# Patient Record
Sex: MALE | Race: WHITE | Employment: OTHER | URBAN - METROPOLITAN AREA
[De-identification: names, ages, dates, MRNs, and addresses within clinical notes are randomized per-mention and may not be internally consistent; named-entity substitution may affect disease eponyms.]

---

## 2017-01-01 ENCOUNTER — CARE COORDINATION (OUTPATIENT)
Dept: CARE COORDINATION | Facility: CLINIC | Age: 82
End: 2017-01-01

## 2017-01-01 ENCOUNTER — OFFICE VISIT (OUTPATIENT)
Dept: CARDIOLOGY | Facility: CLINIC | Age: 82
End: 2017-01-01
Attending: NURSE PRACTITIONER
Payer: MEDICARE

## 2017-01-01 VITALS
SYSTOLIC BLOOD PRESSURE: 120 MMHG | BODY MASS INDEX: 24.93 KG/M2 | HEART RATE: 48 BPM | DIASTOLIC BLOOD PRESSURE: 56 MMHG | WEIGHT: 155.1 LBS | HEIGHT: 66 IN

## 2017-01-01 DIAGNOSIS — E78.00 HYPERCHOLESTEROLEMIA: ICD-10-CM

## 2017-01-01 DIAGNOSIS — I25.10 CORONARY ARTERY DISEASE INVOLVING NATIVE CORONARY ARTERY OF NATIVE HEART WITHOUT ANGINA PECTORIS: Primary | ICD-10-CM

## 2017-01-01 DIAGNOSIS — I48.20 CHRONIC ATRIAL FIBRILLATION (H): ICD-10-CM

## 2017-01-01 DIAGNOSIS — G25.0 ESSENTIAL TREMOR: Primary | ICD-10-CM

## 2017-01-01 DIAGNOSIS — I35.0 MILD AORTIC STENOSIS: ICD-10-CM

## 2017-01-01 PROCEDURE — 99214 OFFICE O/P EST MOD 30 MIN: CPT | Performed by: INTERNAL MEDICINE

## 2017-01-01 RX ORDER — PROPRANOLOL HYDROCHLORIDE 40 MG/1
TABLET ORAL
Qty: 60 TABLET | Refills: 3 | Status: SHIPPED | OUTPATIENT
Start: 2017-01-01 | End: 2017-01-01

## 2017-01-30 DIAGNOSIS — I10 ESSENTIAL HYPERTENSION, BENIGN: Primary | ICD-10-CM

## 2017-01-30 RX ORDER — AMLODIPINE BESYLATE 5 MG/1
5 TABLET ORAL DAILY
Qty: 90 TABLET | Refills: 3 | Status: SHIPPED | OUTPATIENT
Start: 2017-01-30 | End: 2018-01-01

## 2017-03-13 ENCOUNTER — OFFICE VISIT (OUTPATIENT)
Dept: FAMILY MEDICINE | Facility: CLINIC | Age: 82
End: 2017-03-13

## 2017-03-13 VITALS
BODY MASS INDEX: 23.57 KG/M2 | HEART RATE: 59 BPM | DIASTOLIC BLOOD PRESSURE: 60 MMHG | WEIGHT: 155 LBS | SYSTOLIC BLOOD PRESSURE: 130 MMHG | OXYGEN SATURATION: 99 %

## 2017-03-13 DIAGNOSIS — R63.4 LOSS OF WEIGHT: ICD-10-CM

## 2017-03-13 DIAGNOSIS — R29.898 COGWHEEL RIGIDITY: Primary | ICD-10-CM

## 2017-03-13 DIAGNOSIS — R26.89 BALANCE DISORDER: ICD-10-CM

## 2017-03-13 LAB
% GRANULOCYTES: 80.2 % (ref 42.2–75.2)
BACTERIA URINE: NORMAL
BILIRUB UR QL STRIP: 0
BLOOD URINE DIP: 0
CASTS/LPF: NORMAL
COLOR UR: YELLOW
CRYSTAL URINE: NORMAL
EPITHELIAL CELLS - QUEST: NORMAL
GLUCOSE UR STRIP-MCNC: 0 MG/DL
HCT VFR BLD AUTO: 46.5 % (ref 39–51)
HEMOGLOBIN: 15.6 G/DL (ref 13.4–17.5)
KETONES UR QL STRIP: 0
LEUKOCYTE ESTERASE URINE DIP: 0
LYMPHOCYTES NFR BLD AUTO: 15 % (ref 20.5–51.1)
MCH RBC QN AUTO: 32.2 PG (ref 27–31)
MCHC RBC AUTO-ENTMCNC: 33.5 G/DL (ref 33–37)
MCV RBC AUTO: 96.1 FL (ref 80–100)
MONOCYTES NFR BLD AUTO: 4.8 % (ref 1.7–9.3)
MUCOUS URINE: NORMAL
NITRITE UR QL STRIP: NORMAL
PH UR STRIP: 7 PH (ref 5–9)
PLATELET # BLD AUTO: 169 K/UL (ref 140–450)
PROT UR QL: 0 MG/DL (ref ?–0.01)
RBC # BLD AUTO: 4.84 X10/CMM (ref 4.2–5.9)
RBC URINE: NORMAL (ref 0–3)
SP GR UR STRIP: 1.02 (ref 1–1.02)
UROBILINOGEN UR QL STRIP: 0.2 EU/DL (ref 0.2–1)
WBC # BLD AUTO: 7.4 X10/CMM (ref 3.8–11)
WBC URINE: NORMAL (ref 0–3)

## 2017-03-13 PROCEDURE — 81003 URINALYSIS AUTO W/O SCOPE: CPT | Performed by: FAMILY MEDICINE

## 2017-03-13 PROCEDURE — 85025 COMPLETE CBC W/AUTO DIFF WBC: CPT | Performed by: FAMILY MEDICINE

## 2017-03-13 PROCEDURE — 36415 COLL VENOUS BLD VENIPUNCTURE: CPT | Performed by: FAMILY MEDICINE

## 2017-03-13 PROCEDURE — 99214 OFFICE O/P EST MOD 30 MIN: CPT | Performed by: FAMILY MEDICINE

## 2017-03-13 NOTE — PROGRESS NOTES
Problem(s) Oriented visit        SUBJECTIVE:                                                    Deepak Arndt is a 81 year old male who presents to clinic today for the following health issues :    . Cogwheel rigidity  Found today    - Referral to [facility].    2. Loss of weight  See letter from wife  - Comp. Metabolic Panel (14) ([facility])  - CBC with Diff/Plt ([facility])  - PSA Serum ([facility])  - Referral to [facility].  - TSH ([facility])    3. Balance disorder  2 falls  - Referral to [facility].         Problem list, Medication list, Allergies, and Medical/Social/Surgical histories reviewed in [facility] and updated as appropriate.   Additional history: as documented    ROS:  General and Resp. completed and negative except as noted above    Histories:   Patient Active Problem List   Diagnosis     HTN (hypertension)     Osteoporosis     Hypercholesterolemia     Past myocardial infarction     Advance Care Planning     Chronic atrial fibrillation (H)     Health Care Home     Myocardial infarction (H)     Mild aortic stenosis     Coronary artery disease involving native coronary artery of native heart without angina pectoris     Aortic valve disorder     Past Surgical History   Procedure Laterality Date     Hc or ocular device intraop detached retina       Cataract iol, rt/lt         Social History   Substance Use Topics     Smoking status: Former Smoker     Packs/day: 1.00     Years: 20.00     Types: Cigarettes     Quit date: 1/1/1974     Smokeless tobacco: Never Used     Alcohol use 7.0 oz/week     14 Glasses of wine per week      Comment: 2/day     No family history on file.        OBJECTIVE:                                                    /60  Pulse 59  Wt 70.3 kg (155 lb)  SpO2 99%  BMI 23.57 kg/m2  Body mass index is 23.57 kg/(m^2).   APPEARANCE: = Relaxed and in no distress  Conj/Eyelids = noninjected and lids and lashes are without  inflammation  PERRLA/Irises = Pupils Round Reactive to Light and Irisis without inflammation  Neck = No anterior or posterior adenopathy appreciated.  Thyroid = Not enlarged and no masses felt  Resp effort = Calm regular breathing  Breath Sounds = Good air movement with no rales or rhonchi in any lung fields  Heart Rate, Rythym, & sounds (no Murm)  = Regular rate and rythym with no S3, S4, or murmer appreciated.  Carotid Art's = Pulses full and equal and no bruits appreciated  Abdomen = Soft, nontender, no masses, & bowel sounds in all quadrants  Liver/Spleen = Normal span and no splenomegaly noted  Digits and Nails = FROM in all finger joints, no nail dystrophy  Ext (edema) = No pretibial edema noted or elsewhere  Musculsktl: unsteady gait, and cogwheel rigidity  SKIN = absent significant rashes or lesions   Recent/Remote Memory = Alert and Oriented x 3  Mood/Affect = Cooperative and interested     ASSESSMENT/PLAN:                                                        Deepak was seen today for weight loss and fall.    Diagnoses and all orders for this visit:    Cogwheel rigidity  -     Referral to [facility]    Loss of weight  -     Comp. Metabolic Panel (14) ([facility])  -     CBC with Diff/Plt ([facility])  -     PSA Serum ([facility])  -     Referral to [facility]  -     TSH ([facility])  UA  Colonoscopy done 3 years ago and no PSA for 6 years,  Balance disorder  -     Referral to [facility]        See Patient Instructions    The following health maintenance items are reviewed in Epic and correct as of today:  Health Maintenance   Topic Date Due     MEDICARE ANNUAL WELLNESS VISIT  03/17/2002     PSA Q1 YR  05/04/2012     LIPID MONITORING Q6 MO( NO INBASKET)  12/01/2016     FALL RISK ASSESSMENT  05/26/2017     PNEUMOCOCCAL (2 of 2 - PPSV23) 06/01/2017     INFLUENZA VACCINE (SYSTEM ASSIGNED)  09/01/2017     ADVANCE DIRECTIVE PLANNING Q5 YRS (NO  NICKO)  10/29/2020     TETANUS IMMUNIZATION (SYSTEM ASSIGNED)  02/27/2023       Clinton Mills MD  [facility]  [phone]    For any issues my office # is [phone]

## 2017-03-13 NOTE — MR AVS SNAPSHOT
After Visit Summary   3/13/2017    Deepak Arndt    MRN: [id number]           Patient Information     Date Of Birth          1935        Visit Information        Provider Department      3/13/2017 8:45 AM Clinton Mills MD [facility]        Today's Diagnoses     Cogwheel rigidity    -  1    Loss of weight        Balance disorder           Follow-ups after your visit        Additional Services     Referral to [facility]       Referral to [facility]  Phone:  [phone]  Reason for Referral:  Parkinsons evaluation    Please be aware that coverage of these services is subject to the terms and limitations of your health insurance plan.  Call member services at your health plan with any benefit or coverage questions.                  Who to contact     If you have questions or need follow up information about today's clinic visit or your schedule please contact [facility] directly at [phone].  Normal or non-critical lab and imaging results will be communicated to you by MyChart, letter or phone within 4 business days after the clinic has received the results. If you do not hear from us within 7 days, please contact the clinic through [employer] or phone. If you have a critical or abnormal lab result, we will notify you by phone as soon as possible.  Submit refill requests through [employer] or call your pharmacy and they will forward the refill request to us. Please allow 3 business days for your refill to be completed.          Additional Information About Your Visit        MyChart Information     [employer] gives you secure access to your electronic health record. If you see a primary care provider, you can also send messages to your care team and make appointments. If you have questions, please call your primary care clinic.  If you do not have a primary care provider, please call [phone] and they will assist  you.        Care EveryWhere ID     This is your Care EveryWhere ID. This could be used by other organizations to access your [place] medical records  [id number]        Your Vitals Were     Pulse Pulse Oximetry BMI (Body Mass Index)             59 99% 23.57 kg/m2          Blood Pressure from Last 3 Encounters:   03/13/17 130/60   12/28/16 130/86   12/16/16 150/90    Weight from Last 3 Encounters:   03/13/17 70.3 kg (155 lb)   12/28/16 72.6 kg (160 lb)   12/16/16 72.6 kg (160 lb)              We Performed the Following     CBC with Diff/Plt ([facility])     Comp. Metabolic Panel (14) ([facility])     PSA Serum ([facility])     Referral to [facility]     TSH ([facility])     Urinalysis w/reflex protein, bili ([facility])        Primary Care Provider Office Phone # Fax #    Clinton Mills [phone]       [facility] [address] [facility] [address]        Thank you!     Thank you for choosing [facility]  for your care. Our goal is always to provide you with excellent care. Hearing back from our patients is one way we can continue to improve our services. Please take a few minutes to complete the written survey that you may receive in the mail after your visit with us. Thank you!             Your Updated Medication List - Protect others around you: Learn how to safely use, store and throw away your medicines at www.disposemymeds.org.          This list is accurate as of: 3/13/17  9:28 AM.  Always use your most recent med list.                   Brand Name Dispense Instructions for use    amLODIPine 5 MG tablet    NORVASC    90 tablet    Take 1 tablet (5 mg) by mouth daily       CALCIUM 500/VITAMIN D PO      Take 1 tablet by mouth daily.       DULoxetine 30 MG EC capsule    CYMBALTA         fish oil-omega-3 fatty acids 1000 MG capsule      Take 1 capsule by mouth daily.       hydrochlorothiazide 25 MG tablet    HYDRODIURIL    90 tablet    TAKE ONE TABLET BY  MOUTH ONE TIME DAILY       MULTIVITAMIN & MINERAL PO      Take  by mouth daily.       PRADAXA PO      Take 150 mg by mouth 2 times daily       simvastatin 10 MG tablet    ZOCOR     Take 5 mg by mouth At Bedtime

## 2017-03-14 LAB
ALBUMIN SERPL-MCNC: 4.4 G/DL (ref 3.5–4.7)
ALBUMIN/GLOB SERPL: 2.3 {RATIO} (ref 1.2–2.2)
ALP SERPL-CCNC: 66 IU/L (ref 39–117)
ALT SERPL-CCNC: 13 IU/L (ref 0–44)
AST SERPL-CCNC: 15 IU/L (ref 0–40)
BILIRUB SERPL-MCNC: 0.7 MG/DL (ref 0–1.2)
BUN SERPL-MCNC: 16 MG/DL (ref 8–27)
BUN/CREATININE RATIO: 19 (ref 10–22)
CALCIUM SERPL-MCNC: 10 MG/DL (ref 8.6–10.2)
CHLORIDE SERPLBLD-SCNC: 102 MMOL/L (ref 96–106)
CREAT SERPL-MCNC: 0.86 MG/DL (ref 0.76–1.27)
EGFR IF AFRICN AM: 95 ML/MIN/1.73
EGFR IF NONAFRICN AM: 82 ML/MIN/1.73
GLOBULIN, TOTAL: 1.9 G/DL (ref 1.5–4.5)
GLUCOSE SERPL-MCNC: 116 MG/DL (ref 65–99)
POTASSIUM SERPL-SCNC: 3.7 MMOL/L (ref 3.5–5.2)
PROT SERPL-MCNC: 6.3 G/DL (ref 6–8.5)
PSA NG/ML: 4.1 NG/ML (ref 0–4)
SODIUM SERPL-SCNC: 141 MMOL/L (ref 134–144)
TOTAL CO2: 22 MMOL/L (ref 18–28)
TSH BLD-ACNC: 0.86 UIU/ML (ref 0.45–4.5)

## 2017-03-16 NOTE — PROGRESS NOTES
Dear Deepak,   I am writing to report that your included test results are within expected ranges. I do not suggest that we make any changes at this time.    Clinton Mills M.D.

## 2017-05-04 ENCOUNTER — OFFICE VISIT (OUTPATIENT)
Dept: FAMILY MEDICINE | Facility: CLINIC | Age: 82
End: 2017-05-04

## 2017-05-04 VITALS
OXYGEN SATURATION: 96 % | HEART RATE: 47 BPM | TEMPERATURE: 98.8 F | WEIGHT: 157 LBS | BODY MASS INDEX: 23.87 KG/M2 | DIASTOLIC BLOOD PRESSURE: 60 MMHG | SYSTOLIC BLOOD PRESSURE: 138 MMHG

## 2017-05-04 DIAGNOSIS — J18.9 PNEUMONIA OF RIGHT LOWER LOBE DUE TO INFECTIOUS ORGANISM: ICD-10-CM

## 2017-05-04 DIAGNOSIS — R05.9 COUGH: Primary | ICD-10-CM

## 2017-05-04 DIAGNOSIS — R53.83 FATIGUE, UNSPECIFIED TYPE: ICD-10-CM

## 2017-05-04 LAB
% GRANULOCYTES: 82 % (ref 42.2–75.2)
HCT VFR BLD AUTO: 46.8 % (ref 39–51)
HEMOGLOBIN: 15.5 G/DL (ref 13.4–17.5)
LYMPHOCYTES NFR BLD AUTO: 9.5 % (ref 20.5–51.1)
MCH RBC QN AUTO: 32.1 PG (ref 27–31)
MCHC RBC AUTO-ENTMCNC: 33.1 G/DL (ref 33–37)
MCV RBC AUTO: 97 FL (ref 80–100)
MONOCYTES NFR BLD AUTO: 8.5 % (ref 1.7–9.3)
PLATELET # BLD AUTO: 138 K/UL (ref 140–450)
RBC # BLD AUTO: 4.82 X10/CMM (ref 4.2–5.9)
WBC # BLD AUTO: 7 X10/CMM (ref 3.8–11)

## 2017-05-04 PROCEDURE — 99214 OFFICE O/P EST MOD 30 MIN: CPT | Performed by: FAMILY MEDICINE

## 2017-05-04 PROCEDURE — 71020 XR CHEST 2 VW: CPT | Performed by: FAMILY MEDICINE

## 2017-05-04 PROCEDURE — 85025 COMPLETE CBC W/AUTO DIFF WBC: CPT | Performed by: FAMILY MEDICINE

## 2017-05-04 PROCEDURE — 36415 COLL VENOUS BLD VENIPUNCTURE: CPT | Performed by: FAMILY MEDICINE

## 2017-05-04 RX ORDER — CODEINE PHOSPHATE AND GUAIFENESIN 10; 100 MG/5ML; MG/5ML
2 SOLUTION ORAL EVERY 4 HOURS PRN
Qty: 236 ML | Refills: 0 | Status: SHIPPED | OUTPATIENT
Start: 2017-05-04 | End: 2017-05-15

## 2017-05-04 RX ORDER — LEVOFLOXACIN 500 MG/1
500 TABLET, FILM COATED ORAL DAILY
Qty: 10 TABLET | Refills: 0 | Status: SHIPPED | OUTPATIENT
Start: 2017-05-04 | End: 2017-05-15

## 2017-05-04 NOTE — MR AVS SNAPSHOT
After Visit Summary   5/4/2017    Deepak Arndt    MRN: [id number]           Patient Information     Date Of Birth          1935        Visit Information        Provider Department      5/4/2017 12:30 PM Clinton Mills MD [facility]        Today's Diagnoses     Cough    -  1    Fatigue, unspecified type        Pneumonia of right lower lobe due to infectious organism           Follow-ups after your visit        Your next 10 appointments already scheduled     Jun 06, 2017  2:30 PM CDT   PHYSICAL with Clinton Mills MD   [facility]    [address] [place] [address] [phone]              Who to contact     If you have questions or need follow up information about today's clinic visit or your schedule please contact [facility] directly at [phone].  Normal or non-critical lab and imaging results will be communicated to you by [employer], letter or phone within 4 business days after the clinic has received the results. If you do not hear from us within 7 days, please contact the clinic through [employer] or phone. If you have a critical or abnormal lab result, we will notify you by phone as soon as possible.  Submit refill requests through [email or web address] or call your pharmacy and they will forward the refill request to us. Please allow 3 business days for your refill to be completed.          Additional Information About Your Visit        MyChart Information     [email or web address] gives you secure access to your electronic health record. If you see a primary care provider, you can also send messages to your care team and make appointments. If you have questions, please call your primary care clinic.  If you do not have a primary care provider, please call [phone] and they will assist you.        Care EveryWhere ID     This is your Care EveryWhere ID. This could be used by other organizations to access your [place]  medical records  [id number]        Your Vitals Were     Pulse Temperature Pulse Oximetry BMI (Body Mass Index)          47 98.8  F (37.1  C) (Oral) 96% 23.87 kg/m2         Blood Pressure from Last 3 Encounters:   05/04/17 138/60   03/13/17 130/60   12/28/16 130/86    Weight from Last 3 Encounters:   05/04/17 71.2 kg (157 lb)   03/13/17 70.3 kg (155 lb)   12/28/16 72.6 kg (160 lb)              We Performed the Following     CBC with Diff/Plt ([facility])     Comp. Metabolic Panel (14) ([facility])     TSH ([facility])     X-ray Chest 2 vws*          Today's Medication Changes          These changes are accurate as of: 5/4/17  1:14 PM.  If you have any questions, ask your nurse or doctor.               Start taking these medicines.        Dose/Directions    levofloxacin 500 MG tablet   Commonly known as:  LEVAQUIN   Used for:  Pneumonia of right lower lobe due to infectious organism   Started by:  Clinton Mills MD        Dose:  500 mg   Take 1 tablet (500 mg) by mouth daily   Quantity:  10 tablet   Refills:  0         These medicines have changed or have updated prescriptions.        Dose/Directions    * CHERATUSSIN AC PO   This may have changed:  Another medication with the same name was added. Make sure you understand how and when to take each.   Changed by:  Clinton Mills MD        Refills:  0       * guaiFENesin-codeine 100-10 MG/5ML Soln solution   Commonly known as:  ROBITUSSIN AC   This may have changed:  You were already taking a medication with the same name, and this prescription was added. Make sure you understand how and when to take each.   Used for:  Cough, Pneumonia of right lower lobe due to infectious organism   Changed by:  Clinton Mills MD        Dose:  2 tsp.   Take 10 mLs by mouth every 4 hours as needed for cough   Quantity:  236 mL   Refills:  0       * Notice:  This list has 2 medication(s) that are the same as other medications prescribed for you. Read the directions carefully, and  ask your doctor or other care provider to review them with you.         Where to get your medicines      These medications were sent to [facility] [address] [place] - [address]  [address] [place] [address]     Phone:  [phone]     levofloxacin 500 MG tablet         Some of these will need a paper prescription and others can be bought over the counter.  Ask your nurse if you have questions.     Bring a paper prescription for each of these medications     guaiFENesin-codeine 100-10 MG/5ML Soln solution                Primary Care Provider Office Phone # Fax #    Clinton Mills [phone]       [facility] [address]  [facility] [address]        Thank you!     Thank you for choosing [facility]  for your care. Our goal is always to provide you with excellent care. Hearing back from our patients is one way we can continue to improve our services. Please take a few minutes to complete the written survey that you may receive in the mail after your visit with us. Thank you!             Your Updated Medication List - Protect others around you: Learn how to safely use, store and throw away your medicines at www.disposemymeds.org.          This list is accurate as of: 5/4/17  1:14 PM.  Always use your most recent med list.                   Brand Name Dispense Instructions for use    amLODIPine 5 MG tablet    NORVASC    90 tablet    Take 1 tablet (5 mg) by mouth daily       CALCIUM 500/VITAMIN D PO      Take 1 tablet by mouth daily.       * CHERATUSSIN AC PO          * guaiFENesin-codeine 100-10 MG/5ML Soln solution    ROBITUSSIN AC    236 mL    Take 10 mLs by mouth every 4 hours as needed for cough       fish oil-omega-3 fatty acids 1000 MG capsule      Take 1 capsule by mouth daily.       hydrochlorothiazide 25 MG tablet    HYDRODIURIL    90 tablet    TAKE ONE TABLET BY MOUTH ONE TIME DAILY       levofloxacin 500 MG tablet    LEVAQUIN    10 tablet     Take 1 tablet (500 mg) by mouth daily       MULTIVITAMIN & MINERAL PO      Take  by mouth daily.       PRADAXA PO      Take 150 mg by mouth 2 times daily       simvastatin 10 MG tablet    ZOCOR     Take 5 mg by mouth At Bedtime       * Notice:  This list has 2 medication(s) that are the same as other medications prescribed for you. Read the directions carefully, and ask your doctor or other care provider to review them with you.

## 2017-05-04 NOTE — PROGRESS NOTES
Problem(s) Oriented visit        SUBJECTIVE:                                                    Deepak Arndt is a 82 year old male who presents to clinic today for the following health issues :    1. Cough  3 weeks and getting worse  - X-ray Chest 2 vws*  - CBC with Diff/Plt ([facility])    2. Fatigue, unspecified type  More time in the bed  - Comp. Metabolic Panel (14) ([facility])  - TSH ([facility])  - CBC with Diff/Plt ([facility])    3. Pneumonia of right lower lobe due to infectious organism  Found on the xray today         Problem list, Medication list, Allergies, and Medical/Social/Surgical histories reviewed in [facility] and updated as appropriate.   Additional history: as documented    ROS:  5 point ROS completed and negative except noted above, including Gen, CV, Resp, GI, MS    Histories:   Patient Active Problem List   Diagnosis     HTN (hypertension)     Osteoporosis     Hypercholesterolemia     Past myocardial infarction     Advance Care Planning     Chronic atrial fibrillation (H)     Health Care Home     Myocardial infarction (H)     Mild aortic stenosis     Coronary artery disease involving native coronary artery of native heart without angina pectoris     Aortic valve disorder     Past Surgical History:   Procedure Laterality Date     CATARACT IOL, RT/LT       HC OR OCULAR DEVICE INTRAOP DETACHED RETINA         Social History   Substance Use Topics     Smoking status: Former Smoker     Packs/day: 1.00     Years: 20.00     Types: Cigarettes     Quit date: 1/1/1974     Smokeless tobacco: Never Used     Alcohol use 7.0 oz/week     14 Glasses of wine per week      Comment: 2/day     No family history on file.        OBJECTIVE:                                                    /60  Pulse (!) 47  Temp 98.8  F (37.1  C) (Oral)  Wt 71.2 kg (157 lb)  SpO2 96%  BMI 23.87 kg/m2  Body mass index is 23.87 kg/(m^2).   APPEARANCE: = Relaxed and in no distress  Conj/Eyelids = noninjected and lids and lashes are without  inflammation  PERRLA/Irises = Pupils Round Reactive to Light and Irisis without inflammation  Ears/Nose = External structures and Nares have usual shape and form  Ear canals and TM's = Canals are not inflammed and have none or little wax and the drums are not injected and have a light reflex   Lips/Teeth/Gums = No lesions seen, good dentition, and gums seem healthy  Oropharynx = No leukoplakia, No injection to the tissues, Normal Uvula  Neck = No anterior or posterior adenopathy appreciated.  Resp effort = Calm regular breathing  Breath Sounds =  rales on the right  Mood/Affect = Cooperative and interested     ASSESSMENT/PLAN:                                                        Deepak was seen today for cough.    Diagnoses and all orders for this visit:    Cough  -     X-ray Chest 2 vws*  -     CBC with Diff/Plt ([facility])  -     guaiFENesin-codeine (ROBITUSSIN AC) 100-10 MG/5ML SOLN solution; Take 10 mLs by mouth every 4 hours as needed for cough    Fatigue, unspecified type  -     Comp. Metabolic Panel (14) ([facility])  -     TSH ([facility])  -     CBC with Diff/Plt ([facility])    Pneumonia of right lower lobe due to infectious organism  -     guaiFENesin-codeine (ROBITUSSIN AC) 100-10 MG/5ML SOLN solution; Take 10 mLs by mouth every 4 hours as needed for cough  -     levofloxacin (LEVAQUIN) 500 MG tablet; Take 1 tablet (500 mg) by mouth daily            The following health maintenance items are reviewed in Epic and correct as of today:  Health Maintenance   Topic Date Due     MEDICARE ANNUAL WELLNESS VISIT  03/17/2002     LIPID MONITORING Q6 MO( NO INBASKET)  12/01/2016     FALL RISK ASSESSMENT  05/26/2017     PNEUMOCOCCAL (2 of 2 - PPSV23) 06/01/2017     INFLUENZA VACCINE (SYSTEM ASSIGNED)  09/01/2017     PSA Q1 YR  03/13/2018     ADVANCE DIRECTIVE PLANNING Q5 YRS (NO INBASKET)  10/29/2020     TETANUS IMMUNIZATION (SYSTEM ASSIGNED)  02/27/2023       Clinton Mills MD  [facility]  [facility]  [facility]  [phone]    For any issues my office # is [phone]

## 2017-05-05 LAB
ALBUMIN SERPL-MCNC: 4.4 G/DL (ref 3.5–4.7)
ALBUMIN/GLOB SERPL: 1.7 {RATIO} (ref 1.2–2.2)
ALP SERPL-CCNC: 70 IU/L (ref 39–117)
ALT SERPL-CCNC: 21 IU/L (ref 0–44)
AST SERPL-CCNC: 26 IU/L (ref 0–40)
BILIRUB SERPL-MCNC: 0.7 MG/DL (ref 0–1.2)
BUN SERPL-MCNC: 15 MG/DL (ref 8–27)
BUN/CREATININE RATIO: 17 (ref 10–24)
CALCIUM SERPL-MCNC: 9.7 MG/DL (ref 8.6–10.2)
CHLORIDE SERPLBLD-SCNC: 100 MMOL/L (ref 96–106)
CREAT SERPL-MCNC: 0.89 MG/DL (ref 0.76–1.27)
EGFR IF AFRICN AM: 93 ML/MIN/1.73
EGFR IF NONAFRICN AM: 80 ML/MIN/1.73
GLOBULIN, TOTAL: 2.6 G/DL (ref 1.5–4.5)
GLUCOSE SERPL-MCNC: 94 MG/DL (ref 65–99)
POTASSIUM SERPL-SCNC: 4.2 MMOL/L (ref 3.5–5.2)
PROT SERPL-MCNC: 7 G/DL (ref 6–8.5)
SODIUM SERPL-SCNC: 142 MMOL/L (ref 134–144)
TOTAL CO2: 23 MMOL/L (ref 18–28)
TSH BLD-ACNC: 0.65 UIU/ML (ref 0.45–4.5)

## 2017-05-12 ENCOUNTER — TRANSFERRED RECORDS (OUTPATIENT)
Dept: FAMILY MEDICINE | Facility: CLINIC | Age: 82
End: 2017-05-12

## 2017-05-15 ENCOUNTER — OFFICE VISIT (OUTPATIENT)
Dept: FAMILY MEDICINE | Facility: CLINIC | Age: 82
End: 2017-05-15

## 2017-05-15 VITALS
WEIGHT: 155 LBS | OXYGEN SATURATION: 97 % | BODY MASS INDEX: 23.57 KG/M2 | SYSTOLIC BLOOD PRESSURE: 138 MMHG | HEART RATE: 50 BPM | DIASTOLIC BLOOD PRESSURE: 62 MMHG

## 2017-05-15 DIAGNOSIS — R05.9 COUGH: Primary | ICD-10-CM

## 2017-05-15 DIAGNOSIS — R26.81 GAIT INSTABILITY: ICD-10-CM

## 2017-05-15 PROCEDURE — 99213 OFFICE O/P EST LOW 20 MIN: CPT | Performed by: FAMILY MEDICINE

## 2017-05-15 PROCEDURE — 71020 XR CHEST 2 VW: CPT | Performed by: FAMILY MEDICINE

## 2017-05-15 NOTE — MR AVS SNAPSHOT
After Visit Summary   5/15/2017    Deepak Arndt    MRN: [id number]           Patient Information     Date Of Birth          1935        Visit Information        Provider Department      5/15/2017 1:15 PM Clinton Mills MD [facility]        Today's Diagnoses     Cough    -  1    Gait instability           Follow-ups after your visit        Your next 10 appointments already scheduled     Jun 06, 2017  2:30 PM CDT   [facility] with [facility] MD   [facility]    [address] [place] [address] [phone]              Who to contact     If you have questions or need follow up information about today's clinic visit or your schedule please contact [facility] directly at [phone].  Normal or non-critical lab and imaging results will be communicated to you by MyChart, letter or phone within 4 business days after the clinic has received the results. If you do not hear from us within 7 days, please contact the clinic through MyChart or phone. If you have a critical or abnormal lab result, we will notify you by phone as soon as possible.  Submit refill requests through [employer] or call your pharmacy and they will forward the refill request to us. Please allow 3 business days for your refill to be completed.          Additional Information About Your Visit        MyChart Information     [employer] gives you secure access to your electronic health record. If you see a primary care provider, you can also send messages to your care team and make appointments. If you have questions, please call your primary care clinic.  If you do not have a primary care provider, please call [phone] and they will assist you.        Care EveryWhere ID     This is your Care EveryWhere ID. This could be used by other organizations to access your [place] medical records  [id number]        Your Vitals Were     Pulse Pulse  Oximetry BMI (Body Mass Index)             50 97% 23.57 kg/m2          Blood Pressure from Last 3 Encounters:   05/15/17 138/62   05/04/17 138/60   03/13/17 130/60    Weight from Last 3 Encounters:   05/15/17 70.3 kg (155 lb)   05/04/17 71.2 kg (157 lb)   03/13/17 70.3 kg (155 lb)              We Performed the Following     X-ray Chest 2 vws*        Primary Care Provider Office Phone # Fax #    Clinton Mills [phone]       [facility] [address]  [facility] [address]        Thank you!     Thank you for choosing [facility]  for your care. Our goal is always to provide you with excellent care. Hearing back from our patients is one way we can continue to improve our services. Please take a few minutes to complete the written survey that you may receive in the mail after your visit with us. Thank you!             Your Updated Medication List - Protect others around you: Learn how to safely use, store and throw away your medicines at www.disposemymeds.org.          This list is accurate as of: 5/15/17  3:28 PM.  Always use your most recent med list.                   Brand Name Dispense Instructions for use    amLODIPine 5 MG tablet    NORVASC    90 tablet    Take 1 tablet (5 mg) by mouth daily       CALCIUM 500/VITAMIN D PO      Take 1 tablet by mouth daily.       fish oil-omega-3 fatty acids 1000 MG capsule      Take 1 capsule by mouth daily.       hydrochlorothiazide 25 MG tablet    HYDRODIURIL    90 tablet    TAKE ONE TABLET BY MOUTH ONE TIME DAILY       MULTIVITAMIN & MINERAL PO      Take  by mouth daily.       PRADAXA PO      Take 150 mg by mouth 2 times daily       simvastatin 10 MG tablet    ZOCOR     Take 5 mg by mouth At Bedtime

## 2017-05-15 NOTE — PROGRESS NOTES
Problem(s) Oriented visit        SUBJECTIVE:                                                    Deepak Arndt is a 82 year old male who presents to clinic today for the following health issues :      1. Cough  Pnuemonia, follow up, feels much improved.  Here for follow up cxr  No cough  Seeing Neurology for tremor and gait instability  - X-ray Chest 2 vws*         Problem list, Medication list, Allergies, and Medical/Social/Surgical histories reviewed in EPIC and updated as appropriate.   Additional history: as documented    ROS:  General and CV completed and negative except as noted above    Histories:   Patient Active Problem List   Diagnosis     HTN (hypertension)     Osteoporosis     Hypercholesterolemia     Past myocardial infarction     Advance Care Planning     Chronic atrial fibrillation (H)     Health Care Home     Myocardial infarction (H)     Mild aortic stenosis     Coronary artery disease involving native coronary artery of native heart without angina pectoris     Aortic valve disorder     Past Surgical History:   Procedure Laterality Date     CATARACT IOL, RT/LT       HC OR OCULAR DEVICE INTRAOP DETACHED RETINA         Social History   Substance Use Topics     Smoking status: Former Smoker     Packs/day: 1.00     Years: 20.00     Types: Cigarettes     Quit date: 1/1/1974     Smokeless tobacco: Never Used     Alcohol use 7.0 oz/week     14 Glasses of wine per week      Comment: 2/day     No family history on file.        OBJECTIVE:                                                    /62  Pulse 50  Wt 70.3 kg (155 lb)  SpO2 97%  BMI 23.57 kg/m2  Body mass index is 23.57 kg/(m^2).   APPEARANCE: = Relaxed and in no distress  Conj/Eyelids = noninjected and lids and lashes are without inflammation  PERRLA/Irises = Pupils Round Reactive to Light and Irisis without inflammation  Ears/Nose = External structures and Nares have usual shape and form  Ear canals and TM's = Canals are not inflammed and  have none or little wax and the drums are not injected and have a light reflex   Lips/Teeth/Gums = No lesions seen, good dentition, and gums seem healthy  Oropharynx = No leukoplakia, No injection to the tissues, Normal Uvula  Neck = No anterior or posterior adenopathy appreciated.  Resp effort = Calm regular breathing  Breath Sounds = Good air movement with no rales or rhonchi in any lung fields  Mood/Affect = Cooperative and interested     ASSESSMENT/PLAN:                                                        Deepak was seen today for recheck medication.    Diagnoses and all orders for this visit:    Cough  -     X-ray Chest 2 vws*    Gait instability    see what the neurologist has to say.        The following health maintenance items are reviewed in Epic and correct as of today:  Health Maintenance   Topic Date Due     MEDICARE ANNUAL WELLNESS VISIT  03/17/2002     LIPID MONITORING Q6 MO( NO INBASKET)  12/01/2016     FALL RISK ASSESSMENT  05/26/2017     PNEUMOCOCCAL (2 of 2 - PPSV23) 06/01/2017     INFLUENZA VACCINE (SYSTEM ASSIGNED)  09/01/2017     PSA Q1 YR  03/13/2018     ADVANCE DIRECTIVE PLANNING Q5 YRS (NO INBASKET)  10/29/2020     TETANUS IMMUNIZATION (SYSTEM ASSIGNED)  02/27/2023       Clinton Mills MD  [facility]  [phone]    For any issues my office # is [phone]

## 2017-05-16 ENCOUNTER — TELEPHONE (OUTPATIENT)
Dept: FAMILY MEDICINE | Facility: CLINIC | Age: 82
End: 2017-05-16

## 2017-05-16 DIAGNOSIS — J98.11 MILD BASILAR ATELECTASIS OF BOTH LUNGS: Primary | ICD-10-CM

## 2017-05-16 NOTE — TELEPHONE ENCOUNTER
Called patient with chest x ray result.  Informed him of a small area of compression in the base of the lungs. Per Dr Mills patient should get CT of the lungs.  Order sent to [facility].  They will call patient to schedule.

## 2017-05-23 ENCOUNTER — TRANSFERRED RECORDS (OUTPATIENT)
Dept: FAMILY MEDICINE | Facility: CLINIC | Age: 82
End: 2017-05-23

## 2017-05-23 NOTE — LETTER
[facility] [address] [place]  [address]  Phone: [phone]    May 26, 2017      Deepak Arndt  [address] [place] [address]              Dear Deepak,      I am writing to report that your included test results are within expected ranges. I do not suggest that we make any changes at this time.           Sincerely,     Clinton Mills M.D.    Results for orders placed or performed in visit on 05/04/17   Comp. Metabolic Panel (14) ([facility])   Result Value Ref Range    Glucose 94 65 - 99 mg/dL    Urea Nitrogen 15 8 - 27 mg/dL    Creatinine 0.89 0.76 - 1.27 mg/dL    eGFR If NonAfricn Am 80 >59 mL/min/1.73    eGFR If Africn Am 93 >59 mL/min/1.73    BUN/Creatinine Ratio 17 10 - 24    Sodium 142 134 - 144 mmol/L    Potassium 4.2 3.5 - 5.2 mmol/L    Chloride 100 96 - 106 mmol/L    Total CO2 23 18 - 28 mmol/L    Calcium 9.7 8.6 - 10.2 mg/dL    Protein Total 7.0 6.0 - 8.5 g/dL    Albumin 4.4 3.5 - 4.7 g/dL    Globulin, Total 2.6 1.5 - 4.5 g/dL    A/G Ratio 1.7 1.2 - 2.2    Bilirubin Total 0.7 0.0 - 1.2 mg/dL    Alkaline Phosphatase 70 39 - 117 IU/L    AST 26 0 - 40 IU/L    ALT 21 0 - 44 IU/L    Narrative    Performed at:  01 - [facility] [address], [place], [place]  [id number]  : Efrem Thrasher MD, Phone:  [phone]   TSH ([facility])   Result Value Ref Range    TSH 0.645 0.450 - 4.500 uIU/mL    Narrative    Performed at:  01 - [facility] [address], [place], [place]  [id number]  : Efrem Thrasher MD, Phone:  [phone]   CBC with Diff/Plt ([facility])   Result Value Ref Range    WBC x10/cmm 7.0 3.8 - 11.0 x10/cmm    % Lymphocytes 9.5 (A) 20.5 - 51.1 %    % Monocytes 8.5 1.7 - 9.3 %    % Granulocytes 82.0 (A) 42.2 - 75.2 %    RBC x10/cmm 4.82 4.2 - 5.9 x10/cmm    Hemoglobin 15.5 13.4 - 17.5 g/dl    Hematocrit 46.8 39 - 51 %    MCV 97.0 80 - 100 fL    MCH 32.1 (A) 27.0 - 31.0 pg    MCHC 33.1 33.0 - 37.0 g/dL    Platelet Count 138 (A) 140 - 450 K/uL

## 2017-05-23 NOTE — LETTER
[facility] [address] [place]  [address]  Phone: [phone]    May 26, 2017      Deepak Arndt  [address] [place] [address]              Dear Deepak,    I am writing to report that your included test results are within expected ranges. I do not suggest that we make any changes at this time.           Sincerely,     Clinton Mills M.D.    Results for orders placed or performed in visit on 05/04/17   Comp. Metabolic Panel (14) ([facility])   Result Value Ref Range    Glucose 94 65 - 99 mg/dL    Urea Nitrogen 15 8 - 27 mg/dL    Creatinine 0.89 0.76 - 1.27 mg/dL    eGFR If NonAfricn Am 80 >59 mL/min/1.73    eGFR If Africn Am 93 >59 mL/min/1.73    BUN/Creatinine Ratio 17 10 - 24    Sodium 142 134 - 144 mmol/L    Potassium 4.2 3.5 - 5.2 mmol/L    Chloride 100 96 - 106 mmol/L    Total CO2 23 18 - 28 mmol/L    Calcium 9.7 8.6 - 10.2 mg/dL    Protein Total 7.0 6.0 - 8.5 g/dL    Albumin 4.4 3.5 - 4.7 g/dL    Globulin, Total 2.6 1.5 - 4.5 g/dL    A/G Ratio 1.7 1.2 - 2.2    Bilirubin Total 0.7 0.0 - 1.2 mg/dL    Alkaline Phosphatase 70 39 - 117 IU/L    AST 26 0 - 40 IU/L    ALT 21 0 - 44 IU/L    Narrative    Performed at:  01 - [facility] [address], [place], [place]  [id number]  : Efrem Thrasher MD, Phone:  [phone]   TSH ([facility])   Result Value Ref Range    TSH 0.645 0.450 - 4.500 uIU/mL    Narrative    Performed at:  01 - [facility] [address], [place], [place]  [id number]  : Efrem Thrasher MD, Phone:  [phone]   CBC with Diff/Plt ([facility])   Result Value Ref Range    WBC x10/cmm 7.0 3.8 - 11.0 x10/cmm    % Lymphocytes 9.5 (A) 20.5 - 51.1 %    % Monocytes 8.5 1.7 - 9.3 %    % Granulocytes 82.0 (A) 42.2 - 75.2 %    RBC x10/cmm 4.82 4.2 - 5.9 x10/cmm    Hemoglobin 15.5 13.4 - 17.5 g/dl    Hematocrit 46.8 39 - 51 %    MCV 97.0 80 - 100 fL    MCH 32.1 (A) 27.0 - 31.0 pg    MCHC 33.1 33.0 - 37.0 g/dL    Platelet Count 138 (A) 140 - 450 K/uL

## 2017-06-06 ENCOUNTER — OFFICE VISIT (OUTPATIENT)
Dept: FAMILY MEDICINE | Facility: CLINIC | Age: 82
End: 2017-06-06

## 2017-06-06 VITALS
BODY MASS INDEX: 24.59 KG/M2 | DIASTOLIC BLOOD PRESSURE: 60 MMHG | SYSTOLIC BLOOD PRESSURE: 130 MMHG | OXYGEN SATURATION: 98 % | HEIGHT: 66 IN | WEIGHT: 153 LBS | HEART RATE: 56 BPM

## 2017-06-06 DIAGNOSIS — I48.20 CHRONIC ATRIAL FIBRILLATION (H): ICD-10-CM

## 2017-06-06 DIAGNOSIS — Z00.00 ROUTINE GENERAL MEDICAL EXAMINATION AT A HEALTH CARE FACILITY: Primary | ICD-10-CM

## 2017-06-06 DIAGNOSIS — I10 ESSENTIAL HYPERTENSION: ICD-10-CM

## 2017-06-06 DIAGNOSIS — E78.00 HYPERCHOLESTEROLEMIA: ICD-10-CM

## 2017-06-06 PROCEDURE — G0439 PPPS, SUBSEQ VISIT: HCPCS | Performed by: FAMILY MEDICINE

## 2017-06-06 RX ORDER — DULOXETIN HYDROCHLORIDE 30 MG/1
CAPSULE, DELAYED RELEASE ORAL
Refills: 3 | COMMUNITY
Start: 2017-05-21 | End: 2017-06-06

## 2017-06-06 NOTE — PROGRESS NOTES
Problem(s) Oriented visit    Besides the Medicare Wellness Exam he is here to discuss the status of the following problem(s)  Subjective:  1. Afib  Doing well on pradexa  2. Essential hypertension  Blood presure remains well controlled when checked out of clinic.    Reviewed last 6 BP readings in chart:  BP Readings from Last 6 Encounters:   06/06/17 130/60   05/15/17 138/62   05/04/17 138/60   03/13/17 130/60   12/28/16 130/86   12/16/16 150/90       he has not experienced any significant side effects from medications for hypertension.    NO active cardiac complaints or symptoms with exercise.      3. Hypercholesterolemia  Has history of hyperlipidemia.  On statin for this, denies any significant side effects of this medication.      Latest labs reviewed:    Recent Labs   Lab Test 06/01/16 06/02/15  05/26/10   CHOL  122  128   < >  156   HDL  53  49   < >  38   LDL  50  59   < >  74   TRIG  94  101   < >  222   CHOLHDLRATIO   --   79   --    --     < > = values in this interval not displayed.        Lab Results   Component Value Date    AST 26 05/04/2017             ROS:  5 point ROS completed and negative except noted above, including Gen, CV, Resp, GI, MS     HISTORY:   reports that he drinks about 7.0 oz of alcohol per week    reports that he quit smoking about 43 years ago. His smoking use included Cigarettes. He has a 20.00 pack-year smoking history. He has never used smokeless tobacco.    Patient Active Problem List    Aortic valve disorder         Priority: Medium [2]         Date Noted: 12/16/2015      Mild aortic stenosis         Priority: Medium [2]      Coronary artery disease involving native coronary artery of native heart without angina pectoris         Priority: Medium [2]      Health Care Home         Priority: Medium [2]         Date Noted: 07/07/2014            State Tier 1      Chronic atrial fibrillation (H)         Priority: Medium [2]         Date Noted: 08/20/2013      Myocardial infarction  (H)         Priority: Medium [2]         Date Noted: 06/01/2012            inferior      Advance Care Planning         Priority: Medium [2]         Date Noted: 09/29/2011            Advance Care Planning 10/29/2015: Receipt of ACP            document:  Received: Health Care Directive which            was witnessed or notarized on 9-17-03.  Document            previously scanned on 3-12-15.  Validation form            completed and sent to be scanned.  Code Status            needs to be updated to reflect choices in most            recent ACP document.  Confirmed/documented            designated decision maker(s).  Added by Danyell Soria RN, System ACP Coordinator Honoring             Choices             Advance Care Planning 9/29/11: As per reasonable            care for Seniors, wants in the Short term            aggressive care. No desire for long term            prolongation of life through artificial means if            no hope to bring back to a reasonable             status.Dr. Clinton Mills                  HTN (hypertension)         Priority: Medium [2]         Date Noted: 02/11/2011      Osteoporosis         Priority: Medium [2]         Date Noted: 02/11/2011      Hypercholesterolemia         Priority: Medium [2]         Date Noted: 02/11/2011      Past myocardial infarction         Priority: Medium [2]         Date Noted: 02/11/2011        EXAM:  BP: 130/60   Pulse: 56    Temp: Data Unavailable    Wt Readings from Last 2 Encounters:   06/06/17 69.4 kg (153 lb)   05/15/17 70.3 kg (155 lb)       BMI= Body mass index is 24.69 kg/(m^2).    EXAM:  APPEARANCE: = Relaxed and in no distress  Conj/Eyelids = noninjected and lids and lashes are without inflammation  PERRLA/Irises = Pupils Round Reactive to Light and Irisis without inflammation  Ears/Nose = External structures and Nares have usual shape and form  Ear canals and TM's = Canals are not inflammed and have none or little wax and the drums are not  injected and have a light reflex   Lips/Teeth/Gums = No lesions seen, good dentition, and gums seem healthy  Oropharynx = No leukoplakia, No injection to the tissues, Normal Uvula  Neck = No anterior or posterior adenopathy appreciated.  Thyroid = Not enlarged and no masses felt  Resp effort = Calm regular breathing  Breath Sounds = Good air movement with no rales or rhonchi in any lung fields  Heart Rate, Rythym, & sounds (no Murm)  = irregularly irregular rhythm  Carotid Art's = Pulses full and equal and no bruits appreciated  Abdomen = Soft, nontender, no masses, & bowel sounds in all quadrants  Liver/Spleen = Normal span and no splenomegaly noted  = testes and penis are without lesions  Rectal = Anus without lesions, no polyps, Prostate nrl sized and no nodules  Digits and Nails = FROM in all finger joints, no nail dystrophy  Ext (edema) = No pretibial edema noted or elsewhere  Musculsktl =  Strength and ROM of major joints are within normal limits  SKIN = absent significant rashes or lesions   Recent/Remote Memory = Alert and Oriented x 3  NEURO = CN II-XII are tested and no deficits found  Mood/Affect = Cooperative and interested      Assessment/Plan:      Essential hypertension  Discussed hypertension in detail including JNC VIII guidelines for blood pressure goals.  Discussed indication for treatment and treatment options.  Discussed the importance for aggressive management of HTN to prevent vascular complications later.  Recommended lower fat, lower carbohydrate, and lower sodium (<2000 mg)diet.  Discussed required intervals for follow up on HTN, lab studies, and the need to aggresive management of other cardiac disease risk factors.  Recommened pt. follow their blood pressures outside the clinic to ensure that BPs are remaining within guidelines, and to contact me if the readings are not within guidelines so we can adjust treatment as needed.    Hypercholesterolemia  Has history of hyperlipidemia.  On  statin for this, denies any significant side effects of this medication.      Latest labs reviewed:    Recent Labs   Lab Test 06/01/16 06/02/15  05/26/10   CHOL  122  128   < >  156   HDL  53  49   < >  38   LDL  50  59   < >  74   TRIG  94  101   < >  222   CHOLHDLRATIO   --   79   --    --     < > = values in this interval not displayed.        Lab Results   Component Value Date    AST 26 05/04/2017        Afib.  Discussed atrial fibrillation at length including brief discussion of the pathophysiology, possible causes,  treatment issues (rate control & anticoagulation), and also discussed rationale for anticoagulation (including risks of Coumadin therapy) to lower the risk of thromboembolic events (5% per year).            Reviewed patients plan of care and provided an AVS.   The Basic Care Plan (routine screening as documented in Health Maintenance) for Deepak meets the Care Plan requirement.   This Care Plan has been established and reviewed with the  Patient.    Clinton Mills  [facility]  [phone]   For any issues my office # is [phone]

## 2017-06-06 NOTE — MR AVS SNAPSHOT
After Visit Summary   6/6/2017    Deepak Arndt    MRN: [id number]           Patient Information     Date Of Birth          1935        Visit Information        Provider Department      6/6/2017 2:30 PM Clinton Mills MD [facility]        Today's Diagnoses     Routine general medical examination at a health care facility    -  1    Essential hypertension        Hypercholesterolemia        Chronic atrial fibrillation (H)          Care Instructions      Preventive Health Recommendations:       Male Ages 65 and over    Yearly exam:             See your health care provider every year in order to  o   Review health changes.   o   Discuss preventive care.    o   Review your medicines if your doctor has prescribed any.    Talk with your health care provider about whether you should have a test to screen for prostate cancer (PSA).    Every 3 years, have a diabetes test (fasting glucose). If you are at risk for diabetes, you should have this test more often.    Every 5 years, have a cholesterol test. Have this test more often if you are at risk for high cholesterol or heart disease.     Every 10 years, have a colonoscopy. Or, have a yearly FIT test (stool test). These exams will check for colon cancer.    Talk to with your health care provider about screening for Abdominal Aortic Aneurysm if you have a family history of AAA or have a history of smoking.  Shots:     Get a flu shot each year.     Get a tetanus shot every 10 years.     Talk to your doctor about your pneumonia vaccines. There are now two you should receive - Pneumovax (PPSV 23) and Prevnar (PCV 13).    Talk to your doctor about a shingles vaccine.     Talk to your doctor about the hepatitis B vaccine.  Nutrition:     Eat at least 5 servings of fruits and vegetables each day.     Eat whole-grain bread, whole-wheat pasta and brown rice instead of white grains and rice.     Talk to your doctor about Calcium and Vitamin D.  "  Lifestyle    Exercise for at least 150 minutes a week (30 minutes a day, 5 days a week). This will help you control your weight and prevent disease.     Limit alcohol to one drink per day.     No smoking.     Wear sunscreen to prevent skin cancer.     See your dentist every six months for an exam and cleaning.     See your eye doctor every 1 to 2 years to screen for conditions such as glaucoma, macular degeneration and cataracts.          Follow-ups after your visit        Who to contact     If you have questions or need follow up information about today's clinic visit or your schedule please contact [facility] directly at [phone].  Normal or non-critical lab and imaging results will be communicated to you by ProtonMediahart, letter or phone within 4 business days after the clinic has received the results. If you do not hear from us within 7 days, please contact the clinic through Intelligent Apps ([email or web address])t or phone. If you have a critical or abnormal lab result, we will notify you by phone as soon as possible.  Submit refill requests through pyco or call your pharmacy and they will forward the refill request to us. Please allow 3 business days for your refill to be completed.          Additional Information About Your Visit        [employer]Media[employer]Loku Information     pyco gives you secure access to your electronic health record. If you see a primary care provider, you can also send messages to your care team and make appointments. If you have questions, please call your primary care clinic.  If you do not have a primary care provider, please call [phone] and they will assist you.        Care EveryWhere ID     This is your Care EveryWhere ID. This could be used by other organizations to access your [place] medical records  [id number]        Your Vitals Were     Pulse Height Pulse Oximetry BMI (Body Mass Index)          56 1.676 m (5' 6\") 98% 24.69 kg/m2         Blood Pressure from Last 3 Encounters:   06/06/17 130/60 "   05/15/17 138/62   05/04/17 138/60    Weight from Last 3 Encounters:   06/06/17 69.4 kg (153 lb)   05/15/17 70.3 kg (155 lb)   05/04/17 71.2 kg (157 lb)              Today, you had the following     No orders found for display         Today's Medication Changes          These changes are accurate as of: 6/6/17  3:12 PM.  If you have any questions, ask your nurse or doctor.               Stop taking these medicines if you haven't already. Please contact your care team if you have questions.     DULoxetine 30 MG EC capsule   Commonly known as:  CYMBALTA   Stopped by:  Clinton Mills MD                    Primary Care Provider Office Phone # Fax #    Clinton Mills [phone]       [facility] [address]  [facility] [address]        Thank you!     Thank you for choosing [facility]  for your care. Our goal is always to provide you with excellent care. Hearing back from our patients is one way we can continue to improve our services. Please take a few minutes to complete the written survey that you may receive in the mail after your visit with us. Thank you!             Your Updated Medication List - Protect others around you: Learn how to safely use, store and throw away your medicines at www.disposemymeds.org.          This list is accurate as of: 6/6/17  3:12 PM.  Always use your most recent med list.                   Brand Name Dispense Instructions for use    amLODIPine 5 MG tablet    NORVASC    90 tablet    Take 1 tablet (5 mg) by mouth daily       CALCIUM 500/VITAMIN D PO      Take 1 tablet by mouth daily.       fish oil-omega-3 fatty acids 1000 MG capsule      Take 1 capsule by mouth daily.       hydrochlorothiazide 25 MG tablet    HYDRODIURIL    90 tablet    TAKE ONE TABLET BY MOUTH ONE TIME DAILY       MULTIVITAMIN & MINERAL PO      Take  by mouth daily.       PRADAXA PO      Take 150 mg by mouth 2 times daily       simvastatin 10 MG tablet     ZOCOR     Take 5 mg by mouth At Bedtime

## 2017-06-06 NOTE — PROGRESS NOTES
SUBJECTIVE:                                                            Deepak Arndt is a 82 year old male who presents for Preventive Visit.      Are you in the first 12 months of your Medicare Part B coverage?  No    Healthy Habits:    Do you get at least three servings of calcium containing foods daily (dairy, green leafy vegetables, etc.)? yes    Amount of exercise or daily activities, outside of work: 7 day(s) per week    Problems taking medications regularly No    Medication side effects: No    Have you had an eye exam in the past two years? yes    Do you see a dentist twice per year? yes    Do you have sleep apnea, excessive snoring or daytime drowsiness?yes, snoring    COGNITIVE SCREEN  1) Repeat 3 items (Banana, Sunrise, Chair)    2) Clock draw: abnormal  3) 3 item recall: Recalls 3 objects  Results: 3 items recalled: COGNITIVE IMPAIRMENT LESS LIKELY    Mini-CogTM Copyright S Kaley. Licensed by the author for use in [facility]; reprinted with permission ([email or web address]). All rights reserved.          He's not sure why Duloxetine was prescribed for him- currently not taking  ([facility] to help with coughing, he now reports he no longer takes)  Reviewed and updated as needed this visit by clinical staff         Reviewed and updated as needed this visit by Provider        Social History   Substance Use Topics     Smoking status: Former Smoker     Packs/day: 1.00     Years: 20.00     Types: Cigarettes     Quit date: 1/1/1974     Smokeless tobacco: Never Used     Alcohol use 7.0 oz/week     14 Glasses of wine per week      Comment: 2/day       The patient does not drink >3 drinks per day nor >7 drinks per week.    Today's PHQ-2 Score: No flowsheet data found.    Do you feel safe in your environment - Yes    Do you have a Health Care Directive?: Yes: Advance Directive has been received and scanned.    Current providers sharing in care for this patient include:   Patient Care Team:  Gene  Clinton Luque MD as PCP - General (Family Practice)    HEARING FREQUENCY:   Right Ear: passed  Left Ear: passed      Hearing impairment: No    Ability to successfully perform activities of daily living: Yes, no assistance needed     Fall risk:  Fallen 2 or more times in the past year?: Yes  Any fall with injury in the past year?: No    Home safety:  lack of grab bars in the bathroom      The following health maintenance items are reviewed in Epic and correct as of today:  Health Maintenance   Topic Date Due     MEDICARE ANNUAL WELLNESS VISIT  03/17/1953     LIPID MONITORING Q6 MO  12/01/2016     PNEUMOCOCCAL (2 of 2 - PPSV23) 06/01/2017     INFLUENZA VACCINE (SYSTEM ASSIGNED)  09/01/2017     PSA Q1 YR  03/13/2018     FALL RISK ASSESSMENT  05/15/2018     ADVANCE DIRECTIVE PLANNING Q5 YRS  10/29/2020     TETANUS IMMUNIZATION (SYSTEM ASSIGNED)  02/27/2023              ROS:  Constitutional, HEENT, cardiovascular, pulmonary, GI, , musculoskeletal, neuro, skin, endocrine and psych systems are negative, except as otherwise noted.    Patient Active Problem List   Diagnosis     HTN (hypertension)     Osteoporosis     Hypercholesterolemia     Past myocardial infarction     Advance Care Planning     Chronic atrial fibrillation (H)     Health Care Home     Myocardial infarction (H)     Mild aortic stenosis     Coronary artery disease involving native coronary artery of native heart without angina pectoris     Aortic valve disorder     Past Surgical History:   Procedure Laterality Date     CATARACT IOL, RT/LT       HC OR OCULAR DEVICE INTRAOP DETACHED RETINA         Social History   Substance Use Topics     Smoking status: Former Smoker     Packs/day: 1.00     Years: 20.00     Types: Cigarettes     Quit date: 1/1/1974     Smokeless tobacco: Never Used     Alcohol use 7.0 oz/week     14 Glasses of wine per week      Comment: 2/day     No family history on file.      OBJECTIVE:                                                       "      There were no vitals taken for this visit. Estimated body mass index is 23.57 kg/(m^2) as calculated from the following:    Height as of 12/28/16: 1.727 m (5' 8\").    Weight as of 5/15/17: 70.3 kg (155 lb).  EXAM:       ASSESSMENT / PLAN:                                                            Deepak was seen today for physical and imm/inj.    Diagnoses and all orders for this visit:    Routine general medical examination at a health care facility    Need for pneumococcal vaccination  -     PNEUMOCOCCAL VACCINE,ADULT,SQ OR IM        End of Life Planning:  Patient currently has an advanced directive: Yes.  Practitioner is supportive of decision.    COUNSELING:  Reviewed preventive health counseling, as reflected in patient instructions       Healthy diet/nutrition       Vision screening        Estimated body mass index is 23.57 kg/(m^2) as calculated from the following:    Height as of 12/28/16: 1.727 m (5' 8\").    Weight as of 5/15/17: 70.3 kg (155 lb).     reports that he quit smoking about 43 years ago. His smoking use included Cigarettes. He has a 20.00 pack-year smoking history. He has never used smokeless tobacco.      Appropriate preventive services were discussed with this patient, including applicable screening as appropriate for cardiovascular disease, diabetes, osteopenia/osteoporosis, and glaucoma.  As appropriate for age/gender, discussed screening for colorectal cancer, prostate cancer, breast cancer, and cervical cancer. Checklist reviewing preventive services available has been given to the patient.    Reviewed patients plan of care and provided an AVS. The Basic Care Plan (routine screening as documented in Health Maintenance) for Deepak meets the Care Plan requirement. This Care Plan has been established and reviewed with the Patient.    Counseling Resources:  ATP IV Guidelines  Pooled Cohorts Equation Calculator  Breast Cancer Risk Calculator  FRAX Risk Assessment  ICSI Preventive " Guidelines  Dietary Guidelines for Americans, 2010  USDA's MyPlate  ASA Prophylaxis  Lung CA Screening    Clinton Mills MD  [facility]

## 2017-06-30 ENCOUNTER — TRANSFERRED RECORDS (OUTPATIENT)
Dept: FAMILY MEDICINE | Facility: CLINIC | Age: 82
End: 2017-06-30

## 2017-09-11 DIAGNOSIS — Z76.0 ENCOUNTER FOR MEDICATION REFILL: ICD-10-CM

## 2017-09-11 RX ORDER — HYDROCHLOROTHIAZIDE 25 MG/1
TABLET ORAL
Qty: 90 TABLET | Refills: 3 | Status: SHIPPED | OUTPATIENT
Start: 2017-09-11 | End: 2018-01-01

## 2017-09-15 NOTE — PROGRESS NOTES
Tdap on 2/27/2013.  Flu Vaccine: given in clinic; left deltoid.   Pneumonia 23 vaccine: given in clinic; right deltoid.     XR LUMBAR SPINE 2-3 VIEWS  12/28/2016   HISTORY:  acute pain after blunt trauma yesterday  IMPRESSION:  No acute process. Very mild multilevel degenerative  changes, most prominent at L4-5 there is vacuum disc phenomenon and  mild disc space narrowing. Aortic calcification.    XR THORACIC SPINE 3 VW  12/28/2016  IMPRESSION:  No acute process identified. Mild multilevel degenerative  changes. Mild upper thoracic scoliosis convex right.     CT SCAN OF THE HEAD WITHOUT CONTRAST ON 12/28/2016    IMPRESSION: Chronic changes. No evidence for intracranial hemorrhage  or any acute process    BALANCE TESTING  ON 12/1/2014   Colonoscopy on 4/15/2014.     Lipids on 5/15/2017 was normal.   HbAC1 on 5/15/2017 was normal.   Creatinine on 5/15/2017 was normal.   CMP on 5/5/2017 was normal.   TSH on 5/5/2017 was normal.     SUBJECTIVE:   Deepak Arndt is a 82 year old male who presents to clinic today for the following health issues:    Cc:Back pain due to fall x 4 week     Back Pain       Duration: 1 month         Specific cause: TRAUMA - FALL; pt slipped in bath tub; falling backwards and hit lower right back on glass with metal edge (shower sliding doors), no dizziness, no light headedness     Description:   Location of pain: low back right  Character of pain: dull ache; intermittent; lying on the right side pain > sitting, standing, walking up/down stairs, lying flat on back  Pain radiation:none  New numbness or weakness in legs, not attributed to pain:  no     Intensity: Currently 2/10, At its worst 8/10    History:   Pain interferes with job: Not applicable, Retired [employer] sales  Hobbies: golf  History of back problems: recurrent self limited episodes of low back pain in the past  Any previous MRI or X-rays: as above  Sees a specialist for back pain:  No  Therapies tried without relief:  acetaminophen (Tylenol), [facility] and [facility] (wants to golf- not for 3 weeks, balance issues)    Alleviating factors:   Improved by: acetaminophen (Tylenol)  325 mg - somewhat effective for pain     Precipitating factors:  Worsened by: NONE   Bowel and bladder are fine  SIT no issues  Standing no issue, cane prn distance  Walking no issues  Lying no issues    Sleeping in recliner, last night in bed slept ok        Accompanying Signs & Symptoms:  Risk of Fracture:  None  Risk of Cauda Equina:  None  Risk of Infection:  None  Risk of Cancer:  None  Risk of Ankylosing Spondylitis:  Onset at age <35, male, AND morning back stiffness. no         No CP  No SOB  No Edema  BP Readings from Last 3 Encounters:   09/18/17 110/60   06/06/17 130/60   05/15/17 138/62     LDL Cholesterol Calculated   Date Value Ref Range Status   05/15/2017 41 <100 mg/dL Final   ]no muscle pain              Problem list and histories reviewed & adjusted, as indicated.  Additional history: as documented    Patient Active Problem List   Diagnosis     HTN (hypertension)     Osteoporosis     Hypercholesterolemia     Past myocardial infarction     Advance Care Planning     Chronic atrial fibrillation (H)     Health Care Home     Myocardial infarction (H)     Mild aortic stenosis     Coronary artery disease involving native coronary artery of native heart without angina pectoris     Aortic valve disorder     Past Surgical History:   Procedure Laterality Date     CATARACT IOL, RT/LT       HC OR OCULAR DEVICE INTRAOP DETACHED RETINA         Social History   Substance Use Topics     Smoking status: Former Smoker     Packs/day: 1.00     Years: 20.00     Types: Cigarettes     Quit date: 1/1/1974     Smokeless tobacco: Never Used     Alcohol use 7.0 oz/week     14 Glasses of wine per week      Comment: 2/day     No family history on file.      Current Outpatient Prescriptions   Medication Sig Dispense Refill     hydrochlorothiazide  (HYDRODIURIL) 25 MG tablet TAKE ONE TABLET BY MOUTH ONE TIME DAILY 90 tablet 3     amLODIPine (NORVASC) 5 MG tablet Take 1 tablet (5 mg) by mouth daily 90 tablet 3     Dabigatran Etexilate Mesylate (PRADAXA PO) Take 150 mg by mouth 2 times daily       simvastatin (ZOCOR) 10 MG tablet Take 5 mg by mouth At Bedtime        fish oil-omega-3 fatty acids (OMEGA-3 FISH OIL) 1000 MG capsule Take 1 capsule by mouth daily.       Calcium Carbonate-Vitamin D (CALCIUM 500/VITAMIN D PO) Take 1 tablet by mouth daily.       Multiple Vitamins-Minerals (MULTIVITAMIN & MINERAL PO) Take  by mouth daily.       No Known Allergies  Recent Labs   Lab Test  05/15/17   1130  05/15/17   1035  05/04/17   1323  03/13/17   0945 07/12/16 06/01/16 06/02/15  06/11/13   A1C   --   5.5   --    --    --    --    --    --    --    LDL  41   --    --    --    --   50  59   --    --    HDL  55   --    --    --    --   53  49   --    --    TRIG  140   --    --    --    --   94  101   --    --    ALT   --    --   21  13   --    --    --    --    --    CR  0.9   --   0.89  0.86   --   0.9  0.9   < >   --    GFRESTIMATED  >60   --    --    --    --    --    --    --    --    POTASSIUM   --    --   4.2  3.7   --    --    --    --    --    TSH   --    --    --    --   0.39   --    --    --   0.59  0.59    < > = values in this interval not displayed.      BP Readings from Last 3 Encounters:   09/18/17 110/60   06/06/17 130/60   05/15/17 138/62    Wt Readings from Last 3 Encounters:   09/18/17 70.3 kg (155 lb)   06/06/17 69.4 kg (153 lb)   05/15/17 70.3 kg (155 lb)                  Labs reviewed in EPIC          Reviewed and updated as needed this visit by clinical staff       Reviewed and updated as needed this visit by Provider         ROS:  Constitutional, HEENT, cardiovascular, pulmonary, GI, , musculoskeletal, neuro, skin, endocrine and psych systems are negative, except as otherwise noted.      OBJECTIVE:   /60  Pulse 70  Temp 97.7  F (36.5  C)  (Oral)  Resp 16  Wt 70.3 kg (155 lb)  SpO2 97%  BMI 25.02 kg/m2  Body mass index is 25.02 kg/(m^2).  GENERAL: healthy, alert and no distress  EYES: Eyes grossly normal to inspection, PERRL and conjunctivae and sclerae normal  HENT: ear canals and TM's normal, nose and mouth without ulcers or lesions  NECK: no adenopathy, no asymmetry, masses, or scars and thyroid normal to palpation  RESP: lungs clear to auscultation - no rales, rhonchi or wheezes  CV: regular rate and rhythm, normal S1 S2, no S3 or S4, no murmur, click or rub, no peripheral edema and peripheral pulses strong  ABDOMEN: soft, nontender, no hepatosplenomegaly, no masses and bowel sounds normal  MS: no gross musculoskeletal defects noted, no edema  SKIN: no suspicious lesions or rashes  NEURO: Normal strength and tone, mentation intact and speech normal  PSYCH: mentation appears normal, affect normal/bright  LYMPH: no cervical, supraclavicular, axillary, or inguinal adenopathy  Fine tremor (tried simemet and did not help)    Curve to spine  Mild paraspinal muscle tension  ROM fair to good  LE testing is normal  No straight leg raising pain  He seems somewhat unsure of his balance on standing and had to push off of the chair.           Diagnostic Test Results:  Results for orders placed or performed in visit on 06/05/17   Hemoglobin A1c   Result Value Ref Range    Hemoglobin A1C 5.5 4.0 - 6.0 %   Lipid Profile   Result Value Ref Range    Cholesterol 124 0 - 200 mg/dL    Triglycerides 140 0 - 150 mg/dL    HDL Cholesterol 55 >40 mg/dL    LDL Cholesterol Calculated 41 <100 mg/dL    Non HDL Cholesterol 69 mg/dl   Creatinine   Result Value Ref Range    Creatinine 0.9 0.7 - 1.2 mg/dL    GFR Estimate >60 >60 ml/min/1.73m2       ASSESSMENT/PLAN:     ASSESSMENT / PLAN:  (Z23) Need for prophylactic vaccination and inoculation against influenza  (primary encounter diagnosis)  Comment:   Plan: FLU VAC, SPLIT VIRUS IM > 3 YO (QUADRIVALENT)         [21864]             (Z23) Need for pneumococcal vaccine  Comment:   Plan: PNEUMOCOCCAL VACCINE,ADULT,SQ OR IM            (M81.0) Osteoporosis, unspecified osteoporosis type, unspecified pathological fracture presence  Comment:   Plan: fall prevention    (W19.XXXS) Fall, sequela  Comment:   Plan: fall prevention, [facility] as [facility]  Discussed safety in the home.    (M54.5) Back pain, lumbosacral  Comment:   Plan: Observe, prevent falls.          Patient Instructions   [facility] with [facility]    Vaccine today      Chacha Leija MD  [facility]  Injectable Influenza Immunization Documentation    1.  Is the person to be vaccinated sick today? NO    2. Does the person to be vaccinated have an allergy to a component   of the vaccine? NO     3. Has the person to be vaccinated ever had a serious reaction   to influenza vaccine in the past? NO     4. Has the person to be vaccinated ever had Guillain-Barré syndrome? NO     Form completed by Herberth Sneed

## 2017-09-18 ENCOUNTER — OFFICE VISIT (OUTPATIENT)
Dept: FAMILY MEDICINE | Facility: CLINIC | Age: 82
End: 2017-09-18

## 2017-09-18 VITALS
WEIGHT: 155 LBS | HEART RATE: 70 BPM | OXYGEN SATURATION: 97 % | BODY MASS INDEX: 25.02 KG/M2 | RESPIRATION RATE: 16 BRPM | TEMPERATURE: 97.7 F | SYSTOLIC BLOOD PRESSURE: 110 MMHG | DIASTOLIC BLOOD PRESSURE: 60 MMHG

## 2017-09-18 DIAGNOSIS — M81.0 OSTEOPOROSIS, UNSPECIFIED OSTEOPOROSIS TYPE, UNSPECIFIED PATHOLOGICAL FRACTURE PRESENCE: ICD-10-CM

## 2017-09-18 DIAGNOSIS — Z23 NEED FOR PNEUMOCOCCAL VACCINE: ICD-10-CM
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
## 2017-09-18 DIAGNOSIS — M54.50 BACK PAIN, LUMBOSACRAL: ICD-10-CM

## 2017-09-18 DIAGNOSIS — Z23 NEED FOR PROPHYLACTIC VACCINATION AND INOCULATION AGAINST INFLUENZA: Primary | ICD-10-CM

## 2017-09-18 PROCEDURE — G0009 ADMIN PNEUMOCOCCAL VACCINE: HCPCS | Performed by: FAMILY MEDICINE

## 2017-09-18 PROCEDURE — 90732 PPSV23 VACC 2 YRS+ SUBQ/IM: CPT | Performed by: FAMILY MEDICINE

## 2017-09-18 PROCEDURE — 99214 OFFICE O/P EST MOD 30 MIN: CPT | Mod: 25 | Performed by: FAMILY MEDICINE

## 2017-09-18 PROCEDURE — 90686 IIV4 VACC NO PRSV 0.5 ML IM: CPT | Performed by: FAMILY MEDICINE

## 2017-09-18 PROCEDURE — G0008 ADMIN INFLUENZA VIRUS VAC: HCPCS | Performed by: FAMILY MEDICINE

## 2017-09-18 NOTE — MR AVS SNAPSHOT
After Visit Summary   9/18/2017    Deepak Arndt    MRN: [id number]           Patient Information     Date Of Birth          1935        Visit Information        Provider Department      9/18/2017 10:30 AM Chacha Leija MD [facility]        Today's Diagnoses     Need for prophylactic vaccination and inoculation against influenza    -  1    Need for pneumococcal vaccine          Care Instructions    [facility]    Vaccine today          Follow-ups after your visit        Who to contact     If you have questions or need follow up information about today's clinic visit or your schedule please contact [facility] directly at [phone].  Normal or non-critical lab and imaging results will be communicated to you by [employer], letter or phone within 4 business days after the clinic has received the results. If you do not hear from us within 7 days, please contact the clinic through [employer] or phone. If you have a critical or abnormal lab result, we will notify you by phone as soon as possible.  Submit refill requests through [employer] or call your pharmacy and they will forward the refill request to us. Please allow 3 business days for your refill to be completed.          Additional Information About Your Visit        MyChart Information     [employer] gives you secure access to your electronic health record. If you see a primary care provider, you can also send messages to your care team and make appointments. If you have questions, please call your primary care clinic.  If you do not have a primary care provider, please call [phone] and they will assist you.        Care EveryWhere ID     This is your Care EveryWhere ID. This could be used by other organizations to access your [place] medical records  [id number]        Your Vitals Were     Pulse Temperature Respirations Pulse Oximetry BMI (Body Mass Index)       70 97.7  F (36.5  C) (Oral)  16 97% 25.02 kg/m2        Blood Pressure from Last 3 Encounters:   09/18/17 110/60   06/06/17 130/60   05/15/17 138/62    Weight from Last 3 Encounters:   09/18/17 70.3 kg (155 lb)   06/06/17 69.4 kg (153 lb)   05/15/17 70.3 kg (155 lb)              We Performed the Following     FLU VAC, SPLIT VIRUS IM > 3 YO (QUADRIVALENT) [22405]     PNEUMOCOCCAL VACCINE,ADULT,SQ OR IM        Primary Care Provider Office Phone # Fax #    Clinton Mills [phone] [address]  [facility] [id number]        Equal Access to Services     EWA LOONEY : Hadii peggy moseley hadasho Soberthaali, waaxda luqadaha, qaybta kaalmada adeegyada, gallito gill . So [facility] [phone].    ATENCIÓN: Si habla español, tiene a esqueda disposición servicios gratuitos de asistencia lingüística. Llame al [phone].    We comply with applicable federal civil rights laws and [place] laws. We do not discriminate on the basis of race, color, national origin, age, disability sex, sexual orientation or gender identity.            Thank you!     Thank you for choosing [facility]  for your care. Our goal is always to provide you with excellent care. Hearing back from our patients is one way we can continue to improve our services. Please take a few minutes to complete the written survey that you may receive in the mail after your visit with us. Thank you!             Your Updated Medication List - Protect others around you: Learn how to safely use, store and throw away your medicines at www.disposemymeds.org.          This list is accurate as of: 9/18/17 10:50 AM.  Always use your most recent med list.                   Brand Name Dispense Instructions for use Diagnosis    amLODIPine 5 MG tablet    NORVASC    90 tablet    Take 1 tablet (5 mg) by mouth daily    Essential hypertension, benign       CALCIUM 500/VITAMIN D PO      Take 1 tablet by mouth daily.        fish oil-omega-3 fatty acids 1000  MG capsule      Take 1 capsule by mouth daily.        hydrochlorothiazide 25 MG tablet    HYDRODIURIL    90 tablet    TAKE ONE TABLET BY MOUTH ONE TIME DAILY    Encounter for medication refill       MULTIVITAMIN & MINERAL PO      Take  by mouth daily.        PRADAXA PO      Take 150 mg by mouth 2 times daily        simvastatin 10 MG tablet    ZOCOR     Take 5 mg by mouth At Bedtime

## 2017-10-04 DIAGNOSIS — I35.9 AORTIC VALVE DISORDER: Primary | ICD-10-CM

## 2017-10-23 ENCOUNTER — HOSPITAL ENCOUNTER (OUTPATIENT)
Facility: CLINIC | Age: 82
Setting detail: OBSERVATION
Discharge: NURSING, REHAB OR LONG-TERM CARE | End: 2017-10-24
Attending: EMERGENCY MEDICINE | Admitting: INTERNAL MEDICINE
Payer: MEDICARE

## 2017-10-23 ENCOUNTER — APPOINTMENT (OUTPATIENT)
Dept: PHYSICAL THERAPY | Facility: CLINIC | Age: 82
End: 2017-10-23
Attending: INTERNAL MEDICINE
Payer: MEDICARE

## 2017-10-23 ENCOUNTER — APPOINTMENT (OUTPATIENT)
Dept: CT IMAGING | Facility: CLINIC | Age: 82
End: 2017-10-23
Attending: EMERGENCY MEDICINE
Payer: MEDICARE

## 2017-10-23 ENCOUNTER — APPOINTMENT (OUTPATIENT)
Dept: CARDIOLOGY | Facility: CLINIC | Age: 82
End: 2017-10-23
Attending: INTERNAL MEDICINE
Payer: MEDICARE

## 2017-10-23 ENCOUNTER — APPOINTMENT (OUTPATIENT)
Dept: GENERAL RADIOLOGY | Facility: CLINIC | Age: 82
End: 2017-10-23
Attending: EMERGENCY MEDICINE
Payer: MEDICARE

## 2017-10-23 DIAGNOSIS — R79.89 ELEVATED TROPONIN: ICD-10-CM

## 2017-10-23 DIAGNOSIS — M62.81 GENERALIZED MUSCLE WEAKNESS: ICD-10-CM

## 2017-10-23 DIAGNOSIS — G25.0 ESSENTIAL TREMOR: Primary | ICD-10-CM

## 2017-10-23 DIAGNOSIS — R53.83 FATIGUE, UNSPECIFIED TYPE: ICD-10-CM

## 2017-10-23 PROBLEM — R26.9 ABNORMALITY OF GAIT: Status: ACTIVE | Noted: 2017-10-23

## 2017-10-23 LAB
ALBUMIN UR-MCNC: 30 MG/DL
ANION GAP SERPL CALCULATED.3IONS-SCNC: 9 MMOL/L (ref 3–14)
APPEARANCE UR: CLEAR
BASOPHILS # BLD AUTO: 0 10E9/L (ref 0–0.2)
BASOPHILS NFR BLD AUTO: 0.1 %
BILIRUB UR QL STRIP: NEGATIVE
BUN SERPL-MCNC: 21 MG/DL (ref 7–30)
CALCIUM SERPL-MCNC: 8.8 MG/DL (ref 8.5–10.1)
CHLORIDE SERPL-SCNC: 101 MMOL/L (ref 94–109)
CK SERPL-CCNC: 185 U/L (ref 30–300)
CO2 SERPL-SCNC: 24 MMOL/L (ref 20–32)
COLOR UR AUTO: YELLOW
CREAT SERPL-MCNC: 0.95 MG/DL (ref 0.66–1.25)
DIFFERENTIAL METHOD BLD: ABNORMAL
EOSINOPHIL # BLD AUTO: 0 10E9/L (ref 0–0.7)
EOSINOPHIL NFR BLD AUTO: 0 %
ERYTHROCYTE [DISTWIDTH] IN BLOOD BY AUTOMATED COUNT: 13.4 % (ref 10–15)
GFR SERPL CREATININE-BSD FRML MDRD: 76 ML/MIN/1.7M2
GLUCOSE SERPL-MCNC: 125 MG/DL (ref 70–99)
GLUCOSE UR STRIP-MCNC: NEGATIVE MG/DL
HCT VFR BLD AUTO: 45 % (ref 40–53)
HGB BLD-MCNC: 16.3 G/DL (ref 13.3–17.7)
HGB UR QL STRIP: ABNORMAL
IMM GRANULOCYTES # BLD: 0 10E9/L (ref 0–0.4)
IMM GRANULOCYTES NFR BLD: 0.5 %
INTERPRETATION ECG - MUSE: NORMAL
KETONES UR STRIP-MCNC: NEGATIVE MG/DL
LEUKOCYTE ESTERASE UR QL STRIP: NEGATIVE
LYMPHOCYTES # BLD AUTO: 0.4 10E9/L (ref 0.8–5.3)
LYMPHOCYTES NFR BLD AUTO: 4.7 %
MCH RBC QN AUTO: 35.1 PG (ref 26.5–33)
MCHC RBC AUTO-ENTMCNC: 36.2 G/DL (ref 31.5–36.5)
MCV RBC AUTO: 97 FL (ref 78–100)
MONOCYTES # BLD AUTO: 0.7 10E9/L (ref 0–1.3)
MONOCYTES NFR BLD AUTO: 9.2 %
MUCOUS THREADS #/AREA URNS LPF: PRESENT /LPF
NEUTROPHILS # BLD AUTO: 6.9 10E9/L (ref 1.6–8.3)
NEUTROPHILS NFR BLD AUTO: 85.5 %
NITRATE UR QL: NEGATIVE
NRBC # BLD AUTO: 0 10*3/UL
NRBC BLD AUTO-RTO: 0 /100
PH UR STRIP: 5.5 PH (ref 5–7)
PLATELET # BLD AUTO: 104 10E9/L (ref 150–450)
POTASSIUM SERPL-SCNC: 3.4 MMOL/L (ref 3.4–5.3)
RBC # BLD AUTO: 4.65 10E12/L (ref 4.4–5.9)
RBC #/AREA URNS AUTO: 2 /HPF (ref 0–2)
SODIUM SERPL-SCNC: 134 MMOL/L (ref 133–144)
SOURCE: ABNORMAL
SP GR UR STRIP: 1.02 (ref 1–1.03)
TROPONIN I SERPL-MCNC: 0.06 UG/L (ref 0–0.04)
TROPONIN I SERPL-MCNC: 0.07 UG/L (ref 0–0.04)
TSH SERPL DL<=0.005 MIU/L-ACNC: 0.5 MU/L (ref 0.4–4)
UROBILINOGEN UR STRIP-MCNC: NORMAL MG/DL (ref 0–2)
WBC # BLD AUTO: 8 10E9/L (ref 4–11)
WBC #/AREA URNS AUTO: 1 /HPF (ref 0–2)

## 2017-10-23 PROCEDURE — 40000264 ECHO COMPLETE WITH LUMASON

## 2017-10-23 PROCEDURE — 93005 ELECTROCARDIOGRAM TRACING: CPT

## 2017-10-23 PROCEDURE — 40000193 ZZH STATISTIC PT WARD VISIT

## 2017-10-23 PROCEDURE — 25000132 ZZH RX MED GY IP 250 OP 250 PS 637: Mod: GY | Performed by: PHYSICIAN ASSISTANT

## 2017-10-23 PROCEDURE — 25000132 ZZH RX MED GY IP 250 OP 250 PS 637: Mod: GY | Performed by: INTERNAL MEDICINE

## 2017-10-23 PROCEDURE — 99207 ZZC APP CREDIT; MD BILLING SHARED VISIT: CPT | Performed by: PHYSICIAN ASSISTANT

## 2017-10-23 PROCEDURE — 84484 ASSAY OF TROPONIN QUANT: CPT | Performed by: INTERNAL MEDICINE

## 2017-10-23 PROCEDURE — 70450 CT HEAD/BRAIN W/O DYE: CPT

## 2017-10-23 PROCEDURE — 80048 BASIC METABOLIC PNL TOTAL CA: CPT | Performed by: EMERGENCY MEDICINE

## 2017-10-23 PROCEDURE — A9270 NON-COVERED ITEM OR SERVICE: HCPCS | Mod: GY | Performed by: INTERNAL MEDICINE

## 2017-10-23 PROCEDURE — 81001 URINALYSIS AUTO W/SCOPE: CPT | Performed by: EMERGENCY MEDICINE

## 2017-10-23 PROCEDURE — 84484 ASSAY OF TROPONIN QUANT: CPT | Performed by: EMERGENCY MEDICINE

## 2017-10-23 PROCEDURE — 93306 TTE W/DOPPLER COMPLETE: CPT | Mod: 26 | Performed by: INTERNAL MEDICINE

## 2017-10-23 PROCEDURE — 71020 XR CHEST 2 VW: CPT

## 2017-10-23 PROCEDURE — 99220 ZZC INITIAL OBSERVATION CARE,LEVL III: CPT | Performed by: INTERNAL MEDICINE

## 2017-10-23 PROCEDURE — 82550 ASSAY OF CK (CPK): CPT | Performed by: EMERGENCY MEDICINE

## 2017-10-23 PROCEDURE — 84443 ASSAY THYROID STIM HORMONE: CPT | Performed by: EMERGENCY MEDICINE

## 2017-10-23 PROCEDURE — 36415 COLL VENOUS BLD VENIPUNCTURE: CPT | Performed by: INTERNAL MEDICINE

## 2017-10-23 PROCEDURE — G0378 HOSPITAL OBSERVATION PER HR: HCPCS

## 2017-10-23 PROCEDURE — 97162 PT EVAL MOD COMPLEX 30 MIN: CPT | Mod: GP

## 2017-10-23 PROCEDURE — 25500064 ZZH RX 255 OP 636: Performed by: INTERNAL MEDICINE

## 2017-10-23 PROCEDURE — 82550 ASSAY OF CK (CPK): CPT | Performed by: INTERNAL MEDICINE

## 2017-10-23 PROCEDURE — 99285 EMERGENCY DEPT VISIT HI MDM: CPT | Mod: 25

## 2017-10-23 PROCEDURE — 85025 COMPLETE CBC W/AUTO DIFF WBC: CPT | Performed by: EMERGENCY MEDICINE

## 2017-10-23 PROCEDURE — 97530 THERAPEUTIC ACTIVITIES: CPT | Mod: GP

## 2017-10-23 PROCEDURE — A9270 NON-COVERED ITEM OR SERVICE: HCPCS | Mod: GY | Performed by: PHYSICIAN ASSISTANT

## 2017-10-23 PROCEDURE — 97116 GAIT TRAINING THERAPY: CPT | Mod: GP

## 2017-10-23 RX ORDER — AMOXICILLIN 250 MG
1-2 CAPSULE ORAL 2 TIMES DAILY PRN
Status: DISCONTINUED | OUTPATIENT
Start: 2017-10-23 | End: 2017-10-24 | Stop reason: HOSPADM

## 2017-10-23 RX ORDER — PROCHLORPERAZINE 25 MG
12.5 SUPPOSITORY, RECTAL RECTAL EVERY 12 HOURS PRN
Status: DISCONTINUED | OUTPATIENT
Start: 2017-10-23 | End: 2017-10-24 | Stop reason: HOSPADM

## 2017-10-23 RX ORDER — AMLODIPINE BESYLATE 5 MG/1
5 TABLET ORAL DAILY
Status: DISCONTINUED | OUTPATIENT
Start: 2017-10-23 | End: 2017-10-24 | Stop reason: HOSPADM

## 2017-10-23 RX ORDER — DABIGATRAN ETEXILATE 150 MG/1
150 CAPSULE ORAL 2 TIMES DAILY
Status: DISCONTINUED | OUTPATIENT
Start: 2017-10-23 | End: 2017-10-24 | Stop reason: HOSPADM

## 2017-10-23 RX ORDER — ACETAMINOPHEN 325 MG/1
650 TABLET ORAL EVERY 4 HOURS PRN
Status: DISCONTINUED | OUTPATIENT
Start: 2017-10-23 | End: 2017-10-24 | Stop reason: HOSPADM

## 2017-10-23 RX ORDER — ACETAMINOPHEN 650 MG/1
650 SUPPOSITORY RECTAL EVERY 4 HOURS PRN
Status: DISCONTINUED | OUTPATIENT
Start: 2017-10-23 | End: 2017-10-24 | Stop reason: HOSPADM

## 2017-10-23 RX ORDER — POLYETHYLENE GLYCOL 3350 17 G/17G
17 POWDER, FOR SOLUTION ORAL DAILY PRN
Status: DISCONTINUED | OUTPATIENT
Start: 2017-10-23 | End: 2017-10-24 | Stop reason: HOSPADM

## 2017-10-23 RX ORDER — ONDANSETRON 2 MG/ML
4 INJECTION INTRAMUSCULAR; INTRAVENOUS EVERY 6 HOURS PRN
Status: DISCONTINUED | OUTPATIENT
Start: 2017-10-23 | End: 2017-10-24 | Stop reason: HOSPADM

## 2017-10-23 RX ORDER — PROCHLORPERAZINE MALEATE 5 MG
5 TABLET ORAL EVERY 6 HOURS PRN
Status: DISCONTINUED | OUTPATIENT
Start: 2017-10-23 | End: 2017-10-24 | Stop reason: HOSPADM

## 2017-10-23 RX ORDER — BISACODYL 10 MG
10 SUPPOSITORY, RECTAL RECTAL DAILY PRN
Status: DISCONTINUED | OUTPATIENT
Start: 2017-10-23 | End: 2017-10-24 | Stop reason: HOSPADM

## 2017-10-23 RX ORDER — NALOXONE HYDROCHLORIDE 0.4 MG/ML
.1-.4 INJECTION, SOLUTION INTRAMUSCULAR; INTRAVENOUS; SUBCUTANEOUS
Status: DISCONTINUED | OUTPATIENT
Start: 2017-10-23 | End: 2017-10-24 | Stop reason: HOSPADM

## 2017-10-23 RX ORDER — HYDROCHLOROTHIAZIDE 25 MG/1
25 TABLET ORAL DAILY
Status: DISCONTINUED | OUTPATIENT
Start: 2017-10-23 | End: 2017-10-24 | Stop reason: HOSPADM

## 2017-10-23 RX ORDER — SIMVASTATIN 5 MG
5 TABLET ORAL AT BEDTIME
Status: DISCONTINUED | OUTPATIENT
Start: 2017-10-23 | End: 2017-10-24 | Stop reason: HOSPADM

## 2017-10-23 RX ORDER — ACETAMINOPHEN 325 MG/1
650 TABLET ORAL EVERY 4 HOURS PRN
Qty: 100 TABLET | DISCHARGE
Start: 2017-10-23

## 2017-10-23 RX ORDER — PROPRANOLOL HYDROCHLORIDE 40 MG/1
40 TABLET ORAL 2 TIMES DAILY
Status: DISCONTINUED | OUTPATIENT
Start: 2017-10-23 | End: 2017-10-24 | Stop reason: HOSPADM

## 2017-10-23 RX ORDER — ONDANSETRON 4 MG/1
4 TABLET, ORALLY DISINTEGRATING ORAL EVERY 6 HOURS PRN
Status: DISCONTINUED | OUTPATIENT
Start: 2017-10-23 | End: 2017-10-24 | Stop reason: HOSPADM

## 2017-10-23 RX ORDER — PROPRANOLOL HYDROCHLORIDE 40 MG/1
40 TABLET ORAL 2 TIMES DAILY
Qty: 60 TABLET | DISCHARGE
Start: 2017-10-23 | End: 2018-01-01

## 2017-10-23 RX ADMIN — AMLODIPINE BESYLATE 5 MG: 5 TABLET ORAL at 15:22

## 2017-10-23 RX ADMIN — HYDROCHLOROTHIAZIDE 25 MG: 25 TABLET ORAL at 15:22

## 2017-10-23 RX ADMIN — PROPRANOLOL HYDROCHLORIDE 40 MG: 40 TABLET ORAL at 08:25

## 2017-10-23 RX ADMIN — DABIGATRAN ETEXILATE MESYLATE 150 MG: 150 CAPSULE ORAL at 08:24

## 2017-10-23 RX ADMIN — DABIGATRAN ETEXILATE MESYLATE 150 MG: 150 CAPSULE ORAL at 20:48

## 2017-10-23 RX ADMIN — PROPRANOLOL HYDROCHLORIDE 40 MG: 40 TABLET ORAL at 20:48

## 2017-10-23 RX ADMIN — SIMVASTATIN 5 MG: 5 TABLET, FILM COATED ORAL at 20:48

## 2017-10-23 RX ADMIN — SULFUR HEXAFLUORIDE 5 ML: KIT at 11:28

## 2017-10-23 ASSESSMENT — ENCOUNTER SYMPTOMS
TREMORS: 1
NAUSEA: 0
NUMBNESS: 0
SHORTNESS OF BREATH: 0
COUGH: 0
NECK PAIN: 0
BACK PAIN: 0
FATIGUE: 1
WEAKNESS: 0
VOMITING: 0
DYSURIA: 0
HEADACHES: 0

## 2017-10-23 NOTE — PROGRESS NOTES
"   10/23/17 0900   Quick Adds   Type of Visit Initial PT Evaluation   Living Environment   Lives With spouse   Living Arrangements house   Number of Stairs to Enter Home 3  (no rail)   Number of Stairs Within Home 12  (one rail)   Transportation Available car;family or friend will provide  (Pt reports he still drives)   Self-Care   Usual Activity Tolerance moderate   Regular Exercise no   Equipment Currently Used at Home cane, straight   Activity/Exercise/Self-Care Comment Patient reports \"rarely\" using a cane in the home, but endorses furniture walking for stability.   Functional Level Prior   Ambulation 1-->assistive equipment  (SEC)   Transferring 1-->assistive equipment   Toileting 0-->independent   Bathing 0-->independent   Dressing 0-->independent   Fall history within last six months yes   Number of times patient has fallen within last six months 5   Which of the above functional risks had a recent onset or change? ambulation;transferring   General Information   Onset of Illness/Injury or Date of Surgery - Date 10/23/17   Referring Physician MD Elvis   Patient/Family Goals Statement Return home   Pertinent History of Current Problem (include personal factors and/or comorbidities that impact the POC) 82 year old male admitted s/p fall at home. Of note, patient has been going through an extensive OP workup for possible parkinsons.    Precautions/Limitations fall precautions   General Observations Tremors/shakiness noted with all mobility.    Cognitive Status Examination   Orientation person;place;time   Level of Consciousness alert;confused   Follows Commands and Answers Questions able to follow single-step instructions   Personal Safety and Judgment impaired;at risk behaviors demonstrated   Pain Assessment   Patient Currently in Pain No   Range of Motion (ROM)   ROM Comment UE and LE AROM WFL's; able to use extremities for functional mobility.    Strength   Strength Comments Decreased generally, needs " "increased A with mobility this session.    Bed Mobility   Bed Mobility Comments Sit to supine with SBA.    Transfer Skills   Transfer Comments Sit to/from stand with CGA/min A.    Gait   Gait Comments Ambulates 5' with straight cane and mod A x 1. Mod LOB posteriorly.    Balance   Balance Comments Poor dynamic seated and standing balance noted. Patient not able to maintain midline with seated dynamic self care tasks. Needs mod A at trunk.    Sensory Examination   Sensory Perception Comments Denies burning, numbness and tingling   Coordination   Coordination other (see comments)   Coordination Comments Impaired. Patient with great difficulty donning/doffing shoes/socks.    Clinical Impression   Criteria for Skilled Therapeutic Intervention yes, treatment indicated  (One treat)   PT Diagnosis Impaired gait   Influenced by the following impairments Impaired balance, decreased coordination, questionable cognition/insight   Functional limitations due to impairments Decreased independence with tranfers, gait   Clinical Presentation Evolving/Changing   Clinical Presentation Rationale New/evolving balance impairments, multiple environmental barriers at home, cog limiting insight   Clinical Decision Making (Complexity) Moderate complexity   Therapy Frequency` (One eval, one treat)   Anticipated Discharge Disposition Transitional Care Facility   Risk & Benefits of therapy have been explained Yes   Patient, Family & other staff in agreement with plan of care Yes   [facility] [employer] [facility] TM \"6 Clicks\"   2016, Trustees of [facility], under license to [employer].  All rights reserved.   6 Clicks Short Forms Basic Mobility Inpatient Short Form   [facility] AM-PAC  \"6 Clicks\" V.2 Basic Mobility Inpatient Short Form   1. Turning from your back to your side while in a flat bed without using bedrails? 3 - A Little   2. Moving from lying on your back to sitting on the side of a flat bed without using bedrails? 3 - A " Little   3. Moving to and from a bed to a chair (including a wheelchair)? 3 - A Little   4. Standing up from a chair using your arms (e.g., wheelchair, or bedside chair)? 3 - A Little   5. To walk in hospital room? 3 - A Little   6. Climbing 3-5 steps with a railing? 2 - A Lot   Basic Mobility Raw Score (Score out of 24.Lower scores equate to lower levels of function) 17   Total Evaluation Time   Total Evaluation Time (Minutes) 10

## 2017-10-23 NOTE — PROGRESS NOTES
Observation Goals:    -diagnostic tests and consults completed and resulted--Not met. PT, SW consulted.  -vital signs normal or at patient baseline-- Not met. HTN noted. Within parameters.  -safe disposition plan has been identified-- Not met.   Nurse to notify provider when observation goals have been met and patient is ready for discharge.

## 2017-10-23 NOTE — PLAN OF CARE
Problem: Patient Care Overview  Goal: Plan of Care/Patient Progress Review  Outcome: Improving  -diagnostic tests and consults completed and resulted- met  -vital signs normal or at patient baseline- not met  -safe disposition plan has been identified- not met.   Nurse to notify provider when observation goals have been met and patient is ready for discharge.     A&O, does not always follow commands. Noticeable tremors noted in the morning, decreased throughout the day. Slight left sided facial droop, neuros intact. Denies pain. PT eval complete- recommending TCU. Ambulating assist x1, GB and walker. Echo complete. Incontinent of urine. PVR <200mL. Tolerating regular diet. Possible discharge to TCU later today.

## 2017-10-23 NOTE — IP AVS SNAPSHOT
"[facility] OBSERVATION UNIT: [phone]                                              INTERAGENCY TRANSFER FORM - LAB / IMAGING / EKG / EMG RESULTS   10/23/2017                    Hospital Admission Date: 10/23/2017  LA LOCKE   : 1935  Sex: Male        Attending Provider: Madhu Leal MD     Allergies:  No Known Allergies    Infection:  None   Service:  HOSPITALIST    Ht:  1.727 m (5' 8\")   Wt:  68.2 kg (150 lb 4.8 oz)   Admission Wt:  65.8 kg (145 lb)    BMI:  22.85 kg/m 2   BSA:  1.81 m 2            Patient PCP Information     Provider PCP Type    Clinton Mills MD General         Lab Results - 3 Days      Troponin I [[id number]] (Abnormal)  Resulted: 10/23/17 0937, Result status: Final result    Ordering provider: Madhu Leal MD  10/23/17 0673 Resulting lab: [facility]    Specimen Information    Type Source Collected On   Blood  10/23/17 0900          Components       Value Reference Range Flag Lab   Troponin I ES 0.063 0.000 - 0.045 ug/L H FrStHsLb   Comment:         The 99th percentile for upper reference range is 0.045 ug/L.  Troponin values   in the range of 0.045 - 0.120 ug/L may be associated with risks of adverse   clinical events.              CK total [[id number]]  Resulted: 10/23/17 0713, Result status: Final result    Ordering provider: Radha Cox MD  10/23/17 5403 Resulting lab: [facility]    Specimen Information    Type Source Collected On     10/23/17 0423          Components       Value Reference Range Flag Lab   CK Total 185 30 - 300 U/L  FrStHsLb            TSH with free T4 reflex [[id number]]  Resulted: 10/23/17 0457, Result status: Final result    Ordering provider: Radha Cox MD  10/23/17 0406 Resulting lab: [facility]    Specimen Information    Type Source Collected On   Blood  10/23/17 0423          Components       Value Reference Range Flag Lab   TSH 0.50 0.40 - 4.00 mU/L  FrStHsLb       "      Troponin I [[id number]] (Abnormal)  Resulted: 10/23/17 0453, Result status: Final result    Ordering provider: Radha Cox MD  10/23/17 0401 Resulting lab: [facility]    Specimen Information    Type Source Collected On   Blood  10/23/17 0423          Components       Value Reference Range Flag Lab   Troponin I ES 0.068 0.000 - 0.045 ug/L H FrStHsLb   Comment:         The 99th percentile for upper reference range is 0.045 ug/L.  Troponin values   in the range of 0.045 - 0.120 ug/L may be associated with risks of adverse   clinical events.              Basic metabolic panel [[id number]] (Abnormal)  Resulted: 10/23/17 0448, Result status: Final result    Ordering provider: Radha Cox MD  10/23/17 0347 Resulting lab: [facility]    Specimen Information    Type Source Collected On   Blood  10/23/17 0423          Components       Value Reference Range Flag Lab   Sodium 134 133 - 144 mmol/L  FrStHsLb   Potassium 3.4 3.4 - 5.3 mmol/L  FrStHsLb   Chloride 101 94 - 109 mmol/L  FrStHsLb   Carbon Dioxide 24 20 - 32 mmol/L  FrStHsLb   Anion Gap 9 3 - 14 mmol/L  FrStHsLb   Glucose 125 70 - 99 mg/dL H FrStHsLb   Urea Nitrogen 21 7 - 30 mg/dL  FrStHsLb   Creatinine 0.95 0.66 - 1.25 mg/dL  FrStHsLb   GFR Estimate 76 >60 mL/min/1.7m2  FrStHsLb   Comment:  Non  GFR Calc   GFR Estimate If Black >90 >60 mL/min/1.7m2  FrStHsLb   Comment:  African American GFR Calc   Calcium 8.8 8.5 - 10.1 mg/dL  FrStHsLb            CBC with platelets differential [[id number]] (Abnormal)  Resulted: 10/23/17 0434, Result status: Final result    Ordering provider: Radha Cox MD  10/23/17 7701 Resulting lab: [facility]    Specimen Information    Type Source Collected On   Blood  10/23/17 0423          Components       Value Reference Range Flag Lab   WBC 8.0 4.0 - 11.0 10e9/L  FrStHsLb   RBC Count 4.65 4.4 - 5.9 10e12/L  FrStHsLb   Hemoglobin 16.3 13.3 - 17.7  g/dL  FrStHsLb   Hematocrit 45.0 40.0 - 53.0 %  FrStHsLb   MCV 97 78 - 100 fl  FrStHsLb   MCH 35.1 26.5 - 33.0 pg H FrStHsLb   MCHC 36.2 31.5 - 36.5 g/dL  FrStHsLb   RDW 13.4 10.0 - 15.0 %  FrStHsLb   Platelet Count 104 150 - 450 10e9/L L FrStHsLb   Diff Method Automated Method   FrStHsLb   % Neutrophils 85.5 %  FrStHsLb   % Lymphocytes 4.7 %  FrStHsLb   % Monocytes 9.2 %  FrStHsLb   % Eosinophils 0.0 %  FrStHsLb   % Basophils 0.1 %  FrStHsLb   % Immature Granulocytes 0.5 %  FrStHsLb   Nucleated RBCs 0 0 /100  FrStHsLb   Absolute Neutrophil 6.9 1.6 - 8.3 10e9/L  FrStHsLb   Absolute Lymphocytes 0.4 0.8 - 5.3 10e9/L L FrStHsLb   Absolute Monocytes 0.7 0.0 - 1.3 10e9/L  FrStHsLb   Absolute Eosinophils 0.0 0.0 - 0.7 10e9/L  FrStHsLb   Absolute Basophils 0.0 0.0 - 0.2 10e9/L  FrStHsLb   Abs Immature Granulocytes 0.0 0 - 0.4 10e9/L  FrStHsLb   Absolute Nucleated RBC 0.0   FrStHsLb            UA reflex to Microscopic and Culture [[id number]] (Abnormal)  Resulted: 10/23/17 0401, Result status: Final result    Ordering provider: Radha Cox MD  10/23/17 7867 Resulting lab: [facility]    Specimen Information    Type Source Collected On   Midstream Urine Urine clean catch 10/23/17 0011          Components       Value Reference Range Flag Lab   Color Urine Yellow   FrStHsLb   Appearance Urine Clear   FrStHsLb   Glucose Urine Negative NEG^Negative mg/dL  FrStHsLb   Bilirubin Urine Negative NEG^Negative  FrStHsLb   Ketones Urine Negative NEG^Negative mg/dL  FrStHsLb   Specific [place] Urine 1.020 1.003 - 1.035  FrStHsLb   Blood Urine Moderate NEG^Negative A FrStHsLb   pH Urine 5.5 5.0 - 7.0 pH  FrStHsLb   Protein Albumin Urine 30 NEG^Negative mg/dL A FrStHsLb   Urobilinogen mg/dL Normal 0.0 - 2.0 mg/dL  FrStHsLb   Nitrite Urine Negative NEG^Negative  FrStHsLb   Leukocyte Esterase Urine Negative NEG^Negative  FrStHsLb   Source Midstream Urine   FrStHsLb   RBC Urine 2 0 - 2 /HPF  FrStHsLb   WBC Urine 1 0 -  2 /HPF  FrStHsLb   Mucous Urine Present NEG^Negative /LPF A FrStHsLb            Testing Performed By     Lab - Abbreviation Name Director Address Valid Date Range    14 - FrStHsLb [facility] Unknown [address] [place] [address] 05/08/15 1057 - Present            Unresulted Labs (24h ago through future)    Start       Ordered    10/24/17 0600  Basic metabolic panel  AM DRAW,   Routine      10/23/17 0632         Imaging Results - 3 Days      XR Chest 2 Views [[id number]]  Resulted: 10/23/17 0511, Result status: Final result    Ordering provider: Radha Cox MD  10/23/17 0401 Resulted by: Vince Mejía MD    Performed: 10/23/17 0433 - 10/23/17 0445 Resulting lab: RADIOLOGY RESULTS    Narrative:       XR CHEST 2 VW  10/23/2017 4:45 AM      HISTORY: Weakness.     COMPARISON: 12/28/2016.    FINDINGS: The heart is at the upper limits of normal in size without  pulmonary edema. The thoracic aorta is calcified and tortuous. The  lungs are clear. No pneumothorax or pleural effusion.      Impression:       IMPRESSION: No acute abnormality.    VINCE MEJÍA MD      CT Head w/o Contrast [[id number]]  Resulted: 10/23/17 0510, Result status: Final result    Ordering provider: Radha Cox MD  10/23/17 0401 Resulted by: Vince Mejía MD    Performed: 10/23/17 0451 - 10/23/17 0451 Resulting lab: RADIOLOGY RESULTS    Narrative:       CT HEAD W/O CONTRAST 10/23/2017 4:51 AM      HISTORY: Fall.    TECHNIQUE: Routine noncontrast head CT. Radiation dose for this scan  was reduced using automated exposure control, adjustment of the mA  and/or kV according to patient size, or iterative reconstruction  technique.    COMPARISON: 12/28/2016.    FINDINGS: There is mild generalized brain atrophy. No mass, mass  effect or intracranial hemorrhage. No evidence of acute infarct.  Periventricular low attenuation is consistent with chronic small  vessel ischemic disease. Retention cyst or polyp in  the right  maxillary antrum.      Impression:       IMPRESSION:  No acute abnormality.    VINCE REZA MD      Testing Performed By     Lab - Abbreviation Name Director Address Valid Date Range    104 - Rad Rslts RADIOLOGY RESULTS Unknown Unknown 05 1553 - Present               ECG/EMG Results      Echocardiogram Complete [[id number]]  Resulted: 10/23/17 1109, Result status: In process    Ordering provider: Madhu Mckee MD  10/23/17 0632 Performed: 10/23/17 1108 - 10/23/17 1108    Resulting lab: RADIANT             Echo Complete with Lumason [[id number]]  Resulted: 10/23/17 1109, Result status: Edited Result - FINAL    Ordering provider: Madhu Mckee MD  10/23/17 0632 Resulted by: Toney Thurston MD    Performed: 10/23/17 1108 - 10/23/17 114 Resulting lab: RADIOLOGY RESULTS    Narrative:       [id number]  [id number]  [id number]  [id number]^RAFI^MADHU^LEONARD           [facility]  Echocardiography Laboratory  [address] [place] [address]        Name: DEEPAK LOCKE NITHIN  MRN: [id number]  : 1935  Study Date: 10/23/2017 11:09 AM  Age: 82 yrs  Gender: Male  Patient Location: [facility]  Reason For Study: Troponin elevation  Ordering Physician: MADHU MCKEE  Referring Physician: Clinton Mills  Performed By: Deepak Carrillo RDCS     BSA: 1.8 m2  Height: 68 in  Weight: 145 lb  HR: 66  BP: 139/92 mmHg  _____________________________________________________________________________  __        Procedure  Complete Portable Echo Adult. Contrast Lumason.  _____________________________________________________________________________  __        Interpretation Summary     There is mild concentric left ventricular hypertrophy.  The visual ejection fraction is estimated at 55-60%.  No regional wall motion abnormalities noted.  The right ventricle is normal in structure, function and size.  The left atrium is severely dilated.  The right atrium is moderately dilated.  There is mild (1+) mitral  regurgitation.  There is mild (1+) tricuspid regurgitation.  There is trace to mild aortic regurgitation.  There is mild (1+) pulmonic valvular regurgitation.  Mild aortic root dilatation.  The ascending aorta is Mildly dilated.  _____________________________________________________________________________  __        Left Ventricle  The left ventricle is normal in size. There is mild concentric left  ventricular hypertrophy. Left ventricular systolic function is normal. The  visual ejection fraction is estimated at 55-60%. Left ventricular diastolic  function is indeterminate. No regional wall motion abnormalities noted.     Right Ventricle  The right ventricle is normal in structure, function and size.     Atria  The left atrium is severely dilated. The right atrium is moderately dilated.  There is no atrial shunt seen.     Mitral Valve  There is mild mitral annular calcification. There is mild (1+) mitral  regurgitation.        Tricuspid Valve  The tricuspid valve is normal in structure and function. There is mild (1+)  tricuspid regurgitation. The right ventricular systolic pressure is  approximated at 36.1 mmHg plus the right atrial pressure. Right ventricular  systolic pressure is elevated, consistent with mild to moderate pulmonary  hypertension.     Aortic Valve  There is mild trileaflet aortic sclerosis. There is trace to mild aortic  regurgitation.     Pulmonic Valve  The pulmonic valve is not well seen, but is grossly normal. There is mild (1+)  pulmonic valvular regurgitation.     Vessels  Mild aortic root dilatation. The ascending aorta is Mildly dilated.     Pericardium  The pericardium appears normal.        Rhythm  Sinus rhythm was noted.  _____________________________________________________________________________  __  MMode/2D Measurements & Calculations  IVSd: 1.4 cm     LVIDd: 4.9 cm  LVIDs: 2.7 cm  LVPWd: 1.2 cm  FS: 44.5 %  EDV(Teich): 111.5 ml  ESV(Teich): 27.1 ml  LV mass(C)d: 264.3 grams  LV  mass(C)dI: 148.2 grams/m2  Ao root diam: 3.7 cm  LA dimension: 4.4 cm  asc Aorta Diam: 3.9 cm  LA/Ao: 1.2  LA Volume (BP): 90.7 ml  LA Volume Index (BP): 51.0 ml/m2           Doppler Measurements & Calculations  MV E max mary ann: 79.6 cm/sec  MV dec time: 0.28 sec  PA acc time: 0.10 sec  TR max mary ann: 300.6 cm/sec  TR max P.1 mmHg  Lateral E/e': 6.7  Med E to E': 12.1  Medial E/e': 12.1           _____________________________________________________________________________  __           Report approved by: Kirk Malhotra 10/23/2017 01:14 PM       1    Type Source Collected On     10/23/17 1109          View Image (below)              Encounter-Level Documents:     There are no encounter-level documents.      Order-Level Documents:     There are no order-level documents.

## 2017-10-23 NOTE — PROGRESS NOTES
[facility]    Hospitalist Progress Note    Assessment & Plan   Deepak Arndt is an 82-year-old male with past medical history significant for multifactorial gait disorder, polyneuropathy, essential tremor, chronic atrial fibrillation, mild aortic stenosis, coronary artery disease with history of myocardial infarction who presents with progressive gait disturbance, generalized weakness, urinary incontinence.  He was noted incidentally to have a mild troponin elevation.  He was admitted on 10/23/2017 for further workup and treatment.     Chronic polyneuropathy with chronic multifactorial gait disorder  He has been seen by Dr. Paul of the [facility].  He has had an extensive workup previously which included MRI of his brain, EMG which confirmed the presence of moderate length-dependent peripheral neuropathy which was chronic and progressive with no evidence of active denervation, serologic workup including negative ANCA, negative LORENA panel, normal cryoglobulins, normal glycosylated hemoglobin, normal vitamin D, normal rheumatoid factor, normal C-reactive protein, normal LFTs, normal CK, positive JERI at low titer with homogeneous pattern of questionable clinical significance, normal serum protein electrophoresis and essentially normal immunofixation.  Overall, it was felt that he had a multifactorial gait disturbance with his neuropathy contributing in addition to microvascular changes in the brainstem.  He was trialled on levodopa given mild extrapyramidal symptoms which the patient reports was minimally effective.  The patient this admission seems to have more progressive symptoms. His head CT does not reveal any acute abnormalities.  I did encourage them to follow up with Dr. Paul again as an outpatient as it is likely that these deficits will continue to progress.    - PT evaluation, recommending TCU  - SW to see for assistance with discharge planning    Essential  tremor  He was trialled on levodopa previously without benefit.  This tremor does limit his functional status and is bothersome to him overall. Previously was not on a beta blocker despite CAD due to bradycardia. HR has been stable in the 70s since admission.  - Continue trial of propranolol 40mg BID    Troponin elevation  This is of unclear significance.  His EKG shows some nonspecific T-wave changes, however, was without symptoms on admission. Serial troponins flat. Echo with EF 55-60%, no WMAs, biatrial enlargement with mild regurgitation of all valves. Patient has remained free of cardiopulmonary symptoms overnight.    Chronic atrial fibrillation  Heart rates are generally well controlled.  He is not on any rate controlling agents due to borderline bradycardia in the past, although his heart rate is within normal limits now.    - Propranolol as above with heart rate monitoring, presently within acceptable ranges  - Continue prior to admission Pradaxa    Urinary incontinence  This is somewhat of a chronic issue, although the patient's wife notes that his episode of incontinence the night before admission was the worst that he has had.  The patient acknowledges the urge to urinate and reports that his primary limitation is with his gait.  That limits him from getting to the bathroom.  His urinalysis is largely unremarkable with no evidence for infection.    - Recommend outpatient urologic evaluation    Hypertension  Blood pressure within normal limits on arrival to the Emergency Department.    - PTA regimen of amlodipine and HCTZ resumed    Coronary artery disease with prior myocardial infarction  His last stress test was about 1 year prior, which was a nuclear medicine Lexiscan scan and demonstrated a fixed basal inferior defect which may be prior myocardial infarction versus attenuation artifact.  He had been seen by Cardiology with no acute changes or further investigation of this recommended.      DVT  Prophylaxis: on Pradaxa  Code Status: Full Code    Disposition: Expected discharge today or tomorrow once safe discharge established.    Dennis Tan PA-C    Interval History   Pt sitting up in chair, finishing lunch. Continues to experience weakness, feels it at same level from time of admission. Planning to discharge to rehab when placement found.    -Data reviewed today: I reviewed all new labs and imaging results over the last 24 hours. I personally reviewed no images or EKG's today.    Physical Exam   Temp: 98.1  F (36.7  C) Temp src: Oral BP: (!) 169/92 Pulse: 74   Resp: 16 SpO2: 95 % O2 Device: None (Room air)    Vitals:    10/23/17 0325 10/23/17 0638   Weight: 65.8 kg (145 lb) 68.2 kg (150 lb 4.8 oz)     Vital Signs with Ranges  Temp:  [97.7  F (36.5  C)-98.2  F (36.8  C)] 98.1  F (36.7  C)  Pulse:  [56-78] 74  Resp:  [16] 16  BP: (124-180)/(72-92) 169/92  SpO2:  [93 %-96 %] 95 %       GEN: well-developed, well-nourished, appears comfortable  PULM: lungs CTA bilaterally, no increased work of breathing, no wheeze, crackles, rhonchi  CV: irregularly irregular, normal rate, S1 & S2, no murmur  GI: soft, nontender, nondistended, +BS in all 4 quadrants  SKIN: warm & dry without rash or wound, no pedal edema    Medications        propranolol  40 mg Oral BID     dabigatran ANTICOAGULANT (PRADAXA) PO  150 mg Oral BID       Data     Recent Labs  Lab 10/23/17  0900 10/23/17  0423   WBC  --  8.0   HGB  --  16.3   MCV  --  97   PLT  --  104*   NA  --  134   POTASSIUM  --  3.4   CHLORIDE  --  101   CO2  --  24   BUN  --  21   CR  --  0.95   ANIONGAP  --  9   TRIPP  --  8.8   GLC  --  125*   TROPI 0.063* 0.068*       Recent Results (from the past 24 hour(s))   XR Chest 2 Views    Narrative    XR CHEST 2 VW  10/23/2017 4:45 AM      HISTORY: Weakness.     COMPARISON: 12/28/2016.    FINDINGS: The heart is at the upper limits of normal in size without  pulmonary edema. The thoracic aorta is calcified and tortuous. The  lungs  are clear. No pneumothorax or pleural effusion.      Impression    IMPRESSION: No acute abnormality.    VINCE REZA MD   CT Head w/o Contrast    Narrative    CT HEAD W/O CONTRAST 10/23/2017 4:51 AM      HISTORY: Fall.    TECHNIQUE: Routine noncontrast head CT. Radiation dose for this scan  was reduced using automated exposure control, adjustment of the mA  and/or kV according to patient size, or iterative reconstruction  technique.    COMPARISON: 12/28/2016.    FINDINGS: There is mild generalized brain atrophy. No mass, mass  effect or intracranial hemorrhage. No evidence of acute infarct.  Periventricular low attenuation is consistent with chronic small  vessel ischemic disease. Retention cyst or polyp in the right  maxillary antrum.      Impression    IMPRESSION:  No acute abnormality.    VINCE REZA MD

## 2017-10-23 NOTE — PROGRESS NOTES
Social work discussed picking patient up tomorrow at 10:30am today. Social work was concerned about possible cognitive impairment after needing to repeat what time and day she needed to pick him up several times. Wife arrived on unit [facility] at 6:15pm thinking she was picking up her .

## 2017-10-23 NOTE — H&P
PRIMARY CARE PHYSICIAN:  Clinton Mills MD     DATE OF ADMISSION AND SERVICE:  10/23/2017       CHIEF COMPLAINT:  Generalized weakness, gait unsteadiness, tremor, urinary incontinence.      HISTORY OF PRESENT ILLNESS:  Deepak Arndt is an 82 year-old male with past medical history as detailed below who presents with the above chief complaint.     The patient has a history of gait ataxia, tremors and polyneuropathy for which he has seen Dr. Paul of the [facility].  He has had a previous extensive workup which included MRI of his brain, serologic testing which was largely unremarkable.  An EMG did show presence of peripheral neuropathy which appears chronic and progressive.  Ultimately, he was felt to have an idiopathic polyneuropathy that was of sufficient severity to cause gait ataxia, especially in the context of additional cerebral and cerebellar atrophy, leading to ultimate diagnosis of multifactorial gait disorder.  He had been recommended to have [facility] and did feel some [facility] from this.  He was trialled on levodopa which did not offer him any benefit.  Over the past several months since that evaluation in June, his symptoms have seemed to be worsening with increasing shuffling gait, several falls, losing his balance over the past weeks.  His wife also noted that the night prior to admission on 10/22/2017 he was up walking and seemed to be confused as to where he was.  He also lost control of his bladder the night prior.  He does have a previous history of urinary incontinence that is worse for him at night.  However, his wife felt that this episode was the worst it has been.  The patient notes that one of the primary reasons for his incontinence is his difficulty with gait as he reports he feels the urge to urinate, just is not able to move quickly enough to get to the bathroom.  He denies any recent medication changes.  He denies any chest  pain or shortness of breath.  Denies any focal numbness, tingling, weakness, vision changes, headache, difficulty speaking.  He denies any cough, abdominal pain, nausea, vomiting.  He lives in a home in [place] with his wife, his daughter does provide some assistance with chores but otherwise he and his wife have been largely independent.      In the Emergency Department, the patient was seen by Dr. Radha Cox with whom I discussed the patient's presenting symptoms and emergency department course.  His initial vital signs were a temperature of 98.2 degrees Fahrenheit, heart rate 78, blood pressure 124/72, respiratory rate of 16, SpO2 of 93% on room air.  Laboratory workup revealed a urinalysis with moderate blood, 30 of protein, 2 red blood cells, 1 white blood cell.  CBC demonstrated a platelet count of 104, otherwise was unremarkable.  Basic metabolic panel was largely unremarkable.  TSH was 0.50.  Troponin I was 0.068.  EKG demonstrated atrial fibrillation with nonspecific T-wave changes.  Head CT was unremarkable.  Chest x-ray was unremarkable.  Due to the patient's generalized weakness as well as some mild troponin elevation, observation bed was requested for further evaluation.      PAST MEDICAL HISTORY:   1.  Essential tremor.   2.  Polyneuropathy.   3.  Multifactorial gait disorder.   4.  Chronic atrial fibrillation.   5.  Coronary artery disease with prior myocardial infarction, being managed medically.   6.  Mild aortic stenosis.   7.  Hypertension.   8.  Hyperlipidemia.    9.  Erectile dysfunction.   10.  Actinic keratoses.   11.  Osteoporosis.      PAST SURGICAL HISTORY:  Cataract removal.      SOCIAL HISTORY:  He is a former smoker, quit 40 years ago.  He reports drinking 2 glasses of guerline in the evening most days of the week.  He lives mostly independently with his wife in a home in [place].  His daughter does help with some chores around the house.      FAMILY HISTORY:  The patient denies any  significant medical history in primary relatives that are known to him.      REVIEW OF SYSTEMS:  Complete 10-point review of systems assessed and negative except as noted in HPI.      ALLERGIES:  No known drug allergies.      PRIOR TO ADMISSION MEDICATIONS:  Copied and pasted from pharmacy reconciliation note:   Prior to Admission medications    Medication Sig Last Dose Taking? Auth Provider   DULoxetine HCl (CYMBALTA PO) Take 30 mg by mouth every evening 10/22/2017 at pm Yes Unknown, Entered By History   hydrochlorothiazide (HYDRODIURIL) 25 MG tablet TAKE ONE TABLET BY MOUTH ONE TIME DAILY 10/22/2017 at Unknown time Yes Clinton Mills MD   amLODIPine (NORVASC) 5 MG tablet Take 1 tablet (5 mg) by mouth daily 10/22/2017 at Unknown time Yes Justin Arroyo MD   Dabigatran Etexilate Mesylate (PRADAXA PO) Take 150 mg by mouth 2 times daily 10/22/2017 at Unknown time Yes Reported, Patient   simvastatin (ZOCOR) 10 MG tablet Take 5 mg by mouth At Bedtime (Takes half of a 10mg tablet for a total of 5mg daily) 10/22/2017 at Unknown time Yes Reported, Patient   fish oil-omega-3 fatty acids (OMEGA-3 FISH OIL) 1000 MG capsule Take 1 capsule by mouth daily. 10/22/2017 at Unknown time Yes Reported, Patient   Calcium Carbonate-Vitamin D (CALCIUM 500/VITAMIN D PO) Take 1 tablet by mouth daily. 10/22/2017 at Unknown time Yes Reported, Patient   Multiple Vitamins-Minerals (MULTIVITAMIN & MINERAL PO) Take 1 tablet by mouth daily  10/22/2017 at Unknown time Yes Reported, Patient         PHYSICAL EXAMINATION:   VITAL SIGNS:  Temperature 97.7 degrees Fahrenheit, heart rate 56, blood pressure 160/72, respiratory rate 16, SpO2 of 96% on room air.     GENERAL:  Elderly male in no acute distress.   EYES:  Pupils equal, round, react to light, extraocular movements intact.   ENT:  Moist mucous membranes.  Oropharynx clear.     NECK:  Supple, no rigidity.   RESPIRATORY:  Lungs clear to auscultation bilaterally.  Normal effort.    CARDIOVASCULAR:  Irregularly irregular rhythm with normal rate.  No murmurs, rubs or gallops.  No peripheral edema.   GASTROINTESTINAL:  Soft, nontender, nondistended.  Bowel sounds present.   SKIN:  Warm, dry.   PSYCHIATRIC:  Normal affect, appropriate.   NEUROLOGIC:  Alert.  Oriented to person, place and date.  Five out of 5 upper and lower extremity strength symmetric bilaterally.  Bilateral intention tremor noted.  Face symmetric, tongue midline.      DIAGNOSTIC DATA:  Labs and imaging reviewed in epic.  Pertinents included in HPI.      ASSESSMENT AND PLAN:  Deepak Arndt is an 82-year-old male with past medical history significant for multifactorial gait disorder, polyneuropathy, essential tremor, chronic atrial fibrillation, mild aortic stenosis, coronary artery disease with history of myocardial infarction who presents with progressive gait disturbance, generalized weakness, urinary incontinence.  He was noted incidentally to have a mild troponin elevation.  He was admitted on 10/23/2017 for further workup and treatment.     1.  Chronic polyneuropathy with multifactorial gait disorder:  He has been seen by Dr. Paul of the [facility].  He has had an extensive workup previously which included MRI of his brain, EMG which confirmed the presence of moderate    length-dependent peripheral neuropathy which was chronic and progressive with no evidence of active denervation, serologic workup including negative ANCA, negative LORENA panel, normal cryoglobulins, normal glycosylated hemoglobin, normal vitamin D, normal rheumatoid factor, normal C-reactive protein, normal LFTs, normal CK, positive JERI at low titer with homogeneous pattern of questionable clinical significance, normal serum protein electrophoresis and essentially normal immunofixation.  Overall, it was felt that he had a multifactorial gait disturbance with his neuropathy contributing in addition to microvascular changes in the  brainstem.  He was trialled on levodopa given mild extrapyramidal symptoms which the patient reports was minimally effective.  [facility] which he did feel had some benefit.  The patient this admission seems to have more progressive symptoms.  I did offer a consultation with a neurologist here to assess for any new evaluations.  He and his wife declined this assessment at this time.  His head CT does not reveal any acute abnormalities.  I did encourage them to follow up with Dr. Paul again as an outpatient as it is likely that these deficits will continue to progress.  Will have Physical Therapy consulted to determine if he needs extra assistance at home following discharge.  Due to the presence of moderate blood in his urine on his urinalysis with red blood cells noted, will check a CK to evaluate for any significant rhabdomyolysis.   2.  Essential tremor:  He was trialled on levodopa previously without benefit.  This tremor does limit his functional status and is bothersome to him overall.  Will trial a low dose of propranolol.  I note in his previous Cardiology notes that he had not been on a beta blocker despite his coronary disease and chronic atrial fibrillation as his heart rate had tended to run on the bradycardic side.  His heart rate is currently within normal limits.  Would monitor heart rate closely with initiation of propranolol to ensure that it is appropriately tolerated.  May require further adjustment of his prior to admission antihypertensive regimen to accommodate propranolol and would consider discontinuation of hydrochlorothiazide as first change made.   3.  Troponin elevation:  This is of unclear significance.  His EKG shows some nonspecific T-wave changes, however, he denies any current or recent cardiopulmonary symptoms.  Therefore, acute coronary syndrome is felt to be unlikely at this time.  He does have a history of atrial fibrillation and  certainly intermittent elevated rates may potentially contribute to this elevation  He also has a history of mild aortic stenosis.  Will repeat a troponin to confirm trend.  Will obtain an echocardiogram to assess for any structural or valvular changes from his past study.   4.  Chronic atrial fibrillation:  Heart rates are generally well controlled.  He is not on any rate controlling agents due to borderline bradycardia in the past, although his heart rate is within normal limits now.  Placing him on propranolol as above.  Monitor heart rates with this initiation.  Continue prior to admission Pradaxa.   5.  Urinary incontinence:  This is somewhat of a chronic issue, although the patient's wife notes that his episode of incontinence the night before admission was the worst that he has had.  The patient acknowledges the urge to urinate and reports that his primary limitation is with his gait.  That limits him from getting to the bathroom.  His urinalysis is largely unremarkable with no evidence for infection.  Will check a bladder scan following voiding to ensure that he does not have significant retention contributing.  Will otherwise monitor symptom course and for evidence of recurrence.  May consider outpatient urology evaluation.   6.  Hypertension:  Blood pressure within normal limits on arrival to the Emergency Department.  As above, initiating propranolol and may consider discontinuing hydrochlorothiazide to allow further room for this from a blood pressure perspective.  Otherwise, he can continue his prior to admission amlodipine once doses are confirmed.   7.  Coronary artery disease with prior myocardial infarction:  His last stress test was about 1 year prior, which was a nuclear medicine Lexiscan scan and demonstrated a fixed basal inferior defect which may be prior myocardial infarction versus attenuation artifact.  He had been seen by Cardiology with no acute changes or further investigation of this  recommended.  Will be trending troponin as above and checking an echocardiogram as noted above.     Deep venous thrombosis prophylaxis:  Continue Pradaxa.      CODE STATUS:  Full code.      DISPOSITION:  Registered to observation status.  Anticipate discharge within the next 24 hours assuming no significant cardiac abnormalities and once safe disposition can be determined.         HARDY MCKEE MD             D: 10/23/2017 07:05   T: 10/23/2017 08:04   MT: GAB      Name:     LA LOCKE   MRN:      [id number]        Account:      [id number]   :      1935           Admitted:     [id number]      Document: [id number]       cc: Clinton Mills MD

## 2017-10-23 NOTE — LETTER
Transition Communication Hand-off for Care Transitions to Next Level of Care Provider    Name: Deepak Arndt  MRN #: [id number]  Primary Care Provider: Clinton Mills     Primary Clinic: [address]  [facility] [place] [address]     Reason for Hospitalization:  Generalized muscle weakness [M62.81]  Elevated troponin [R74.8]  Fatigue, unspecified type [R53.83]  Admit Date/Time: 10/23/2017  3:22 AM  Discharge Date: 10/24/17  Payor Source: Payor: MEDICARE / Plan: MEDICARE / Product Type: Medicare /     Readmission Assessment Measure (JASIEL) Risk Score/category: none           Reason for Communication Hand-off Referral: Other Next[employer] ACO waiver    Discharge Plan:  Discharge Plan:       Most Recent Value    Concerns To Be Addressed discharge planning concerns           Laly Marquez    AVS/Discharge Summary is the source of truth; this is a helpful guide for improved communication of patient story

## 2017-10-23 NOTE — IP AVS SNAPSHOT
"` `     [facility]: [phone]                                              INTERAGENCY TRANSFER FORM - NURSING   10/23/2017                    Hospital Admission Date: 10/23/2017  LA LOCKE   : 1935  Sex: Male        Attending Provider: Madhu Leal MD     Allergies:  No Known Allergies    Infection:  None   Service:  HOSPITALIST    Ht:  1.727 m (5' 8\")   Wt:  68.2 kg (150 lb 4.8 oz)   Admission Wt:  65.8 kg (145 lb)    BMI:  22.85 kg/m 2   BSA:  1.81 m 2            Patient PCP Information     Provider PCP Type    Clinton Mills MD General      Current Code Status     Date Active Code Status Order ID Comments User Context       10/23/2017  6:32 AM Full Code [id number]  Madhu Leal MD Inpatient       Code Status History     Date Active Date Inactive Code Status Order ID Comments User Context    2011  2:32 PM 10/23/2017  6:32 AM Full Code [id number] As per reasonable care for Seniors, wants in the Short term aggressive care.   No desire for long term prolongation of life through artificial means if no hope to bring back to a reasonable status.   Clinton Mills MD Outpatient      Advance Directives        Does patient have a scanned Advance Directive/ACP document in EPIC?           Yes        Hospital Problems as of 10/24/2017              Priority Class Noted POA    Abnormality of gait Medium  10/23/2017 Yes      Non-Hospital Problems as of 10/24/2017              Priority Class Noted    HTN (hypertension) Medium  2011    Osteoporosis Medium  2011    Hypercholesterolemia Medium  2011    Past myocardial infarction Medium  2011    Advance Care Planning Medium  2011    Myocardial infarction Medium  2012    Chronic atrial fibrillation (H) Medium  2013    Health Care Home Medium  2014    Mild aortic stenosis Medium  Unknown    Coronary artery disease involving native coronary artery of native heart without angina pectoris Medium  " "Unknown    Aortic valve disorder Medium  12/16/2015      Immunizations     Name Date      HEPA 02/27/13     Influenza (IIV3) 09/19/13     Influenza (IIV3) 10/01/12     Influenza Vaccine IM 3yrs+ 4 Valent IIV4 09/18/17     Pneumococcal (PCV 13) 06/01/16     Pneumococcal (PCV 13) 01/01/02     Pneumococcal 23 valent 09/18/17     TD (ADULT, 7+) 05/01/07     TDAP Vaccine (Boostrix) 02/27/13          END      ASSESSMENT     Discharge Profile Flowsheet     EXPECTED DISCHARGE     Patient's communication style  spoken language (English or Bilingual) 10/23/17 0316    Expected Discharge Date  10/24/17 10/24/17 0951   FINAL RESOURCES      DISCHARGE NEEDS ASSESSMENT     Resources List  Transitional Care 10/24/17 0951    Concerns To Be Addressed  discharge planning concerns 10/23/17 1049   Transitional Care  -- ([facility] [place]) 10/24/17 0951    Patient/family verbalizes understanding of discharge plan recommendations?  Yes 10/23/17 1049   PAS Number  [id number] 10/24/17 0951    Medical Team notified of plan?  yes 10/23/17 1049   SKIN      Equipment Currently Used at Home  cane, straight 10/23/17 0944   Inspection of bony prominences  Full 10/24/17 0423    Transportation Available  family or friend will provide 10/24/17 0951   Skin WDL  WDL 10/24/17 0423    Current Discharge Risk  dependent with mobility/activities of daily living;physical impairment 10/23/17 1049   SAFETY      # of Referrals Placed by CTS  Post Acute Facilities 10/23/17 1049   Safety WDL  WDL 10/24/17 0423    COMMUNICATION ASSESSMENT     All Alarms  alarm(s) activated and audible 10/24/17 0423                 Assessment WDL (Within Defined Limits) Definitions           Safety WDL     Effective: 09/28/15    Row Information: <b>WDL Definition:</b> Bed in low position, wheels locked; call light in reach; upper side rails up x 2; ID band on<br> <font color=\"gray\"><i>Item=AS safety wdl>>List=AS safety wdl>>Version=F14</i></font>      Skin WDL     Effective: 09/28/15    " "Row Information: <b>WDL Definition:</b> Warm; dry; intact; elastic; without discoloration; pressure points without redness<br> <font color=\"gray\"><i>Item=AS skin wdl>>List=AS skin wdl>>Version=F14</i></font>      Vitals     Vital Signs Flowsheet     VITAL SIGNS     0-10 Pain Scale  0 10/23/17 0606    Temp  100.5  F (38.1  C) 10/24/17 0821   HEIGHT AND WEIGHT      Temp src  Oral 10/24/17 0821   Height  1.727 m (5' 8\") 10/23/17 0640    Resp  18 10/24/17 0821   Height Method  Stated 10/23/17 0326    Pulse  61 10/23/17 1519   Weight  68.2 kg (150 lb 4.8 oz) 10/23/17 0640    Heart Rate  70 10/24/17 0821   BSA (Calculated - sq m)  1.81 10/23/17 0640    Pulse/Heart Rate Source  Monitor 10/24/17 0821   BMI (Calculated)  22.9 10/23/17 0640    BP  129/65 10/24/17 0821   POSITIONING      BP Location  Right arm 10/24/17 0821   Body Position  supine, head elevated 10/24/17 0154    OXYGEN THERAPY     DAILY CARE      SpO2  96 % 10/24/17 0821   Activity Management  ambulated to bathroom 10/24/17 0154    O2 Device  None (Room air) 10/24/17 0821   Activity Assistance Provided  assistance, 1 person 10/24/17 0154    PAIN/COMFORT     Assistive Device Utilized  walker;gait belt 10/24/17 0154    Patient Currently in Pain  denies 10/24/17 0154   Additional Documentation  Activity Device Assistance (Row) 10/24/17 0154            Patient Lines/Drains/Airways Status    Active LINES/DRAINS/AIRWAYS     Name: Placement date: Placement time: Site: Days: Last dressing change:    Peripheral IV 10/23/17 Left Upper forearm 10/23/17   0330   Upper forearm   1             Patient Lines/Drains/Airways Status    Active PICC/CVC     None            Intake/Output Detail Report     Date Intake   Output Net    Shift P.O. IV Piggyback Total Urine Total       Noc 10/22/17 2300 - 10/23/17 0659 -- -- -- -- -- 0    Day 10/23/17 0700 - 10/23/17 1459 480 -- 480 80 80 400    Kallie 10/23/17 1500 - 10/23/17 2259 -- -- -- 20 20 -20    Noc 10/23/17 2300 - 10/24/17 0681 -- " -- -- -- -- 0    Day 10/24/17 0700 - 10/24/17 1459 -- -- -- 100 100 -100      Last Void/BM       Most Recent Value    Urine Occurrence 1 at 10/24/2017 0929    Stool Occurrence       Case Management/Discharge Planning     Case Management/Discharge Planning Flowsheet     REFERRAL INFORMATION     Support Assessment  Adequate family and caregiver support 10/23/17 1049    Did the Initial Social Work Assessment result in a Social Work Case?  Yes 10/23/17 1049   Quality of Family Relationships  supportive;involved 10/23/17 1049    Admission Type  observation 10/23/17 1049   EXPECTED DISCHARGE      Arrived From  home or self-care 10/23/17 1049   Expected Discharge Date  10/24/17 10/24/17 0951    Referral Source  physician 10/23/17 1049   ASSESSMENT/CONCERNS TO BE ADDRESSED      # of Referrals Placed by CTS  Post Acute Facilities 10/23/17 1049   Concerns To Be Addressed  discharge planning concerns 10/23/17 1049    Post Acute Facilities  TCU 10/23/17 1049   DISCHARGE PLANNING      Reason For Consult  discharge planning 10/23/17 1049   Patient/family verbalizes understanding of discharge plan recommendations?  Yes 10/23/17 1049    Record Reviewed  medical record 10/23/17 1049   Medical Team notified of plan?  yes 10/23/17 1049    CTS Assigned to Case  Laly Marquez 10/24/17 0951   Transportation Available  family or friend will provide 10/24/17 0951    LIVING ENVIRONMENT     Current Discharge Risk  dependent with mobility/activities of daily living;physical impairment 10/23/17 1049    Lives With  spouse 10/23/17 1049   FINAL RESOURCES      Living Arrangements  house 10/23/17 1049   Equipment Currently Used at Home  cane, harlan 10/23/17 0944    Provides Primary Care For  no one, unable/limited ability to care for self 10/23/17 1049   Resources List  Transitional Care 10/24/17 0951    Quality Of Family Relationships  supportive;helpful;involved 10/23/17 1049   Transitional Care  -- ([facility] [place]) 10/24/17 0951    Able to  Return to Prior Living Arrangements  no 10/23/17 1049   PAS Number  [id number] 10/24/17 0951    HOME SAFETY     ABUSE RISK SCREEN      Patient Feels Safe Living in Home?  yes 10/23/17 1049   QUESTION TO PATIENT:  Has a member of your family or a partner(now or in the past) intimidated, hurt, manipulated, or controlled you in any way?  no 10/23/17 0320    ASSESSMENT OF FAMILY/SOCIAL SUPPORT     QUESTION TO PATIENT: Do you feel safe going back to the place where you are living?  yes 10/23/17 0320    Marital Status   10/23/17 1049   OBSERVATION: Is there reason to believe there has been maltreatment of a vulnerable adult (ie. Physical/Sexual/Emotional abuse, self neglect, lack of adequate food, shelter, medical care, or financial exploitation)?  no 10/23/17 0320    Who is your support system?  Wife;Children 10/23/17 1049   (R) MENTAL HEALTH SUICIDE RISK      Spouse's Name  Caitlyn 10/23/17 1049   Are you depressed or being treated for depression?  No 10/23/17 0645    Description of Support System  Supportive;Involved 10/23/17 1043

## 2017-10-23 NOTE — IP AVS SNAPSHOT
"          [facility] OBSERVATION UNIT: [phone]                                              INTERAGENCY TRANSFER FORM - PHYSICIAN ORDERS   10/23/2017                    Hospital Admission Date: 10/23/2017  LA LOCKE   : 1935  Sex: Male        Attending Provider: Madhu Leal MD     Allergies:  No Known Allergies    Infection:  None   Service:  HOSPITALIST    Ht:  1.727 m (5' 8\")   Wt:  68.2 kg (150 lb 4.8 oz)   Admission Wt:  65.8 kg (145 lb)    BMI:  22.85 kg/m 2   BSA:  1.81 m 2            Patient PCP Information     Provider PCP Type    Clinton Mills MD General      ED Clinical Impression     Diagnosis Description Comment Added By Time Added    Fatigue, unspecified type [R53.83] Fatigue, unspecified type [R53.83]  Radha Cox MD 10/23/2017  5:41 AM    Generalized muscle weakness [M62.81] Generalized muscle weakness [M62.81]  Radha Cox MD 10/23/2017  5:41 AM    Elevated troponin [R74.8] Elevated troponin [R74.8]  Radha Cox MD 10/23/2017  5:41 AM      Hospital Problems as of 10/24/2017              Priority Class Noted POA    Abnormality of gait Medium  10/23/2017 Yes      Non-Hospital Problems as of 10/24/2017              Priority Class Noted    HTN (hypertension) Medium  2011    Osteoporosis Medium  2011    Hypercholesterolemia Medium  2011    Past myocardial infarction Medium  2011    Advance Care Planning Medium  2011    Myocardial infarction Medium  2012    Chronic atrial fibrillation (H) Medium  2013    Health Care Home Medium  2014    Mild aortic stenosis Medium  Unknown    Coronary artery disease involving native coronary artery of native heart without angina pectoris Medium  Unknown    Aortic valve disorder Medium  2015      Code Status History     Date Active Date Inactive Code Status Order ID Comments User Context    2011  2:32 PM 10/23/2017  6:32 AM Full Code [id number] As per reasonable care for " Seniors, wants in the Short term aggressive care.   No desire for long term prolongation of life through artificial means if no hope to bring back to a reasonable status.   Clinton Mills MD Outpatient         Medication Review      START taking        Dose / Directions Comments    acetaminophen 325 MG tablet   Commonly known as:  TYLENOL   Used for:  Generalized muscle weakness        Dose:  650 mg   Take 2 tablets (650 mg) by mouth every 4 hours as needed for mild pain or fever   Quantity:  100 tablet   Refills:  0        propranolol 40 MG tablet   Commonly known as:  INDERAL   Used for:  Essential tremor        Dose:  40 mg   Take 1 tablet (40 mg) by mouth 2 times daily   Quantity:  60 tablet   Refills:  0          CONTINUE these medications which have NOT CHANGED        Dose / Directions Comments    amLODIPine 5 MG tablet   Commonly known as:  NORVASC   Indication:  High Blood Pressure Disorder   Used for:  Essential hypertension, benign        Dose:  5 mg   Take 1 tablet (5 mg) by mouth daily   Quantity:  90 tablet   Refills:  3        CALCIUM 500/VITAMIN D PO        Dose:  1 tablet   Take 1 tablet by mouth daily.   Refills:  0        CYMBALTA PO        Dose:  30 mg   Take 30 mg by mouth every evening   Refills:  0        fish oil-omega-3 fatty acids 1000 MG capsule        Dose:  1 capsule   Take 1 capsule by mouth daily.   Refills:  0        hydrochlorothiazide 25 MG tablet   Commonly known as:  HYDRODIURIL   Used for:  Encounter for medication refill        TAKE ONE TABLET BY MOUTH ONE TIME DAILY   Quantity:  90 tablet   Refills:  3        MULTIVITAMIN & MINERAL PO        Dose:  1 tablet   Take 1 tablet by mouth daily   Refills:  0        PRADAXA PO        Dose:  150 mg   Take 150 mg by mouth 2 times daily   Refills:  0        simvastatin 10 MG tablet   Commonly known as:  ZOCOR   Indication:  cuts 10 mg in half        Dose:  5 mg   Take 5 mg by mouth At Bedtime (Takes half of a 10mg tablet for a total  of 5mg daily)   Refills:  0                After Care     Activity - Up with nursing assistance           Advance Diet as Tolerated       Follow this diet upon discharge: Regular       Bladder scan       X 2 for post void residual       Fall precautions           General info for SNF       Length of Stay Estimate: Short Term Care: Estimated # of Days <30  Condition at Discharge: Stable  Level of care:skilled   Rehabilitation Potential: Good  Admission H&P remains valid and up-to-date: Yes  Recent Chemotherapy: N/A  Use Nursing Home Standing Orders: Yes       Mantoux instructions       Give two-step Mantoux (PPD) Per Facility Policy Yes             Referrals     Occupational Therapy Adult Consult       Evaluate and treat as clinically indicated.    Reason:  Frequent falls, weakness       Physical Therapy Adult Consult       Evaluate and treat as clinically indicated.    Reason:  Frequent falls, balance issues             Your next 10 appointments already scheduled     Dec 06, 2017  9:30 AM CST   Ech Complete with SHCVECHR4   [facility] CV Echocardiography (Cardiovascular Imaging at [facility])    [address] [place] [address]  [address]  [facility] [id number] [phone]           1. Please bring or wear a comfortable two-piece outfit. 2. You may eat, drink and take your normal medicines. 3. For any questions that cannot be answered, please contact the ordering physician            Dec 11, 2017  2:45 PM CST   Return Visit with Justin Arroyo MD   [facility] PHYSICIANS HEART AT [place] ([facility])    [address] [place] [address] [address]  [facility] [id number] [phone]              Follow-Up Appointment Instructions     Future Labs/Procedures    Follow Up and recommended labs and tests     Comments:    Follow up with primary care provider in 1-2 weeks.  No follow up labs or test are needed.  Follow up with specialist, Dr. Paul, in 2-4 weeks.  Follow up with specialist,  Urology, if needed for ongoing urinary symptoms.      Follow-Up Appointment Instructions     Follow Up and recommended labs and tests       Follow up with primary care provider in 1-2 weeks.  No follow up labs or test are needed.  Follow up with specialist, Dr. Paul, in 2-4 weeks.  Follow up with specialist, Urology, if needed for ongoing urinary symptoms.             Statement of Approval     Ordered          10/24/17 0948  I have reviewed and agree with all the recommendations and orders detailed in this document.  EFFECTIVE NOW     Approved and electronically signed by:  Candy Garcia MD

## 2017-10-23 NOTE — IP AVS SNAPSHOT
` ` Patient Information     Patient Name Sex     Deepak Arndt ([id number]) Male 1935       Room Bed    OU03 OU03-01      Patient Demographics     Address Phone E-mail Address    [address] [address] [phone] (Home) *Preferred*  [phone] (Mobile) [email or web address]      Patient Ethnicity & Race     Ethnic Group Patient Race    American White      Emergency Contact(s)     Name Relation Home Work Mobile    Caitlyn Arndt Spouse [phone]    TOSHIA NEGRON Daughter [phone]      Documents on File        Status Date Received Description       Documents for the Patient    Consent for Services - [facility] Received () 11     Privacy Notice - [facility] Received () 11     Insurance Card Received () 11 BC/BS    Patient ID Received 13 -    Insurance Card Received () 11 MEDICARE    HIM TYSON Authorization   MY CHART 11    Insurance Card Received () 12 [facility]-BC/BS-Basic Medicare [employer] S[employer]IOR GOLD    Insurance Card Received () 12 [facility]-MEDICARE    Privacy Notice - [facility] Received 12     External Medication Information Consent Accepted 13 [facility]-EXTERNAL    Consent for Services - Hospital/Clinic Received () 12     Consent for Services - Hospital/Clinic  () 12     Privacy Notice - [facility]  12 ACKNOWLEDGMENT OF RECEIPT OF NOTICE OF PRIVACY PRACTICE    Consent for Services - [facility] Received () 13 [facility]-CONSENT    Privacy Notice - [facility] Received 13 [facility]-PRIVACY    Insurance Card Received 13 [facility]-MEDICARE    Insurance Card Received () 13 [facility]-BCBS    Consent for Services - Hospital/Clinic   13 ABN [facility]    Consent for EHR Access  13 Copied from existing Consent for services - C/HOD collected on 2012    [facility] Specified Other       Consent for Services - Hospital/Clinic Received () 13     Consent  for Services - Hospital/Clinic       HIM TYSON Authorization  13     Patient ID Received 14 -    Insurance Card Received 14 -    HIM [facility] Authorization  14     Consent for Services - [facility] Received () 14 [facility]-CONSENT    Insurance Card Received () 14 [facility]-BCBS    Consent for Services - Hospital/Clinic Received () 14     Advance Directives and Living Will Received 03/12/15 Health Care Directive 03    Advance Directives and Living Will Not Received  Validation of AD 03    Consent for Services - Hospital/Clinic Received () 12/15/15     Insurance Card Received 12/15/15 -    HIM TYSON Authorization - File Only  02/15/16 PATIENT    Consent for Services - [facility] Received () 16 [facility]-CONSENT    Insurance Card Received () 16 [facility]-BCBS    Consent for Services/Privacy Notice - Hospital/Clinic Received () 16     Insurance Card Received 17 [facility]-BCBS    HIM [place] Authorization - File Only   17 MEDICAL RELEASE TO hospitals CLINIC OF NEUROLOGY    Consent for Services - [facility] Received 17 [facility]-CONSENT    Care Everywhere Prospective Auth Received 10/23/17     Consent for Services/Privacy Notice - Hospital/Clinic Received 10/23/17        Documents for the Encounter    CMS IM for Patient Signature         Admission Information     Attending Provider Admitting Provider Admission Type Admission Date/Time    Madhu Leal MD Melander, Ian M, MD Emergency 10/23/17  0322    Discharge Date Hospital Service Auth/Cert Status Service Area     Hospitalist Incomplete [facility] SERVICES    Unit Room/Bed Admission Status        OBSERVATION OU03/OU03-01 Admission (Confirmed)       Admission     Complaint    Abnormality of gait      Hospital Account     Name Acct ID Class Status Primary Coverage    Deepak Arndt [id number] Observation Open MEDICARE - MEDICARE            Guarantor Account (for Hospital Account #[id number])      Name Relation to Pt Service Area Active? Acct Type    Deepak Arndt Self FCS Yes Personal/Family    Address Phone          [address] [address] [phone](H)              Coverage Information (for Hospital Account #[id number])     F/O Payor/Plan Precert #    MEDICARE/MEDICARE     Subscriber Subscriber #    Deepak Arndt [id number]    Address Phone    [employer]  [address]  [place] [address] [phone]

## 2017-10-23 NOTE — IP AVS SNAPSHOT
MRN:[id number]                      After Visit Summary   10/23/2017    Deepak Arndt    MRN: [id number]           Thank you!     Thank you for choosing [facility] for your care. Our goal is always to provide you with excellent care. Hearing back from our patients is one way we can continue to improve our services. Please take a few minutes to complete the written survey that you may receive in the mail after you visit with us. Thank you!        Patient Information     Date Of Birth          1935        About your hospital stay     You were admitted on:  October 23, 2017 You last received care in the:  [facility] Observation Unit    You were discharged on:  October 24, 2017       Who to Call     For medical emergencies, please call 911.  For non-urgent questions about your medical care, please call your primary care provider or clinic, [phone]          Attending Provider     Provider Specialty    Radha Cox MD Emergency Medicine    [facility] Internal Medicine       Primary Care Provider Office Phone # Fax #    Clinton Mills [phone]      After Care Instructions     Activity - Up with nursing assistance           Advance Diet as Tolerated       Follow this diet upon discharge: Regular            Bladder scan       X 2 for post void residual            Fall precautions           General info for SNF       Length of Stay Estimate: Short Term Care: Estimated # of Days <30  Condition at Discharge: Stable  Level of care:skilled   Rehabilitation Potential: Good  Admission H&P remains valid and up-to-date: Yes  Recent Chemotherapy: N/A  Use Nursing Home Standing Orders: Yes            Mantoux instructions       Give two-step Mantoux (PPD) Per Facility Policy Yes                  Follow-up Appointments     Follow Up and recommended labs and tests       Follow up with primary care provider in 1-2 weeks.  No follow up labs or test are needed.  Follow up  "with specialist, Dr. Paul, in 2-4 weeks.  Follow up with specialist, Urology, if needed for ongoing urinary symptoms.                  Your next 10 appointments already scheduled     Dec 06, 2017  9:30 AM CST   Ech Complete with SHCVECHR4   [facility] CV Echocardiography (Cardiovascular Imaging at [facility])    [address]  [address]  [facility] [id number] [phone]           1. Please bring or wear a comfortable two-piece outfit. 2. You may eat, drink and take your normal medicines. 3. For any questions that cannot be answered, please contact the ordering physician            Dec 11, 2017  2:45 PM CST   Return Visit with Justin Arroyo MD   [facility] PHYSICIANS HEART AT [place] ([facility])    [address]  [facility] [address] [phone]              Additional Services     Occupational Therapy Adult Consult       Evaluate and treat as clinically indicated.    Reason:  Frequent falls, weakness            Physical Therapy Adult Consult       Evaluate and treat as clinically indicated.    Reason:  Frequent falls, balance issues                  Pending Results     No orders found for last 3 day(s).            Statement of Approval     Ordered          10/24/17 0948  I have reviewed and agree with all the recommendations and orders detailed in this document.  EFFECTIVE NOW     Approved and electronically signed by:  Candy Gacria MD             Admission Information     Date & Time Provider Department Dept. Phone    10/23/2017 Madhu Leal MD [facility] Observation Unit [phone]      Your Vitals Were     Blood Pressure Pulse Temperature Respirations Height Weight    129/65 (BP Location: Right arm) 61 100.5  F (38.1  C) (Oral) 18 1.727 m (5' 8\") 68.2 kg (150 lb 4.8 oz)    Pulse Oximetry BMI (Body Mass Index)                96% 22.85 kg/m2          MyChart Information     [facility] gives you secure access to your " electronic health record. If you see a primary care provider, you can also send messages to your care team and make appointments. If you have questions, please call your primary care clinic.  If you do not have a primary care provider, please call [phone] and they will assist you.        Care EveryWhere ID     This is your Care EveryWhere ID. This could be used by other organizations to access your [facility] medical records  [id number]        Equal Access to Services     NICK LOONEY : Kosta alvarado Sodavid, watereda lumargothadaha, qaybta kaalmada yovana, gallito barth. So [facility] [phone].    ATENCIÓN: Si habla espmilena, tiene a esqueda disposición servicios gratuitos de asistencia lingüística. Antonio al [phone].    We comply with applicable federal civil rights laws and [place] laws. We do not discriminate on the basis of race, color, national origin, age, disability, sex, sexual orientation, or gender identity.               Review of your medicines      START taking        Dose / Directions    acetaminophen 325 MG tablet   Commonly known as:  TYLENOL   Used for:  Generalized muscle weakness        Dose:  650 mg   Take 2 tablets (650 mg) by mouth every 4 hours as needed for mild pain or fever   Quantity:  100 tablet   Refills:  0       propranolol 40 MG tablet   Commonly known as:  INDERAL   Used for:  Essential tremor        Dose:  40 mg   Take 1 tablet (40 mg) by mouth 2 times daily   Quantity:  60 tablet   Refills:  0         CONTINUE these medicines which have NOT CHANGED        Dose / Directions    amLODIPine 5 MG tablet   Commonly known as:  NORVASC   Indication:  High Blood Pressure Disorder   Used for:  Essential hypertension, benign        Dose:  5 mg   Take 1 tablet (5 mg) by mouth daily   Quantity:  90 tablet   Refills:  3       CALCIUM 500/VITAMIN D PO        Dose:  1 tablet   Take 1 tablet by mouth daily.   Refills:  0       CYMBALTA PO        Dose:  30 mg    Take 30 mg by mouth every evening   Refills:  0       fish oil-omega-3 fatty acids 1000 MG capsule        Dose:  1 capsule   Take 1 capsule by mouth daily.   Refills:  0       hydrochlorothiazide 25 MG tablet   Commonly known as:  HYDRODIURIL   Used for:  Encounter for medication refill        TAKE ONE TABLET BY MOUTH ONE TIME DAILY   Quantity:  90 tablet   Refills:  3       MULTIVITAMIN & MINERAL PO        Dose:  1 tablet   Take 1 tablet by mouth daily   Refills:  0       PRADAXA PO        Dose:  150 mg   Take 150 mg by mouth 2 times daily   Refills:  0       simvastatin 10 MG tablet   Commonly known as:  ZOCOR   Indication:  cuts 10 mg in half        Dose:  5 mg   Take 5 mg by mouth At Bedtime (Takes half of a 10mg tablet for a total of 5mg daily)   Refills:  0            Where to get your medicines      Some of these will need a paper prescription and others can be bought over the counter. Ask your nurse if you have questions.     You don't need a prescription for these medications     acetaminophen 325 MG tablet    propranolol 40 MG tablet                Protect others around you: Learn how to safely use, store and throw away your medicines at www.disposemymeds.org.             Medication List: This is a list of all your medications and when to take them. Check marks below indicate your daily home schedule. Keep this list as a reference.      Medications           Morning Afternoon Evening Bedtime As Needed    acetaminophen 325 MG tablet   Commonly known as:  TYLENOL   Take 2 tablets (650 mg) by mouth every 4 hours as needed for mild pain or fever   Last time this was given:  650 mg on 10/24/2017  9:36 AM                                amLODIPine 5 MG tablet   Commonly known as:  NORVASC   Take 1 tablet (5 mg) by mouth daily   Last time this was given:  5 mg on 10/24/2017  9:36 AM                                CALCIUM 500/VITAMIN D PO   Take 1 tablet by mouth daily.                                CYMBALTA PO    Take 30 mg by mouth every evening                                fish oil-omega-3 fatty acids 1000 MG capsule   Take 1 capsule by mouth daily.                                hydrochlorothiazide 25 MG tablet   Commonly known as:  HYDRODIURIL   TAKE ONE TABLET BY MOUTH ONE TIME DAILY   Last time this was given:  25 mg on 10/24/2017  9:36 AM                                MULTIVITAMIN & MINERAL PO   Take 1 tablet by mouth daily                                PRADAXA PO   Take 150 mg by mouth 2 times daily   Last time this was given:  150 mg on 10/24/2017  9:35 AM                                propranolol 40 MG tablet   Commonly known as:  INDERAL   Take 1 tablet (40 mg) by mouth 2 times daily   Last time this was given:  40 mg on 10/24/2017  9:36 AM                                simvastatin 10 MG tablet   Commonly known as:  ZOCOR   Take 5 mg by mouth At Bedtime (Takes half of a 10mg tablet for a total of 5mg daily)   Last time this was given:  5 mg on 10/23/2017  8:48 PM

## 2017-10-23 NOTE — PROGRESS NOTES
Care Transition Initial Assessment - RITESH  Reason For Consult: discharge planning  Met with: Patient    Active Problems:    Abnormality of gait         DATA  Lives With: spouse  Living Arrangements: house  Description of Support System: Supportive, Involved  Who is your support system?: Wife, Children  Support Assessment: Adequate family and caregiver support.   Identified issues/concerns regarding health management:    SW received request for consult to assist with discharge planning. Patient admitted Observation Status on 10/23/17 for Abnormality of Gait. Tentative discharge date 10/23/17. SW met with patient. Patient currently resides in a house with his wife. Patient is alert and oriented, with some confusion. Patient has fallen several times over the last 6 months per report. Discussed with patient current recommendation of TCU at discharge for continued PT/OT therapies. Patient stating he would be in agreement with this. Discussed with patient that Medicare does not cover TCU stays, when the patient has not had a qualifying inpatient hospital stay. Informed patient however, that he is eligible for the [facility] waiver program since he receives a majority of his care through [facility]. Updated patient that pending their assessment, patient would have TCU coverage through this waiver program at either [facility] or [facility]. Patient stating he is in agreement with SW sending referrals to the above facilities. Patient has no other questions at this time. SW updated patient's wife via phone, who will discuss TCU further with the patient and their daughter.         Quality Of Family Relationships: supportive, helpful, involved  Transportation Available: family or friend will provide      ASSESSMENT  Cognitive Status:  awake, alert and oriented  Concerns to be addressed: discharge planning.       PLAN  Financial costs for the patient includes: N/A- [facility].   Patient given  options and choices for discharge: discussed recommendations.  Patient/family is agreeable to the plan?  Yes  Patient Goals and Preferences: TCU at discharge.  Patient anticipates discharging to: TCU.    VIRGIE Elizondo, LGSW

## 2017-10-23 NOTE — PLAN OF CARE
Problem: Patient Care Overview  Goal: Plan of Care/Patient Progress Review  Outcome: No Change  Pt A&O. HTN noted, within parameters. Denies pain, SOB. LS clear. Ataxic gait. Unsteady. Up with 1+ cane. Plan for Echo, PT and SW consults.

## 2017-10-23 NOTE — PROGRESS NOTES
RITESH  I: Patient accepted to TCU at [facility] on 10/24 under the [facility] ACO waiver program. SW updated patient and patient's wife who are in agreement. Patient's wife plans to transport the patient at 10:30AM. Patient's wife will pick patient up at [address]. RITESH left message at [facility] regarding discharge plan. RN updated.   P: SW will continue to follow.     Laly Marquez, MSW, LGSW

## 2017-10-23 NOTE — IP AVS SNAPSHOT
"` `     [facility] OBSERVATION UNIT: [phone]                 INTERAGENCY TRANSFER FORM - NOTES (H&P, Discharge Summary, Consults, Procedures, Therapies)   10/23/2017                    Hospital Admission Date: 10/23/2017  LA LOCKE   : 1935  Sex: Male        Patient PCP Information     Provider PCP Type    Clinton Mills MD General      History & Physicals     No notes of this type exist for this encounter.      Discharge Summaries     No notes of this type exist for this encounter.      Consult Notes     No notes of this type exist for this encounter.         Progress Notes - Physician (Notes from 10/21/17 through 10/24/17)      Progress Notes by Laly Marquez LSW at 10/24/2017  9:46 AM     Author:  Laly Marquez LSW Service:  (none) Author Type:      Filed:  10/24/2017  9:50 AM Date of Service:  10/24/2017  9:46 AM Creation Time:  10/24/2017  9:46 AM    Status:  Addendum :  Laly Marquez LSW ()           I: Patient's wife to transport patient at 10:30 this morning. Patient's wife stating she will be at door #6. Patient's wife stating she does not want to \"get her daughter involved\", in regards to discharge planning. Patient's wife expressed that they are \"very capable of making their own decisions\". Patient in agreement with discharge to [facility] under the [facility] ACO waiver program. RITESH faxed discharge orders, PAS. RN updated. Facility aware of discharge time.[LS1.1] RITESH sent hand-off to [facility] regarding discharge plan for patient.[LS1.2]   P: No further needs at this time.     Laly Marquez, MSW, LGSW    PAS-RR    D: Per DHS regulation, RITESH completed and submitted PAS-RR to [place] [employer] Direct Connect via the [employer] Line.  PAS-RR confirmation # is : [id number]    I: SW spoke with patient and they are aware a PAS-RR has been submitted.  RITESH reviewed with patient that they may be contacted for a follow up appointment within 10 " days of hospital discharge if their SNF stay is < 30 days.  Contact information for [employer] [facility] was also provided.    A: Patient verbalized understanding.    P: Further questions may be directed to [facility] [employer] Line at #[phone], option #4 for PAS- staff.[LS1.1]       Revision History        User Key Date/Time User Provider Type Action    > [N/A] 10/24/2017  9:50 AM Laly Marquez LSW  Addend     LS1.2 10/24/2017  9:49 AM Laly Marquez LSW  Sign     LS1.1 10/24/2017  9:46 AM Laly Marquez LSW              Progress Notes by Mariola Sullivan RN at 10/23/2017  6:31 PM     Author:  Mariola Sullivan RN Service:  (none) Author Type:  Registered Nurse    Filed:  10/23/2017  6:37 PM Date of Service:  10/23/2017  6:31 PM Creation Time:  10/23/2017  6:31 PM    Status:  Signed :  Mariola Sullivan RN (Registered Nurse)         Social work discussed picking patient up tomorrow at 10:30am today. Social work was concerned about possible cognitive impairment after needing to repeat what time and day she needed to pick him up several times. [facility] was [facility] up her .[AM1.1]      Revision History        User Key Date/Time User Provider Type Action    > AM1.1 10/23/2017  6:37 PM Mariola Sullivan RN Registered Nurse Sign            Progress Notes by Laly Marquez LSW at 10/23/2017  4:39 PM     Author:  Laly Marquez LSW Service:  (none) Author Type:      Filed:  10/23/2017  4:40 PM Date of Service:  10/23/2017  4:39 PM Creation Time:  10/23/2017  4:39 PM    Status:  Signed :  Laly Marquez LSW ()         RITESH  I: Patient accepted to TCU at [facility] on 10/24 under the [employer] ACO waiver program. RITESH updated patient and patient's wife who are in agreement. Patient's wife plans to transport the patient at 10:30AM. Patient's wife will pick patient up at Door #6. SW left message at [facility] regarding discharge plan. RN updated.   P: SW will continue to follow.     VIRGIE Elizondo, LGSW[LS1.1]     Revision History        User Key Date/Time User Provider Type Action    > LS1.1 10/23/2017  4:40 PM Laly Marquez LSW  Sign            Progress Notes by Laly Marquez LSW at 10/23/2017 10:49 AM     Author:  Laly Marquez LSW Service:  (none) Author Type:      Filed:  10/23/2017  1:02 PM Date of Service:  10/23/2017 10:49 AM Creation Time:  10/23/2017 10:49 AM    Status:  Addendum :  Laly Marquez LSW ()         Care Transition Initial Assessment - RITESH  Reason For Consult: discharge planning  Met with: Patient    Active Problems:    Abnormality of gait         DATA  Lives With: spouse  Living Arrangements: house  Description of Support System: Supportive, Involved  Who is your support system?: Wife, Children  Support Assessment: Adequate family and caregiver support.   Identified issues/concerns regarding health management:    SW received request for consult to assist with discharge planning. Patient admitted Observation Status on 10/23/17 for Abnormality of Gait. Tentative discharge date 10/23/17. SW met with patient. Patient currently resides in a house with his wife. Patient is alert and oriented, with some confusion. Patient has fallen several times over the last 6 months per report. Discussed with patient current recommendation of TCU at discharge for continued PT/OT therapies. Patient stating he would be in agreement with this. Discussed with patient that Medicare does not cover TCU stays, when the patient has not had a qualifying inpatient hospital stay. Informed patient however, that [facility] since he receives a majority of his care through Telephone. Updated patient that pending their assessment, patient would have TCU coverage through this waiver program at either [facility] or [facility]. Patient  "stating he is in agreement with SW sending referrals to the above facilities. Patient has no other questions at this time.[LS1.1] SW updated patient's wife via phone, who will discuss TCU further with the patient and their daughter.[LS1.2]         Quality Of Family Relationships: supportive, helpful, involved  Transportation Available: family or friend will provide      ASSESSMENT  Cognitive Status:  awake, alert and oriented  Concerns to be addressed: discharge planning.       PLAN  Financial costs for the patient includes: N/A- patient eligible for [employer] ACO waiver.   Patient given options and choices for discharge: discussed recommendations.  Patient/family is agreeable to the plan?  Yes  Patient Goals and Preferences: TCU at discharge.  Patient anticipates discharging to: TCU.    VIRGIE Elizondo, OMAYRA[LS1.1]             Revision History        User Key Date/Time User Provider Type Action    > LS1.2 10/23/2017  1:02 PM Laly Marquez LSW  Addend     LS1.1 10/23/2017 10:56 AM Laly Marquez LSW  Sign            Progress Notes by Nidia Dimas PT at 10/23/2017 10:14 AM     Author:  Nidia Dimas PT Service:  (none) Author Type:  Physical Therapist    Filed:  10/23/2017 10:14 AM Date of Service:  10/23/2017 10:14 AM Creation Time:  10/23/2017 10:14 AM    Status:  Signed :  Nidia Dimas PT (Physical Therapist)            10/23/17 0900   Quick Adds   Type of Visit Initial PT Evaluation   Living Environment   Lives With spouse   Living Arrangements house   Number of Stairs to Enter Home 3  (no rail)   Number of Stairs Within Home 12  (one rail)   Transportation Available car;family or friend will provide  (Pt reports he still drives)   Self-Care   Usual Activity Tolerance moderate   Regular Exercise no   Equipment Currently Used at Home cane, straight   Activity/Exercise/Self-Care Comment Patient reports \"rarely\" using a cane in the home, but endorses furniture walking for " stability.   Functional Level Prior   Ambulation 1-->assistive equipment  (SEC)   Transferring 1-->assistive equipment   Toileting 0-->independent   Bathing 0-->independent   Dressing 0-->independent   Fall history within last six months yes   Number of times patient has fallen within last six months 5   Which of the above functional risks had a recent onset or change? ambulation;transferring   General Information   Onset of Illness/Injury or Date of Surgery - Date 10/23/17   Referring Physician MD Elvis   Patient/Family Goals Statement Return home   Pertinent History of Current Problem (include personal factors and/or comorbidities that impact the POC) 82 year old male admitted s/p fall at home. Of note, patient has been going through an extensive OP workup for possible parkinsons.    Precautions/Limitations fall precautions   General Observations Tremors/shakiness noted with all mobility.    Cognitive Status Examination   Orientation person;place;time   Level of Consciousness alert;confused   Follows Commands and Answers Questions able to follow single-step instructions   Personal Safety and Judgment impaired;at risk behaviors demonstrated   Pain Assessment   Patient Currently in Pain No   Range of Motion (ROM)   ROM Comment UE and LE AROM WFL's; able to use extremities for functional mobility.    Strength   Strength Comments Decreased generally, needs increased A with mobility this session.    Bed Mobility   Bed Mobility Comments Sit to supine with SBA.    Transfer Skills   Transfer Comments Sit to/from stand with CGA/min A.    Gait   Gait Comments Ambulates 5' with straight cane and mod A x 1. Mod LOB posteriorly.    Balance   Balance Comments Poor dynamic seated and standing balance noted. Patient not able to maintain midline with seated dynamic self care tasks. Needs mod A at trunk.    Sensory Examination   Sensory Perception Comments Denies burning, numbness and tingling   Coordination   Coordination  "other (see comments)   Coordination Comments Impaired. Patient with great difficulty donning/doffing shoes/socks.    Clinical Impression   Criteria for Skilled Therapeutic Intervention yes, treatment indicated  (One treat)   PT Diagnosis Impaired gait   Influenced by the following impairments Impaired balance, decreased coordination, questionable cognition/insight   Functional limitations due to impairments Decreased independence with tranfers, gait   Clinical Presentation Evolving/Changing   Clinical Presentation Rationale New/evolving balance impairments, multiple environmental barriers at home, cog limiting insight   Clinical Decision Making (Complexity) Moderate complexity   Therapy Frequency` (One eval, one treat)   Anticipated Discharge Disposition Transitional Care Facility   Risk & Benefits of therapy have been explained Yes   Patient, Family & other staff in agreement with plan of care Yes   [facility] AM-PAC TM \"6 Clicks\"   2016, Trustees of [facility], under license to [employer].  All rights reserved.   6 Clicks Short Forms Basic Mobility Inpatient Short Form   [facility] AM-PAC  \"6 Clicks\" V.2 Basic Mobility Inpatient Short Form   1. Turning from your back to your side while in a flat bed without using bedrails? 3 - A Little   2. Moving from lying on your back to sitting on the side of a flat bed without using bedrails? 3 - A Little   3. Moving to and from a bed to a chair (including a wheelchair)? 3 - A Little   4. Standing up from a chair using your arms (e.g., wheelchair, or bedside chair)? 3 - A Little   5. To walk in hospital room? 3 - A Little   6. Climbing 3-5 steps with a railing? 2 - A Lot   Basic Mobility Raw Score (Score out of 24.Lower scores equate to lower levels of function) 17   Total Evaluation Time   Total Evaluation Time (Minutes) 10[BB1.1]        Revision History        User Key Date/Time User Provider Type Action    > BB1.1 10/23/2017 10:14 AM Nidia Dimas, " PT Physical Therapist Sign            Progress Notes by Jonathan Hughes RN at 10/23/2017  6:54 AM     Author:  Jonathan Hughes RN Service:  (none) Author Type:  Registered Nurse    Filed:  10/23/2017  6:56 AM Date of Service:  10/23/2017  6:54 AM Creation Time:  10/23/2017  6:54 AM    Status:  Signed :  Jonathan Hughes RN (Registered Nurse)         Observation Goals:    -diagnostic tests and consults completed and resulted--Not met. PT, SW consulted.  -vital signs normal or at patient baseline-- Not met. HTN noted. Within parameters.  -safe disposition plan has been identified-- Not met.   Nurse to notify provider when observation goals have been met and patient is ready for discharge.[MW1.1]     Revision History        User Key Date/Time User Provider Type Action    > MW1.1 10/23/2017  6:56 AM Jonathan Hughes RN Registered Nurse Sign            Progress Notes by Rick Irby RN at 10/23/2017  6:28 AM     Author:  Rick Irby RN Service:  (none) Author Type:  Registered Nurse    Filed:  10/23/2017  6:29 AM Date of Service:  10/23/2017  6:28 AM Creation Time:  10/23/2017  6:28 AM    Status:  Signed :  Rick Irby RN (Registered Nurse)         Pt arrived at this time.[BN1.1]      Revision History        User Key Date/Time User Provider Type Action    > BN1.1 10/23/2017  6:29 AM Rick Irby RN Registered Nurse Sign            ED Notes by Vijay Willett RN at 10/23/2017  5:54 AM     Author:  Vijay Willett RN Service:  (none) Author Type:  Registered Nurse    Filed:  10/23/2017  6:04 AM Date of Service:  10/23/2017  5:54 AM Creation Time:  10/23/2017  5:54 AM    Status:  Signed :  Vijay Willett RN (Registered Nurse)         [facility]  ED Nurse Handoff Report    ED Chief complaint: Generalized Weakness (Pt presents with dizziness, weakness, tremor, and urinary incontinence.)      ED Diagnosis:   Final diagnoses:   Fatigue, unspecified type   Generalized  "muscle weakness   Elevated troponin       Code Status: tbd by hospitalist    Allergies: No Known Allergies    Activity level - Baseline/Home:  Independent    Activity Level - Current:   Independent     Needed?: No    Isolation: No  Infection: Not Applicable    Bariatric?: No    Vital Signs:   Vitals:    10/23/17 0317 10/23/17 0325   BP: 124/72    Pulse: 78    Resp: 16    Temp: 98.2  F (36.8  C)    TempSrc: Oral    SpO2: 93%    Weight:  65.8 kg (145 lb)   Height:  1.727 m (5' 8\")       Cardiac Rhythm: ,        Pain level: 0-10 Pain Scale: 0    Is this patient confused?: No    Patient Report: Initial Complaint: Weakness, increased falls, tremors  Focused Assessment: Increased falls over last year. 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 Increased incidence of urinary incontinence over last year. Pt pleasant.  Tests Performed: Labs, urine, xr, ct  Abnormal Results: trop 0.068, moderate blood in urine.  Treatments provided: none    Family Comments: wife lives nearby, went home    OBS brochure/video discussed/provided to patient: N/A    ED Medications: Medications - No data to display    Drips infusing?:  No      ED NURSE PHONE NUMBER: [phone][TC1.1]            Revision History        User Key Date/Time User Provider Type Action    > TC1.1 10/23/2017  6:04 AM Vijay Willett RN Registered Nurse Sign            ED Provider Notes by Radha Cox MD at 10/23/2017  3:10 AM     Author:  Radha Cox MD Service:  Emergency Medicine Author Type:  Physician    Filed:  10/23/2017  6:02 AM Date of Service:  10/23/2017  3:10 AM Creation Time:  10/23/2017  3:26 AM    Status:  Signed :  Radha Cox MD (Physician)           History     Chief Complaint:  Generalized Weakness[AB1.1] and Urinary Incontinence[AB1.2]     HPI[AB1.1]   Deepak Arndt is a 82 year old male with a history of[AB1.3] HTN, hyperlipidemia, aortic stenosis, MI, and atrial fibrillation " currently anticoagulated on Pradaxa who[AB1.2] presents to the emergency department for evaluation of generalized weakness and urinary[AB1.3] incontinence[AB1.4]. Of note, the patient in the midst of a workup with[AB1.3] [facility] of[AB1.5] neurology[AB1.3] with [AB1.2] [place][AB1.5],[AB1.2] including evaluation for Parkinson's disease[AB1.3],[AB1.2]  for ongoing generalized weakness[AB1.3], fatigue, gait unsteadiness, tremors, and urinary[AB1.2] incontinence[AB1.4]. Tonight, the patient states that he has continued to have these symptoms[AB1.2], though explains that he has felt increasing more fatigued and generally weak,[AB1.4] and felt that the frequency of his episodes of urinary incontinence have become more frequent, prompting his ED visit.     He additionally notes that he was walking in his home yesterday without his cane and had a mechanical fall. He may have struck his head during this incident, though denies sustaining any other significant injuries with this incident.  The patient denies any recent headaches, neck pain, back pain, cough, chest pain, shortness of breath,[AB1.2] dysuria[AB1.4], nausea, vomiting, or new numbness, tingling, or weakness,[AB1.2]    Allergies:  No Known Allergies     Medications:    Hydrodiuril  Norvasc  Simvastatin  Pradaxa    Past Medical History:    Actinic keratosis  atrial fibrillation  Cancer  CAD  Detached   Cataract  ED  HTN  hyperlipidemia  Lung nodules  Mild aortic stenosis  Multiple thyroid nodules  Murmur  MI  Osteoporosis  Mild aortic stenosis  Aortic valve disorder    Past Surgical History:    Cataract  Detached retina repair    Family History:    No past pertinent family history.    Social History:  Presents with his[AB1.1] wife[AB1.2].   Former Smoker.  Positive for alcohol use, 2 drinks daily.   Marital Status:   [2]    Review of Systems   Constitutional: Positive for[AB1.1] fatigue[AB1.2].   Respiratory: Negative for[AB1.1] cough[AB1.2]  "and[AB1.1] shortness of breath[AB1.2].    Cardiovascular: Negative for[AB1.1] chest pain[AB1.2].   Gastrointestinal: Negative for[AB1.1] nausea[AB1.2] and[AB1.1] vomiting[AB1.2].   Genitourinary: Negative for[AB1.1] dysuria[AB1.2].[AB1.1]        Positive for urinary incontinence.[AB1.2]    Musculoskeletal: Negative for[AB1.1] back pain[AB1.2] and[AB1.1] neck pain[AB1.2].   Neurological: Positive for[AB1.1] tremors[AB1.2]. Negative for[AB1.1] weakness[AB1.2],[AB1.1] numbness[AB1.2] and[AB1.1] headaches[AB1.2].[AB1.1]   All other systems reviewed and are negative[AB1.2].    Physical Exam   First Vitals:  BP: 124/72  Pulse: 78  Temp: 98.2  F (36.8  C)  Resp: 16  Height: 172.7 cm (5' 8\")  Weight: 65.8 kg (145 lb)  SpO2: 93 %    Physical Exam[AB1.1]  Physical Exam   General: Resting on the bed.  Head: No obvious trauma to head.  Ears, Nose, Throat:  External ears normal.  Nose normal.    Eyes:  Conjunctivae and EOM are normal.  Pupils are equal, round, and reactive.   Neck: Normal range of motion.  Neck supple.  non tender c spine  CV: Regular rate and rhythm.  No murmurs.      Respiratory: Effort normal and breath sounds normal.  No wheezing or crackles.   Gastrointestinal: Soft.  No distension. There is no tenderness.    Neuro: Alert. Moving all extremities appropriately.  Normal speech.  Normal gait.  Motor and sensation intact.  2+ patellar reflexes  Skin: Skin is warm and dry.  No rash noted.   Psych: Normal mood and affect. Behavior is normal.[JB1.1]     Emergency Department Course   ECG:[AB1.1]  Indication: Generalized Weakness  Time: 0411  Vent. Rate 64 bpm. SC interval *. QRS duration 96. QT/QTc 424/437. P-R-T axis * 19 -48. atrial fibrillation with premature ventricular or aberrantly conducted complexes. ST and T wave abnormality. ST and T wave abnormality, consider anterolateral ischemia. Abnormal ECG. No significant change compared to EKG dated 09/13/2016. Read time: " 0419[AB1.6]    Imaging:[AB1.1]  Radiographic findings were communicated with the patient who voiced understanding of the findings.    CT Head without contrast:[AB1.6]   No acute abnormality.[AB1.7] As per radiology.    XR Chest 2 views:[AB1.6]   No acute abnormality.[AB1.7] As per radiology.[AB1.6]     Laboratory:[AB1.1]  CBC: WBC:[AB1.6] 8.0[AB1.7], HGB:[AB1.6] 16.3[AB1.7], PLT:[AB1.6] 104 (L)[AB1.7]  BMP: Glucose[AB1.6] 125 (H)[AB1.7], o/w WNL (Creatinine:[AB1.6] 0.95[AB1.7])    0423 Troponin:[AB1.6] 0.068 (H)[AB1.7]    TSH with free T4 reflex:[AB1.6]0.50[AB1.7]    UA with micro: moderate blood, mucous present, protein albumin 30 o/w negative[AB1.6]  Urine culture: pending[AB1.7]    Emergency Department Course:[AB1.1]  Nursing notes and vitals reviewed. 0339 I performed an exam of the patient as documented above.     EKG obtained in the ED, see results above.     Blood drawn. This was sent to the lab for further testing, results above.    The patient provided a urine sample here in the emergency department. This was sent for laboratory testing, findings above.     The patient was sent for a Chest XR and CT Head while in the emergency department, findings above.[AB1.6]     0503[AB1.7] I rechecked the patient and discussed the results of his workup thus far.[AB1.6]     0534[AB1.8]  I consulted with [AB1.7] Elvis[AB1.8] of the hospitalist services.[AB1.7] He is[AB1.8] in agreement to accept the patient for admission.    Findings and plan explained to the Patient who consents to admission. Discussed the patient with [AB1.7] Elvis[AB1.8], who will admit the patient to a[AB1.7]n observation[AB1.8] bed for further monitoring, evaluation, and treatment.[AB1.7]    Impression & Plan    Medical Decision Making:[AB1.1]  Deepak Arndt is a 82 year old male with a history of atrial fibrillation on Pradaxa, tremor, and frequent falls, who presents with generalized weakness. Vital signs reviewed and are  unremarkable. Broad differential pursued, including but not limited to ACS, infection, intracranial hemorrhage, electrolyte or metabolic abnormality, dehydration, UTI, hyper or hypothyroidism, anemia, etc. Overall, the patient is well appearing with a nonfocal neurological examination. No focal signs or symptoms of stroke. Head CT was obtained given recent history of fall. Head CT negative with no acute abnormality. Chest XR obtained, without any acute intrapulmonary pathology, such as pneumonia. CBC without any acute anemia or leukocytosis. He does have chronic thrombocytopenia and his platelets were last 148. BMP without any acute electrolyte, metabolic, or renal abnormality. TSH within normal range and is not concerning for hyper or hypothyroidism. Ur[AB1.4]ine[JB1.1]analysis with moderate blood, but no evidence of infection. EKG was reviewed. The patient does have a history of atrial fibrillation and is currently in atrial fibrillation. There is no acute ischemic changes. Some chronic ST changes, which are appreciated on prior EKGs. Of note, his troponin is elevated at 0.068. The patient is already on Pradaxa[AB1.4] therefore[JB1.1] Heparinization does not appear appropriate at this time. However, given his generalized weakness, which has worsened recently, and his troponin elevation, I feel that the patient would benefit from admission for serial troponins, serial EKGS, and likely echocardiogram. Potentially, this is causing his generalized weakness. He was discussed with the hospitalist, who graciously agreed to accept the patient for admission. He will be transferred to the floor in stable condition.[AB1.4]     Diagnosis:[AB1.1]    ICD-10-CM   1. Fatigue, unspecified type R53.83   2. Generalized muscle weakness M62.81   3. Elevated troponin R74.8[AB1.9]       Disposition:[AB1.1]  Admitted to [AB1.7] Elvis[AB1.8] of the hospitalist service[AB1.7]    I, Rosie Avalos, am serving as a scribe on 10/23/2017 at  3:39 AM to personally document services performed by Radha Cox MD based on my observations and the provider's statements to me.[AB1.3]     Rosie Avalos  10/23/2017    EMERGENCY DEPARTMENT[AB1.1]       Radha Cox MD  10/23/17 0602  [JB1.2]     Revision History        User Key Date/Time User Provider Type Action    > JB1.2 10/23/2017  6:02 AM Radha Cox MD Physician Sign     JB1.1 10/23/2017  6:01 AM Radha Cox MD Physician      AB1.9 10/23/2017  5:44 AM Avalos, Rosie Scribe Share     AB1.8 10/23/2017  5:42 AM Avalos, Rosie Scribe      AB1.4 10/23/2017  5:28 AM Avalos, Rosie Scribe Share     AB1.5 10/23/2017  5:08 AM Avalos, Rosie Scribe Share     AB1.7 10/23/2017  5:05 AM Avalos, Rosie Scribe Share     AB1.6 10/23/2017  4:54 AM Avalos, Rosie Scribe Share     AB1.2 10/23/2017  4:00 AM Avalos, Rosie Scribe Share     AB1.3 10/23/2017  3:40 AM Avalos, Rosie Scribe Share     AB1.1 10/23/2017  3:29 AM Avalos, Rosie Scribe Share            ED Notes by Lily Chandler RN at 10/23/2017  3:22 AM     Author:  Lily Chandler RN Service:  (none) Author Type:  Registered Nurse    Filed:  10/23/2017  3:22 AM Date of Service:  10/23/2017  3:22 AM Creation Time:  10/23/2017  3:22 AM    Status:  Signed :  iLly Chandler RN (Registered Nurse)         Bed: ED06  Expected date:   Expected time:   Means of arrival:   Comments:  Hold-triage     Revision History        User Key Date/Time User Provider Type Action    > RK1.1 10/23/2017  3:22 AM Lily Chandler RN Registered Nurse Sign                  Procedure Notes     No notes of this type exist for this encounter.         Progress Notes - Therapies (Notes from 10/21/17 through 10/24/17)      Progress Notes by Bowell, Nidia, PT at 10/23/2017 10:14 AM     Author:  Nidia Dimas PT Service:  (none) Author Type:  Physical Therapist    Filed:  10/23/2017 10:14 AM Date of Service:  10/23/2017 10:14 AM Creation Time:  10/23/2017 10:14 AM    Status:  Signed :   "Nidia Dimas, PT (Physical Therapist)            10/23/17 0900   Quick Adds   Type of Visit Initial PT Evaluation   Living Environment   Lives With spouse   Living Arrangements house   Number of Stairs to Enter Home 3  (no rail)   Number of Stairs Within Home 12  (one rail)   Transportation Available car;family or friend will provide  (Pt reports he still drives)   Self-Care   Usual Activity Tolerance moderate   Regular Exercise no   Equipment Currently Used at Home cane, straight   Activity/Exercise/Self-Care Comment Patient reports \"rarely\" using a cane in the home, but endorses furniture walking for stability.   Functional Level Prior   Ambulation 1-->assistive equipment  (SEC)   Transferring 1-->assistive equipment   Toileting 0-->independent   Bathing 0-->independent   Dressing 0-->independent   Fall history within last six months yes   Number of times patient has fallen within last six months 5   Which of the above functional risks had a recent onset or change? ambulation;transferring   General Information   Onset of Illness/Injury or Date of Surgery - Date 10/23/17   Referring Physician MD Elvis   Patient/Family Goals Statement Return home   Pertinent History of Current Problem (include personal factors and/or comorbidities that impact the POC) 82 year old male admitted s/p fall at home. Of note, patient has been going through an extensive OP workup for possible parkinsons.    Precautions/Limitations fall precautions   General Observations Tremors/shakiness noted with all mobility.    Cognitive Status Examination   Orientation person;place;time   Level of Consciousness alert;confused   Follows Commands and Answers Questions able to follow single-step instructions   Personal Safety and Judgment impaired;at risk behaviors demonstrated   Pain Assessment   Patient Currently in Pain No   Range of Motion (ROM)   ROM Comment UE and LE AROM WFL's; able to use extremities for functional mobility.    Strength " "  Strength Comments Decreased generally, needs increased A with mobility this session.    Bed Mobility   Bed Mobility Comments Sit to supine with SBA.    Transfer Skills   Transfer Comments Sit to/from stand with CGA/min A.    Gait   Gait Comments Ambulates 5' with straight cane and mod A x 1. Mod LOB posteriorly.    Balance   Balance Comments Poor dynamic seated and standing balance noted. Patient not able to maintain midline with seated dynamic self care tasks. Needs mod A at trunk.    Sensory Examination   Sensory Perception Comments Denies burning, numbness and tingling   Coordination   Coordination other (see comments)   Coordination Comments Impaired. Patient with great difficulty donning/doffing shoes/socks.    Clinical Impression   Criteria for Skilled Therapeutic Intervention yes, treatment indicated  (One treat)   PT Diagnosis Impaired gait   Influenced by the following impairments Impaired balance, decreased coordination, questionable cognition/insight   Functional limitations due to impairments Decreased independence with tranfers, gait   Clinical Presentation Evolving/Changing   Clinical Presentation Rationale New/evolving balance impairments, multiple environmental barriers at home, cog limiting insight   Clinical Decision Making (Complexity) Moderate complexity   Therapy Frequency` (One eval, one treat)   Anticipated Discharge Disposition Transitional Care Facility   Risk & Benefits of therapy have been explained Yes   Patient, Family & other staff in agreement with plan of care Yes   [facility] [employer]-PAC TM \"6 Clicks\"   2016, Trustees of [facility], under license to [employer].  All rights reserved.   6 Clicks Short Forms Basic Mobility Inpatient Short Form   [facility] AM-PAC  \"6 Clicks\" V.2 Basic Mobility Inpatient Short Form   1. Turning from your back to your side while in a flat bed without using bedrails? 3 - A Little   2. Moving from lying on your back to sitting on the " side of a flat bed without using bedrails? 3 - A Little   3. Moving to and from a bed to a chair (including a wheelchair)? 3 - A Little   4. Standing up from a chair using your arms (e.g., wheelchair, or bedside chair)? 3 - A Little   5. To walk in hospital room? 3 - A Little   6. Climbing 3-5 steps with a railing? 2 - A Lot   Basic Mobility Raw Score (Score out of 24.Lower scores equate to lower levels of function) 17   Total Evaluation Time   Total Evaluation Time (Minutes) 10[BB1.1]        Revision History        User Key Date/Time User Provider Type Action    > BB1.1 10/23/2017 10:14 AM Nidia Dimas PT Physical Therapist Sign

## 2017-10-23 NOTE — ED PROVIDER NOTES
History     Chief Complaint:  Generalized Weakness and Urinary Incontinence     HPI   Deepak Arndt is a 82 year old male with a history of HTN, hyperlipidemia, aortic stenosis, MI, and atrial fibrillation currently anticoagulated on Pradaxa who presents to the emergency department for evaluation of generalized weakness and urinary incontinence. Of note, the patient in the midst of a workup with [facility] of neurology with Dr. Paul, including evaluation for Parkinson's disease,  for ongoing generalized weakness, fatigue, gait unsteadiness, tremors, and urinary incontinence. Tonight, the patient states that he has continued to have these symptoms, though explains that he has felt increasing more fatigued and generally weak, and felt that the frequency of his episodes of urinary incontinence have become more frequent, prompting his ED visit.     He additionally notes that he was walking in his home yesterday without his cane and had a mechanical fall. He may have struck his head during this incident, though denies sustaining any other significant injuries with this incident.  The patient denies any recent headaches, neck pain, back pain, cough, chest pain, shortness of breath, dysuria, nausea, vomiting, or new numbness, tingling, or weakness,    Allergies:  No Known Allergies     Medications:    Hydrodiuril  Norvasc  Simvastatin  Pradaxa    Past Medical History:    Actinic keratosis  atrial fibrillation  Cancer  CAD  Detached   Cataract  ED  HTN  hyperlipidemia  Lung nodules  Mild aortic stenosis  Multiple thyroid nodules  Murmur  MI  Osteoporosis  Mild aortic stenosis  Aortic valve disorder    Past Surgical History:    Cataract  Detached retina repair    Family History:    No past pertinent family history.    Social History:  Presents with his wife.   Former Smoker.  Positive for alcohol use, 2 drinks daily.   Marital Status:   [2]    Review of Systems   Constitutional: Positive for fatigue.  "  Respiratory: Negative for cough and shortness of breath.    Cardiovascular: Negative for chest pain.   Gastrointestinal: Negative for nausea and vomiting.   Genitourinary: Negative for dysuria.        Positive for urinary incontinence.    Musculoskeletal: Negative for back pain and neck pain.   Neurological: Positive for tremors. Negative for weakness, numbness and headaches.   All other systems reviewed and are negative.    Physical Exam   First Vitals:  BP: 124/72  Pulse: 78  Temp: 98.2  F (36.8  C)  Resp: 16  Height: 172.7 cm (5' 8\")  Weight: 65.8 kg (145 lb)  SpO2: 93 %    Physical Exam  Physical Exam   General: Resting on the bed.  Head: No obvious trauma to head.  Ears, Nose, Throat:  External ears normal.  Nose normal.    Eyes:  Conjunctivae and EOM are normal.  Pupils are equal, round, and reactive.   Neck: Normal range of motion.  Neck supple.  non tender c spine  CV: Regular rate and rhythm.  No murmurs.      Respiratory: Effort normal and breath sounds normal.  No wheezing or crackles.   Gastrointestinal: Soft.  No distension. There is no tenderness.    Neuro: Alert. Moving all extremities appropriately.  Normal speech.  Normal gait.  Motor and sensation intact.  2+ patellar reflexes  Skin: Skin is warm and dry.  No rash noted.   Psych: Normal mood and affect. Behavior is normal.     Emergency Department Course   ECG:  Indication: Generalized Weakness  Time: 0411  Vent. Rate 64 bpm. IN interval *. QRS duration 96. QT/QTc 424/437. P-R-T axis * 19 -48. atrial fibrillation with premature ventricular or aberrantly conducted complexes. ST and T wave abnormality. ST and T wave abnormality, consider anterolateral ischemia. Abnormal ECG. No significant change compared to EKG dated 09/13/2016. Read time: 0419    Imaging:  Radiographic findings were communicated with the patient who voiced understanding of the findings.    CT Head without contrast:   No acute abnormality. As per radiology.    XR Chest 2 views: "   No acute abnormality. As per radiology.     Laboratory:  CBC: WBC: 8.0, HGB: 16.3, PLT: 104 (L)  BMP: Glucose 125 (H), o/w WNL (Creatinine: 0.95)    0423 Troponin: 0.068 (H)    TSH with free T4 reflex:0.50    UA with micro: moderate blood, mucous present, protein albumin 30 o/w negative  Urine culture: pending    Emergency Department Course:  Nursing notes and vitals reviewed. 0339 I performed an exam of the patient as documented above.     EKG obtained in the ED, see results above.     Blood drawn. This was sent to the lab for further testing, results above.    The patient provided a urine sample here in the emergency department. This was sent for laboratory testing, findings above.     The patient was sent for a Chest XR and CT Head while in the emergency department, findings above.     0503 I rechecked the patient and discussed the results of his workup thus far.     0534  I consulted with Dr. Leal of the hospitalist services. He is in agreement to accept the patient for admission.    Findings and plan explained to the Patient who consents to admission. Discussed the patient with Dr. Leal, who will admit the patient to an observation bed for further monitoring, evaluation, and treatment.    Impression & Plan    Medical Decision Making:  Deepak Arndt is a 82 year old male with a history of atrial fibrillation on Pradaxa, tremor, and frequent falls, who presents with generalized weakness. Vital signs reviewed and are unremarkable. Broad differential pursued, including but not limited to ACS, infection, intracranial hemorrhage, electrolyte or metabolic abnormality, dehydration, UTI, hyper or hypothyroidism, anemia, etc. Overall, the patient is well appearing with a nonfocal neurological examination. No focal signs or symptoms of stroke. Head CT was obtained given recent history of fall. Head CT negative with no acute abnormality. Chest XR obtained, without any acute intrapulmonary pathology, such as  pneumonia. CBC without any acute anemia or leukocytosis. He does have chronic thrombocytopenia and his platelets were last 148. BMP without any acute electrolyte, metabolic, or renal abnormality. TSH within normal range and is not concerning for hyper or hypothyroidism. Urineanalysis with moderate blood, but no evidence of infection. EKG was reviewed. The patient does have a history of atrial fibrillation and is currently in atrial fibrillation. There is no acute ischemic changes. Some chronic ST changes, which are appreciated on prior EKGs. Of note, his troponin is elevated at 0.068. The patient is already on Pradaxa therefore Heparinization does not appear appropriate at this time. However, given his generalized weakness, which has worsened recently, and his troponin elevation, I feel that the patient would benefit from admission for serial troponins, serial EKGS, and likely echocardiogram. Potentially, this is causing his generalized weakness. He was discussed with the hospitalist, who graciously agreed to accept the patient for admission. He will be transferred to the floor in stable condition.     Diagnosis:    ICD-10-CM   1. Fatigue, unspecified type R53.83   2. Generalized muscle weakness M62.81   3. Elevated troponin R74.8       Disposition:  Admitted to Dr. Leal of the hospitalist service    I, Rosie Avalos, am serving as a scribe on 10/23/2017 at 3:39 AM to personally document services performed by Radha Cox MD based on my observations and the provider's statements to me.     Rosie Avalos  10/23/2017    EMERGENCY DEPARTMENT       Radha Cox MD  10/23/17 0602

## 2017-10-23 NOTE — ED NOTES
"[facility]  ED Nurse Handoff Report    ED Chief complaint: Generalized Weakness (Pt presents with dizziness, weakness, tremor, and urinary incontinence.)      ED Diagnosis:   Final diagnoses:   Fatigue, unspecified type   Generalized muscle weakness   Elevated troponin       Code Status: tbd by hospitalist    Allergies: No Known Allergies    Activity level - Baseline/Home:  Independent    Activity Level - Current:   Independent     Needed?: No    Isolation: No  Infection: Not Applicable    Bariatric?: No    Vital Signs:   Vitals:    10/23/17 0317 10/23/17 0325   BP: 124/72    Pulse: 78    Resp: 16    Temp: 98.2  F (36.8  C)    TempSrc: Oral    SpO2: 93%    Weight:  65.8 kg (145 lb)   Height:  1.727 m (5' 8\")       Cardiac Rhythm: ,        Pain level: 0-10 Pain Scale: 0    Is this patient confused?: No    Patient Report: Initial Complaint: Weakness, increased falls, tremors  Focused Assessment: Increased falls over last year. 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 Increased incidence of urinary incontinence over last year. Pt pleasant.  Tests Performed: Labs, urine, xr, ct  Abnormal Results: trop 0.068, moderate blood in urine.  Treatments provided: none    Family Comments: wife lives nearby, went home    OBS brochure/video discussed/provided to patient: N/A    ED Medications: Medications - No data to display    Drips infusing?:  No      ED NURSE PHONE NUMBER: [phone]         "

## 2017-10-23 NOTE — IP AVS SNAPSHOT
` `     [facility] OBSERVATION UNIT: [phone]            Medication Administration Report for Deepak Arndt as of 10/24/17 0959   Legend:    Given Hold Not Given Due Canceled Entry Other Actions    Time Time (Time) Time  Time-Action       Inactive    Active    Linked        Medications 10/18/17 10/19/17 10/20/17 10/21/17 10/22/17 10/23/17 10/24/17    acetaminophen (TYLENOL) Suppository 650 mg  Dose: 650 mg Freq: EVERY 4 HOURS PRN Route: RE  PRN Reason: mild pain  Start: 10/23/17 0630   Admin Instructions: Alternate ibuprofen (if ordered) with acetaminophen.  Maximum acetaminophen dose from all sources = 75 mg/kg/day not to exceed 4 grams/day.               acetaminophen (TYLENOL) tablet 650 mg  Dose: 650 mg Freq: EVERY 4 HOURS PRN Route: PO  PRN Reason: mild pain  Start: 10/23/17 0630   Admin Instructions: Alternate ibuprofen (if ordered) with acetaminophen.  Maximum acetaminophen dose from all sources = 75 mg/kg/day not to exceed 4 grams/day.           0936 (650 mg)-Given           amLODIPine (NORVASC) tablet 5 mg  Dose: 5 mg Freq: DAILY Route: PO  Indications of Use: HYPERTENSION  Start: 10/23/17 1410         1522 (5 mg)-Given        0936 (5 mg)-Given           bisacodyl (DULCOLAX) Suppository 10 mg  Dose: 10 mg Freq: DAILY PRN Route: RE  PRN Reason: constipation  Start: 10/23/17 0631   Admin Instructions: Hold for loose stools.  This is the third step of a three step constipation treatment protocol.               dabigatran ANTICOAGULANT (PRADAXA) capsule 150 mg  Dose: 150 mg Freq: 2 TIMES DAILY Route: PO  Start: 10/23/17 0800         0824 (150 mg)-Given       2048 (150 mg)-Given        0935 (150 mg)-Given       [ ] 2000           hydrochlorothiazide (HYDRODIURIL) tablet 25 mg  Dose: 25 mg Freq: DAILY Route: PO  Start: 10/23/17 1410         1522 (25 mg)-Given        0936 (25 mg)-Given           lidocaine 2 % (URO-JET) jelly 10-20 mL  Dose: 10-20 mL Freq: ONCE Route: UR  Start: 10/24/17 0957   Admin  Instructions: Into penis for catheter insertion           [ ] 0957           naloxone (NARCAN) injection 0.1-0.4 mg  Dose: 0.1-0.4 mg Freq: EVERY 2 MIN PRN Route: IV  PRN Reason: opioid reversal  Start: 10/23/17 0629   Admin Instructions: For respiratory rate LESS than or EQUAL to 8.  Partial reversal dose:  0.1 mg titrated q 2 minutes for Analgesia Side Effects Monitoring Sedation Level of 3 (frequently drowsy, arousable, drifts to sleep during conversation).Full reversal dose:  0.4 mg bolus for Analgesia Side Effects Monitoring Sedation Level of 4 (somnolent, minimal or no response to stimulation).  Up to 2mg may be given IV Push undiluted each 0.4mg over 15 seconds in emergency situations. For non-emergent situations further dilute in 9mL of NS to facilitate titration of response.               ondansetron (ZOFRAN-ODT) ODT tab 4 mg  Dose: 4 mg Freq: EVERY 6 HOURS PRN Route: PO  PRN Reasons: nausea,vomiting  Start: 10/23/17 0630   Admin Instructions: This is Step 1 of nausea and vomiting management.  If nausea not resolved in 15 minutes, go to Step 2 prochlorperazine (COMPAZINE). Do not push through foil backing. Peel back foil and gently remove. Place on tongue immediately. Administration with liquid unnecessary              Or  ondansetron (ZOFRAN) injection 4 mg  Dose: 4 mg Freq: EVERY 6 HOURS PRN Route: IV  PRN Reasons: nausea,vomiting  Start: 10/23/17 0630   Admin Instructions: This is Step 1 of nausea and vomiting management.  If nausea not resolved in 15 minutes, go to Step 2 prochlorperazine (COMPAZINE).  Irritant. Up to 4 mg may be given IV Push undiluted over 2-5 minutes.               polyethylene glycol (MIRALAX/GLYCOLAX) Packet 17 g  Dose: 17 g Freq: DAILY PRN Route: PO  PRN Reason: constipation  Start: 10/23/17 0631   Admin Instructions: Give in 8oz of  water, juice, or soda. Hold for loose stools.  This is the second step of a three step constipation treatment protocol.  1 Packet = 17 grams. Mixed  prescribed dose in 8 ounces of water. Follow with 8 oz. of water.               prochlorperazine (COMPAZINE) injection 5 mg  Dose: 5 mg Freq: EVERY 6 HOURS PRN Route: IV  PRN Reasons: nausea,vomiting  Start: 10/23/17 0630   Admin Instructions: This is Step 2 of nausea and vomiting management.   If nausea not resolved in 15 minutes, give metoclopramide (REGLAN) if ordered (step 3 of nausea and vomiting management)  Up to 10 mg may be given IV Push undiluted. Each 5mg over 1 minute.              Or  prochlorperazine (COMPAZINE) tablet 5 mg  Dose: 5 mg Freq: EVERY 6 HOURS PRN Route: PO  PRN Reason: vomiting  Start: 10/23/17 0630   Admin Instructions: This is Step 2 of nausea and vomiting management.   If nausea not resolved in 15 minutes, give metoclopramide (REGLAN) if ordered (step 3 of nausea and vomiting management)              Or  prochlorperazine (COMPAZINE) Suppository 12.5 mg  Dose: 12.5 mg Freq: EVERY 12 HOURS PRN Route: RE  PRN Reasons: nausea,vomiting  Start: 10/23/17 0630   Admin Instructions: This is Step 2 of nausea and vomiting management.   If nausea not resolved in 15 minutes, give metoclopramide (REGLAN) if ordered (step 3 of nausea and vomiting management)               propranolol (INDERAL) tablet 40 mg  Dose: 40 mg Freq: 2 TIMES DAILY Route: PO  Start: 10/23/17 0800         0825 (40 mg)-Given       2048 (40 mg)-Given        0936 (40 mg)-Given       [ ] 2000           senna-docusate (SENOKOT-S;PERICOLACE) 8.6-50 MG per tablet 1-2 tablet  Dose: 1-2 tablet Freq: 2 TIMES DAILY PRN Route: PO  PRN Comment: constipation   Start: 10/23/17 0630   Admin Instructions: If no bowel movement in 24 hours, increase to 2 tablets PO BID.  Hold for loose stools.   This is the first step of a three step constipation treatment protocol.               simvastatin (ZOCOR) tablet 5 mg  Dose: 5 mg Freq: AT BEDTIME Route: PO  Indications Comment: cuts 10 mg in half  Start: 10/23/17 2200         2048 (5 mg)-Given                [ ] 2200          Completed Medications  Medications 10/18/17 10/19/17 10/20/17 10/21/17 10/22/17 10/23/17 10/24/17         Dose: 10 mL Freq: ONCE Route: IV  Start: 10/23/17 1128   End: 10/23/17 1128         1128 (10 mL)-Given              Dose: 5 mL Freq: ONCE Route: IV  Start: 10/23/17 1128   End: 10/23/17 1128         1128 (5 mL)-Given

## 2017-10-23 NOTE — PHARMACY-ADMISSION MEDICATION HISTORY
Admission medication history interview status for the 10/23/2017  admission is complete. See EPIC admission navigator for prior to admission medications     Medication history source reliability:Moderate    Actions taken by pharmacist (provider contacted, etc):Verified Cymbalta, HCTZ & amldopine with patient's retail pharmacy - [facility].  Also verified Cymbalta with patient's wife at home.     Additional medication history information not noted on PTA med list :  1) Patient is unsure of indication for Cymbalta but states that [place] prescribed it for him and that he intends to discontinue medication once he has ran out of his bottle. (Only 20 capsules left)      Medication reconciliation/reorder completed by provider prior to medication history? No    Time spent in this activity: 30 minutes    Prior to Admission medications    Medication Sig Last Dose Taking? Auth Provider   DULoxetine HCl (CYMBALTA PO) Take 30 mg by mouth every evening 10/22/2017 at pm Yes Unknown, Entered By History   hydrochlorothiazide (HYDRODIURIL) 25 MG tablet TAKE ONE TABLET BY MOUTH ONE TIME DAILY 10/22/2017 at Unknown time Yes Clinton Mills MD   amLODIPine (NORVASC) 5 MG tablet Take 1 tablet (5 mg) by mouth daily 10/22/2017 at Unknown time Yes Justin Arroyo MD   Dabigatran Etexilate Mesylate (PRADAXA PO) Take 150 mg by mouth 2 times daily 10/22/2017 at Unknown time Yes Reported, Patient   simvastatin (ZOCOR) 10 MG tablet Take 5 mg by mouth At Bedtime (Takes half of a 10mg tablet for a total of 5mg daily) 10/22/2017 at Unknown time Yes Reported, Patient   fish oil-omega-3 fatty acids (OMEGA-3 FISH OIL) 1000 MG capsule Take 1 capsule by mouth daily. 10/22/2017 at Unknown time Yes Reported, Patient   Calcium Carbonate-Vitamin D (CALCIUM 500/VITAMIN D PO) Take 1 tablet by mouth daily. 10/22/2017 at Unknown time Yes Reported, Patient   Multiple Vitamins-Minerals (MULTIVITAMIN & MINERAL PO) Take 1 tablet by mouth daily  10/22/2017 at  Unknown time Yes Reported, Patient

## 2017-10-24 VITALS
HEIGHT: 68 IN | HEART RATE: 61 BPM | BODY MASS INDEX: 22.78 KG/M2 | DIASTOLIC BLOOD PRESSURE: 65 MMHG | RESPIRATION RATE: 18 BRPM | TEMPERATURE: 100.5 F | WEIGHT: 150.3 LBS | SYSTOLIC BLOOD PRESSURE: 129 MMHG | OXYGEN SATURATION: 96 %

## 2017-10-24 LAB
ALBUMIN UR-MCNC: 100 MG/DL
AMORPH CRY #/AREA URNS HPF: ABNORMAL /HPF
APPEARANCE UR: ABNORMAL
BILIRUB UR QL STRIP: NEGATIVE
COLOR UR AUTO: YELLOW
GLUCOSE UR STRIP-MCNC: NEGATIVE MG/DL
GRAN CASTS #/AREA URNS LPF: 3 /LPF
HGB UR QL STRIP: ABNORMAL
KETONES UR STRIP-MCNC: 10 MG/DL
LEUKOCYTE ESTERASE UR QL STRIP: NEGATIVE
MUCOUS THREADS #/AREA URNS LPF: PRESENT /LPF
NITRATE UR QL: NEGATIVE
PH UR STRIP: 5.5 PH (ref 5–7)
RBC #/AREA URNS AUTO: 0 /HPF (ref 0–2)
SOURCE: ABNORMAL
SP GR UR STRIP: 1.02 (ref 1–1.03)
UROBILINOGEN UR STRIP-MCNC: NORMAL MG/DL (ref 0–2)
WBC #/AREA URNS AUTO: 2 /HPF (ref 0–2)

## 2017-10-24 PROCEDURE — G0378 HOSPITAL OBSERVATION PER HR: HCPCS

## 2017-10-24 PROCEDURE — 99217 ZZC OBSERVATION CARE DISCHARGE: CPT | Performed by: PHYSICIAN ASSISTANT

## 2017-10-24 PROCEDURE — A9270 NON-COVERED ITEM OR SERVICE: HCPCS | Mod: GY | Performed by: PHYSICIAN ASSISTANT

## 2017-10-24 PROCEDURE — A9270 NON-COVERED ITEM OR SERVICE: HCPCS | Mod: GY | Performed by: INTERNAL MEDICINE

## 2017-10-24 PROCEDURE — 25000125 ZZHC RX 250: Performed by: PHYSICIAN ASSISTANT

## 2017-10-24 PROCEDURE — 25000132 ZZH RX MED GY IP 250 OP 250 PS 637: Mod: GY | Performed by: INTERNAL MEDICINE

## 2017-10-24 PROCEDURE — 25000132 ZZH RX MED GY IP 250 OP 250 PS 637: Mod: GY | Performed by: PHYSICIAN ASSISTANT

## 2017-10-24 PROCEDURE — 81001 URINALYSIS AUTO W/SCOPE: CPT | Performed by: PHYSICIAN ASSISTANT

## 2017-10-24 RX ADMIN — AMLODIPINE BESYLATE 5 MG: 5 TABLET ORAL at 09:36

## 2017-10-24 RX ADMIN — LIDOCAINE HYDROCHLORIDE 10 ML: 20 JELLY TOPICAL at 10:12

## 2017-10-24 RX ADMIN — HYDROCHLOROTHIAZIDE 25 MG: 25 TABLET ORAL at 09:36

## 2017-10-24 RX ADMIN — PROPRANOLOL HYDROCHLORIDE 40 MG: 40 TABLET ORAL at 09:36

## 2017-10-24 RX ADMIN — ACETAMINOPHEN 650 MG: 325 TABLET, FILM COATED ORAL at 09:36

## 2017-10-24 RX ADMIN — DABIGATRAN ETEXILATE MESYLATE 150 MG: 150 CAPSULE ORAL at 09:35

## 2017-10-24 NOTE — PROGRESS NOTES
"RITESH  I: Patient's wife to transport patient at 10:30 this morning. Patient's wife stating she will be at door #6. Patient's wife stating she does not want to \"get her daughter involved\", in regards to discharge planning. Patient's wife expressed that they are \"very capable of making their own decisions\". [facility] the [facility] program. RITESH faxed discharge orders, PAS. RN updated. Facility aware of discharge time. RITESH sent hand-off to [facility] regarding discharge plan for patient.   P: No further needs at this time.     Laly Marquez, MSW, LGSW    PAS-RR    D: Per DHS regulation, RITESH completed and submitted PAS-RR to [place] [employer] Direct Connect via the Senior LinkAge Line.  PAS-RR confirmation # is : [id number]    I: SW spoke with patient and they are aware a PAS-RR has been submitted.  RITESH reviewed with patient that they may be contacted for a follow up appointment within 10 days of hospital discharge if their SNF stay is < 30 days.  Contact information for Senior LinkAge Line was also provided.    A: Patient verbalized understanding.    P: Further questions may be directed to [facility] LinkAge Line at #[phone], option #4 for PAS-RR staff.    "

## 2017-10-24 NOTE — PLAN OF CARE
Problem: Patient Care Overview  Goal: Plan of Care/Patient Progress Review  Outcome: No Change  -diagnostic tests and consults completed and resulted- met  -vital signs normal or at patient baseline- met  -safe disposition plan has been identified- met     Temp- 100.5, no other signs of infection present. Tylenol administered and ARIEL Delgadillo updated- order for SIC UA. SIC for 100mL, UA sent to lab. Incontinent at times. Tremors. Does not follow commands well. Plan to d/c to TCU, wife will transport pt. Tolerating regular diet. Denies pain.

## 2017-10-24 NOTE — PLAN OF CARE
Problem: Patient Care Overview  Goal: Discharge Needs Assessment  Outcome: No Change  -diagnostic tests and consults completed and resulted- met  -vital signs normal or at patient baseline- met  -safe disposition plan has been identified- met, although concerning as wife showed up tonight to pick patient up after being informed several times that she would pick him up tomorrow to go to TCU.       Nurse to notify provider when observation goals have been met and patient is ready for discharge.

## 2017-10-24 NOTE — PROGRESS NOTES
[place] [facility]  PRIMARY CARE PROVIDER AND CLINIC:  [place] [address] / [facility] [address]  Chief Complaint   Patient presents with     Hospital F/U       HPI:    Deepak Arndt is a 82 year old  (1935),admitted to the [facility] from [facility].  Hospital stay 10/23/17 through 10/24/17. No discharge summary available at this time.     Per last progress note:  Deepak Arndt is an 82-year-old male with past medical history significant for multifactorial gait disorder, polyneuropathy, essential tremor, chronic atrial fibrillation, mild aortic stenosis, coronary artery disease with history of myocardial infarction who presents with progressive gait disturbance, generalized weakness, urinary incontinence.  He was noted incidentally to have a mild troponin elevation.  He was admitted on 10/23/2017 for further workup and treatment.      Chronic polyneuropathy with chronic multifactorial gait disorder  He has been seen by Dr. Paul of the [facility].  He has had an extensive workup previously which included MRI of his brain, EMG which confirmed the presence of moderate length-dependent peripheral neuropathy which was chronic and progressive with no evidence of active denervation, serologic workup including negative ANCA, negative LORENA panel, normal cryoglobulins, normal glycosylated hemoglobin, normal vitamin D, normal rheumatoid factor, normal C-reactive protein, normal LFTs, normal CK, positive JERI at low titer with homogeneous pattern of questionable clinical significance, normal serum protein electrophoresis and essentially normal immunofixation.  Overall, it was felt that he had a multifactorial gait disturbance with his neuropathy contributing in addition to microvascular changes in the brainstem.  He was trialled on levodopa given mild extrapyramidal symptoms which the patient reports was minimally effective.   The patient this admission seems to have more progressive symptoms. His head CT does not reveal any acute abnormalities.  I did encourage them to follow up with Dr. Paul again as an outpatient as it is likely that these deficits will continue to progress.        Essential tremor  He was trialled on levodopa previously without benefit.  This tremor does limit his functional status and is bothersome to him overall. Previously was not on a beta blocker despite CAD due to bradycardia. HR has been stable in the 70s since admission.  - Continue trial of propranolol 40mg BID     Troponin elevation  This is of unclear significance.  His EKG shows some nonspecific T-wave changes, however, was without symptoms on admission. Serial troponins flat. Echo with EF 55-60%, no WMAs, biatrial enlargement with mild regurgitation of all valves. Patient has remained free of cardiopulmonary symptoms overnight.     Chronic atrial fibrillation  Heart rates are generally well controlled.  He is not on any rate controlling agents due to borderline bradycardia in the past, although his heart rate is within normal limits now.    - Propranolol as above with heart rate monitoring, presently within acceptable ranges  - Continue prior to admission Pradaxa     Urinary incontinence  This is somewhat of a chronic issue, although the patient's wife notes that his episode of incontinence the night before admission was the worst that he has had.  The patient acknowledges the urge to urinate and reports that his primary limitation is with his gait.  That limits him from getting to the bathroom.  His urinalysis is largely unremarkable with no evidence for infection.    - Recommend outpatient urologic evaluation     Hypertension  Blood pressure within normal limits on arrival to the Emergency Department.    - PTA regimen of amlodipine and HCTZ resumed     Coronary artery disease with prior myocardial infarction  His last stress test was about 1 year  prior, which was a nuclear medicine Lexiscan scan and demonstrated a fixed basal inferior defect which may be prior myocardial infarction versus attenuation artifact.  He had been seen by Cardiology with no acute changes or further investigation of this recommended.      Admitted to this facility for  rehab, medical management and nursing care.     HPI information obtained from: facility chart records, facility staff, patient report and [facility] chart review.        Current issues are:      Chronic polyneuropathy  Generalized muscle weakness  See above. He is being evaluated by therapy today    Essential tremor  Chronic, on propranolol    Essential hypertension  BP readings range:  110/67  131/71  108/72  127/72  131/70  98/70    Chronic atrial fibrillation (H)  HR 50-65. On pradaxa    Coronary artery disease involving native coronary artery of native heart without angina pectoris  Currently denies CP, SOB.      CODE STATUS/ADVANCE DIRECTIVES DISCUSSION:   CPR/Full code   Patient's living condition: lives with spouse    ALLERGIES:Review of patient's allergies indicates no known allergies.  PAST MEDICAL HISTORY:  has a past medical history of Actinic keratosis; Atrial fibrillation (H); Cancer (H); Cataract; Coronary artery disease; Detached retina (2007); ED (erectile dysfunction); Hyperlipidemia; Hypertension; Lung nodules; Mild aortic stenosis; Multiple thyroid nodules; Murmur; Myocardial infarction (6/2012); and Osteoporosis.  PAST SURGICAL HISTORY:  has a past surgical history that includes hc or ocular device intraop detached retina and cataract iol, rt/lt.  FAMILY HISTORY: family history is not on file.  SOCIAL HISTORY:  reports that he quit smoking about 43 years ago. His smoking use included Cigarettes. He has a 20.00 pack-year smoking history. He has never used smokeless tobacco. He reports that he drinks about 7.0 oz of alcohol per week  He reports that he does not use illicit drugs.    Post Discharge  Medication Reconciliation Status: discharge medications reconciled, continue medications without change.  Current Outpatient Prescriptions   Medication Sig Dispense Refill     DULoxetine HCl (CYMBALTA PO) Take 30 mg by mouth every evening       acetaminophen (TYLENOL) 325 MG tablet Take 2 tablets (650 mg) by mouth every 4 hours as needed for mild pain or fever 100 tablet      propranolol (INDERAL) 40 MG tablet Take 1 tablet (40 mg) by mouth 2 times daily 60 tablet      hydrochlorothiazide (HYDRODIURIL) 25 MG tablet TAKE ONE TABLET BY MOUTH ONE TIME DAILY 90 tablet 3     amLODIPine (NORVASC) 5 MG tablet Take 1 tablet (5 mg) by mouth daily 90 tablet 3     Dabigatran Etexilate Mesylate (PRADAXA PO) Take 150 mg by mouth 2 times daily       simvastatin (ZOCOR) 10 MG tablet Take 5 mg by mouth At Bedtime (Takes half of a 10mg tablet for a total of 5mg daily)       fish oil-omega-3 fatty acids (OMEGA-3 FISH OIL) 1000 MG capsule Take 1 capsule by mouth daily.       Multiple Vitamins-Minerals (MULTIVITAMIN & MINERAL PO) Take 1 tablet by mouth daily          ROS:  10 point ROS of systems including Constitutional, Eyes, Respiratory, Cardiovascular, Gastroenterology, Genitourinary, Integumentary, Muscularskeletal, Psychiatric were all negative except for pertinent positives noted in my HPI.    Exam:  /67  Pulse 62  Temp 97  F (36.1  C)  Resp 18  Wt 150 lb (68 kg)  SpO2 96%  BMI 22.81 kg/m2   GENERAL APPEARANCE:  Alert, in no distress  ENT:  Mouth and posterior oropharynx normal, moist mucous membranes  EYES:  EOM, conjunctivae, lids, pupils and irises normal  NECK:  No adenopathy,masses or thyromegaly  RESP:  respiratory effort and palpation of chest normal, lungs clear to auscultation , no respiratory distress  CV:  Palpation and auscultation of heart done , no edema, irregular rhythm (afib)  ABDOMEN:  normal bowel sounds, soft, nontender, no hepatosplenomegaly or other masses  SKIN:  Inspection of skin and  subcutaneous tissue baseline, Palpation of skin and subcutaneous tissue baseline  PSYCH:  oriented X 3, normal insight, judgement and memory    Lab/Diagnostic data:    CBC RESULTS:   Recent Labs   Lab Test  10/23/17   0423  05/04/17   1300   WBC  8.0  7.0   RBC  4.65  4.82   HGB  16.3  15.5   HCT  45.0  46.8   MCV  97  97.0   MCH  35.1*  32.1*   MCHC  36.2  33.1   RDW  13.4   --    PLT  104*  138*       Last Basic Metabolic Panel:  Recent Labs   Lab Test  10/23/17   0423  05/15/17   1130  05/04/17   1323   NA  134   --   142   POTASSIUM  3.4   --   4.2   CHLORIDE  101   --   100   TRIPP  8.8   --   9.7   CO2  24   --    --    BUN  21   --   15  17   CR  0.95  0.9  0.89   GLC  125*   --   94       Liver Function Studies -   Recent Labs   Lab Test  05/04/17   1323  03/13/17   0945   PROTTOTAL  7.0  6.3   ALBUMIN  4.4  4.4   BILITOTAL  0.7  0.7   ALKPHOS  70  66   AST  26  15   ALT  21  13       TSH   Date Value Ref Range Status   10/23/2017 0.50 0.40 - 4.00 mU/L Final   07/12/2016 0.39 0.30 - 5.00 uIU/mL Final       Lab Results   Component Value Date    A1C 5.5 05/15/2017       ASSESSMENT/PLAN:  (G62.9) Chronic polyneuropathy  (primary encounter diagnosis)  (M62.81) Generalized muscle weakness  Comment: PT/OT may lead to some improvement, but overall prognosis appears that he would decline again. At this point, the goal is to improve and discharge home.  Plan: PT/OT eval and treat, discharge planning per their recommendations. F/u neurology    (G25.0) Essential tremor  Comment: Chronic, relatively controlled with propanolol  Plan: Continue current POC with no changes at this time and adjustments as needed.    (I10) Essential hypertension  Comment: Chronic, controlled  Plan: Continue current POC with no changes at this time and adjustments as needed.    (I48.2) Chronic atrial fibrillation (H)  Comment: Rate controlled. On Pradaxa for anticoagulation. No side effects noted  Plan: Continue current POC with no changes at  this time and adjustments as needed.    (I25.10) Coronary artery disease involving native coronary artery of native heart without angina pectoris  Comment: Asymptomatic  Plan: Continue current POC with no changes at this time and adjustments as needed.            Electronically signed by:  SHILPA Brennan [facility]  Pager: [phone]

## 2017-10-24 NOTE — DISCHARGE SUMMARY
[facility]    Discharge Summary  Hospitalist    Date of Admission:  10/23/2017  Date of Discharge:  10/24/2017  Discharging Provider: Dennis Tan PA-C    Discharge Diagnoses      Fatigue, unspecified type  Generalized muscle weakness  Elevated troponin  Essential tremor    History of Present Illness   Deepak Arndt is an 82 year old male who presented with progressive gait disturbance, generalized weakness, urinary incontinence.      HPI from admission H&P:  The patient has a history of gait ataxia, tremors and polyneuropathy for which he has seen Dr. Paul of the [facility].  He has had a previous extensive workup which included MRI of his brain, serologic testing which was largely unremarkable.  An EMG did show presence of peripheral neuropathy which appears chronic and progressive.  Ultimate assessment was for idiopathic polyneuropathy that was of sufficient severity to cause gait ataxia, especially in the context of additional cerebral and cerebellar atrophy, leading to ultimate diagnosis of multifactorial gait disorder.  He had been recommended to [facility] and did [facility] some [facility] from this.  He was trialled on levodopa which did not offer him any benefit.  Over the past several months since that evaluation in June, his symptoms have seemed to be worsening with increasing shuffling gait, several falls, losing his balance over the past weeks.  His wife also noted that the night prior to admission on 10/22/2017 he was up walking and seemed to be confused as to where he was.  He also lost complete control of his bladder the night prior.  He does have a previous history of urinary incontinence that is worse for him at night.  However, his wife felt that this episode was the worst it has been.  The patient notes that one of the primary reasons for his incontinence is his difficulty with gait as he reports he feels the urge to  urinate, just is not able to move quickly enough to get to the bathroom.  He denies any recent medication changes.  He denies any chest pain or shortness of breath.  Denies any focal numbness, tingling, weakness, vision changes, headache, difficulty speaking.  He denies any cough, abdominal pain, nausea, vomiting.  He lives in a home in [place] with his wife, his daughter does provide some assistance with chores but otherwise he and his wife have been marginally independent.       In the Emergency Department, the patient was seen by Dr. Radha Cox with whom I discussed the patient's presenting symptoms and emergency department course.  His initial vital signs were temperature of 98.2 degrees Fahrenheit, heart rate 78, blood pressure 124/72, respiratory rate of 16, SpO2 of 93% on room air.  Laboratory workup revealed a normal urinalysis with moderate blood, 30 of protein, 2 red blood cells, 1 white blood cell.  CBC demonstrated a platelet count of 104, otherwise was unremarkable.  Basic metabolic panel was largely unremarkable.  TSH was 0.50.  Troponin I was 0.068.  EKG demonstrated atrial fibrillation with nonspecific T-wave changes.  Head CT was unremarkable.  Chest x-ray was unremarkable.  Due to the patient's generalized weakness as well as some mild troponin elevation, observation bed was requested for further evaluation.     Hospital Course   Deepak Arndt was admitted on 10/23/2017.  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 Paul of the [facility].  He has had an extensive workup previously which included MRI of his brain, EMG which confirmed the presence of moderate length-dependent peripheral neuropathy which was chronic and progressive with no evidence of active denervation, serologic workup including negative ANCA, negative LORENA panel, normal cryoglobulins,  normal glycosylated hemoglobin, normal vitamin D, normal rheumatoid factor, normal C-reactive protein, normal LFTs, normal CK, positive JERI at low titer with homogeneous pattern of questionable clinical significance, normal serum protein electrophoresis and essentially normal immunofixation.  Overall, it was felt that he had a multifactorial gait disturbance with his neuropathy contributing in addition to microvascular changes in the brainstem.  He was trialled on levodopa given mild extrapyramidal symptoms which the patient reports was minimally effective.  The patient this admission seems to have more progressive symptoms. His head CT does not reveal any acute abnormalities.  I did encourage them to follow up with Dr. Paul again as an outpatient as it is likely that these deficits will continue to progress. He was evaluated by PT who recommended TCU, SW assisted with placement at [facility].      Essential tremor  He was trialled on levodopa previously without benefit.  This tremor does limit his functional status and is bothersome to him overall. Previously was not on a beta blocker despite CAD due to bradycardia. He was trialed on propranolol with adequate hemodynamics, was discharged with new prescription to continue.     Troponin elevation  This is of unclear significance.  His EKG shows some nonspecific T-wave changes, however, was without symptoms on admission. Serial troponins flat. Echo with EF 55-60%, no WMAs, biatrial enlargement with mild regurgitation of all valves. Patient was free of cardiopulmonary symptoms throughout admission.     Chronic atrial fibrillation  Heart rates are generally well controlled.  He is not on any rate controlling agents due to borderline bradycardia in the past, although his heart rate is within normal limits now. He was started on propranolol as above without significant change to heart rates. He was resumed on PTA pradaxa.     Urinary incontinence  This is somewhat of a  chronic issue, although the patient's wife notes that his episode of incontinence the night before admission was the worst that he has had.  The patient acknowledges the urge to urinate and reports that his primary limitation is with his gait.  That limits him from getting to the bathroom.  His urinalysis is largely unremarkable with no evidence for infection. He was recommended to follow-up with a urologist as an outpatient.     Hypertension  Blood pressure within normal limits on arrival to the Emergency Department.  PTA regimen of amlodipine and HCTZ resumed     Coronary artery disease with prior myocardial infarction  His last stress test was about 1 year prior, which was a nuclear medicine Lexiscan scan and demonstrated a fixed basal inferior defect which may be prior myocardial infarction versus attenuation artifact.  He had been seen by Cardiology with no acute changes or further investigation of this recommended.      Dennis Tan PA-C    Significant Results and Procedures   As noted below    Pending Results   These results will be followed up by n/a  Unresulted Labs Ordered in the Past 30 Days of this Admission     No orders found for last 61 day(s).          Code Status   Full Code       Primary Care Physician   [place]    Physical Exam   Temp: 100.5  F (38.1  C) Temp src: Oral BP: 129/65 Pulse: 61 Heart Rate: 70 Resp: 18 SpO2: 96 % O2 Device: None (Room air)    Vitals:    10/23/17 0325 10/23/17 0638   Weight: 65.8 kg (145 lb) 68.2 kg (150 lb 4.8 oz)     Vital Signs with Ranges  Temp:  [96.9  F (36.1  C)-100.9  F (38.3  C)] 100.5  F (38.1  C)  Pulse:  [61] 61  Heart Rate:  [66-70] 70  Resp:  [16-18] 18  BP: (123-135)/(60-77) 129/65  SpO2:  [93 %-96 %] 96 %  I/O last 3 completed shifts:  In: 480 [P.O.:480]  Out: 100 [Urine:100]    GEN: well-developed, well-nourished, appears comfortable  PULM: lungs CTA bilaterally, no increased work of breathing, no wheeze, crackles, rhonchi  CV: irregularly  irregular, normal rate, S1 & S2, no murmur  GI: soft, nontender, nondistended, +BS in all 4 quadrants  SKIN: warm & dry without rash or wound, no pedal edema    Discharge Disposition   Discharged to rehabilitation facility  Condition at discharge: Stable    Consultations This Hospital Stay   SOCIAL WORK IP CONSULT  PHYSICAL THERAPY ADULT IP CONSULT  PHYSICAL THERAPY ADULT IP CONSULT  OCCUPATIONAL THERAPY ADULT IP CONSULT    Time Spent on this Encounter   I, Dennis Tan, personally saw the patient today and spent less than or equal to 30 minutes discharging this patient.    Discharge Orders     General info for SNF   Length of Stay Estimate: Short Term Care: Estimated # of Days <30  Condition at Discharge: Stable  Level of care:skilled   Rehabilitation Potential: Good  Admission H&P remains valid and up-to-date: Yes  Recent Chemotherapy: N/A  Use Nursing Home Standing Orders: Yes     Mantoux instructions   Give two-step Mantoux (PPD) Per Facility Policy Yes     Bladder scan   X 2 for post void residual     Activity - Up with nursing assistance     Follow Up and recommended labs and tests   Follow up with primary care provider in 1-2 weeks.  No follow up labs or test are needed.  Follow up with specialist, Dr. Paul, in 2-4 weeks.  Follow up with specialist, Urology, if needed for ongoing urinary symptoms.     Physical Therapy Adult Consult   Evaluate and treat as clinically indicated.    Reason:  Frequent falls, balance issues     Occupational Therapy Adult Consult   Evaluate and treat as clinically indicated.    Reason:  Frequent falls, weakness     Fall precautions     Advance Diet as Tolerated   Follow this diet upon discharge: Regular       Discharge Medications   Current Discharge Medication List      START taking these medications    Details   acetaminophen (TYLENOL) 325 MG tablet Take 2 tablets (650 mg) by mouth every 4 hours as needed for mild pain or fever  Qty: 100 tablet    Associated Diagnoses:  Generalized muscle weakness      propranolol (INDERAL) 40 MG tablet Take 1 tablet (40 mg) by mouth 2 times daily  Qty: 60 tablet    Associated Diagnoses: Essential tremor         CONTINUE these medications which have NOT CHANGED    Details   DULoxetine HCl (CYMBALTA PO) Take 30 mg by mouth every evening      hydrochlorothiazide (HYDRODIURIL) 25 MG tablet TAKE ONE TABLET BY MOUTH ONE TIME DAILY  Qty: 90 tablet, Refills: 3    Associated Diagnoses: Encounter for medication refill      amLODIPine (NORVASC) 5 MG tablet Take 1 tablet (5 mg) by mouth daily  Qty: 90 tablet, Refills: 3    Associated Diagnoses: Essential hypertension, benign      Dabigatran Etexilate Mesylate (PRADAXA PO) Take 150 mg by mouth 2 times daily      simvastatin (ZOCOR) 10 MG tablet Take 5 mg by mouth At Bedtime (Takes half of a 10mg tablet for a total of 5mg daily)      fish oil-omega-3 fatty acids (OMEGA-3 FISH OIL) 1000 MG capsule Take 1 capsule by mouth daily.      Calcium Carbonate-Vitamin D (CALCIUM 500/VITAMIN D PO) Take 1 tablet by mouth daily.      Multiple Vitamins-Minerals (MULTIVITAMIN & MINERAL PO) Take 1 tablet by mouth daily            Allergies   No Known Allergies  Data   Most Recent 3 CBC's:  Recent Labs   Lab Test  10/23/17   0423  05/04/17   1300  03/13/17   1007   WBC  8.0  7.0  7.4   HGB  16.3  15.5  15.6   MCV  97  97.0  96.1   PLT  104*  138*  169      Most Recent 3 BMP's:  Recent Labs   Lab Test  10/23/17   0423  05/15/17   1130  05/04/17   1323  03/13/17   0945   NA  134   --   142  141   POTASSIUM  3.4   --   4.2  3.7   CHLORIDE  101   --   100  102   CO2  24   --    --    --    BUN  21   --   15  17  16  19   CR  0.95  0.9  0.89  0.86   ANIONGAP  9   --    --    --    TRIPP  8.8   --   9.7  10.0   GLC  125*   --   94  116*     Most Recent 2 LFT's:  Recent Labs   Lab Test  05/04/17   1323  03/13/17   0945   AST  26  15   ALT  21  13   ALKPHOS  70  66   BILITOTAL  0.7  0.7     Most Recent INR's and Anticoagulation Dosing  History:  Anticoagulation Dose History     There is no flowsheet data to display.        Most Recent 3 Troponin's:  Recent Labs   Lab Test  10/23/17   0900  10/23/17   0423   TROPI  0.063*  0.068*     Most Recent Cholesterol Panel:  Recent Labs   Lab Test  05/15/17   1130   CHOL  124   LDL  41   HDL  55   TRIG  140     Most Recent 6 Bacteria Isolates From Any Culture (See EPIC Reports for Culture Details):  Recent Labs   Lab Test  01/08/14   1341  01/08/14   1339   CULT  Culture negative for acid fast bacilli Assayed at [facility] [address]  Culture negative after 4 weeks  Normal smiley  Culture negative for acid fast bacilli Assayed at [facility] [address]  Penicillium species isolated No additional fungi cultured after 4 weeks incubation*  Normal respiratory smiley     Most Recent TSH, T4 and A1c Labs:  Recent Labs   Lab Test  10/23/17   0423  05/15/17   1035  06/11/13   TSH  0.50   --    < >  0.59  0.59   T4   --    --    --   1.0   A1C   --   5.5   --    --     < > = values in this interval not displayed.     Results for orders placed or performed during the hospital encounter of 10/23/17   XR Chest 2 Views    Narrative    XR CHEST 2 VW  10/23/2017 4:45 AM      HISTORY: Weakness.     COMPARISON: 12/28/2016.    FINDINGS: The heart is at the upper limits of normal in size without  pulmonary edema. The thoracic aorta is calcified and tortuous. The  lungs are clear. No pneumothorax or pleural effusion.      Impression    IMPRESSION: No acute abnormality.    VINCE REZA MD   CT Head w/o Contrast    Narrative    CT HEAD W/O CONTRAST 10/23/2017 4:51 AM      HISTORY: Fall.    TECHNIQUE: Routine noncontrast head CT. Radiation dose for this scan  was reduced using automated exposure control, adjustment of the mA  and/or kV according to patient size, or iterative reconstruction  technique.    COMPARISON: 12/28/2016.    FINDINGS: There is mild generalized brain  atrophy. No mass, mass  effect or intracranial hemorrhage. No evidence of acute infarct.  Periventricular low attenuation is consistent with chronic small  vessel ischemic disease. Retention cyst or polyp in the right  maxillary antrum.      Impression    IMPRESSION:  No acute abnormality.    VINCE REZA MD

## 2017-10-24 NOTE — PLAN OF CARE
Problem: Patient Care Overview  Goal: Plan of Care/Patient Progress Review  Outcome: No Change  -diagnostic tests and consults completed and resulted- met  -vital signs normal or at patient baseline- met  -safe disposition plan has been identified- met, plan to DC in AM

## 2017-10-24 NOTE — PLAN OF CARE
Problem: Patient Care Overview  Goal: Discharge Needs Assessment  Outcome: No Change  -diagnostic tests and consults completed and resulted: met  -vital signs normal or at patient baseline: met  -safe disposition plan has been identified: met     Disoriented to time and situation. VSS on RA. Denies pain. Up with assist of 1 and walker. Plan to discharge to TCU this morning.

## 2017-10-24 NOTE — PLAN OF CARE
Problem: Patient Care Overview  Goal: Plan of Care/Patient Progress Review  Outcome: No Change  Disoriented to time and occasional situation. Forgetful. Does not follow commands well. Ax1 w/gb to BR. Unsteady. L side facial droop, baseline. VSS. Incontinent of urine at times. <200 PVR's. Denies pain. Tolerating diet. D/C tomorrow to TCU.

## 2017-10-24 NOTE — PLAN OF CARE
Problem: Patient Care Overview  Goal: Discharge Needs Assessment  Outcome: No Change  -diagnostic tests and consults completed and resulted- met  -vital signs normal or at patient baseline- not met Tmax 100.9  -safe disposition plan has been identified- met   Nurse to notify provider when observation goals have been met and patient is ready for discharge.

## 2017-10-24 NOTE — PLAN OF CARE
Problem: Patient Care Overview  Goal: Plan of Care/Patient Progress Review  Outcome: No Change  -diagnostic tests and consults completed and resulted- met  -vital signs normal or at patient baseline- met  -safe disposition plan has been identified- met, plan to DC in AM    A&Ox2, disoriented to situation and time. Difficult to redirect; does not follow instructions well, VSS on RA. Denies pain. Up Ax1 with walker and gait belt. Very unsteady on feet. Reg diet. Incontinent of urine. IV SL. Plan to DC in the AM with wife to TCU. Will continue to monitor.

## 2017-10-25 ENCOUNTER — NURSING HOME VISIT (OUTPATIENT)
Dept: GERIATRICS | Facility: CLINIC | Age: 82
End: 2017-10-25
Payer: MEDICARE

## 2017-10-25 ENCOUNTER — CARE COORDINATION (OUTPATIENT)
Dept: CARE COORDINATION | Facility: CLINIC | Age: 82
End: 2017-10-25

## 2017-10-25 ENCOUNTER — CARE COORDINATION (OUTPATIENT)
Dept: GERIATRIC MEDICINE | Facility: CLINIC | Age: 82
End: 2017-10-25

## 2017-10-25 VITALS
OXYGEN SATURATION: 96 % | TEMPERATURE: 97 F | HEART RATE: 62 BPM | DIASTOLIC BLOOD PRESSURE: 67 MMHG | BODY MASS INDEX: 22.81 KG/M2 | WEIGHT: 150 LBS | RESPIRATION RATE: 18 BRPM | SYSTOLIC BLOOD PRESSURE: 110 MMHG

## 2017-10-25 DIAGNOSIS — I48.20 CHRONIC ATRIAL FIBRILLATION (H): ICD-10-CM

## 2017-10-25 DIAGNOSIS — I25.10 CORONARY ARTERY DISEASE INVOLVING NATIVE CORONARY ARTERY OF NATIVE HEART WITHOUT ANGINA PECTORIS: ICD-10-CM

## 2017-10-25 DIAGNOSIS — I10 ESSENTIAL HYPERTENSION: ICD-10-CM

## 2017-10-25 DIAGNOSIS — G25.0 ESSENTIAL TREMOR: ICD-10-CM

## 2017-10-25 DIAGNOSIS — G62.9 CHRONIC POLYNEUROPATHY: Primary | ICD-10-CM

## 2017-10-25 DIAGNOSIS — M62.81 GENERALIZED MUSCLE WEAKNESS: ICD-10-CM

## 2017-10-25 PROCEDURE — 99309 SBSQ NF CARE MODERATE MDM 30: CPT | Performed by: NURSE PRACTITIONER

## 2017-10-25 NOTE — PROGRESS NOTES
Clinic Care Coordination Contact    Situation: Patient chart reviewed by care coordinator.    Background: Patient admitted to [facility].     Assessment: On 10/24/2017 patient was moved to [facility]. Admitted to this facility for  rehab, medical management and nursing care.     Plan/Recommendations: FPA will do no further outreaches while pt is in the SNF.        Kristi Hughes RN  Clinic Care Coordinator  [phone]  [email or web address]

## 2017-10-25 NOTE — PROGRESS NOTES
Received referral for [facility].  Client was admitted to [facility] in [place] on 10/24/17.  [facility] and left a message with this CMs contact information, and requested to be notified of discharge plans and care conferences.  Called client in his room and there was no answer.  Called client's wife, Caitlyn, and left a message requesting a return call.  Mary Carrera, [facility]  [phone]

## 2017-10-25 NOTE — PROGRESS NOTES
[facility] SNF referral received from MARGARITO Moreno ([facility]) on 10/24/17.  [facility] assigned CC is MARGARITO Salas.  Josefina Hutchinson  CMS Supervisor  [facility]  [phone]

## 2017-10-25 NOTE — PROGRESS NOTES
Clinic Care Coordination Contact  Care Coordination Transition Communication    Referral Source: IP/TCU Report    Clinical Data: Patient was hospitalized at [facility] from 10/23 to 10/24 with diagnosis of Fatigue.     Transition to Facility:              [facility]              Contact name and phone number/fax: [phone]    Plan: RN/SW Care Coordinator will await notification from facility staff informing RN/SW Care Coordinator of patient's discharge plans/needs. RN/SW Care Coordinator will review chart and outreach to facility staff every 4 weeks and as needed.     SKYE Vallejo  Care Navigator  [facility]  [phone]

## 2017-10-25 NOTE — PROGRESS NOTES
Received a return call from client's wife, Caitlyn.  [facility] the [facility] and how this CM became involved.  Wife shared that client was having a couple of hard weeks with regards to urine incontinence and the other day she could not manage it any further and he went to the hospital.  Reviewed that client does have tremors and wife shared that client did see a neurologist and was told that he did not have parkinson's.  Wife talked about how the urine accidents have increased.  Encouraged that client should sit down to use the toilet due to his tremors and that client may require an incontinence product.  Wife shared that she didn't think client would be willing to wear one.  Reviewed the damage urine incontinence can cause, and that client would be at a greater fall risk trying to rush to the bathroom as well.  Reviewed lifeline and wife shared that it had been brought up with client in the past by that he will not wear it.  Inquired about finances, wondering if this would be the cause for not accepting services, and wife shared that there are plenty of funds.  Reviewed with the wife that this CM would follow up and attend any care conference.  Wife had this CMs name and number and will call if she has any further questions.  Mary Carrera, [facility]  [phone]

## 2017-10-26 ENCOUNTER — CARE COORDINATION (OUTPATIENT)
Dept: GERIATRIC MEDICINE | Facility: CLINIC | Age: 82
End: 2017-10-26

## 2017-10-26 NOTE — PROGRESS NOTES
Received a call from the [facility], Mansi.  She shared that client will most likely be ready for discharge next week.  Mansi inquired if this CM had any back ground on client.  Reviewed the concerns that client's wife had brought up regarding the incontinence.  Mansi shared at this time cognition is not great, but that could change.  Care conference is set for 11/8/17 at 11:30am.  Mansi also shared that she was contacted by SKYE Vallejo at  regarding care coordination.  Reviewed with Mansi that this CM would follow up with Moni regarding [facility] involvement.  No further questions at this time.  Called Moni Heaton and left a message updating her on client's NGACO status as well as [facility] involvement.  Mary Carrera, [facility]  [phone]

## 2017-11-01 ENCOUNTER — CARE COORDINATION (OUTPATIENT)
Dept: GERIATRIC MEDICINE | Facility: CLINIC | Age: 82
End: 2017-11-01

## 2017-11-01 ENCOUNTER — DISCHARGE SUMMARY NURSING HOME (OUTPATIENT)
Dept: GERIATRICS | Facility: CLINIC | Age: 82
End: 2017-11-01
Payer: MEDICARE

## 2017-11-01 VITALS
SYSTOLIC BLOOD PRESSURE: 116 MMHG | OXYGEN SATURATION: 96 % | RESPIRATION RATE: 20 BRPM | BODY MASS INDEX: 22.35 KG/M2 | WEIGHT: 147 LBS | HEART RATE: 46 BPM | TEMPERATURE: 98.1 F | DIASTOLIC BLOOD PRESSURE: 65 MMHG

## 2017-11-01 DIAGNOSIS — I10 ESSENTIAL HYPERTENSION: ICD-10-CM

## 2017-11-01 DIAGNOSIS — G25.0 ESSENTIAL TREMOR: ICD-10-CM

## 2017-11-01 DIAGNOSIS — G62.9 CHRONIC POLYNEUROPATHY: Primary | ICD-10-CM

## 2017-11-01 DIAGNOSIS — M62.81 GENERALIZED MUSCLE WEAKNESS: ICD-10-CM

## 2017-11-01 DIAGNOSIS — I48.20 CHRONIC ATRIAL FIBRILLATION (H): ICD-10-CM

## 2017-11-01 DIAGNOSIS — I25.10 CORONARY ARTERY DISEASE INVOLVING NATIVE CORONARY ARTERY OF NATIVE HEART WITHOUT ANGINA PECTORIS: ICD-10-CM

## 2017-11-01 PROCEDURE — 99316 NF DSCHRG MGMT 30 MIN+: CPT | Performed by: NURSE PRACTITIONER

## 2017-11-01 NOTE — PROGRESS NOTES
Received the following email from the [facility], Mansi:  Therapy was going to work with him into next week and we were going to have CC on Wed. Res and wife requested to speak with me, they initially wanted him to leave today, but I told them it was not possible and that it wouldn t support good planning.   I then attempted to encourage him to stay, but he won t have it. Negotiated with them and he agrees to have therapy through Fri and DC Sat.  They are in agreement with referral for referral for home OT/PT.    Mary - I will write up list of orders needed.     Emailed the [facility] back thanking her for the update.  Mary Carrera, [facility]  [phone]

## 2017-11-01 NOTE — PROGRESS NOTES
[place] [facility] DISCHARGE SUMMARY    PATIENT'S NAME: Deepak Arndt  YOB: 1935  MEDICAL RECORD NUMBER:  [id number]    PRIMARY CARE PROVIDER AND CLINIC RESPONSIBLE AFTER TRANSFER: [place] [address] / [facility] [address]     CODE STATUS/ADVANCE DIRECTIVES DISCUSSION:   CPR/Full code      No Known Allergies    TRANSFERRING PROVIDERS: SHILPA Brennan CNP, Rowena Chaves MD  DATE OF SNF ADMISSION:  October / 24 / 2017  DATE OF SNF (anticipated) DISCHARGE: November / 04 / 2017  DISCHARGE DISPOSITION: Non-FMG Provider   Nursing Facility: [facility] stay 10/23/17 to 10/24/17.     Condition on Discharge:  Improving.  Function:  Supervision with ADLs. Has been independently using walker around the facility  Cognitive Scores: CPT 5.4/5.6 and PHQ9 03/27        DISCHARGE DIAGNOSIS:   1. Chronic polyneuropathy    2. Generalized muscle weakness    3. Essential tremor    4. Essential hypertension    5. Chronic atrial fibrillation (H)    6. Coronary artery disease involving native coronary artery of native heart without angina pectoris        HPI Nursing Facility Course:  HPI information obtained from: facility chart records, facility staff and patient report.    Past medical history significant for multifactorial gait disorder, polyneuropathy, essential tremor, chronic atrial fibrillation, mild aortic stenosis, coronary artery disease with history of myocardial infarction who presents with progressive gait disturbance, generalized weakness, urinary incontinence.  He was noted incidentally to have a mild troponin elevation. [facility] stay 10/23/17 through 10/24/17.    Chronic polyneuropathy  Generalized muscle weakness  He has been seen by Dr. Paul of the [facility].  He has had an extensive workup previously which included MRI of his brain, EMG which confirmed the presence of  moderate length-dependent peripheral neuropathy which was chronic and progressive. Patient has been private pay at [facility] due to not having a qualifying hospitalization. This is a patient driven discharge, but he is safe from a PT/OT standpoint. No falls while here. CPT 5.4, decision making intact.    Essential tremor  Chronic, on propranolol    Essential hypertension  BP readings range:  116/65  120/69  133/73  111/66  108/72   116/63  148/70  117/70  130/72  140/73    Chronic atrial fibrillation (H)  HR 46-66. On pradaxa    Coronary artery disease involving native coronary artery of native heart without angina pectoris  Currently denies CP, SOB.      PAST MEDICAL HISTORY:  has a past medical history of Actinic keratosis; Atrial fibrillation (H); Cancer (H); Cataract; Coronary artery disease; Detached retina (2007); ED (erectile dysfunction); Hyperlipidemia; Hypertension; Lung nodules; Mild aortic stenosis; Multiple thyroid nodules; Murmur; Myocardial infarction (6/2012); and Osteoporosis.    DISCHARGE MEDICATIONS:  Current Outpatient Prescriptions   Medication Sig Dispense Refill     DULoxetine HCl (CYMBALTA PO) Take 30 mg by mouth every evening       acetaminophen (TYLENOL) 325 MG tablet Take 2 tablets (650 mg) by mouth every 4 hours as needed for mild pain or fever 100 tablet      propranolol (INDERAL) 40 MG tablet Take 1 tablet (40 mg) by mouth 2 times daily 60 tablet      hydrochlorothiazide (HYDRODIURIL) 25 MG tablet TAKE ONE TABLET BY MOUTH ONE TIME DAILY 90 tablet 3     amLODIPine (NORVASC) 5 MG tablet Take 1 tablet (5 mg) by mouth daily 90 tablet 3     Dabigatran Etexilate Mesylate (PRADAXA PO) Take 150 mg by mouth 2 times daily       simvastatin (ZOCOR) 10 MG tablet Take 5 mg by mouth At Bedtime (Takes half of a 10mg tablet for a total of 5mg daily)       fish oil-omega-3 fatty acids (OMEGA-3 FISH OIL) 1000 MG capsule Take 1 capsule by mouth daily.         MEDICATION CHANGES/RATIONALE:   None  Controlled  medications sent with patient: not applicable/none     ROS:    10 point ROS of systems including Constitutional, Eyes, Respiratory, Cardiovascular, Gastroenterology, Genitourinary, Integumentary, Muscularskeletal, Psychiatric were all negative except for pertinent positives noted in my HPI.    Physical Exam:   Vitals: /65  Pulse (!) 46  Temp 98.1  F (36.7  C)  Resp 20  Wt 147 lb (66.7 kg)  SpO2 96%  BMI 22.35 kg/m2  BMI= Body mass index is 22.35 kg/(m^2).    GENERAL APPEARANCE:  Alert, in no distress    DISCHARGE PLAN:  Occupational Therapy, Physical Therapy, Registered Nurse, Home Health Aide,  and From:  [facility]  Patient instructed to follow-up with:  PCP in 7 days      Current [place] scheduled appointments:  Future Appointments  Date Time Provider Department Center   12/6/2017 9:30 AM [id number] [facility] CVIMG   12/11/2017 2:45 PM Justin Arroyo MD [facility] PSA CLIN       MTM referral needed and placed by this provider: No    Pending labs: none    SNF labs     CBC RESULTS:   Recent Labs   Lab Test  10/23/17   0423  05/04/17   1300   WBC  8.0  7.0   RBC  4.65  4.82   HGB  16.3  15.5   HCT  45.0  46.8   MCV  97  97.0   MCH  35.1*  32.1*   MCHC  36.2  33.1   RDW  13.4   --    PLT  104*  138*       Last Basic Metabolic Panel:  Recent Labs   Lab Test  10/23/17   0423  05/15/17   1130  05/04/17   1323   NA  134   --   142   POTASSIUM  3.4   --   4.2   CHLORIDE  101   --   100   TRIPP  8.8   --   9.7   CO2  24   --    --    BUN  21   --   15  17   CR  0.95  0.9  0.89   GLC  125*   --   94       Liver Function Studies -   Recent Labs   Lab Test  05/04/17   1323  03/13/17   0945   PROTTOTAL  7.0  6.3   ALBUMIN  4.4  4.4   BILITOTAL  0.7  0.7   ALKPHOS  70  66   AST  26  15   ALT  21  13       TSH   Date Value Ref Range Status   10/23/2017 0.50 0.40 - 4.00 mU/L Final   07/12/2016 0.39 0.30 - 5.00 uIU/mL Final       Lab Results   Component Value Date    A1C 5.5 05/15/2017            Discharge Treatments: none    TOTAL DISCHARGE TIME:   Greater than 30 minutes  Electronically signed by:  SHILPA Brennan [facility]  Pager: [phone]

## 2017-11-03 ENCOUNTER — CARE COORDINATION (OUTPATIENT)
Dept: GERIATRIC MEDICINE | Facility: CLINIC | Age: 82
End: 2017-11-03

## 2017-11-03 NOTE — PROGRESS NOTES
Received a voice mail message from the [facility], Mansi.  In her message she shared that client and his wife are insistent on client going home this weekend, even though therapies would like another week to work with client.  Mansi shared that she spoke with the daughter regarding the recommendations as well.  Mansi shared that she placed a referral to  Home care for RN, PT, OT, HHA and SW.  Called Mansi back and she shared that she has talked with client and his wife and the wife will not express her concerns in front of client.  Reviewed with Mansi that this CM will follow up on Monday after discharge.  Mary Carrera, [facility]  [phone]

## 2017-11-06 ENCOUNTER — CARE COORDINATION (OUTPATIENT)
Dept: GERIATRIC MEDICINE | Facility: CLINIC | Age: 82
End: 2017-11-06

## 2017-11-06 PROCEDURE — G0180 MD CERTIFICATION HHA PATIENT: HCPCS | Performed by: FAMILY MEDICINE

## 2017-11-06 NOTE — PROGRESS NOTES
Client had discharged from [facility] on 11/4/17 to home.  Called client to follow up.  Spoke with client.  Client reports that he is glad to be home and to do things in his own time.  Reviewed the referral for home care and client stated that he isn't sure why it is necessary.  Reviewed the benefits of home care and how a home assessment could enhance his ability to be successful in his home.  Client stated that his wife is out at an appointment and when she gets home he plans to discuss home care with her, as he thinks they got a call today regarding this as well.  Talked with client about lifeline due to his unsteadiness on his feet.  Client confirmed that he has unsteadiness.  Talked about how a lifeline could be beneficial to him.  Client again stated that he will talk with his wife about this.  Inquired about the urine incontinence and client reports that he has had no accidents since being home.  Client shared that he and his wife would call this CM back when his wife returns home.  Has a PCP appointment scheduled for the 9th.  Will continue to review home care service upon return call.  Mary Carrera, [facility]  [phone]

## 2017-11-07 ENCOUNTER — MEDICAL CORRESPONDENCE (OUTPATIENT)
Dept: FAMILY MEDICINE | Facility: CLINIC | Age: 82
End: 2017-11-07

## 2017-11-09 ENCOUNTER — OFFICE VISIT (OUTPATIENT)
Dept: FAMILY MEDICINE | Facility: CLINIC | Age: 82
End: 2017-11-09

## 2017-11-09 ENCOUNTER — CARE COORDINATION (OUTPATIENT)
Dept: CARE COORDINATION | Facility: CLINIC | Age: 82
End: 2017-11-09

## 2017-11-09 VITALS
OXYGEN SATURATION: 98 % | WEIGHT: 147.4 LBS | SYSTOLIC BLOOD PRESSURE: 104 MMHG | HEART RATE: 44 BPM | DIASTOLIC BLOOD PRESSURE: 64 MMHG | BODY MASS INDEX: 22.41 KG/M2

## 2017-11-09 DIAGNOSIS — I25.10 CORONARY ARTERY DISEASE INVOLVING NATIVE CORONARY ARTERY OF NATIVE HEART WITHOUT ANGINA PECTORIS: ICD-10-CM

## 2017-11-09 DIAGNOSIS — R25.1 TREMOR: Primary | ICD-10-CM

## 2017-11-09 DIAGNOSIS — I77.810 ASCENDING AORTA DILATION (H): ICD-10-CM

## 2017-11-09 DIAGNOSIS — I10 ESSENTIAL HYPERTENSION: ICD-10-CM

## 2017-11-09 PROCEDURE — 99496 TRANSJ CARE MGMT HIGH F2F 7D: CPT | Performed by: FAMILY MEDICINE

## 2017-11-09 RX ORDER — PROPRANOLOL HYDROCHLORIDE 40 MG/1
40 TABLET ORAL 2 TIMES DAILY
Qty: 180 TABLET | Refills: 1 | COMMUNITY
Start: 2017-11-09 | End: 2017-01-01

## 2017-11-09 NOTE — MR AVS SNAPSHOT
After Visit Summary   11/9/2017    Deepak Arndt    MRN: [id number]           Patient Information     Date Of Birth          1935        Visit Information        Provider Department      11/9/2017 2:00 PM [place] MD [facility]        Today's Diagnoses     Tremor    -  1    Essential hypertension        Coronary artery disease involving native coronary artery of native heart without angina pectoris           Follow-ups after your visit        Your next 10 appointments already scheduled     Dec 06, 2017  9:30 AM CST   Ech Complete with SHCVECHR4   [facility] CV Echocardiography (Cardiovascular Imaging at [facility])    [address]  [address]  [facility] [id number] [phone]           1. Please bring or wear a comfortable two-piece outfit. 2. You may eat, drink and take your normal medicines. 3. For any questions that cannot be answered, please contact the ordering physician            Dec 11, 2017  2:45 PM CST   Return Visit with Justin Arroyo MD   [facility]    [address]  [place] [facility] [id number] [phone]              Who to contact     If you have questions or need follow up information about today's clinic visit or your schedule please contact [facility] directly at [phone].  Normal or non-critical lab and imaging results will be communicated to you by MyChart, letter or phone within 4 business days after the clinic has received the results. If you do not hear from us within 7 days, please contact the clinic through MyChart or phone. If you have a critical or abnormal lab result, we will notify you by phone as soon as possible.  Submit refill requests through [employer] or call your pharmacy and they will forward the refill request to us. Please allow 3 business days for your refill to be completed.          Additional  Information About Your Visit        MyChart Information     [employer] gives you secure access to your electronic health record. If you see a primary care provider, you can also send messages to your care team and make appointments. If you have questions, please call your primary care clinic.  If you do not have a primary care provider, please call [phone] and they will assist you.        Care EveryWhere ID     This is your Care EveryWhere ID. This could be used by other organizations to access your [place] medical records  [id number]        Your Vitals Were     Pulse Pulse Oximetry BMI (Body Mass Index)             44 98% 22.41 kg/m2          Blood Pressure from Last 3 Encounters:   11/09/17 104/64   11/01/17 116/65   10/25/17 110/67    Weight from Last 3 Encounters:   11/09/17 66.9 kg (147 lb 6.4 oz)   11/01/17 66.7 kg (147 lb)   10/25/17 68 kg (150 lb)              Today, you had the following     No orders found for display       Primary Care Provider Office Phone # Fax #    [place] [phone] [address]  [facility] [phone]        Equal Access to Services     EWA LOONEY : Hadii aad ku hadasho Soomaali, waaxda luqadaha, qaybta kaalmada adeegyada, waxay mayelin hayshahlan juan jose acevedo la'jorge ah. So [facility] [phone].    ATENCIÓN: Si habla español, tiene a esqueda disposición servicios gratuitos de asistencia lingüística. Llame al [phone].    We comply with applicable federal civil rights laws and [place] laws. We do not discriminate on the basis of race, color, national origin, age, disability, sex, sexual orientation, or gender identity.            Thank you!     Thank you for choosing [facility]  for your care. Our goal is always to provide you with excellent care. Hearing back from our patients is one way we can continue to improve our services. Please take a few minutes to complete the written survey that you may receive in the mail after your  visit with us. Thank you!             Your Updated Medication List - Protect others around you: Learn how to safely use, store and throw away your medicines at www.disposemymeds.org.          This list is accurate as of: 11/9/17  2:40 PM.  Always use your most recent med list.                   Brand Name Dispense Instructions for use Diagnosis    acetaminophen 325 MG tablet    TYLENOL    100 tablet    Take 2 tablets (650 mg) by mouth every 4 hours as needed for mild pain or fever    Generalized muscle weakness       amLODIPine 5 MG tablet    NORVASC    90 tablet    Take 1 tablet (5 mg) by mouth daily    Essential hypertension, benign       Calcium carb-Vitamin D 500 mg [place]-200 units 500-200 MG-UNIT per tablet    OSCAL with D;Oyster Shell Calcium     Take 1 tablet by mouth daily        CYMBALTA PO      Take 30 mg by mouth every evening        DAILY MULTI PO      Take by mouth daily        fish oil-omega-3 fatty acids 1000 MG capsule      Take 1 capsule by mouth daily.        hydrochlorothiazide 25 MG tablet    HYDRODIURIL    90 tablet    TAKE ONE TABLET BY MOUTH ONE TIME DAILY    Encounter for medication refill       PRADAXA PO      Take 150 mg by mouth 2 times daily        * propranolol 40 MG tablet    INDERAL    60 tablet    Take 1 tablet (40 mg) by mouth 2 times daily    Essential tremor       * propranolol 40 MG tablet    INDERAL    180 tablet    Take 1 tablet (40 mg) by mouth 2 times daily        simvastatin 10 MG tablet    ZOCOR     Take 5 mg by mouth At Bedtime (Takes half of a 10mg tablet for a total of 5mg daily)        * Notice:  This list has 2 medication(s) that are the same as other medications prescribed for you. Read the directions carefully, and ask your doctor or other care provider to review them with you.

## 2017-11-09 NOTE — PROGRESS NOTES
HPI:    Deepak Arndt is a 82 year old  (1935),admitted to the [facility] from [facility].  Hospital stay 10/23/17 through 10/24/17. No discharge summary available at this time.      Per last progress note:  Deepak Arndt is an 82-year-old male with past medical history significant for multifactorial gait disorder, polyneuropathy, essential tremor, chronic atrial fibrillation, mild aortic stenosis, coronary artery disease with history of myocardial infarction who presents with progressive gait disturbance, generalized weakness, urinary incontinence.  He was noted incidentally to have a mild troponin elevation.   See rest pf note in the chart.  Problem(s) Oriented visit      ROS:  General and Resp. completed and negative except as noted above     HISTORY:   reports that he drinks about 7.0 oz of alcohol per week    reports that he quit smoking about 43 years ago. His smoking use included Cigarettes. He has a 20.00 pack-year smoking history. He has never used smokeless tobacco.    Past Medical History:   Diagnosis Date     Actinic keratosis      Atrial fibrillation (H)      Cancer (H)      Cataract      Coronary artery disease      Detached retina 2007     ED (erectile dysfunction)      Hyperlipidemia      Hypertension      Lung nodules      Mild aortic stenosis      Multiple thyroid nodules      Murmur      Myocardial infarction 6/2012     Osteoporosis      Past Surgical History:   Procedure Laterality Date     CATARACT IOL, RT/LT       HC OR OCULAR DEVICE INTRAOP DETACHED RETINA         EXAM:  BP: 104/64   Pulse: 44    Temp: Data Unavailable    Wt Readings from Last 2 Encounters:   11/09/17 66.9 kg (147 lb 6.4 oz)   11/01/17 66.7 kg (147 lb)       BMI= Body mass index is 22.41 kg/(m^2).    EXAM:  APPEARANCE: = Relaxed and in no distress  Conj/Eyelids = noninjected and lids and lashes are without inflammation  PERRLA/Irises = Pupils Round Reactive to Light and Irisis  "without inflammation  Neck = No anterior or posterior adenopathy appreciated.  Thyroid = Not enlarged and no masses felt  Resp effort = Calm regular breathing  Breath Sounds = Good air movement with no rales or rhonchi in any lung fields  Heart Rate, Rythym, & sounds (no Murm)  = Regular rate and rythym with no S3, S4, or murmer appreciated.  Carotid Art's = Pulses full and equal and no bruits appreciated  Abdomen = Soft, nontender, no masses, & bowel sounds in all quadrants  Liver/Spleen = Normal span and no splenomegaly noted  Digits and Nails = FROM in all finger joints, no nail dystrophy  Ext (edema) = No pretibial edema noted or elsewhere  Musculsktl =  Strength and ROM of major joints are within normal limits  SKIN = absent significant rashes or lesions   Recent/Remote Memory = Alert and Oriented x 3  Mood/Affect = Cooperative and interested      Assessment/Plan:  Deepak was seen today for tcu - follow up, hypertension, fatigue, clinic care coordination - face to face and bradycardia.    Diagnoses and all orders for this visit:    Tremor        COUNSELING:   reports that he quit smoking about 43 years ago. His smoking use included Cigarettes. He has a 20.00 pack-year smoking history. He has never used smokeless tobacco.    Estimated body mass index is 22.41 kg/(m^2) as calculated from the following:    Height as of 10/23/17: 1.727 m (5' 8\").    Weight as of this encounter: 66.9 kg (147 lb 6.4 oz).       Appropriate preventive services were discussed with this patient, including applicable screening as appropriate for cardiovascular disease, diabetes, osteopenia/osteoporosis, and glaucoma.  As appropriate for age/gender, discussed screening for colorectal cancer, prostate cancer, breast cancer, and cervical cancer. Checklist reviewing preventive services available has been given to the patient.    Reviewed patients plan of care and provided an AVS. The Basic Care Plan (routine screening as documented in Health " Maintenance) for Deepak meets the Care Plan requirement. This Care Plan has been established and reviewed with the  Patient and spouse.      The following health maintenance items are reviewed in Epic and correct as of today:  Health Maintenance   Topic Date Due     LIPID MONITORING Q6 MO  11/15/2017     PSA Q1 YR  03/13/2018     MEDICARE ANNUAL WELLNESS VISIT  06/06/2018     FALL RISK ASSESSMENT  06/06/2018     ADVANCE DIRECTIVE PLANNING Q5 YRS  10/29/2020     TETANUS IMMUNIZATION (SYSTEM ASSIGNED)  02/27/2023     INFLUENZA VACCINE (SYSTEM ASSIGNED)  Completed     PNEUMOCOCCAL  Completed       [place]  [facility]  For any issues my office # is [phone]

## 2017-11-09 NOTE — PROGRESS NOTES
Clinic Care Coordination Contact  OUTREACH    Referral Information:  Referral Source: IP/TCU Report  Reason for Contact: Follow-up from TCU discharge  Care Conference: No     Universal Utilization:   ED Visits in last year: 2  Hospital visits in last year: 1  Last PCP appointment: 11/09/17  Missed Appointments: 0  Concerns: Ed/IP visits related to various health concerns  Multiple Providers or Specialists: Yes    Clinical Concerns:  Current Medical Concerns: Pt was hospitalized 10/23-10/24 for Essential Tremor and utilized the [facility] program and [facility] and was discharged with the following diagnosis as primary.  1. Chronic polyneuropathy    2. Generalized muscle weakness    3. Essential tremor    4. Essential hypertension    5. Chronic atrial fibrillation (H)    6. Coronary artery disease involving native coronary artery of native heart without angina pectoris      Education Provided to patient: Health Care Home, Care Coordination   Clinical Pathway: None    Medication Management:  Pt reports adherence, Reviewed with pt during visit, Pt is also receiving nursing oversight from home care- who report that pt is at risk for non-compliance    Functional Status:  Mobility Status: Independent- still having some generalized weakness and fatigue  Equipment Currently Used at Home: none  Transportation: Pt was advised not to drive since recently starting a new medication, Pt's spouse has been providing     Psychosocial:  Current living arrangement:: I live in a private home with spouse  Financial/Insurance: Medicare, Pt and spouse deny any concerns related to finances or bills     Resources and Interventions:  Current Resources: Home Care:  Home care PT/OT/SN- SN reports Mr. Arndt is struggling with the changes in his health and is at risk for falls and medication non compliance. SN is recommended for medication management and compliance. PT/OT are recommended for comprehensive falls  risk assessment, gait, transfers, balance, medication management, cognition, ADL/IADL management and safety, need for a HHA and DME needs. Pt does not think he needs a HHA or  at this time.  Advanced Care Plans/Directives on file:: Yes- asked to bring in an updated copy     Patient/Caregiver understanding: Pt reports understanding of care coordination role and denies any additional needs or questions at this time.  Frequency of Care Coordination: 2-3 weeks  Upcoming appointment: 12/06/17     Plan: RITESH MAXWELL to follow-up upon discharge from home care services.    Kaylyn Chau [facility]  Clinic Care Coordinator  [phone]  [email or web address]

## 2017-11-11 ENCOUNTER — MEDICAL CORRESPONDENCE (OUTPATIENT)
Dept: FAMILY MEDICINE | Facility: CLINIC | Age: 82
End: 2017-11-11

## 2017-11-14 ENCOUNTER — MEDICAL CORRESPONDENCE (OUTPATIENT)
Dept: FAMILY MEDICINE | Facility: CLINIC | Age: 82
End: 2017-11-14

## 2017-11-21 ENCOUNTER — TELEPHONE (OUTPATIENT)
Dept: CARDIOLOGY | Facility: CLINIC | Age: 82
End: 2017-11-21

## 2017-11-21 NOTE — TELEPHONE ENCOUNTER
I called and spoke to pt's wife. Let her know pt does not need echo 12/6/17 as he already had one during his 10/2017 hospitalization. Timmy ZAVALA

## 2017-12-01 NOTE — TELEPHONE ENCOUNTER
propranolol (INDERAL) 40 MG   Last OV - 11/9/17 last labs 10/23/17  Afshan Cottrell MA December 1, 2017 11:00 AM

## 2017-12-11 NOTE — PROGRESS NOTES
HPI and Plan:   This 82-year-old gentleman seen in follow-up of his coronary artery disease and chronic atrial fibrillation.  There is a history of hypertension.    He had generally been stable this year.  However, he is not coughing any longer because of progressive ataxia/weakness issues which was felt, on detailed neurologic evaluation, be some kind of idiopathic peripheral polyneuropathy.  He has not had chest discomfort, orthopnea, PND, edema, syncope.  He was evaluated in the hospital a month ago for what his wife says was confusion which was her major concern.  He was confused in the middle of the night.  The hospital notes are written detail and only reported weakness.  They did not find anything abnormal including a significant new changes on CT scan of the head.  He had been tried on some what sounds like Parkinsonian type medications for his ataxia and these were not helping.  There were stopped and he was switched to propranolol because of his essential tremor.  He states this has helped considerably.    He has fallen a couple of times.  He is not hit his head.  He is on chronic anticoagulation because of his atrial fibrillation.  He has chronic bradycardia but this is been asymptomatic.  Since being on propranolol he notes no change in his activity tolerance, which is again limited by his neuropathy and ataxic functional status.      He denies myalgias, bleeding episodes.    He presented a number of years ago with an echo showing inferior wall motion abnormality and a stress test showing a fixed defect consistent with an MI.  Ejection fraction was globally normal.  He's been treated medically since that time including statin therapy.  Several years ago he developed chronic atrial fibrillation and is on anticoagulation.  He states clinically he is stable this year.  He has intermittent chronic nonproductive cough which is eluded diagnosis but otherwise has no symptoms.    Exam-detailed below.;  Blood  pressure 120/56.  Pulse 50 and irregular.  Lungs-clear  Cardiac- irregular rhythm no JVD or edema.  He has a grade 2/6 systolic ejection murmur at the base.  There is also grade 1 near holosystolic murmur at the apex radiating to the axilla.    Peripheral vascular exam is essentially normal.    Extremities-no edema   Remaining exam noted below.     Echocardiogram from recent admission reviewed.  Ejection fraction continues to be normal globally.  No hematoma anatomically severe/significant valvular disease.  Severe left atrial enlargement.  Mild aortic enlargement.     Review of labs ldl at goal in May of this year.  Recently joint and renal function were normal.     Impression/plan  1-chronic atrial fibrillation.  Essentially asymptomatic although quite bradycardic at rest.  Tolerating anticoagulation without bleeding.  Tolerating addition of propranolol which is quite helpful for his essential tremor.  .  He will continue his activity level and  let us know if he has any activity limitations.  He will continue anticoagulation.   I did discuss the possibility of a left atrial appendage closure if he is continuing to fall or has other issues that he increase his risk with chronic anticoagulation.    2-coronary artery disease.  Currently without ischemic heart failure or arrhythmia symptoms.  Normal ejection fraction.  Recommend continuing risk factor intervention and medical therapy.    3-mild aortic stenosis.  There has only been a small change in the gradient over the last several years.  Continue to follow.    4-mild aortic root dilatation.  This has changed a bit over the last 3 years.  Recommend repeat echo in 2 years.    5-dyslipidemia, on statin therapy.  Recommend continuing current dose.      6-bradycardia, continues to be asymptomatic at this time.  We'll continue to follow.     7-chronic anticoagulation.  Has been well-tolerated for many years.  As above though he is starting to  have some falls related  to his ataxia.  If this worsens we need to consider left atrial appendage occlusion    I will have him continue his current cardiovascular regimen    Orders Placed This Encounter   Procedures     Follow-Up with Cardiac Advanced Practice Provider       No orders of the defined types were placed in this encounter.      Medications Discontinued During This Encounter   Medication Reason     DULoxetine HCl (CYMBALTA PO) Discontinued by another Health Care Provider     propranolol (INDERAL) 40 MG tablet Medication Reconciliation Clean Up         Encounter Diagnoses   Name Primary?     Coronary artery disease involving native coronary artery of native heart without angina pectoris Yes     Chronic atrial fibrillation (H)      Mild aortic stenosis      Hypercholesterolemia        CURRENT MEDICATIONS:  Current Outpatient Prescriptions   Medication Sig Dispense Refill     Multiple Vitamins-Minerals (DAILY MULTI PO) Take by mouth daily       Calcium carb-Vitamin D 500 mg [place]-200 units (OSCAL WITH D;OYSTER SHELL CALCIUM) 500-200 MG-UNIT per tablet Take 1 tablet by mouth daily       acetaminophen (TYLENOL) 325 MG tablet Take 2 tablets (650 mg) by mouth every 4 hours as needed for mild pain or fever 100 tablet      propranolol (INDERAL) 40 MG tablet Take 1 tablet (40 mg) by mouth 2 times daily 60 tablet      hydrochlorothiazide (HYDRODIURIL) 25 MG tablet TAKE ONE TABLET BY MOUTH ONE TIME DAILY 90 tablet 3     amLODIPine (NORVASC) 5 MG tablet Take 1 tablet (5 mg) by mouth daily 90 tablet 3     Dabigatran Etexilate Mesylate (PRADAXA PO) Take 150 mg by mouth 2 times daily       simvastatin (ZOCOR) 10 MG tablet Take 5 mg by mouth At Bedtime (Takes half of a 10mg tablet for a total of 5mg daily)       fish oil-omega-3 fatty acids (OMEGA-3 FISH OIL) 1000 MG capsule Take 1 capsule by mouth daily.       [DISCONTINUED] propranolol (INDERAL) 40 MG tablet TAKE 1 TABLET BY MOUTH TWICE DAILY FOR ESSENTIAL TREMOR 60 tablet 3       ALLERGIES    "No Known Allergies    PAST MEDICAL HISTORY:  Past Medical History:   Diagnosis Date     Actinic keratosis      Atrial fibrillation (H)      Cancer (H)     skin     Cataract     both     Coronary artery disease      Detached retina 2007    both     ED (erectile dysfunction)      Hyperlipidemia      Hypertension      Lung nodules      Mild aortic stenosis     6/2012 Echo - trivial AS, mild-mod AR     Multiple thyroid nodules      Murmur     6/2012 Echo - trivial AS, mild-mod AR, mild-mod MR, mild-mod TR, mod AK     Myocardial infarction 6/2012    inferior - noted on 2007 stress test     Osteoporosis        PAST SURGICAL HISTORY:  Past Surgical History:   Procedure Laterality Date     CATARACT IOL, RT/LT       HC OR OCULAR DEVICE INTRAOP DETACHED RETINA         FAMILY HISTORY:  History reviewed. No pertinent family history.    SOCIAL HISTORY:  Social History     Social History     Marital status:      Spouse name: N/A     Number of children: N/A     Years of education: N/A     Social History Main Topics     Smoking status: Former Smoker     Packs/day: 1.00     Years: 20.00     Types: Cigarettes     Start date: 1954     Quit date: 1/1/1974     Smokeless tobacco: Never Used     Alcohol use No     Drug use: No     Sexual activity: Not Asked     Other Topics Concern     Caffeine Concern No     Exercise No     golf     Social History Narrative       Review of Systems:  Skin:  Negative       Eyes:  Positive for glasses reading  ENT:  Negative      Respiratory:  Positive for cough at night when lays down to sleep   Cardiovascular:    fatigue;Positive for    Gastroenterology: Negative      Genitourinary:  Negative      Musculoskeletal:  Negative      Neurologic:  Negative      Psychiatric:  Negative      Heme/Lymph/Imm:  Negative      Endocrine:  Negative        Physical Exam:  Vitals: /56  Pulse (!) 48  Ht 1.676 m (5' 6\")  Wt 70.4 kg (155 lb 1.6 oz)  BMI 25.03 kg/m2    Constitutional:  cooperative, alert and " oriented, well developed, well nourished, in no acute distress        Skin:  warm and dry to the touch, no apparent skin lesions or masses noted          Head:  normocephalic, no masses or lesions        Eyes:  pupils equal and round;sclera white;EOMS intact        Lymph:      ENT:  no pallor or cyanosis        Neck:  carotid pulses are full and equal bilaterally;JVP normal;no carotid bruit        Respiratory:  clear to auscultation         Cardiac: normal S1 and S2;no S3 or S4 irregularly irregular rhythm;bradycardic     systolic murmur;grade 2;RUSB systolic murmur;grade 2;apical      not assessed this visit                                        GI:  BS normoactive        Extremities and Muscular Skeletal:  no deformities, clubbing, cyanosis, erythema observed;no edema              Neurological:  affect appropriate        Psych:  Alert and Oriented x 3        CC  Ene Nur, APRN CNP  [address]  [place] [address]

## 2017-12-11 NOTE — PROGRESS NOTES
Clinic Care Coordination Contact  [facility]/Voicemail    Referral Source: IP/TCU Report  Clinical Data: Care Coordinator Outreach  Outreach attempted x 1.  Left message on voicemail with call back information and requested return call.  Plan: Care Coordinator will try to reach patient again in 3-5 business days.    Kaylyn Chau \[facility] [place] [facility]\""  Clinic Care Coordinator  [phone]  [email or web address]

## 2017-12-11 NOTE — LETTER
12/11/2017    Clinton Mills MD  [address]  [facility] [address]    RE: Deepak Arndt       Dear Colleague,    I had the pleasure of seeing Deepak Arndt in the [facility].    HPI and Plan:   This 82-year-old gentleman seen in follow-up of his coronary artery disease and chronic atrial fibrillation.  There is a history of hypertension.    He had generally been stable this year.  However, he is not coughing any longer because of progressive ataxia/weakness issues which was felt, on detailed neurologic evaluation, be some kind of idiopathic peripheral polyneuropathy.  He has not had chest discomfort, orthopnea, PND, edema, syncope.  He was evaluated in the hospital a month ago for what his wife says was confusion which was her major concern.  He was confused in the middle of the night.  The hospital notes are written detail and only reported weakness.  They did not find anything abnormal including a significant new changes on CT scan of the head.  He had been tried on some what sounds like Parkinsonian type medications for his ataxia and these were not helping.  There were stopped and he was switched to propranolol because of his essential tremor.  He states this has helped considerably.    He has fallen a couple of times.  He is not hit his head.  He is on chronic anticoagulation because of his atrial fibrillation.  He has chronic bradycardia but this is been asymptomatic.  Since being on propranolol he notes no change in his activity tolerance, which is again limited by his neuropathy and ataxic functional status.      He denies myalgias, bleeding episodes.    He presented a number of years ago with an echo showing inferior wall motion abnormality and a stress test showing a fixed defect consistent with an MI.  Ejection fraction was globally normal.  He's been treated medically since that time including statin therapy.  Several years ago he developed chronic  atrial fibrillation and is on anticoagulation.  He states clinically he is stable this year.  He has intermittent chronic nonproductive cough which is eluded diagnosis but otherwise has no symptoms.    Exam-detailed below.;  Blood pressure 120/56.  Pulse 50 and irregular.  Lungs-clear  Cardiac- irregular rhythm no JVD or edema.  He has a grade 2/6 systolic ejection murmur at the base.  There is also grade 1 near holosystolic murmur at the apex radiating to the axilla.    Peripheral vascular exam is essentially normal.    Extremities-no edema   Remaining exam noted below.     Echocardiogram from recent admission reviewed.  Ejection fraction continues to be normal globally.  No hematoma anatomically severe/significant valvular disease.  Severe left atrial enlargement.  Mild aortic enlargement.     Review of labs ldl at goal in May of this year.  Recently joint and renal function were normal.     Impression/plan  1-chronic atrial fibrillation.  Essentially asymptomatic although quite bradycardic at rest.  Tolerating anticoagulation without bleeding.  Tolerating addition of propranolol which is quite helpful for his essential tremor.  .  He will continue his activity level and  let us know if he has any activity limitations.  He will continue anticoagulation.    I did discuss the possibility of a left atrial appendage closure if he is continuing to fall or has other issues that he increase his risk with chronic anticoagulation.    2-coronary artery disease.  Currently without ischemic heart failure or arrhythmia symptoms.  Normal ejection fraction.  Recommend continuing risk factor intervention and medical therapy.    3-mild aortic stenosis.  There has only been a small change in the gradient over the last several years.  Continue to follow.    4-mild aortic root dilatation.  This has changed a bit over the last 3 years.  Recommend repeat echo in 2 years.    5-dyslipidemia, on statin therapy.  Recommend continuing  current dose.      6-bradycardia, continues to be asymptomatic at this time.  We'll continue to follow.     7-chronic anticoagulation.  Has been well-tolerated for many years.  As above though he is starting to  have some falls related to his ataxia.  If this worsens we need to consider left atrial appendage occlusion    I will have him continue his current cardiovascular regimen    Orders Placed This Encounter   Procedures     Follow-Up with Cardiac Advanced Practice Provider       No orders of the defined types were placed in this encounter.      Medications Discontinued During This Encounter   Medication Reason     DULoxetine HCl (CYMBALTA PO) Discontinued by another Health Care Provider     propranolol (INDERAL) 40 MG tablet Medication Reconciliation Clean Up         Encounter Diagnoses   Name Primary?     Coronary artery disease involving native coronary artery of native heart without angina pectoris Yes     Chronic atrial fibrillation (H)      Mild aortic stenosis      Hypercholesterolemia        CURRENT MEDICATIONS:  Current Outpatient Prescriptions   Medication Sig Dispense Refill     Multiple Vitamins-Minerals (DAILY MULTI PO) Take by mouth daily       Calcium carb-Vitamin D 500 mg [place]-200 units (OSCAL WITH D;OYSTER SHELL CALCIUM) 500-200 MG-UNIT per tablet Take 1 tablet by mouth daily       acetaminophen (TYLENOL) 325 MG tablet Take 2 tablets (650 mg) by mouth every 4 hours as needed for mild pain or fever 100 tablet      propranolol (INDERAL) 40 MG tablet Take 1 tablet (40 mg) by mouth 2 times daily 60 tablet      hydrochlorothiazide (HYDRODIURIL) 25 MG tablet TAKE ONE TABLET BY MOUTH ONE TIME DAILY 90 tablet 3     amLODIPine (NORVASC) 5 MG tablet Take 1 tablet (5 mg) by mouth daily 90 tablet 3     Dabigatran Etexilate Mesylate (PRADAXA PO) Take 150 mg by mouth 2 times daily       simvastatin (ZOCOR) 10 MG tablet Take 5 mg by mouth At Bedtime (Takes half of a 10mg tablet for a total of 5mg daily)        fish oil-omega-3 fatty acids (OMEGA-3 FISH OIL) 1000 MG capsule Take 1 capsule by mouth daily.       [DISCONTINUED] propranolol (INDERAL) 40 MG tablet TAKE 1 TABLET BY MOUTH TWICE DAILY FOR ESSENTIAL TREMOR 60 tablet 3       ALLERGIES   No Known Allergies    PAST MEDICAL HISTORY:  Past Medical History:   Diagnosis Date     Actinic keratosis      Atrial fibrillation (H)      Cancer (H)     skin     Cataract     both     Coronary artery disease      Detached retina 2007    both     ED (erectile dysfunction)      Hyperlipidemia      Hypertension      Lung nodules      Mild aortic stenosis     6/2012 Echo - trivial AS, mild-mod AR     Multiple thyroid nodules      Murmur     6/2012 Echo - trivial AS, mild-mod AR, mild-mod MR, mild-mod TR, mod MT     Myocardial infarction 6/2012    inferior - noted on 2007 stress test     Osteoporosis        PAST SURGICAL HISTORY:  Past Surgical History:   Procedure Laterality Date     CATARACT IOL, RT/LT       HC OR OCULAR DEVICE INTRAOP DETACHED RETINA         FAMILY HISTORY:  History reviewed. No pertinent family history.    SOCIAL HISTORY:  Social History     Social History     Marital status:      Spouse name: N/A     Number of children: N/A     Years of education: N/A     Social History Main Topics     Smoking status: Former Smoker     Packs/day: 1.00     Years: 20.00     Types: Cigarettes     Start date: 1954     Quit date: 1/1/1974     Smokeless tobacco: Never Used     Alcohol use No     Drug use: No     Sexual activity: Not Asked     Other Topics Concern     Caffeine Concern No     Exercise No     golf     Social History Narrative       Review of Systems:  Skin:  Negative       Eyes:  Positive for glasses reading  ENT:  Negative      Respiratory:  Positive for cough at night when lays down to sleep   Cardiovascular:    fatigue;Positive for    Gastroenterology: Negative      Genitourinary:  Negative      Musculoskeletal:  Negative      Neurologic:  Negative     "  Psychiatric:  Negative      Heme/Lymph/Imm:  Negative      Endocrine:  Negative        Physical Exam:  Vitals: /56  Pulse (!) 48  Ht 1.676 m (5' 6\")  Wt 70.4 kg (155 lb 1.6 oz)  BMI 25.03 kg/m2    Constitutional:  cooperative, alert and oriented, well developed, well nourished, in no acute distress        Skin:  warm and dry to the touch, no apparent skin lesions or masses noted          Head:  normocephalic, no masses or lesions        Eyes:  pupils equal and round;sclera white;EOMS intact        Lymph:      ENT:  no pallor or cyanosis        Neck:  carotid pulses are full and equal bilaterally;JVP normal;no carotid bruit        Respiratory:  clear to auscultation         Cardiac: normal S1 and S2;no S3 or S4 irregularly irregular rhythm;bradycardic     systolic murmur;grade 2;RUSB systolic murmur;grade 2;apical      not assessed this visit                                        GI:  BS normoactive        Extremities and Muscular Skeletal:  no deformities, clubbing, cyanosis, erythema observed;no edema              Neurological:  affect appropriate        Psych:  Alert and Oriented x 3          Thank you for allowing me to participate in the care of your patient.    Sincerely,     Justin Arroyo MD     [facility]    "

## 2017-12-11 NOTE — MR AVS SNAPSHOT
After Visit Summary   12/11/2017    Deepak Arndt    MRN: [id number]           Patient Information     Date Of Birth          1935        Visit Information        Provider Department      12/11/2017 2:45 PM Justin Arroyo MD [facility]        Today's Diagnoses     Coronary artery disease involving native coronary artery of native heart without angina pectoris    -  1    Chronic atrial fibrillation (H)        Mild aortic stenosis        Hypercholesterolemia           Follow-ups after your visit        Additional Services     Follow-Up with Cardiac Advanced Practice Provider                 Future tests that were ordered for you today     Open Future Orders        Priority Expected Expires Ordered    Follow-Up with Cardiac Advanced Practice Provider Routine 12/11/2018 12/12/2018 12/11/2017            Who to contact     If you have questions or need follow up information about today's clinic visit or your schedule please contact [facility]   [place] directly at [phone].  Normal or non-critical lab and imaging results will be communicated to you by Smart[employer], letter or phone within 4 business days after the clinic has received the results. If you do not hear from us within 7 days, please contact the clinic through [employer] or phone. If you have a critical or abnormal lab result, we will notify you by phone as soon as possible.  Submit refill requests through [employer] or call your pharmacy and they will forward the refill request to us. Please allow 3 business days for your refill to be completed.          Additional Information About Your Visit        MyChart Information     [employer] gives you secure access to your electronic health record. If you see a primary care provider, you can also send messages to your care team and make appointments. If you have questions, please call your primary care clinic.  If you do not have a  "primary care provider, please call [phone] and they will assist you.        Care EveryWhere ID     This is your Care EveryWhere ID. This could be used by other organizations to access your [place] medical records  [id number]        Your Vitals Were     Pulse Height BMI (Body Mass Index)             48 1.676 m (5' 6\") 25.03 kg/m2          Blood Pressure from Last 3 Encounters:   12/11/17 120/56   11/09/17 104/64   11/01/17 116/65    Weight from Last 3 Encounters:   12/11/17 70.4 kg (155 lb 1.6 oz)   11/09/17 66.9 kg (147 lb 6.4 oz)   11/01/17 66.7 kg (147 lb)              We Performed the Following     Follow-Up with Cardiologist        Primary Care Provider Office Phone # Fax #    Clinton Mills [phone] [address]  [facility] [address]        Equal Access to Services     [facility]: Hadii aad ku hadasho Soomaali, waaxda luqadaha, qaybta kaalmada adeegyada, waxay idiin hayjorge gill . So [facility] [phone].    ATENCIÓN: Si habla español, tiene a esqueda disposición servicios gratuitos de asistencia lingüística. [facility] [phone].    We comply with applicable federal civil rights laws and [place] laws. We do not discriminate on the basis of race, color, national origin, age, disability, sex, sexual orientation, or gender identity.            Thank you!     Thank you for choosing [facility]  for your care. Our goal is always to provide you with excellent care. Hearing back from our patients is one way we can continue to improve our services. Please take a few minutes to complete the written survey that you may receive in the mail after your visit with us. Thank you!             Your Updated Medication List - Protect others around you: Learn how to safely use, store and throw away your medicines at www.disposemymeds.org.          This list is accurate as of: 12/11/17  3:51 PM.  Always use your most recent med list. "                   Brand Name Dispense Instructions for use Diagnosis    acetaminophen 325 MG tablet    TYLENOL    100 tablet    Take 2 tablets (650 mg) by mouth every 4 hours as needed for mild pain or fever    Generalized muscle weakness       amLODIPine 5 MG tablet    NORVASC    90 tablet    Take 1 tablet (5 mg) by mouth daily    Essential hypertension, benign       Calcium carb-Vitamin D 500 mg [place]-200 units 500-200 MG-UNIT per tablet    OSCAL with D;Oyster Shell Calcium     Take 1 tablet by mouth daily        DAILY MULTI PO      Take by mouth daily        fish oil-omega-3 fatty acids 1000 MG capsule      Take 1 capsule by mouth daily.        hydrochlorothiazide 25 MG tablet    HYDRODIURIL    90 tablet    TAKE ONE TABLET BY MOUTH ONE TIME DAILY    Encounter for medication refill       PRADAXA PO      Take 150 mg by mouth 2 times daily        propranolol 40 MG tablet    INDERAL    60 tablet    Take 1 tablet (40 mg) by mouth 2 times daily    Essential tremor       simvastatin 10 MG tablet    ZOCOR     Take 5 mg by mouth At Bedtime (Takes half of a 10mg tablet for a total of 5mg daily)

## 2017-12-12 NOTE — PROGRESS NOTES
Clinic Care Coordination Contact  OUTREACH    Referral Information:  Referral Source: IP/TCU Report  Reason for Contact: Follow-up Home Care discharge  Care Conference: No     Universal Utilization:   ED Visits in last year: 2  Hospital visits in last year: 1  Last PCP appointment: 11/09/17  Missed Appointments: 0  Concerns: Ed/IP visits related to various health concerns  Multiple Providers or Specialists: Yes    Clinical Concerns:  Current Medical Concerns: Pt was hospitalized [facility] stay 10/23/17 to 10/24/17 and discharged to [facility] October 24, 2017- Nov 4, 2017 to his home with [facility].  Patient received PT/OT declining SN services. Patient is a high risk of falling- has a walker and a cane but frequently does not use his assistive devices. No falls reported. Pt saw the Cardiologist yesterday and received a good report with recommendations to follow-up in one year. Pt was seen by a Neurologist in 6/2017 and it was recommended that he follow-up in 10 weeks-provided pt a reminder to schedule an appointment.  Current Behavioral Concerns: No major concerns    Education Provided to patient: Care Coordination   Clinical Pathway Name: None    Medication Management:  Pt reports adherence, Reviewed regimen and it is up to date     Functional Status:  Mobility Status: Independent- uses a cane at times. Pt reports some unsteadiness but also reports that he was able to use a  to plow the driveway yesterday.   Equipment Currently Used at Home: cane, straight, walker, rolling, walker, standard  Transportation: Pt can drive, spouse can drive as well     Psychosocial:  Current living arrangement:: I live in a private home with spouse  Financial/Insurance: Medicare, [place], Denies any financial concerns     Resources and Interventions:  Current Resources: Home Care: Completed, No formal supports  Advanced Care Plans/Directives on file:: Yes    Patient/Caregiver  understanding: Pt denies any additional questions or concerns.  Frequency of Care Coordination: No further outreaches will be made at this time unless a new referral is made or a change in the pt's status occurs. Patient was provided with this writer's contact information and encouraged to call with any questions or concerns.     Plan: RITESH CC will be available if needed.    MARGARITO Skaggs  [facility]   [phone]  [email or web address]

## 2018-01-01 ENCOUNTER — SURGERY (OUTPATIENT)
Age: 83
End: 2018-01-01

## 2018-01-01 ENCOUNTER — APPOINTMENT (OUTPATIENT)
Dept: PHYSICAL THERAPY | Facility: CLINIC | Age: 83
DRG: 374 | End: 2018-01-01
Payer: MEDICARE

## 2018-01-01 ENCOUNTER — INFUSION THERAPY VISIT (OUTPATIENT)
Dept: INFUSION THERAPY | Facility: CLINIC | Age: 83
End: 2018-01-01
Attending: INTERNAL MEDICINE
Payer: MEDICARE

## 2018-01-01 ENCOUNTER — HOSPITAL ENCOUNTER (OUTPATIENT)
Facility: CLINIC | Age: 83
Discharge: HOME | DRG: 374 | End: 2018-10-08
Attending: RADIOLOGY | Admitting: RADIOLOGY
Payer: MEDICARE

## 2018-01-01 ENCOUNTER — TRANSFERRED RECORDS (OUTPATIENT)
Dept: FAMILY MEDICINE | Facility: CLINIC | Age: 83
End: 2018-01-01

## 2018-01-01 ENCOUNTER — PATIENT OUTREACH (OUTPATIENT)
Dept: CARE COORDINATION | Facility: CLINIC | Age: 83
End: 2018-01-01

## 2018-01-01 ENCOUNTER — HOSPITAL ENCOUNTER (OUTPATIENT)
Dept: CT IMAGING | Facility: CLINIC | Age: 83
Discharge: HOME | End: 2018-11-15
Attending: INTERNAL MEDICINE | Admitting: INTERNAL MEDICINE
Payer: MEDICARE

## 2018-01-01 ENCOUNTER — NURSING HOME VISIT (OUTPATIENT)
Dept: GERIATRICS | Facility: CLINIC | Age: 83
End: 2018-01-01
Payer: MEDICARE

## 2018-01-01 ENCOUNTER — TELEPHONE (OUTPATIENT)
Dept: ONCOLOGY | Facility: CLINIC | Age: 83
End: 2018-01-01

## 2018-01-01 ENCOUNTER — ANESTHESIA (OUTPATIENT)
Dept: GASTROENTEROLOGY | Facility: CLINIC | Age: 83
End: 2018-01-01
Payer: MEDICARE

## 2018-01-01 ENCOUNTER — HOSPITAL ENCOUNTER (OUTPATIENT)
Facility: CLINIC | Age: 83
Setting detail: SPECIMEN
Discharge: HOME | End: 2018-10-30
Attending: NURSE PRACTITIONER | Admitting: NURSE PRACTITIONER
Payer: MEDICARE

## 2018-01-01 ENCOUNTER — HOSPITAL ENCOUNTER (EMERGENCY)
Facility: CLINIC | Age: 83
Discharge: HOME | End: 2018-09-19
Attending: EMERGENCY MEDICINE | Admitting: EMERGENCY MEDICINE
Payer: MEDICARE

## 2018-01-01 ENCOUNTER — HOSPITAL ENCOUNTER (OUTPATIENT)
Facility: CLINIC | Age: 83
Setting detail: SPECIMEN
Discharge: HOME | End: 2018-10-23
Attending: INTERNAL MEDICINE | Admitting: INTERNAL MEDICINE
Payer: MEDICARE

## 2018-01-01 ENCOUNTER — HOSPITAL ENCOUNTER (OUTPATIENT)
Facility: CLINIC | Age: 83
Setting detail: SPECIMEN
Discharge: HOME | End: 2018-11-06
Attending: NURSE PRACTITIONER | Admitting: NURSE PRACTITIONER
Payer: MEDICARE

## 2018-01-01 ENCOUNTER — APPOINTMENT (OUTPATIENT)
Dept: GENERAL RADIOLOGY | Facility: CLINIC | Age: 83
DRG: 374 | End: 2018-01-01
Attending: EMERGENCY MEDICINE
Payer: MEDICARE

## 2018-01-01 ENCOUNTER — HOSPITAL ENCOUNTER (OUTPATIENT)
Facility: CLINIC | Age: 83
Discharge: HOME | End: 2018-09-24
Attending: INTERNAL MEDICINE | Admitting: INTERNAL MEDICINE
Payer: MEDICARE

## 2018-01-01 ENCOUNTER — APPOINTMENT (OUTPATIENT)
Dept: INTERVENTIONAL RADIOLOGY/VASCULAR | Facility: CLINIC | Age: 83
DRG: 374 | End: 2018-01-01
Attending: INTERNAL MEDICINE
Payer: MEDICARE

## 2018-01-01 ENCOUNTER — OFFICE VISIT (OUTPATIENT)
Dept: FAMILY MEDICINE | Facility: CLINIC | Age: 83
End: 2018-01-01

## 2018-01-01 ENCOUNTER — TELEPHONE (OUTPATIENT)
Dept: FAMILY MEDICINE | Facility: CLINIC | Age: 83
End: 2018-01-01

## 2018-01-01 ENCOUNTER — ONCOLOGY VISIT (OUTPATIENT)
Dept: ONCOLOGY | Facility: CLINIC | Age: 83
End: 2018-01-01
Attending: INTERNAL MEDICINE
Payer: MEDICARE

## 2018-01-01 ENCOUNTER — ANESTHESIA EVENT (OUTPATIENT)
Dept: GASTROENTEROLOGY | Facility: CLINIC | Age: 83
End: 2018-01-01
Payer: MEDICARE

## 2018-01-01 ENCOUNTER — APPOINTMENT (OUTPATIENT)
Dept: SPEECH THERAPY | Facility: CLINIC | Age: 83
DRG: 374 | End: 2018-01-01
Attending: HOSPITALIST
Payer: MEDICARE

## 2018-01-01 ENCOUNTER — APPOINTMENT (OUTPATIENT)
Dept: CARDIOLOGY | Facility: CLINIC | Age: 83
DRG: 374 | End: 2018-01-01
Attending: INTERNAL MEDICINE
Payer: MEDICARE

## 2018-01-01 ENCOUNTER — APPOINTMENT (OUTPATIENT)
Dept: PHYSICAL THERAPY | Facility: CLINIC | Age: 83
DRG: 374 | End: 2018-01-01
Attending: INTERNAL MEDICINE
Payer: MEDICARE

## 2018-01-01 ENCOUNTER — HOSPITAL ENCOUNTER (OUTPATIENT)
Facility: CLINIC | Age: 83
Setting detail: SPECIMEN
End: 2018-10-30
Attending: INTERNAL MEDICINE
Payer: MEDICARE

## 2018-01-01 ENCOUNTER — HOSPITAL ENCOUNTER (OUTPATIENT)
Facility: CLINIC | Age: 83
Setting detail: SPECIMEN
Discharge: HOME | End: 2018-11-13
Attending: INTERNAL MEDICINE | Admitting: INTERNAL MEDICINE
Payer: MEDICARE

## 2018-01-01 ENCOUNTER — APPOINTMENT (OUTPATIENT)
Dept: CT IMAGING | Facility: CLINIC | Age: 83
DRG: 374 | End: 2018-01-01
Attending: EMERGENCY MEDICINE
Payer: MEDICARE

## 2018-01-01 ENCOUNTER — TELEPHONE (OUTPATIENT)
Dept: CARDIOLOGY | Facility: CLINIC | Age: 83
End: 2018-01-01

## 2018-01-01 ENCOUNTER — DISCHARGE SUMMARY NURSING HOME (OUTPATIENT)
Dept: GERIATRICS | Facility: CLINIC | Age: 83
End: 2018-01-01
Payer: MEDICARE

## 2018-01-01 ENCOUNTER — TELEPHONE (OUTPATIENT)
Dept: INTERNAL MEDICINE | Facility: CLINIC | Age: 83
End: 2018-01-01

## 2018-01-01 ENCOUNTER — HOSPITAL ENCOUNTER (INPATIENT)
Facility: CLINIC | Age: 83
LOS: 9 days | Discharge: NURSING, REHAB OR LONG-TERM CARE | DRG: 374 | End: 2018-10-20
Attending: EMERGENCY MEDICINE | Admitting: INTERNAL MEDICINE
Payer: MEDICARE

## 2018-01-01 ENCOUNTER — HOSPITAL ENCOUNTER (OUTPATIENT)
Facility: CLINIC | Age: 83
Discharge: HOME | End: 2018-10-02
Attending: INTERNAL MEDICINE | Admitting: INTERNAL MEDICINE
Payer: MEDICARE

## 2018-01-01 ENCOUNTER — HOSPITAL ENCOUNTER (EMERGENCY)
Facility: CLINIC | Age: 83
Discharge: HOME | End: 2018-11-25
Attending: EMERGENCY MEDICINE | Admitting: EMERGENCY MEDICINE
Payer: MEDICARE

## 2018-01-01 ENCOUNTER — HOSPITAL ENCOUNTER (OUTPATIENT)
Dept: CT IMAGING | Facility: CLINIC | Age: 83
Discharge: HOME | End: 2018-09-28
Attending: INTERNAL MEDICINE | Admitting: INTERNAL MEDICINE
Payer: MEDICARE

## 2018-01-01 ENCOUNTER — APPOINTMENT (OUTPATIENT)
Dept: GENERAL RADIOLOGY | Facility: CLINIC | Age: 83
End: 2018-01-01
Attending: EMERGENCY MEDICINE
Payer: MEDICARE

## 2018-01-01 VITALS
SYSTOLIC BLOOD PRESSURE: 105 MMHG | HEART RATE: 63 BPM | WEIGHT: 130.8 LBS | DIASTOLIC BLOOD PRESSURE: 58 MMHG | HEIGHT: 67 IN | RESPIRATION RATE: 16 BRPM | BODY MASS INDEX: 20.53 KG/M2 | TEMPERATURE: 97.6 F | OXYGEN SATURATION: 99 %

## 2018-01-01 VITALS
DIASTOLIC BLOOD PRESSURE: 58 MMHG | RESPIRATION RATE: 16 BRPM | HEART RATE: 63 BPM | WEIGHT: 130.8 LBS | SYSTOLIC BLOOD PRESSURE: 105 MMHG | BODY MASS INDEX: 20.48 KG/M2 | TEMPERATURE: 97.6 F | OXYGEN SATURATION: 99 %

## 2018-01-01 VITALS
WEIGHT: 153.4 LBS | SYSTOLIC BLOOD PRESSURE: 138 MMHG | BODY MASS INDEX: 24.08 KG/M2 | HEIGHT: 67 IN | TEMPERATURE: 97.8 F | DIASTOLIC BLOOD PRESSURE: 64 MMHG

## 2018-01-01 VITALS
TEMPERATURE: 97.8 F | WEIGHT: 136.02 LBS | HEIGHT: 67 IN | HEART RATE: 78 BPM | DIASTOLIC BLOOD PRESSURE: 65 MMHG | RESPIRATION RATE: 18 BRPM | SYSTOLIC BLOOD PRESSURE: 101 MMHG | OXYGEN SATURATION: 96 % | BODY MASS INDEX: 21.35 KG/M2

## 2018-01-01 VITALS
BODY MASS INDEX: 20.34 KG/M2 | WEIGHT: 129.6 LBS | DIASTOLIC BLOOD PRESSURE: 58 MMHG | HEART RATE: 70 BPM | RESPIRATION RATE: 27 BRPM | OXYGEN SATURATION: 95 % | TEMPERATURE: 98.5 F | HEIGHT: 67 IN | SYSTOLIC BLOOD PRESSURE: 132 MMHG

## 2018-01-01 VITALS
WEIGHT: 131.2 LBS | HEART RATE: 73 BPM | BODY MASS INDEX: 20.59 KG/M2 | DIASTOLIC BLOOD PRESSURE: 81 MMHG | OXYGEN SATURATION: 100 % | RESPIRATION RATE: 30 BRPM | HEIGHT: 67 IN | TEMPERATURE: 98.5 F | SYSTOLIC BLOOD PRESSURE: 158 MMHG

## 2018-01-01 VITALS — TEMPERATURE: 98.1 F | HEART RATE: 89 BPM | DIASTOLIC BLOOD PRESSURE: 74 MMHG | SYSTOLIC BLOOD PRESSURE: 133 MMHG

## 2018-01-01 VITALS
TEMPERATURE: 97.8 F | SYSTOLIC BLOOD PRESSURE: 126 MMHG | OXYGEN SATURATION: 97 % | HEART RATE: 75 BPM | RESPIRATION RATE: 16 BRPM | DIASTOLIC BLOOD PRESSURE: 78 MMHG

## 2018-01-01 VITALS
SYSTOLIC BLOOD PRESSURE: 125 MMHG | HEART RATE: 57 BPM | SYSTOLIC BLOOD PRESSURE: 128 MMHG | DIASTOLIC BLOOD PRESSURE: 76 MMHG | WEIGHT: 129.6 LBS | WEIGHT: 152 LBS | OXYGEN SATURATION: 96 % | TEMPERATURE: 97.4 F | BODY MASS INDEX: 24.17 KG/M2 | RESPIRATION RATE: 16 BRPM | BODY MASS INDEX: 20.29 KG/M2 | HEART RATE: 76 BPM | OXYGEN SATURATION: 98 % | DIASTOLIC BLOOD PRESSURE: 62 MMHG | RESPIRATION RATE: 20 BRPM

## 2018-01-01 VITALS
TEMPERATURE: 98.7 F | SYSTOLIC BLOOD PRESSURE: 114 MMHG | OXYGEN SATURATION: 93 % | WEIGHT: 145.8 LBS | DIASTOLIC BLOOD PRESSURE: 70 MMHG | HEART RATE: 60 BPM | BODY MASS INDEX: 22.88 KG/M2 | RESPIRATION RATE: 24 BRPM | HEIGHT: 67 IN

## 2018-01-01 VITALS
OXYGEN SATURATION: 94 % | SYSTOLIC BLOOD PRESSURE: 140 MMHG | WEIGHT: 148 LBS | TEMPERATURE: 98.6 F | RESPIRATION RATE: 24 BRPM | HEIGHT: 68 IN | BODY MASS INDEX: 22.43 KG/M2 | DIASTOLIC BLOOD PRESSURE: 83 MMHG

## 2018-01-01 VITALS
DIASTOLIC BLOOD PRESSURE: 65 MMHG | SYSTOLIC BLOOD PRESSURE: 110 MMHG | RESPIRATION RATE: 18 BRPM | HEART RATE: 78 BPM | TEMPERATURE: 97.8 F | OXYGEN SATURATION: 96 %

## 2018-01-01 VITALS
DIASTOLIC BLOOD PRESSURE: 68 MMHG | TEMPERATURE: 97.4 F | HEART RATE: 58 BPM | HEIGHT: 67 IN | WEIGHT: 133.4 LBS | OXYGEN SATURATION: 98 % | SYSTOLIC BLOOD PRESSURE: 135 MMHG | BODY MASS INDEX: 20.94 KG/M2 | RESPIRATION RATE: 20 BRPM

## 2018-01-01 VITALS
WEIGHT: 157.4 LBS | OXYGEN SATURATION: 99 % | SYSTOLIC BLOOD PRESSURE: 100 MMHG | RESPIRATION RATE: 16 BRPM | BODY MASS INDEX: 25.02 KG/M2 | TEMPERATURE: 97.7 F | DIASTOLIC BLOOD PRESSURE: 60 MMHG | HEART RATE: 71 BPM

## 2018-01-01 VITALS
HEART RATE: 54 BPM | TEMPERATURE: 96.5 F | HEIGHT: 68 IN | OXYGEN SATURATION: 91 % | BODY MASS INDEX: 19.6 KG/M2 | SYSTOLIC BLOOD PRESSURE: 153 MMHG | DIASTOLIC BLOOD PRESSURE: 58 MMHG | RESPIRATION RATE: 16 BRPM | WEIGHT: 129.3 LBS

## 2018-01-01 VITALS
WEIGHT: 136 LBS | OXYGEN SATURATION: 96 % | DIASTOLIC BLOOD PRESSURE: 73 MMHG | HEART RATE: 75 BPM | TEMPERATURE: 97.6 F | SYSTOLIC BLOOD PRESSURE: 117 MMHG | BODY MASS INDEX: 21.35 KG/M2 | HEIGHT: 67 IN | RESPIRATION RATE: 18 BRPM

## 2018-01-01 VITALS
HEIGHT: 69 IN | TEMPERATURE: 98.1 F | BODY MASS INDEX: 23.7 KG/M2 | OXYGEN SATURATION: 95 % | RESPIRATION RATE: 16 BRPM | WEIGHT: 160 LBS | HEART RATE: 41 BPM | SYSTOLIC BLOOD PRESSURE: 122 MMHG | DIASTOLIC BLOOD PRESSURE: 70 MMHG

## 2018-01-01 VITALS
BODY MASS INDEX: 21.22 KG/M2 | WEIGHT: 140 LBS | OXYGEN SATURATION: 96 % | RESPIRATION RATE: 20 BRPM | SYSTOLIC BLOOD PRESSURE: 132 MMHG | TEMPERATURE: 99.1 F | HEIGHT: 68 IN | DIASTOLIC BLOOD PRESSURE: 75 MMHG

## 2018-01-01 VITALS
TEMPERATURE: 98.4 F | OXYGEN SATURATION: 95 % | SYSTOLIC BLOOD PRESSURE: 125 MMHG | RESPIRATION RATE: 18 BRPM | HEART RATE: 65 BPM | DIASTOLIC BLOOD PRESSURE: 70 MMHG

## 2018-01-01 VITALS
HEART RATE: 48 BPM | OXYGEN SATURATION: 98 % | DIASTOLIC BLOOD PRESSURE: 70 MMHG | BODY MASS INDEX: 24.96 KG/M2 | RESPIRATION RATE: 16 BRPM | SYSTOLIC BLOOD PRESSURE: 126 MMHG | WEIGHT: 157 LBS

## 2018-01-01 VITALS
TEMPERATURE: 97.7 F | BODY MASS INDEX: 22.43 KG/M2 | DIASTOLIC BLOOD PRESSURE: 83 MMHG | OXYGEN SATURATION: 96 % | RESPIRATION RATE: 24 BRPM | SYSTOLIC BLOOD PRESSURE: 149 MMHG | WEIGHT: 148 LBS | HEIGHT: 68 IN

## 2018-01-01 VITALS
HEART RATE: 76 BPM | HEIGHT: 67 IN | DIASTOLIC BLOOD PRESSURE: 67 MMHG | TEMPERATURE: 98.7 F | OXYGEN SATURATION: 92 % | BODY MASS INDEX: 21.35 KG/M2 | WEIGHT: 136 LBS | RESPIRATION RATE: 18 BRPM | SYSTOLIC BLOOD PRESSURE: 123 MMHG

## 2018-01-01 VITALS
SYSTOLIC BLOOD PRESSURE: 133 MMHG | RESPIRATION RATE: 16 BRPM | TEMPERATURE: 96 F | DIASTOLIC BLOOD PRESSURE: 54 MMHG | OXYGEN SATURATION: 98 %

## 2018-01-01 DIAGNOSIS — R05.9 COUGH: Primary | ICD-10-CM

## 2018-01-01 DIAGNOSIS — R53.81 PHYSICAL DECONDITIONING: ICD-10-CM

## 2018-01-01 DIAGNOSIS — Z76.0 ENCOUNTER FOR MEDICATION REFILL: ICD-10-CM

## 2018-01-01 DIAGNOSIS — R00.1 SYMPTOMATIC BRADYCARDIA: ICD-10-CM

## 2018-01-01 DIAGNOSIS — I25.10 CORONARY ARTERY DISEASE INVOLVING NATIVE CORONARY ARTERY OF NATIVE HEART WITHOUT ANGINA PECTORIS: ICD-10-CM

## 2018-01-01 DIAGNOSIS — I10 ESSENTIAL HYPERTENSION: ICD-10-CM

## 2018-01-01 DIAGNOSIS — C15.5 CANCER OF DISTAL THIRD OF ESOPHAGUS (H): ICD-10-CM

## 2018-01-01 DIAGNOSIS — C16.0 GE JUNCTION CARCINOMA (H): ICD-10-CM

## 2018-01-01 DIAGNOSIS — I26.92 ACUTE SADDLE PULMONARY EMBOLISM WITHOUT ACUTE COR PULMONALE (H): ICD-10-CM

## 2018-01-01 DIAGNOSIS — I48.20 CHRONIC ATRIAL FIBRILLATION (H): ICD-10-CM

## 2018-01-01 DIAGNOSIS — R63.4 LOSS OF WEIGHT: ICD-10-CM

## 2018-01-01 DIAGNOSIS — Z93.1 GASTROINTESTINAL TUBE PRESENT (H): ICD-10-CM

## 2018-01-01 DIAGNOSIS — K59.1 FUNCTIONAL DIARRHEA: Primary | ICD-10-CM

## 2018-01-01 DIAGNOSIS — C15.9 ESOPHAGEAL CANCER (H): ICD-10-CM

## 2018-01-01 DIAGNOSIS — Z51.89 TREATMENT: ICD-10-CM

## 2018-01-01 DIAGNOSIS — C15.5 CANCER OF DISTAL THIRD OF ESOPHAGUS (H): Primary | ICD-10-CM

## 2018-01-01 DIAGNOSIS — E78.5 HYPERLIPIDEMIA LDL GOAL <70: ICD-10-CM

## 2018-01-01 DIAGNOSIS — C16.0 GE JUNCTION CARCINOMA (H): Primary | ICD-10-CM

## 2018-01-01 DIAGNOSIS — C15.9 MALIGNANT NEOPLASM OF ESOPHAGUS, UNSPECIFIED LOCATION (H): ICD-10-CM

## 2018-01-01 DIAGNOSIS — M62.81 GENERALIZED MUSCLE WEAKNESS: ICD-10-CM

## 2018-01-01 DIAGNOSIS — I26.99 OTHER ACUTE PULMONARY EMBOLISM WITHOUT ACUTE COR PULMONALE (H): ICD-10-CM

## 2018-01-01 DIAGNOSIS — R79.89 ELEVATED TROPONIN: ICD-10-CM

## 2018-01-01 DIAGNOSIS — E78.00 HYPERCHOLESTEROLEMIA: ICD-10-CM

## 2018-01-01 DIAGNOSIS — R79.89 LOW TSH LEVEL: ICD-10-CM

## 2018-01-01 DIAGNOSIS — Z51.5 HOSPICE CARE PATIENT: ICD-10-CM

## 2018-01-01 DIAGNOSIS — G25.0 ESSENTIAL TREMOR: ICD-10-CM

## 2018-01-01 DIAGNOSIS — L20.89 OTHER ATOPIC DERMATITIS: ICD-10-CM

## 2018-01-01 DIAGNOSIS — E78.5 HYPERLIPIDEMIA LDL GOAL <100: ICD-10-CM

## 2018-01-01 DIAGNOSIS — R13.10 DYSPHAGIA: Primary | ICD-10-CM

## 2018-01-01 DIAGNOSIS — K21.9 GASTROESOPHAGEAL REFLUX DISEASE, ESOPHAGITIS PRESENCE NOT SPECIFIED: Primary | ICD-10-CM

## 2018-01-01 DIAGNOSIS — I10 ESSENTIAL HYPERTENSION, BENIGN: ICD-10-CM

## 2018-01-01 DIAGNOSIS — K21.9 GASTROESOPHAGEAL REFLUX DISEASE, ESOPHAGITIS PRESENCE NOT SPECIFIED: ICD-10-CM

## 2018-01-01 DIAGNOSIS — R53.81 PHYSICAL DECONDITIONING: Primary | ICD-10-CM

## 2018-01-01 DIAGNOSIS — Z01.818 PREOP GENERAL PHYSICAL EXAM: Primary | ICD-10-CM

## 2018-01-01 DIAGNOSIS — Z12.5 SCREENING FOR PROSTATE CANCER: ICD-10-CM

## 2018-01-01 LAB
% GRANULOCYTES: 81.8 % (ref 42.2–75.2)
ALBUMIN SERPL-MCNC: 3.3 G/DL (ref 3.4–5)
ALBUMIN SERPL-MCNC: 3.5 G/DL (ref 3.4–5)
ALBUMIN UR-MCNC: 10 MG/DL
ALP SERPL-CCNC: 68 U/L (ref 40–150)
ALP SERPL-CCNC: 79 U/L (ref 40–150)
ALT SERPL W P-5'-P-CCNC: 21 U/L (ref 0–70)
ALT SERPL W P-5'-P-CCNC: 27 U/L (ref 0–70)
ANION GAP SERPL CALCULATED.3IONS-SCNC: 10 MMOL/L (ref 3–14)
ANION GAP SERPL CALCULATED.3IONS-SCNC: 12 MMOL/L (ref 3–14)
ANION GAP SERPL CALCULATED.3IONS-SCNC: 5 MMOL/L (ref 3–14)
ANION GAP SERPL CALCULATED.3IONS-SCNC: 7 MMOL/L (ref 3–14)
ANION GAP SERPL CALCULATED.3IONS-SCNC: 9 MMOL/L (ref 3–14)
APPEARANCE UR: CLEAR
APTT PPP: 32 SEC (ref 22–37)
AST SERPL W P-5'-P-CCNC: 19 U/L (ref 0–45)
AST SERPL W P-5'-P-CCNC: 20 U/L (ref 0–45)
BACTERIA SPEC CULT: NO GROWTH
BACTERIA SPEC CULT: NO GROWTH
BASOPHILS # BLD AUTO: 0 10E9/L (ref 0–0.2)
BASOPHILS # BLD AUTO: 0.1 10E9/L (ref 0–0.2)
BASOPHILS NFR BLD AUTO: 0.1 %
BASOPHILS NFR BLD AUTO: 0.2 %
BASOPHILS NFR BLD AUTO: 0.2 %
BASOPHILS NFR BLD AUTO: 0.3 %
BASOPHILS NFR BLD AUTO: 0.5 %
BASOPHILS NFR BLD AUTO: 0.7 %
BILIRUB SERPL-MCNC: 0.8 MG/DL (ref 0.2–1.3)
BILIRUB SERPL-MCNC: 1.2 MG/DL (ref 0.2–1.3)
BILIRUB UR QL STRIP: NEGATIVE
BUN SERPL-MCNC: 14 MG/DL (ref 7–30)
BUN SERPL-MCNC: 16 MG/DL (ref 7–30)
BUN SERPL-MCNC: 22 MG/DL (ref 7–30)
BUN SERPL-MCNC: 27 MG/DL (ref 7–30)
BUN SERPL-MCNC: 9 MG/DL (ref 7–30)
CALCIUM SERPL-MCNC: 8.6 MG/DL (ref 8.5–10.1)
CALCIUM SERPL-MCNC: 8.7 MG/DL (ref 8.5–10.1)
CALCIUM SERPL-MCNC: 8.8 MG/DL (ref 8.5–10.1)
CALCIUM SERPL-MCNC: 9 MG/DL (ref 8.5–10.1)
CALCIUM SERPL-MCNC: 9.5 MG/DL (ref 8.5–10.1)
CHLORIDE SERPL-SCNC: 101 MMOL/L (ref 94–109)
CHLORIDE SERPL-SCNC: 103 MMOL/L (ref 94–109)
CHLORIDE SERPL-SCNC: 103 MMOL/L (ref 94–109)
CHLORIDE SERPL-SCNC: 108 MMOL/L (ref 94–109)
CHLORIDE SERPL-SCNC: 98 MMOL/L (ref 94–109)
CK SERPL-CCNC: 37 U/L (ref 30–300)
CK SERPL-CCNC: 40 U/L (ref 30–300)
CO2 SERPL-SCNC: 22 MMOL/L (ref 20–32)
CO2 SERPL-SCNC: 25 MMOL/L (ref 20–32)
CO2 SERPL-SCNC: 25 MMOL/L (ref 20–32)
CO2 SERPL-SCNC: 27 MMOL/L (ref 20–32)
CO2 SERPL-SCNC: 27 MMOL/L (ref 20–32)
COLOR UR AUTO: YELLOW
COPATH REPORT: NORMAL
CREAT SERPL-MCNC: 0.56 MG/DL (ref 0.66–1.25)
CREAT SERPL-MCNC: 0.61 MG/DL (ref 0.66–1.25)
CREAT SERPL-MCNC: 0.63 MG/DL (ref 0.66–1.25)
CREAT SERPL-MCNC: 0.64 MG/DL (ref 0.66–1.25)
CREAT SERPL-MCNC: 0.65 MG/DL (ref 0.66–1.25)
CREAT SERPL-MCNC: 0.65 MG/DL (ref 0.66–1.25)
CREAT SERPL-MCNC: 0.73 MG/DL (ref 0.66–1.25)
CREAT SERPL-MCNC: 0.78 MG/DL (ref 0.66–1.25)
CREAT SERPL-MCNC: 1.04 MG/DL (ref 0.66–1.25)
CREAT SERPL-MCNC: 1.09 MG/DL (ref 0.66–1.25)
DIFFERENTIAL METHOD BLD: ABNORMAL
EOSINOPHIL # BLD AUTO: 0.2 10E9/L (ref 0–0.7)
EOSINOPHIL # BLD AUTO: 0.3 10E9/L (ref 0–0.7)
EOSINOPHIL # BLD AUTO: 0.3 10E9/L (ref 0–0.7)
EOSINOPHIL # BLD AUTO: 0.4 10E9/L (ref 0–0.7)
EOSINOPHIL # BLD AUTO: 0.5 10E9/L (ref 0–0.7)
EOSINOPHIL # BLD AUTO: 0.6 10E9/L (ref 0–0.7)
EOSINOPHIL NFR BLD AUTO: 2 %
EOSINOPHIL NFR BLD AUTO: 2.1 %
EOSINOPHIL NFR BLD AUTO: 3.1 %
EOSINOPHIL NFR BLD AUTO: 3.6 %
EOSINOPHIL NFR BLD AUTO: 4.6 %
EOSINOPHIL NFR BLD AUTO: 4.8 %
ERYTHROCYTE [DISTWIDTH] IN BLOOD BY AUTOMATED COUNT: 13 % (ref 10–15)
ERYTHROCYTE [DISTWIDTH] IN BLOOD BY AUTOMATED COUNT: 13.1 % (ref 10–15)
ERYTHROCYTE [DISTWIDTH] IN BLOOD BY AUTOMATED COUNT: 13.3 % (ref 10–15)
ERYTHROCYTE [DISTWIDTH] IN BLOOD BY AUTOMATED COUNT: 13.3 % (ref 10–15)
ERYTHROCYTE [DISTWIDTH] IN BLOOD BY AUTOMATED COUNT: 13.5 % (ref 10–15)
ERYTHROCYTE [DISTWIDTH] IN BLOOD BY AUTOMATED COUNT: 14.8 % (ref 10–15)
GFR SERPL CREATININE-BSD FRML MDRD: 65 ML/MIN/1.7M2
GFR SERPL CREATININE-BSD FRML MDRD: 68 ML/MIN/1.7M2
GFR SERPL CREATININE-BSD FRML MDRD: >90 ML/MIN/1.7M2
GLUCOSE BLDC GLUCOMTR-MCNC: 120 MG/DL (ref 70–99)
GLUCOSE BLDC GLUCOMTR-MCNC: 91 MG/DL (ref 70–99)
GLUCOSE SERPL-MCNC: 100 MG/DL (ref 70–99)
GLUCOSE SERPL-MCNC: 104 MG/DL (ref 70–99)
GLUCOSE SERPL-MCNC: 109 MG/DL (ref 70–99)
GLUCOSE SERPL-MCNC: 114 MG/DL (ref 70–99)
GLUCOSE SERPL-MCNC: 97 MG/DL (ref 70–99)
GLUCOSE UR STRIP-MCNC: NEGATIVE MG/DL
HBA1C MFR BLD: 5.7 % (ref 0–5.6)
HCT VFR BLD AUTO: 38 % (ref 40–53)
HCT VFR BLD AUTO: 39.6 % (ref 40–53)
HCT VFR BLD AUTO: 39.7 % (ref 40–53)
HCT VFR BLD AUTO: 40.7 % (ref 40–53)
HCT VFR BLD AUTO: 42.2 % (ref 40–53)
HCT VFR BLD AUTO: 42.6 % (ref 40–53)
HCT VFR BLD AUTO: 44.1 % (ref 39–51)
HCT VFR BLD AUTO: 44.5 % (ref 40–53)
HCT VFR BLD AUTO: 48.1 % (ref 40–53)
HEMOGLOBIN: 14.5 G/DL (ref 13.4–17.5)
HGB BLD-MCNC: 12.7 G/DL (ref 13.3–17.7)
HGB BLD-MCNC: 13.5 G/DL (ref 13.3–17.7)
HGB BLD-MCNC: 13.6 G/DL (ref 13.3–17.7)
HGB BLD-MCNC: 13.9 G/DL (ref 13.3–17.7)
HGB BLD-MCNC: 14.2 G/DL (ref 13.3–17.7)
HGB BLD-MCNC: 15.1 G/DL (ref 13.3–17.7)
HGB BLD-MCNC: 15.2 G/DL (ref 13.3–17.7)
HGB BLD-MCNC: 17 G/DL (ref 13.3–17.7)
HGB UR QL STRIP: NEGATIVE
IMM GRANULOCYTES # BLD: 0 10E9/L (ref 0–0.4)
IMM GRANULOCYTES # BLD: 0.1 10E9/L (ref 0–0.4)
IMM GRANULOCYTES # BLD: 0.2 10E9/L (ref 0–0.4)
IMM GRANULOCYTES NFR BLD: 0.3 %
IMM GRANULOCYTES NFR BLD: 0.5 %
IMM GRANULOCYTES NFR BLD: 0.5 %
IMM GRANULOCYTES NFR BLD: 0.8 %
IMM GRANULOCYTES NFR BLD: 0.8 %
IMM GRANULOCYTES NFR BLD: 2.1 %
INR PPP: 1.18 (ref 0.86–1.14)
INTERPRETATION ECG - MUSE: NORMAL
INTERPRETATION ECG - MUSE: NORMAL
KETONES UR STRIP-MCNC: 40 MG/DL
LACTATE BLD-SCNC: 1.3 MMOL/L (ref 0.7–2)
LACTATE BLD-SCNC: 1.5 MMOL/L (ref 0.7–2)
LACTATE BLD-SCNC: 2.1 MMOL/L (ref 0.7–2)
LEUKOCYTE ESTERASE UR QL STRIP: NEGATIVE
LIPASE SERPL-CCNC: 154 U/L (ref 73–393)
LIPASE SERPL-CCNC: 200 U/L (ref 73–393)
LYMPHOCYTES # BLD AUTO: 0.8 10E9/L (ref 0.8–5.3)
LYMPHOCYTES # BLD AUTO: 1.7 10E9/L (ref 0.8–5.3)
LYMPHOCYTES # BLD AUTO: 2.1 10E9/L (ref 0.8–5.3)
LYMPHOCYTES NFR BLD AUTO: 10.8 %
LYMPHOCYTES NFR BLD AUTO: 12.8 %
LYMPHOCYTES NFR BLD AUTO: 13.4 %
LYMPHOCYTES NFR BLD AUTO: 13.6 %
LYMPHOCYTES NFR BLD AUTO: 13.8 % (ref 20.5–51.1)
LYMPHOCYTES NFR BLD AUTO: 5.7 %
LYMPHOCYTES NFR BLD AUTO: 7.1 %
Lab: NORMAL
Lab: NORMAL
MAGNESIUM SERPL-MCNC: 2.2 MG/DL (ref 1.6–2.3)
MAGNESIUM SERPL-MCNC: 2.2 MG/DL (ref 1.6–2.3)
MCH RBC QN AUTO: 32.4 PG (ref 27–31)
MCH RBC QN AUTO: 33.2 PG (ref 26.5–33)
MCH RBC QN AUTO: 33.3 PG (ref 26.5–33)
MCH RBC QN AUTO: 33.4 PG (ref 26.5–33)
MCH RBC QN AUTO: 33.6 PG (ref 26.5–33)
MCH RBC QN AUTO: 34.5 PG (ref 26.5–33)
MCH RBC QN AUTO: 34.6 PG (ref 26.5–33)
MCHC RBC AUTO-ENTMCNC: 33 G/DL (ref 33–37)
MCHC RBC AUTO-ENTMCNC: 33.3 G/DL (ref 31.5–36.5)
MCHC RBC AUTO-ENTMCNC: 33.4 G/DL (ref 31.5–36.5)
MCHC RBC AUTO-ENTMCNC: 34.1 G/DL (ref 31.5–36.5)
MCHC RBC AUTO-ENTMCNC: 34.2 G/DL (ref 31.5–36.5)
MCHC RBC AUTO-ENTMCNC: 34.2 G/DL (ref 31.5–36.5)
MCHC RBC AUTO-ENTMCNC: 34.3 G/DL (ref 31.5–36.5)
MCHC RBC AUTO-ENTMCNC: 35.3 G/DL (ref 31.5–36.5)
MCHC RBC AUTO-ENTMCNC: 35.8 G/DL (ref 31.5–36.5)
MCV RBC AUTO: 100 FL (ref 78–100)
MCV RBC AUTO: 100 FL (ref 78–100)
MCV RBC AUTO: 97 FL (ref 78–100)
MCV RBC AUTO: 98 FL (ref 78–100)
MCV RBC AUTO: 98.1 FL (ref 80–100)
MONOCYTES # BLD AUTO: 0.8 10E9/L (ref 0–1.3)
MONOCYTES # BLD AUTO: 0.8 10E9/L (ref 0–1.3)
MONOCYTES # BLD AUTO: 1.2 10E9/L (ref 0–1.3)
MONOCYTES # BLD AUTO: 1.3 10E9/L (ref 0–1.3)
MONOCYTES # BLD AUTO: 1.4 10E9/L (ref 0–1.3)
MONOCYTES # BLD AUTO: 1.6 10E9/L (ref 0–1.3)
MONOCYTES NFR BLD AUTO: 10.8 %
MONOCYTES NFR BLD AUTO: 11.3 %
MONOCYTES NFR BLD AUTO: 12.6 %
MONOCYTES NFR BLD AUTO: 15.9 %
MONOCYTES NFR BLD AUTO: 4.4 % (ref 1.7–9.3)
MONOCYTES NFR BLD AUTO: 6.6 %
MONOCYTES NFR BLD AUTO: 8.9 %
MUCOUS THREADS #/AREA URNS LPF: PRESENT /LPF
NEUTROPHILS # BLD AUTO: 11 10E9/L (ref 1.6–8.3)
NEUTROPHILS # BLD AUTO: 11.6 10E9/L (ref 1.6–8.3)
NEUTROPHILS # BLD AUTO: 4.3 10E9/L (ref 1.6–8.3)
NEUTROPHILS # BLD AUTO: 5 10E9/L (ref 1.6–8.3)
NEUTROPHILS # BLD AUTO: 8.9 10E9/L (ref 1.6–8.3)
NEUTROPHILS # BLD AUTO: 9.6 10E9/L (ref 1.6–8.3)
NEUTROPHILS NFR BLD AUTO: 65.9 %
NEUTROPHILS NFR BLD AUTO: 70.2 %
NEUTROPHILS NFR BLD AUTO: 75 %
NEUTROPHILS NFR BLD AUTO: 76.7 %
NEUTROPHILS NFR BLD AUTO: 76.8 %
NEUTROPHILS NFR BLD AUTO: 78.7 %
NITRATE UR QL: NEGATIVE
NRBC # BLD AUTO: 0 10*3/UL
NRBC BLD AUTO-RTO: 0 /100
NT-PROBNP SERPL-MCNC: 467 PG/ML (ref 0–1800)
NT-PROBNP SERPL-MCNC: 486 PG/ML (ref 0–1800)
PH UR STRIP: 6.5 PH (ref 5–7)
PHOSPHATE SERPL-MCNC: 2.4 MG/DL (ref 2.5–4.5)
PHOSPHATE SERPL-MCNC: 3.2 MG/DL (ref 2.5–4.5)
PHOSPHATE SERPL-MCNC: 3.5 MG/DL (ref 2.5–4.5)
PLATELET # BLD AUTO: 149 K/UL (ref 140–450)
PLATELET # BLD AUTO: 160 10E9/L (ref 150–450)
PLATELET # BLD AUTO: 173 10E9/L (ref 150–450)
PLATELET # BLD AUTO: 180 10E9/L (ref 150–450)
PLATELET # BLD AUTO: 190 10E9/L (ref 150–450)
PLATELET # BLD AUTO: 207 10E9/L (ref 150–450)
PLATELET # BLD AUTO: 213 10E9/L (ref 150–450)
PLATELET # BLD AUTO: 253 10E9/L (ref 150–450)
PLATELET # BLD AUTO: 268 10E9/L (ref 150–450)
PLATELET # BLD AUTO: 285 10E9/L (ref 150–450)
PLATELET # BLD EST: ABNORMAL 10*3/UL
POTASSIUM SERPL-SCNC: 3.5 MMOL/L (ref 3.4–5.3)
POTASSIUM SERPL-SCNC: 3.6 MMOL/L (ref 3.4–5.3)
POTASSIUM SERPL-SCNC: 4 MMOL/L (ref 3.4–5.3)
PROCALCITONIN SERPL-MCNC: 0.12 NG/ML
PROT SERPL-MCNC: 7.2 G/DL (ref 6.8–8.8)
PROT SERPL-MCNC: 8 G/DL (ref 6.8–8.8)
PSA NG/ML: 2.6 NG/ML (ref 0–4)
RADIOLOGIST FLAGS: ABNORMAL
RBC # BLD AUTO: 3.82 10E12/L (ref 4.4–5.9)
RBC # BLD AUTO: 4.04 10E12/L (ref 4.4–5.9)
RBC # BLD AUTO: 4.08 10E12/L (ref 4.4–5.9)
RBC # BLD AUTO: 4.18 10E12/L (ref 4.4–5.9)
RBC # BLD AUTO: 4.27 10E12/L (ref 4.4–5.9)
RBC # BLD AUTO: 4.37 10E12/L (ref 4.4–5.9)
RBC # BLD AUTO: 4.5 X10/CMM (ref 4.2–5.9)
RBC # BLD AUTO: 4.53 10E12/L (ref 4.4–5.9)
RBC # BLD AUTO: 4.93 10E12/L (ref 4.4–5.9)
RBC #/AREA URNS AUTO: <1 /HPF (ref 0–2)
RBC MORPH BLD: ABNORMAL
SODIUM SERPL-SCNC: 135 MMOL/L (ref 133–144)
SODIUM SERPL-SCNC: 135 MMOL/L (ref 133–144)
SODIUM SERPL-SCNC: 136 MMOL/L (ref 133–144)
SODIUM SERPL-SCNC: 137 MMOL/L (ref 133–144)
SODIUM SERPL-SCNC: 139 MMOL/L (ref 133–144)
SOURCE: ABNORMAL
SP GR UR STRIP: 1.03 (ref 1–1.03)
SPECIMEN SOURCE: NORMAL
SPECIMEN SOURCE: NORMAL
T4 FREE SERPL-MCNC: 1.52 NG/DL (ref 0.76–1.46)
TROPONIN I SERPL-MCNC: 0.05 UG/L (ref 0–0.04)
TROPONIN I SERPL-MCNC: 0.06 UG/L (ref 0–0.04)
TROPONIN I SERPL-MCNC: 0.06 UG/L (ref 0–0.04)
TROPONIN I SERPL-MCNC: <0.015 UG/L (ref 0–0.04)
TSH SERPL DL<=0.005 MIU/L-ACNC: 0.27 MU/L (ref 0.4–4)
UPPER EUS: NORMAL
UPPER GI ENDOSCOPY: NORMAL
UROBILINOGEN UR STRIP-MCNC: 8 MG/DL (ref 0–2)
WBC # BLD AUTO: 10.6 10E9/L (ref 4–11)
WBC # BLD AUTO: 11.3 10E9/L (ref 4–11)
WBC # BLD AUTO: 11.6 10E9/L (ref 4–11)
WBC # BLD AUTO: 12.4 10E9/L (ref 4–11)
WBC # BLD AUTO: 12.4 10E9/L (ref 4–11)
WBC # BLD AUTO: 12.5 10E9/L (ref 4–11)
WBC # BLD AUTO: 14.3 10E9/L (ref 4–11)
WBC # BLD AUTO: 15.5 10E9/L (ref 4–11)
WBC # BLD AUTO: 6.2 10E9/L (ref 4–11)
WBC # BLD AUTO: 7.5 10E9/L (ref 4–11)
WBC # BLD AUTO: 8.5 X10/CMM (ref 3.8–11)
WBC #/AREA URNS AUTO: 1 /HPF (ref 0–5)

## 2018-01-01 PROCEDURE — 97116 GAIT TRAINING THERAPY: CPT | Mod: GP | Performed by: PHYSICAL THERAPIST

## 2018-01-01 PROCEDURE — A9270 NON-COVERED ITEM OR SERVICE: HCPCS | Mod: GY | Performed by: INTERNAL MEDICINE

## 2018-01-01 PROCEDURE — 99213 OFFICE O/P EST LOW 20 MIN: CPT | Performed by: NURSE PRACTITIONER

## 2018-01-01 PROCEDURE — 25000125 ZZHC RX 250: Performed by: INTERNAL MEDICINE

## 2018-01-01 PROCEDURE — 88305 TISSUE EXAM BY PATHOLOGIST: CPT | Performed by: INTERNAL MEDICINE

## 2018-01-01 PROCEDURE — 96413 CHEMO IV INFUSION 1 HR: CPT

## 2018-01-01 PROCEDURE — 99309 SBSQ NF CARE MODERATE MDM 30: CPT | Performed by: NURSE PRACTITIONER

## 2018-01-01 PROCEDURE — 25000128 H RX IP 250 OP 636: Performed by: INTERNAL MEDICINE

## 2018-01-01 PROCEDURE — 25000128 H RX IP 250 OP 636: Performed by: EMERGENCY MEDICINE

## 2018-01-01 PROCEDURE — 40000193 ZZH STATISTIC PT WARD VISIT: Performed by: PHYSICAL THERAPIST

## 2018-01-01 PROCEDURE — 85027 COMPLETE CBC AUTOMATED: CPT | Performed by: NURSE PRACTITIONER

## 2018-01-01 PROCEDURE — 96417 CHEMO IV INFUS EACH ADDL SEQ: CPT

## 2018-01-01 PROCEDURE — 25000128 H RX IP 250 OP 636: Performed by: NURSE ANESTHETIST, CERTIFIED REGISTERED

## 2018-01-01 PROCEDURE — 97530 THERAPEUTIC ACTIVITIES: CPT | Mod: GP | Performed by: PHYSICAL THERAPIST

## 2018-01-01 PROCEDURE — 88342 IMHCHEM/IMCYTCHM 1ST ANTB: CPT | Performed by: INTERNAL MEDICINE

## 2018-01-01 PROCEDURE — 85048 AUTOMATED LEUKOCYTE COUNT: CPT | Performed by: HOSPITALIST

## 2018-01-01 PROCEDURE — 25000125 ZZHC RX 250: Performed by: EMERGENCY MEDICINE

## 2018-01-01 PROCEDURE — 85025 COMPLETE CBC W/AUTO DIFF WBC: CPT | Performed by: NURSE PRACTITIONER

## 2018-01-01 PROCEDURE — 85004 AUTOMATED DIFF WBC COUNT: CPT | Performed by: INTERNAL MEDICINE

## 2018-01-01 PROCEDURE — 37000008 ZZH ANESTHESIA TECHNICAL FEE, 1ST 30 MIN: Performed by: INTERNAL MEDICINE

## 2018-01-01 PROCEDURE — 93005 ELECTROCARDIOGRAM TRACING: CPT

## 2018-01-01 PROCEDURE — 27210915 ZZ H TUBE GASTRO CR4

## 2018-01-01 PROCEDURE — 25000132 ZZH RX MED GY IP 250 OP 250 PS 637: Mod: GY | Performed by: NURSE PRACTITIONER

## 2018-01-01 PROCEDURE — 70450 CT HEAD/BRAIN W/O DYE: CPT

## 2018-01-01 PROCEDURE — 85025 COMPLETE CBC W/AUTO DIFF WBC: CPT | Performed by: INTERNAL MEDICINE

## 2018-01-01 PROCEDURE — 36415 COLL VENOUS BLD VENIPUNCTURE: CPT | Performed by: HOSPITALIST

## 2018-01-01 PROCEDURE — 25000132 ZZH RX MED GY IP 250 OP 250 PS 637: Mod: GY | Performed by: EMERGENCY MEDICINE

## 2018-01-01 PROCEDURE — 74018 RADEX ABDOMEN 1 VIEW: CPT

## 2018-01-01 PROCEDURE — 71260 CT THORAX DX C+: CPT

## 2018-01-01 PROCEDURE — 96367 TX/PROPH/DG ADDL SEQ IV INF: CPT

## 2018-01-01 PROCEDURE — 99284 EMERGENCY DEPT VISIT MOD MDM: CPT | Mod: 25

## 2018-01-01 PROCEDURE — 25000128 H RX IP 250 OP 636: Performed by: NURSE PRACTITIONER

## 2018-01-01 PROCEDURE — 36415 COLL VENOUS BLD VENIPUNCTURE: CPT | Performed by: FAMILY MEDICINE

## 2018-01-01 PROCEDURE — 27210886 ZZH ACCESSORY CR5

## 2018-01-01 PROCEDURE — 83605 ASSAY OF LACTIC ACID: CPT | Performed by: INTERNAL MEDICINE

## 2018-01-01 PROCEDURE — 97110 THERAPEUTIC EXERCISES: CPT | Mod: GP | Performed by: PHYSICAL THERAPIST

## 2018-01-01 PROCEDURE — 88173 CYTOPATH EVAL FNA REPORT: CPT | Mod: 26 | Performed by: INTERNAL MEDICINE

## 2018-01-01 PROCEDURE — 25000132 ZZH RX MED GY IP 250 OP 250 PS 637: Mod: GY | Performed by: INTERNAL MEDICINE

## 2018-01-01 PROCEDURE — 99232 SBSQ HOSP IP/OBS MODERATE 35: CPT | Performed by: HOSPITALIST

## 2018-01-01 PROCEDURE — 99213 OFFICE O/P EST LOW 20 MIN: CPT | Performed by: FAMILY MEDICINE

## 2018-01-01 PROCEDURE — 99233 SBSQ HOSP IP/OBS HIGH 50: CPT | Performed by: INTERNAL MEDICINE

## 2018-01-01 PROCEDURE — 82565 ASSAY OF CREATININE: CPT | Performed by: INTERNAL MEDICINE

## 2018-01-01 PROCEDURE — 82565 ASSAY OF CREATININE: CPT | Performed by: NURSE PRACTITIONER

## 2018-01-01 PROCEDURE — 88342 IMHCHEM/IMCYTCHM 1ST ANTB: CPT | Mod: 26 | Performed by: INTERNAL MEDICINE

## 2018-01-01 PROCEDURE — 88172 CYTP DX EVAL FNA 1ST EA SITE: CPT | Performed by: INTERNAL MEDICINE

## 2018-01-01 PROCEDURE — 82550 ASSAY OF CK (CPK): CPT | Performed by: EMERGENCY MEDICINE

## 2018-01-01 PROCEDURE — 00000158 ZZHCL STATISTIC H-FISH PROCESS B/S: Performed by: INTERNAL MEDICINE

## 2018-01-01 PROCEDURE — 40000010 ZZH STATISTIC ANES STAT CODE-CRNA PER MINUTE: Performed by: INTERNAL MEDICINE

## 2018-01-01 PROCEDURE — 3E04305 INTRODUCTION OF OTHER ANTINEOPLASTIC INTO CENTRAL VEIN, PERCUTANEOUS APPROACH: ICD-10-PCS | Performed by: INTERNAL MEDICINE

## 2018-01-01 PROCEDURE — 25000125 ZZHC RX 250: Performed by: NURSE ANESTHETIST, CERTIFIED REGISTERED

## 2018-01-01 PROCEDURE — 96375 TX/PRO/DX INJ NEW DRUG ADDON: CPT

## 2018-01-01 PROCEDURE — G0463 HOSPITAL OUTPT CLINIC VISIT: HCPCS | Mod: 25

## 2018-01-01 PROCEDURE — 12000000 ZZH R&B MED SURG/OB

## 2018-01-01 PROCEDURE — 82550 ASSAY OF CK (CPK): CPT | Performed by: HOSPITALIST

## 2018-01-01 PROCEDURE — 99214 OFFICE O/P EST MOD 30 MIN: CPT | Performed by: NURSE PRACTITIONER

## 2018-01-01 PROCEDURE — 99214 OFFICE O/P EST MOD 30 MIN: CPT | Performed by: INTERNAL MEDICINE

## 2018-01-01 PROCEDURE — 43242 EGD US FINE NEEDLE BX/ASPIR: CPT | Performed by: INTERNAL MEDICINE

## 2018-01-01 PROCEDURE — C1769 GUIDE WIRE: HCPCS

## 2018-01-01 PROCEDURE — 85025 COMPLETE CBC W/AUTO DIFF WBC: CPT | Performed by: EMERGENCY MEDICINE

## 2018-01-01 PROCEDURE — 25000128 H RX IP 250 OP 636: Performed by: HOSPITALIST

## 2018-01-01 PROCEDURE — 88305 TISSUE EXAM BY PATHOLOGIST: CPT | Mod: 26 | Performed by: INTERNAL MEDICINE

## 2018-01-01 PROCEDURE — 93306 TTE W/DOPPLER COMPLETE: CPT

## 2018-01-01 PROCEDURE — 71046 X-RAY EXAM CHEST 2 VIEWS: CPT

## 2018-01-01 PROCEDURE — 84100 ASSAY OF PHOSPHORUS: CPT | Performed by: HOSPITALIST

## 2018-01-01 PROCEDURE — 27210742 ZZH CATH CR1

## 2018-01-01 PROCEDURE — 84439 ASSAY OF FREE THYROXINE: CPT | Performed by: HOSPITALIST

## 2018-01-01 PROCEDURE — 99153 MOD SED SAME PHYS/QHP EA: CPT

## 2018-01-01 PROCEDURE — 40000854 ZZH STATISTIC SIMPLE TUBE INSERTION/CHARGE, PORT, CATH, FISTULOGRAM

## 2018-01-01 PROCEDURE — 36415 COLL VENOUS BLD VENIPUNCTURE: CPT | Performed by: INTERNAL MEDICINE

## 2018-01-01 PROCEDURE — 00000146 ZZHCL STATISTIC GLUCOSE BY METER IP

## 2018-01-01 PROCEDURE — 99238 HOSP IP/OBS DSCHRG MGMT 30/<: CPT | Performed by: INTERNAL MEDICINE

## 2018-01-01 PROCEDURE — 25000128 H RX IP 250 OP 636: Performed by: RADIOLOGY

## 2018-01-01 PROCEDURE — 80053 COMPREHEN METABOLIC PANEL: CPT | Performed by: EMERGENCY MEDICINE

## 2018-01-01 PROCEDURE — 99285 EMERGENCY DEPT VISIT HI MDM: CPT | Mod: 25

## 2018-01-01 PROCEDURE — 81001 URINALYSIS AUTO W/SCOPE: CPT | Performed by: EMERGENCY MEDICINE

## 2018-01-01 PROCEDURE — 83735 ASSAY OF MAGNESIUM: CPT | Performed by: HOSPITALIST

## 2018-01-01 PROCEDURE — 85730 THROMBOPLASTIN TIME PARTIAL: CPT | Performed by: RADIOLOGY

## 2018-01-01 PROCEDURE — 25000566 ZZH SEVOFLURANE, EA 15 MIN: Performed by: INTERNAL MEDICINE

## 2018-01-01 PROCEDURE — 27210429 ZZH NUTRITION PRODUCT INTERMEDIATE LITER

## 2018-01-01 PROCEDURE — 99205 OFFICE O/P NEW HI 60 MIN: CPT | Performed by: INTERNAL MEDICINE

## 2018-01-01 PROCEDURE — 88341 IMHCHEM/IMCYTCHM EA ADD ANTB: CPT | Performed by: INTERNAL MEDICINE

## 2018-01-01 PROCEDURE — 84484 ASSAY OF TROPONIN QUANT: CPT | Performed by: HOSPITALIST

## 2018-01-01 PROCEDURE — 99223 1ST HOSP IP/OBS HIGH 75: CPT | Mod: AI | Performed by: INTERNAL MEDICINE

## 2018-01-01 PROCEDURE — 83036 HEMOGLOBIN GLYCOSYLATED A1C: CPT | Performed by: HOSPITALIST

## 2018-01-01 PROCEDURE — 85025 COMPLETE CBC W/AUTO DIFF WBC: CPT | Performed by: FAMILY MEDICINE

## 2018-01-01 PROCEDURE — 21000001 ZZH R&B HEART CARE

## 2018-01-01 PROCEDURE — 83605 ASSAY OF LACTIC ACID: CPT | Performed by: HOSPITALIST

## 2018-01-01 PROCEDURE — A9270 NON-COVERED ITEM OR SERVICE: HCPCS | Mod: GY | Performed by: EMERGENCY MEDICINE

## 2018-01-01 PROCEDURE — 84100 ASSAY OF PHOSPHORUS: CPT | Performed by: INTERNAL MEDICINE

## 2018-01-01 PROCEDURE — 25000125 ZZHC RX 250: Performed by: RADIOLOGY

## 2018-01-01 PROCEDURE — 87040 BLOOD CULTURE FOR BACTERIA: CPT | Performed by: HOSPITALIST

## 2018-01-01 PROCEDURE — 27210735 ZZH ACCESSORY CR12

## 2018-01-01 PROCEDURE — 88173 CYTOPATH EVAL FNA REPORT: CPT | Mod: 91 | Performed by: INTERNAL MEDICINE

## 2018-01-01 PROCEDURE — 80048 BASIC METABOLIC PNL TOTAL CA: CPT | Performed by: HOSPITALIST

## 2018-01-01 PROCEDURE — 84484 ASSAY OF TROPONIN QUANT: CPT | Performed by: EMERGENCY MEDICINE

## 2018-01-01 PROCEDURE — 99221 1ST HOSP IP/OBS SF/LOW 40: CPT | Mod: 25 | Performed by: INTERNAL MEDICINE

## 2018-01-01 PROCEDURE — 40000894 ZZH STATISTIC OT IP EVAL DEFER

## 2018-01-01 PROCEDURE — 83690 ASSAY OF LIPASE: CPT | Performed by: EMERGENCY MEDICINE

## 2018-01-01 PROCEDURE — 84145 PROCALCITONIN (PCT): CPT | Performed by: HOSPITALIST

## 2018-01-01 PROCEDURE — 00000159 ZZHCL STATISTIC H-SEND OUTS PREP: Performed by: INTERNAL MEDICINE

## 2018-01-01 PROCEDURE — 99316 NF DSCHRG MGMT 30 MIN+: CPT | Performed by: NURSE PRACTITIONER

## 2018-01-01 PROCEDURE — 99231 SBSQ HOSP IP/OBS SF/LOW 25: CPT | Performed by: HOSPITALIST

## 2018-01-01 PROCEDURE — 43239 EGD BIOPSY SINGLE/MULTIPLE: CPT | Performed by: INTERNAL MEDICINE

## 2018-01-01 PROCEDURE — 85610 PROTHROMBIN TIME: CPT | Performed by: RADIOLOGY

## 2018-01-01 PROCEDURE — 85027 COMPLETE CBC AUTOMATED: CPT | Performed by: RADIOLOGY

## 2018-01-01 PROCEDURE — 40000225 ZZH STATISTIC SLP WARD VISIT: Performed by: SPEECH-LANGUAGE PATHOLOGIST

## 2018-01-01 PROCEDURE — 80048 BASIC METABOLIC PNL TOTAL CA: CPT | Performed by: INTERNAL MEDICINE

## 2018-01-01 PROCEDURE — 92526 ORAL FUNCTION THERAPY: CPT | Mod: GN | Performed by: SPEECH-LANGUAGE PATHOLOGIST

## 2018-01-01 PROCEDURE — 27210905 ZZH KIT CR7

## 2018-01-01 PROCEDURE — 25000131 ZZH RX MED GY IP 250 OP 636 PS 637: Mod: GY | Performed by: INTERNAL MEDICINE

## 2018-01-01 PROCEDURE — 84443 ASSAY THYROID STIM HORMONE: CPT | Performed by: HOSPITALIST

## 2018-01-01 PROCEDURE — 93306 TTE W/DOPPLER COMPLETE: CPT | Mod: 26 | Performed by: INTERNAL MEDICINE

## 2018-01-01 PROCEDURE — 85049 AUTOMATED PLATELET COUNT: CPT | Performed by: NURSE PRACTITIONER

## 2018-01-01 PROCEDURE — 84484 ASSAY OF TROPONIN QUANT: CPT | Performed by: INTERNAL MEDICINE

## 2018-01-01 PROCEDURE — 96374 THER/PROPH/DIAG INJ IV PUSH: CPT

## 2018-01-01 PROCEDURE — 85027 COMPLETE CBC AUTOMATED: CPT | Performed by: INTERNAL MEDICINE

## 2018-01-01 PROCEDURE — 83605 ASSAY OF LACTIC ACID: CPT | Performed by: EMERGENCY MEDICINE

## 2018-01-01 PROCEDURE — 83880 ASSAY OF NATRIURETIC PEPTIDE: CPT | Performed by: HOSPITALIST

## 2018-01-01 PROCEDURE — 88172 CYTP DX EVAL FNA 1ST EA SITE: CPT | Mod: 26 | Performed by: INTERNAL MEDICINE

## 2018-01-01 PROCEDURE — 27211193 ZZ H WOUND GLUE CR1

## 2018-01-01 PROCEDURE — 92610 EVALUATE SWALLOWING FUNCTION: CPT | Mod: GN | Performed by: SPEECH-LANGUAGE PATHOLOGIST

## 2018-01-01 PROCEDURE — 97162 PT EVAL MOD COMPLEX 30 MIN: CPT | Mod: GP | Performed by: PHYSICAL THERAPIST

## 2018-01-01 PROCEDURE — 83880 ASSAY OF NATRIURETIC PEPTIDE: CPT | Performed by: EMERGENCY MEDICINE

## 2018-01-01 PROCEDURE — 25000125 ZZHC RX 250: Performed by: NURSE PRACTITIONER

## 2018-01-01 PROCEDURE — 88377 M/PHMTRC ALYS ISHQUANT/SEMIQ: CPT | Performed by: PATHOLOGY

## 2018-01-01 PROCEDURE — 37000009 ZZH ANESTHESIA TECHNICAL FEE, EACH ADDTL 15 MIN: Performed by: INTERNAL MEDICINE

## 2018-01-01 PROCEDURE — 99231 SBSQ HOSP IP/OBS SF/LOW 25: CPT | Performed by: INTERNAL MEDICINE

## 2018-01-01 PROCEDURE — C1788 PORT, INDWELLING, IMP: HCPCS

## 2018-01-01 PROCEDURE — 88341 IMHCHEM/IMCYTCHM EA ADD ANTB: CPT | Mod: 26 | Performed by: INTERNAL MEDICINE

## 2018-01-01 PROCEDURE — 99283 EMERGENCY DEPT VISIT LOW MDM: CPT

## 2018-01-01 PROCEDURE — 99207 ZZC APP CREDIT; MD BILLING SHARED VISIT: CPT | Performed by: HOSPITALIST

## 2018-01-01 PROCEDURE — 74177 CT ABD & PELVIS W/CONTRAST: CPT

## 2018-01-01 PROCEDURE — C9113 INJ PANTOPRAZOLE SODIUM, VIA: HCPCS | Performed by: EMERGENCY MEDICINE

## 2018-01-01 PROCEDURE — 99310 SBSQ NF CARE HIGH MDM 45: CPT | Performed by: NURSE PRACTITIONER

## 2018-01-01 PROCEDURE — 49440 PLACE GASTROSTOMY TUBE PERC: CPT

## 2018-01-01 PROCEDURE — 99306 1ST NF CARE HIGH MDM 50: CPT | Performed by: INTERNAL MEDICINE

## 2018-01-01 PROCEDURE — 71046 X-RAY EXAM CHEST 2 VIEWS: CPT | Mod: FY | Performed by: FAMILY MEDICINE

## 2018-01-01 RX ORDER — LORAZEPAM 2 MG/ML
0.5 INJECTION INTRAMUSCULAR EVERY 4 HOURS PRN
Status: CANCELLED
Start: 2018-01-01

## 2018-01-01 RX ORDER — ALBUTEROL SULFATE 0.83 MG/ML
2.5 SOLUTION RESPIRATORY (INHALATION)
Status: CANCELLED | OUTPATIENT
Start: 2018-01-01

## 2018-01-01 RX ORDER — HEPARIN SODIUM (PORCINE) LOCK FLUSH IV SOLN 100 UNIT/ML 100 UNIT/ML
5 SOLUTION INTRAVENOUS
Status: DISCONTINUED | OUTPATIENT
Start: 2018-01-01 | End: 2018-01-01 | Stop reason: HOSPADM

## 2018-01-01 RX ORDER — FENTANYL CITRATE 50 UG/ML
25-50 INJECTION, SOLUTION INTRAMUSCULAR; INTRAVENOUS EVERY 5 MIN PRN
Status: DISCONTINUED | OUTPATIENT
Start: 2018-01-01 | End: 2018-01-01 | Stop reason: HOSPADM

## 2018-01-01 RX ORDER — HEPARIN SODIUM,PORCINE 10 UNIT/ML
5-10 VIAL (ML) INTRAVENOUS
Status: DISCONTINUED | OUTPATIENT
Start: 2018-01-01 | End: 2018-01-01 | Stop reason: HOSPADM

## 2018-01-01 RX ORDER — EPINEPHRINE 0.3 MG/.3ML
0.3 INJECTION SUBCUTANEOUS EVERY 5 MIN PRN
Status: CANCELLED | OUTPATIENT
Start: 2018-01-01

## 2018-01-01 RX ORDER — ONDANSETRON 4 MG/1
4 TABLET, ORALLY DISINTEGRATING ORAL EVERY 30 MIN PRN
Status: DISCONTINUED | OUTPATIENT
Start: 2018-01-01 | End: 2018-01-01 | Stop reason: HOSPADM

## 2018-01-01 RX ORDER — CEFAZOLIN SODIUM 2 G/100ML
2 INJECTION, SOLUTION INTRAVENOUS
Status: COMPLETED | OUTPATIENT
Start: 2018-01-01 | End: 2018-01-01

## 2018-01-01 RX ORDER — ALBUTEROL SULFATE 90 UG/1
1-2 AEROSOL, METERED RESPIRATORY (INHALATION)
Status: CANCELLED
Start: 2018-01-01

## 2018-01-01 RX ORDER — MEPERIDINE HYDROCHLORIDE 25 MG/ML
25 INJECTION INTRAMUSCULAR; INTRAVENOUS; SUBCUTANEOUS EVERY 30 MIN PRN
Status: CANCELLED | OUTPATIENT
Start: 2018-01-01

## 2018-01-01 RX ORDER — FENTANYL CITRATE 50 UG/ML
INJECTION, SOLUTION INTRAMUSCULAR; INTRAVENOUS PRN
Status: DISCONTINUED | OUTPATIENT
Start: 2018-01-01 | End: 2018-01-01

## 2018-01-01 RX ORDER — SODIUM CHLORIDE, SODIUM LACTATE, POTASSIUM CHLORIDE, CALCIUM CHLORIDE 600; 310; 30; 20 MG/100ML; MG/100ML; MG/100ML; MG/100ML
INJECTION, SOLUTION INTRAVENOUS CONTINUOUS
Status: DISCONTINUED | OUTPATIENT
Start: 2018-01-01 | End: 2018-01-01 | Stop reason: HOSPADM

## 2018-01-01 RX ORDER — DABIGATRAN ETEXILATE 150 MG/1
CAPSULE ORAL
Qty: 60 CAPSULE | Refills: 5
Start: 2018-01-01 | End: 2018-01-01

## 2018-01-01 RX ORDER — NALOXONE HYDROCHLORIDE 0.4 MG/ML
.1-.4 INJECTION, SOLUTION INTRAMUSCULAR; INTRAVENOUS; SUBCUTANEOUS
Status: DISCONTINUED | OUTPATIENT
Start: 2018-01-01 | End: 2018-01-01 | Stop reason: HOSPADM

## 2018-01-01 RX ORDER — ACETAMINOPHEN 325 MG/1
650 TABLET ORAL EVERY 4 HOURS PRN
Status: DISCONTINUED | OUTPATIENT
Start: 2018-01-01 | End: 2018-01-01 | Stop reason: HOSPADM

## 2018-01-01 RX ORDER — SODIUM CHLORIDE 9 MG/ML
1000 INJECTION, SOLUTION INTRAVENOUS CONTINUOUS PRN
Status: CANCELLED
Start: 2018-01-01

## 2018-01-01 RX ORDER — METHYLPREDNISOLONE SODIUM SUCCINATE 125 MG/2ML
125 INJECTION, POWDER, LYOPHILIZED, FOR SOLUTION INTRAMUSCULAR; INTRAVENOUS
Status: CANCELLED
Start: 2018-01-01

## 2018-01-01 RX ORDER — FLUMAZENIL 0.1 MG/ML
0.2 INJECTION, SOLUTION INTRAVENOUS
Status: DISCONTINUED | OUTPATIENT
Start: 2018-01-01 | End: 2018-01-01 | Stop reason: HOSPADM

## 2018-01-01 RX ORDER — HEPARIN SODIUM (PORCINE) LOCK FLUSH IV SOLN 100 UNIT/ML 100 UNIT/ML
5 SOLUTION INTRAVENOUS
Status: COMPLETED | OUTPATIENT
Start: 2018-01-01 | End: 2018-01-01

## 2018-01-01 RX ORDER — DIPHENHYDRAMINE HYDROCHLORIDE 50 MG/ML
50 INJECTION INTRAMUSCULAR; INTRAVENOUS
Status: DISCONTINUED | OUTPATIENT
Start: 2018-01-01 | End: 2018-01-01 | Stop reason: HOSPADM

## 2018-01-01 RX ORDER — DIPHENHYDRAMINE HYDROCHLORIDE 50 MG/ML
50 INJECTION INTRAMUSCULAR; INTRAVENOUS
Status: CANCELLED
Start: 2018-01-01

## 2018-01-01 RX ORDER — LIDOCAINE HYDROCHLORIDE 10 MG/ML
INJECTION, SOLUTION INFILTRATION; PERINEURAL
Status: DISCONTINUED
Start: 2018-01-01 | End: 2018-01-01 | Stop reason: HOSPADM

## 2018-01-01 RX ORDER — ONDANSETRON 2 MG/ML
4 INJECTION INTRAMUSCULAR; INTRAVENOUS EVERY 30 MIN PRN
Status: DISCONTINUED | OUTPATIENT
Start: 2018-01-01 | End: 2018-01-01 | Stop reason: HOSPADM

## 2018-01-01 RX ORDER — DIPHENHYDRAMINE HYDROCHLORIDE 50 MG/ML
25 INJECTION INTRAMUSCULAR; INTRAVENOUS ONCE
Status: CANCELLED
Start: 2018-01-01 | End: 2018-01-01

## 2018-01-01 RX ORDER — DIPHENHYDRAMINE HYDROCHLORIDE 50 MG/ML
INJECTION INTRAMUSCULAR; INTRAVENOUS
Status: DISCONTINUED
Start: 2018-01-01 | End: 2018-01-01 | Stop reason: HOSPADM

## 2018-01-01 RX ORDER — MEPERIDINE HYDROCHLORIDE 25 MG/ML
12.5 INJECTION INTRAMUSCULAR; INTRAVENOUS; SUBCUTANEOUS
Status: DISCONTINUED | OUTPATIENT
Start: 2018-01-01 | End: 2018-01-01

## 2018-01-01 RX ORDER — LOPERAMIDE HCL 2 MG
2 CAPSULE ORAL 4 TIMES DAILY PRN
Status: ON HOLD | COMMUNITY
End: 2018-01-01

## 2018-01-01 RX ORDER — AMOXICILLIN 250 MG
2 CAPSULE ORAL 2 TIMES DAILY PRN
Status: DISCONTINUED | OUTPATIENT
Start: 2018-01-01 | End: 2018-01-01 | Stop reason: HOSPADM

## 2018-01-01 RX ORDER — PROPRANOLOL HYDROCHLORIDE 40 MG/1
TABLET ORAL
Qty: 180 TABLET | Refills: 1 | Status: SHIPPED | OUTPATIENT
Start: 2018-01-01 | End: 2018-01-01

## 2018-01-01 RX ORDER — CODEINE PHOSPHATE AND GUAIFENESIN 10; 100 MG/5ML; MG/5ML
1 SOLUTION ORAL EVERY 4 HOURS PRN
Qty: 120 ML | Refills: 0 | Status: ON HOLD | OUTPATIENT
Start: 2018-01-01 | End: 2018-01-01

## 2018-01-01 RX ORDER — LIDOCAINE 40 MG/G
CREAM TOPICAL
Status: DISCONTINUED | OUTPATIENT
Start: 2018-01-01 | End: 2018-01-01 | Stop reason: HOSPADM

## 2018-01-01 RX ORDER — AMOXICILLIN 250 MG
1 CAPSULE ORAL 2 TIMES DAILY PRN
Status: DISCONTINUED | OUTPATIENT
Start: 2018-01-01 | End: 2018-01-01 | Stop reason: HOSPADM

## 2018-01-01 RX ORDER — HEPARIN SODIUM,PORCINE 10 UNIT/ML
5-10 VIAL (ML) INTRAVENOUS EVERY 24 HOURS
Status: DISCONTINUED | OUTPATIENT
Start: 2018-01-01 | End: 2018-01-01 | Stop reason: HOSPADM

## 2018-01-01 RX ORDER — PROPOFOL 10 MG/ML
INJECTION, EMULSION INTRAVENOUS PRN
Status: DISCONTINUED | OUTPATIENT
Start: 2018-01-01 | End: 2018-01-01

## 2018-01-01 RX ORDER — EPINEPHRINE 1 MG/ML
0.3 INJECTION, SOLUTION INTRAMUSCULAR; SUBCUTANEOUS EVERY 5 MIN PRN
Status: CANCELLED | OUTPATIENT
Start: 2018-01-01

## 2018-01-01 RX ORDER — HEPARIN SODIUM (PORCINE) LOCK FLUSH IV SOLN 100 UNIT/ML 100 UNIT/ML
5 SOLUTION INTRAVENOUS
Status: CANCELLED
Start: 2018-01-01

## 2018-01-01 RX ORDER — LORAZEPAM 2 MG/ML
0.5 INJECTION INTRAMUSCULAR EVERY 4 HOURS PRN
Status: DISCONTINUED | OUTPATIENT
Start: 2018-01-01 | End: 2018-01-01 | Stop reason: HOSPADM

## 2018-01-01 RX ORDER — IOPAMIDOL 755 MG/ML
59 INJECTION, SOLUTION INTRAVASCULAR ONCE
Status: COMPLETED | OUTPATIENT
Start: 2018-01-01 | End: 2018-01-01

## 2018-01-01 RX ORDER — DIPHENHYDRAMINE HCL 50 MG
50 CAPSULE ORAL ONCE
Status: DISCONTINUED | OUTPATIENT
Start: 2018-01-01 | End: 2018-01-01 | Stop reason: HOSPADM

## 2018-01-01 RX ORDER — DIPHENHYDRAMINE HCL 25 MG
50 CAPSULE ORAL ONCE
Status: COMPLETED | OUTPATIENT
Start: 2018-01-01 | End: 2018-01-01

## 2018-01-01 RX ORDER — SIMVASTATIN 10 MG
5 TABLET ORAL AT BEDTIME
Qty: 30 TABLET | Refills: 0 | Status: SHIPPED | OUTPATIENT
Start: 2018-01-01

## 2018-01-01 RX ORDER — ONDANSETRON 2 MG/ML
INJECTION INTRAMUSCULAR; INTRAVENOUS PRN
Status: DISCONTINUED | OUTPATIENT
Start: 2018-01-01 | End: 2018-01-01

## 2018-01-01 RX ORDER — EPINEPHRINE 1 MG/ML
0.3 INJECTION, SOLUTION, CONCENTRATE INTRAVENOUS EVERY 5 MIN PRN
Status: DISCONTINUED | OUTPATIENT
Start: 2018-01-01 | End: 2018-01-01 | Stop reason: HOSPADM

## 2018-01-01 RX ORDER — HEPARIN SODIUM (PORCINE) LOCK FLUSH IV SOLN 100 UNIT/ML 100 UNIT/ML
SOLUTION INTRAVENOUS
Status: DISCONTINUED
Start: 2018-01-01 | End: 2018-01-01 | Stop reason: HOSPADM

## 2018-01-01 RX ORDER — PROPRANOLOL HYDROCHLORIDE 40 MG/1
TABLET ORAL
Qty: 60 TABLET | Refills: 3 | Status: SHIPPED | OUTPATIENT
Start: 2018-01-01 | End: 2018-01-01

## 2018-01-01 RX ORDER — EPHEDRINE SULFATE 50 MG/ML
INJECTION, SOLUTION INTRAMUSCULAR; INTRAVENOUS; SUBCUTANEOUS PRN
Status: DISCONTINUED | OUTPATIENT
Start: 2018-01-01 | End: 2018-01-01

## 2018-01-01 RX ORDER — DEXTROSE MONOHYDRATE 25 G/50ML
25-50 INJECTION, SOLUTION INTRAVENOUS
Status: DISCONTINUED | OUTPATIENT
Start: 2018-01-01 | End: 2018-01-01 | Stop reason: HOSPADM

## 2018-01-01 RX ORDER — LIDOCAINE/PRILOCAINE 2.5 %-2.5%
CREAM (GRAM) TOPICAL PRN
Qty: 30 G | Refills: 3 | Status: SHIPPED | OUTPATIENT
Start: 2018-01-01 | End: 2018-01-01

## 2018-01-01 RX ORDER — FENTANYL CITRATE 50 UG/ML
25-50 INJECTION, SOLUTION INTRAMUSCULAR; INTRAVENOUS
Status: DISCONTINUED | OUTPATIENT
Start: 2018-01-01 | End: 2018-01-01 | Stop reason: HOSPADM

## 2018-01-01 RX ORDER — SUCRALFATE ORAL 1 G/10ML
1 SUSPENSION ORAL 4 TIMES DAILY
Qty: 420 ML | Refills: 1 | Status: ON HOLD | OUTPATIENT
Start: 2018-01-01 | End: 2018-01-01

## 2018-01-01 RX ORDER — AMLODIPINE BESYLATE 5 MG/1
5 TABLET ORAL DAILY
Status: DISCONTINUED | OUTPATIENT
Start: 2018-01-01 | End: 2018-01-01 | Stop reason: HOSPADM

## 2018-01-01 RX ORDER — LANOLIN ALCOHOL/MO/W.PET/CERES
CREAM (GRAM) TOPICAL EVERY 4 HOURS PRN
Status: DISCONTINUED | OUTPATIENT
Start: 2018-01-01 | End: 2018-01-01 | Stop reason: HOSPADM

## 2018-01-01 RX ORDER — AZITHROMYCIN 250 MG/1
TABLET, FILM COATED ORAL
Qty: 6 TABLET | Refills: 0 | Status: SHIPPED | OUTPATIENT
Start: 2018-01-01 | End: 2018-01-01

## 2018-01-01 RX ORDER — FENTANYL CITRATE 50 UG/ML
50-100 INJECTION, SOLUTION INTRAMUSCULAR; INTRAVENOUS
Status: DISCONTINUED | OUTPATIENT
Start: 2018-01-01 | End: 2018-01-01 | Stop reason: HOSPADM

## 2018-01-01 RX ORDER — HYDROCHLOROTHIAZIDE 25 MG/1
TABLET ORAL
Qty: 90 TABLET | Refills: 3 | Status: ON HOLD | OUTPATIENT
Start: 2018-01-01 | End: 2018-01-01

## 2018-01-01 RX ORDER — AMLODIPINE BESYLATE 5 MG/1
5 TABLET ORAL DAILY
Qty: 90 TABLET | Refills: 1 | Status: SHIPPED | OUTPATIENT
Start: 2018-01-01 | End: 2018-01-01

## 2018-01-01 RX ORDER — SODIUM CHLORIDE, SODIUM LACTATE, POTASSIUM CHLORIDE, CALCIUM CHLORIDE 600; 310; 30; 20 MG/100ML; MG/100ML; MG/100ML; MG/100ML
INJECTION, SOLUTION INTRAVENOUS CONTINUOUS PRN
Status: DISCONTINUED | OUTPATIENT
Start: 2018-01-01 | End: 2018-01-01

## 2018-01-01 RX ORDER — HEPARIN SODIUM (PORCINE) LOCK FLUSH IV SOLN 100 UNIT/ML 100 UNIT/ML
500 SOLUTION INTRAVENOUS ONCE
Status: COMPLETED | OUTPATIENT
Start: 2018-01-01 | End: 2018-01-01

## 2018-01-01 RX ORDER — HEPARIN SODIUM,PORCINE 10 UNIT/ML
5 VIAL (ML) INTRAVENOUS EVERY 24 HOURS
Status: DISCONTINUED | OUTPATIENT
Start: 2018-01-01 | End: 2018-01-01 | Stop reason: HOSPADM

## 2018-01-01 RX ORDER — SODIUM CHLORIDE, SODIUM LACTATE, POTASSIUM CHLORIDE, CALCIUM CHLORIDE 600; 310; 30; 20 MG/100ML; MG/100ML; MG/100ML; MG/100ML
INJECTION, SOLUTION INTRAVENOUS CONTINUOUS
Status: DISCONTINUED | OUTPATIENT
Start: 2018-01-01 | End: 2018-01-01

## 2018-01-01 RX ORDER — HEPARIN SODIUM (PORCINE) LOCK FLUSH IV SOLN 100 UNIT/ML 100 UNIT/ML
5 SOLUTION INTRAVENOUS
Status: DISCONTINUED | OUTPATIENT
Start: 2018-01-01 | End: 2018-01-01

## 2018-01-01 RX ORDER — HYDROCHLOROTHIAZIDE 25 MG/1
25 TABLET ORAL DAILY
Status: DISCONTINUED | OUTPATIENT
Start: 2018-01-01 | End: 2018-01-01

## 2018-01-01 RX ORDER — LIDOCAINE HYDROCHLORIDE 10 MG/ML
1-30 INJECTION, SOLUTION EPIDURAL; INFILTRATION; INTRACAUDAL; PERINEURAL
Status: COMPLETED | OUTPATIENT
Start: 2018-01-01 | End: 2018-01-01

## 2018-01-01 RX ORDER — PANTOPRAZOLE SODIUM 40 MG/1
40 TABLET, DELAYED RELEASE ORAL DAILY
Status: DISCONTINUED | OUTPATIENT
Start: 2018-01-01 | End: 2018-01-01

## 2018-01-01 RX ORDER — SIMVASTATIN 5 MG
5 TABLET ORAL AT BEDTIME
Status: DISCONTINUED | OUTPATIENT
Start: 2018-01-01 | End: 2018-01-01 | Stop reason: HOSPADM

## 2018-01-01 RX ORDER — IOPAMIDOL 755 MG/ML
80 INJECTION, SOLUTION INTRAVASCULAR ONCE
Status: COMPLETED | OUTPATIENT
Start: 2018-01-01 | End: 2018-01-01

## 2018-01-01 RX ORDER — ALBUTEROL SULFATE 90 UG/1
1-2 AEROSOL, METERED RESPIRATORY (INHALATION)
Status: DISCONTINUED | OUTPATIENT
Start: 2018-01-01 | End: 2018-01-01 | Stop reason: HOSPADM

## 2018-01-01 RX ORDER — AMLODIPINE BESYLATE 5 MG/1
5 TABLET ORAL DAILY
Qty: 90 TABLET | Refills: 0 | Status: SHIPPED | OUTPATIENT
Start: 2018-01-01 | End: 2018-01-01

## 2018-01-01 RX ORDER — DIPHENHYDRAMINE HYDROCHLORIDE 50 MG/ML
50 INJECTION INTRAMUSCULAR; INTRAVENOUS ONCE
Status: COMPLETED | OUTPATIENT
Start: 2018-01-01 | End: 2018-01-01

## 2018-01-01 RX ORDER — DABIGATRAN ETEXILATE 150 MG/1
CAPSULE ORAL
Qty: 60 CAPSULE | Refills: 0 | Status: SHIPPED | OUTPATIENT
Start: 2018-01-01

## 2018-01-01 RX ORDER — ACETAMINOPHEN 650 MG/1
650 SUPPOSITORY RECTAL EVERY 4 HOURS PRN
Status: DISCONTINUED | OUTPATIENT
Start: 2018-01-01 | End: 2018-01-01 | Stop reason: HOSPADM

## 2018-01-01 RX ORDER — HEPARIN SODIUM (PORCINE) LOCK FLUSH IV SOLN 100 UNIT/ML 100 UNIT/ML
5 SOLUTION INTRAVENOUS ONCE
Status: COMPLETED | OUTPATIENT
Start: 2018-01-01 | End: 2018-01-01

## 2018-01-01 RX ORDER — AMLODIPINE BESYLATE 5 MG/1
5 TABLET ORAL DAILY
Qty: 30 TABLET | Refills: 0 | Status: SHIPPED | OUTPATIENT
Start: 2018-01-01

## 2018-01-01 RX ORDER — ONDANSETRON 4 MG/1
4 TABLET, ORALLY DISINTEGRATING ORAL EVERY 6 HOURS PRN
Status: DISCONTINUED | OUTPATIENT
Start: 2018-01-01 | End: 2018-01-01 | Stop reason: HOSPADM

## 2018-01-01 RX ORDER — PANTOPRAZOLE SODIUM 40 MG/1
40 TABLET, DELAYED RELEASE ORAL DAILY
Qty: 30 TABLET | Refills: 0 | Status: ON HOLD | OUTPATIENT
Start: 2018-01-01 | End: 2018-01-01

## 2018-01-01 RX ORDER — ALBUTEROL SULFATE 0.83 MG/ML
2.5 SOLUTION RESPIRATORY (INHALATION)
Status: DISCONTINUED | OUTPATIENT
Start: 2018-01-01 | End: 2018-01-01 | Stop reason: HOSPADM

## 2018-01-01 RX ORDER — PROPRANOLOL HYDROCHLORIDE 40 MG/1
TABLET ORAL
Qty: 60 TABLET | Refills: 1 | Status: ON HOLD | OUTPATIENT
Start: 2018-01-01 | End: 2018-01-01

## 2018-01-01 RX ORDER — DIPHENHYDRAMINE HCL 25 MG
50 CAPSULE ORAL ONCE
Status: CANCELLED
Start: 2018-01-01

## 2018-01-01 RX ORDER — ONDANSETRON 2 MG/ML
4 INJECTION INTRAMUSCULAR; INTRAVENOUS EVERY 6 HOURS PRN
Status: DISCONTINUED | OUTPATIENT
Start: 2018-01-01 | End: 2018-01-01 | Stop reason: HOSPADM

## 2018-01-01 RX ORDER — NICOTINE POLACRILEX 4 MG
15-30 LOZENGE BUCCAL
Status: DISCONTINUED | OUTPATIENT
Start: 2018-01-01 | End: 2018-01-01 | Stop reason: HOSPADM

## 2018-01-01 RX ORDER — DABIGATRAN ETEXILATE 150 MG/1
150 CAPSULE ORAL 2 TIMES DAILY
Status: DISCONTINUED | OUTPATIENT
Start: 2018-01-01 | End: 2018-01-01

## 2018-01-01 RX ORDER — HYDROXYZINE HYDROCHLORIDE 10 MG/1
10 TABLET, FILM COATED ORAL 3 TIMES DAILY PRN
Status: DISCONTINUED | OUTPATIENT
Start: 2018-01-01 | End: 2018-01-01 | Stop reason: HOSPADM

## 2018-01-01 RX ORDER — IOPAMIDOL 755 MG/ML
72 INJECTION, SOLUTION INTRAVASCULAR ONCE
Status: COMPLETED | OUTPATIENT
Start: 2018-01-01 | End: 2018-01-01

## 2018-01-01 RX ORDER — SODIUM CHLORIDE 9 MG/ML
1000 INJECTION, SOLUTION INTRAVENOUS CONTINUOUS PRN
Status: DISCONTINUED | OUTPATIENT
Start: 2018-01-01 | End: 2018-01-01 | Stop reason: HOSPADM

## 2018-01-01 RX ORDER — DEXAMETHASONE SODIUM PHOSPHATE 4 MG/ML
INJECTION, SOLUTION INTRA-ARTICULAR; INTRALESIONAL; INTRAMUSCULAR; INTRAVENOUS; SOFT TISSUE PRN
Status: DISCONTINUED | OUTPATIENT
Start: 2018-01-01 | End: 2018-01-01

## 2018-01-01 RX ORDER — METHYLPREDNISOLONE SODIUM SUCCINATE 125 MG/2ML
125 INJECTION, POWDER, LYOPHILIZED, FOR SOLUTION INTRAMUSCULAR; INTRAVENOUS
Status: DISCONTINUED | OUTPATIENT
Start: 2018-01-01 | End: 2018-01-01 | Stop reason: HOSPADM

## 2018-01-01 RX ORDER — HYDROMORPHONE HYDROCHLORIDE 1 MG/ML
.3-.5 INJECTION, SOLUTION INTRAMUSCULAR; INTRAVENOUS; SUBCUTANEOUS EVERY 10 MIN PRN
Status: DISCONTINUED | OUTPATIENT
Start: 2018-01-01 | End: 2018-01-01 | Stop reason: HOSPADM

## 2018-01-01 RX ORDER — EPINEPHRINE 1 MG/ML
0.3 INJECTION, SOLUTION, CONCENTRATE INTRAVENOUS EVERY 5 MIN PRN
Status: CANCELLED | OUTPATIENT
Start: 2018-01-01

## 2018-01-01 RX ORDER — FENTANYL CITRATE 50 UG/ML
INJECTION, SOLUTION INTRAMUSCULAR; INTRAVENOUS
Status: DISCONTINUED
Start: 2018-01-01 | End: 2018-01-01 | Stop reason: HOSPADM

## 2018-01-01 RX ORDER — HYDROCORTISONE 2.5 %
CREAM (GRAM) TOPICAL 2 TIMES DAILY
COMMUNITY
End: 2018-01-01

## 2018-01-01 RX ORDER — EPINEPHRINE 0.3 MG/.3ML
0.3 INJECTION SUBCUTANEOUS EVERY 5 MIN PRN
Status: DISCONTINUED | OUTPATIENT
Start: 2018-01-01 | End: 2018-01-01

## 2018-01-01 RX ORDER — SODIUM CHLORIDE 9 MG/ML
INJECTION, SOLUTION INTRAVENOUS CONTINUOUS
Status: DISCONTINUED | OUTPATIENT
Start: 2018-01-01 | End: 2018-01-01

## 2018-01-01 RX ORDER — MEPERIDINE HYDROCHLORIDE 25 MG/ML
25 INJECTION INTRAMUSCULAR; INTRAVENOUS; SUBCUTANEOUS EVERY 30 MIN PRN
Status: DISCONTINUED | OUTPATIENT
Start: 2018-01-01 | End: 2018-01-01 | Stop reason: HOSPADM

## 2018-01-01 RX ORDER — MEPERIDINE HYDROCHLORIDE 25 MG/ML
12.5 INJECTION INTRAMUSCULAR; INTRAVENOUS; SUBCUTANEOUS
Status: DISCONTINUED | OUTPATIENT
Start: 2018-01-01 | End: 2018-01-01 | Stop reason: HOSPADM

## 2018-01-01 RX ADMIN — SODIUM CHLORIDE: 9 INJECTION, SOLUTION INTRAVENOUS at 02:32

## 2018-01-01 RX ADMIN — CARBOPLATIN 200 MG: 10 INJECTION, SOLUTION INTRAVENOUS at 15:46

## 2018-01-01 RX ADMIN — AMLODIPINE BESYLATE 5 MG: 5 TABLET ORAL at 09:09

## 2018-01-01 RX ADMIN — IOPAMIDOL 80 ML: 755 INJECTION, SOLUTION INTRAVENOUS at 11:23

## 2018-01-01 RX ADMIN — DEXAMETHASONE SODIUM PHOSPHATE: 10 INJECTION, SOLUTION INTRAMUSCULAR; INTRAVENOUS at 09:11

## 2018-01-01 RX ADMIN — PACLITAXEL 88 MG: 6 INJECTION, SOLUTION, CONCENTRATE INTRAVENOUS at 12:18

## 2018-01-01 RX ADMIN — FAMOTIDINE 20 MG: 10 INJECTION, SOLUTION INTRAVENOUS at 13:20

## 2018-01-01 RX ADMIN — SODIUM CHLORIDE 250 ML: 9 INJECTION, SOLUTION INTRAVENOUS at 09:03

## 2018-01-01 RX ADMIN — POTASSIUM PHOSPHATE, MONOBASIC AND POTASSIUM PHOSPHATE, DIBASIC 15 MMOL: 224; 236 INJECTION, SOLUTION INTRAVENOUS at 14:39

## 2018-01-01 RX ADMIN — AMLODIPINE BESYLATE 5 MG: 5 TABLET ORAL at 09:32

## 2018-01-01 RX ADMIN — DIPHENHYDRAMINE HYDROCHLORIDE 50 MG: 50 INJECTION, SOLUTION INTRAMUSCULAR; INTRAVENOUS at 14:39

## 2018-01-01 RX ADMIN — AMLODIPINE BESYLATE 5 MG: 5 TABLET ORAL at 09:15

## 2018-01-01 RX ADMIN — SODIUM CHLORIDE 100 ML: 9 INJECTION, SOLUTION INTRAVENOUS at 16:24

## 2018-01-01 RX ADMIN — ONDANSETRON 4 MG: 4 TABLET, ORALLY DISINTEGRATING ORAL at 18:22

## 2018-01-01 RX ADMIN — PANTOPRAZOLE SODIUM 40 MG: 40 TABLET, DELAYED RELEASE ORAL at 09:15

## 2018-01-01 RX ADMIN — HYDROXYZINE HYDROCHLORIDE 10 MG: 10 TABLET ORAL at 03:51

## 2018-01-01 RX ADMIN — DEXAMETHASONE SODIUM PHOSPHATE 4 MG: 4 INJECTION, SOLUTION INTRA-ARTICULAR; INTRALESIONAL; INTRAMUSCULAR; INTRAVENOUS; SOFT TISSUE at 16:46

## 2018-01-01 RX ADMIN — AMLODIPINE BESYLATE 5 MG: 5 TABLET ORAL at 09:59

## 2018-01-01 RX ADMIN — OYSTER SHELL CALCIUM WITH VITAMIN D 1 TABLET: 500; 200 TABLET, FILM COATED ORAL at 08:27

## 2018-01-01 RX ADMIN — DEXAMETHASONE SODIUM PHOSPHATE: 10 INJECTION, SOLUTION INTRAMUSCULAR; INTRAVENOUS at 11:41

## 2018-01-01 RX ADMIN — CEFAZOLIN SODIUM 2 G: 2 INJECTION, SOLUTION INTRAVENOUS at 13:11

## 2018-01-01 RX ADMIN — Medication 5 ML: at 06:19

## 2018-01-01 RX ADMIN — SODIUM CHLORIDE 250 ML: 9 INJECTION, SOLUTION INTRAVENOUS at 11:24

## 2018-01-01 RX ADMIN — PANTOPRAZOLE SODIUM 40 MG: 40 TABLET, DELAYED RELEASE ORAL at 09:59

## 2018-01-01 RX ADMIN — ONDANSETRON 4 MG: 2 INJECTION INTRAMUSCULAR; INTRAVENOUS at 16:47

## 2018-01-01 RX ADMIN — PANTOPRAZOLE SODIUM 40 MG: 40 TABLET, DELAYED RELEASE ORAL at 09:45

## 2018-01-01 RX ADMIN — SODIUM CHLORIDE, PRESERVATIVE FREE 5 ML: 5 INJECTION INTRAVENOUS at 11:35

## 2018-01-01 RX ADMIN — CARBOPLATIN 220 MG: 10 INJECTION, SOLUTION INTRAVENOUS at 13:22

## 2018-01-01 RX ADMIN — LIDOCAINE HYDROCHLORIDE 25 ML: 10 INJECTION, SOLUTION INFILTRATION; PERINEURAL at 14:41

## 2018-01-01 RX ADMIN — SODIUM CHLORIDE 85 ML: 9 INJECTION, SOLUTION INTRAVENOUS at 04:14

## 2018-01-01 RX ADMIN — Medication 5 ML: at 05:04

## 2018-01-01 RX ADMIN — PANTOPRAZOLE SODIUM 40 MG: 40 INJECTION, POWDER, FOR SOLUTION INTRAVENOUS at 14:48

## 2018-01-01 RX ADMIN — HYDROCHLOROTHIAZIDE 25 MG: 25 TABLET ORAL at 09:28

## 2018-01-01 RX ADMIN — HEPARIN SODIUM (PORCINE) LOCK FLUSH IV SOLN 100 UNIT/ML 500 UNITS: 100 SOLUTION at 14:09

## 2018-01-01 RX ADMIN — DABIGATRAN ETEXILATE MESYLATE 150 MG: 150 CAPSULE ORAL at 20:06

## 2018-01-01 RX ADMIN — FENTANYL CITRATE 25 MCG: 50 INJECTION, SOLUTION INTRAMUSCULAR; INTRAVENOUS at 16:39

## 2018-01-01 RX ADMIN — OYSTER SHELL CALCIUM WITH VITAMIN D 1 TABLET: 500; 200 TABLET, FILM COATED ORAL at 09:09

## 2018-01-01 RX ADMIN — ENOXAPARIN SODIUM 60 MG: 60 INJECTION SUBCUTANEOUS at 21:24

## 2018-01-01 RX ADMIN — Medication 5 ML: at 16:06

## 2018-01-01 RX ADMIN — HEPARIN SODIUM (PORCINE) LOCK FLUSH IV SOLN 100 UNIT/ML 5 ML: 100 SOLUTION at 09:15

## 2018-01-01 RX ADMIN — DIPHENHYDRAMINE HYDROCHLORIDE: 50 INJECTION, SOLUTION INTRAMUSCULAR; INTRAVENOUS at 14:06

## 2018-01-01 RX ADMIN — Medication 5 ML: at 09:12

## 2018-01-01 RX ADMIN — DABIGATRAN ETEXILATE MESYLATE 150 MG: 150 CAPSULE ORAL at 21:36

## 2018-01-01 RX ADMIN — AMLODIPINE BESYLATE 5 MG: 5 TABLET ORAL at 09:28

## 2018-01-01 RX ADMIN — PANTOPRAZOLE SODIUM 40 MG: 40 TABLET, DELAYED RELEASE ORAL at 09:14

## 2018-01-01 RX ADMIN — DEXAMETHASONE SODIUM PHOSPHATE: 10 INJECTION, SOLUTION INTRAMUSCULAR; INTRAVENOUS at 11:24

## 2018-01-01 RX ADMIN — DABIGATRAN ETEXILATE MESYLATE 150 MG: 150 CAPSULE ORAL at 08:27

## 2018-01-01 RX ADMIN — ENOXAPARIN SODIUM 60 MG: 60 INJECTION SUBCUTANEOUS at 09:29

## 2018-01-01 RX ADMIN — FAMOTIDINE 20 MG: 20 INJECTION, SOLUTION INTRAVENOUS at 14:18

## 2018-01-01 RX ADMIN — DEXAMETHASONE SODIUM PHOSPHATE: 10 INJECTION, SOLUTION INTRAMUSCULAR; INTRAVENOUS at 13:53

## 2018-01-01 RX ADMIN — SIMVASTATIN 5 MG: 5 TABLET, FILM COATED ORAL at 21:05

## 2018-01-01 RX ADMIN — SODIUM CHLORIDE, POTASSIUM CHLORIDE, SODIUM LACTATE AND CALCIUM CHLORIDE: 600; 310; 30; 20 INJECTION, SOLUTION INTRAVENOUS at 08:18

## 2018-01-01 RX ADMIN — PANTOPRAZOLE SODIUM 40 MG: 40 TABLET, DELAYED RELEASE ORAL at 09:28

## 2018-01-01 RX ADMIN — SIMVASTATIN 5 MG: 5 TABLET, FILM COATED ORAL at 20:06

## 2018-01-01 RX ADMIN — LIDOCAINE HYDROCHLORIDE 30 ML: 20 SOLUTION ORAL; TOPICAL at 14:48

## 2018-01-01 RX ADMIN — PROPOFOL 30 MG: 10 INJECTION, EMULSION INTRAVENOUS at 16:51

## 2018-01-01 RX ADMIN — DEXMEDETOMIDINE HYDROCHLORIDE 12 MCG: 100 INJECTION, SOLUTION INTRAVENOUS at 08:21

## 2018-01-01 RX ADMIN — ONDANSETRON 4 MG: 2 INJECTION INTRAMUSCULAR; INTRAVENOUS at 19:26

## 2018-01-01 RX ADMIN — SIMVASTATIN 5 MG: 5 TABLET, FILM COATED ORAL at 20:49

## 2018-01-01 RX ADMIN — MIDAZOLAM HYDROCHLORIDE 0.5 MG: 1 INJECTION, SOLUTION INTRAMUSCULAR; INTRAVENOUS at 14:40

## 2018-01-01 RX ADMIN — DABIGATRAN ETEXILATE MESYLATE 150 MG: 150 CAPSULE ORAL at 19:47

## 2018-01-01 RX ADMIN — OYSTER SHELL CALCIUM WITH VITAMIN D 1 TABLET: 500; 200 TABLET, FILM COATED ORAL at 09:15

## 2018-01-01 RX ADMIN — HYDROXYZINE HYDROCHLORIDE 10 MG: 10 TABLET ORAL at 01:34

## 2018-01-01 RX ADMIN — AMLODIPINE BESYLATE 5 MG: 5 TABLET ORAL at 09:14

## 2018-01-01 RX ADMIN — OYSTER SHELL CALCIUM WITH VITAMIN D 1 TABLET: 500; 200 TABLET, FILM COATED ORAL at 09:28

## 2018-01-01 RX ADMIN — ENOXAPARIN SODIUM 60 MG: 60 INJECTION SUBCUTANEOUS at 09:49

## 2018-01-01 RX ADMIN — DABIGATRAN ETEXILATE MESYLATE 150 MG: 150 CAPSULE ORAL at 20:59

## 2018-01-01 RX ADMIN — SODIUM CHLORIDE, PRESERVATIVE FREE 70 ML: 5 INJECTION INTRAVENOUS at 11:23

## 2018-01-01 RX ADMIN — SIMVASTATIN 5 MG: 5 TABLET, FILM COATED ORAL at 21:24

## 2018-01-01 RX ADMIN — PANTOPRAZOLE SODIUM 40 MG: 40 TABLET, DELAYED RELEASE ORAL at 08:27

## 2018-01-01 RX ADMIN — PROPOFOL 120 MG: 10 INJECTION, EMULSION INTRAVENOUS at 16:39

## 2018-01-01 RX ADMIN — Medication 1 MG: at 23:37

## 2018-01-01 RX ADMIN — FAMOTIDINE 20 MG: 20 INJECTION, SOLUTION INTRAVENOUS at 09:29

## 2018-01-01 RX ADMIN — PACLITAXEL 88 MG: 6 INJECTION, SOLUTION INTRAVENOUS at 09:51

## 2018-01-01 RX ADMIN — IOPAMIDOL 59 ML: 755 INJECTION, SOLUTION INTRAVENOUS at 04:14

## 2018-01-01 RX ADMIN — ENOXAPARIN SODIUM 60 MG: 60 INJECTION SUBCUTANEOUS at 20:49

## 2018-01-01 RX ADMIN — DEXAMETHASONE SODIUM PHOSPHATE: 4 INJECTION, SOLUTION INTRAMUSCULAR; INTRAVENOUS at 13:18

## 2018-01-01 RX ADMIN — PACLITAXEL 88 MG: 6 INJECTION, SOLUTION, CONCENTRATE INTRAVENOUS at 13:54

## 2018-01-01 RX ADMIN — DABIGATRAN ETEXILATE MESYLATE 150 MG: 150 CAPSULE ORAL at 09:14

## 2018-01-01 RX ADMIN — PACLITAXEL 88 MG: 6 INJECTION, SOLUTION INTRAVENOUS at 14:37

## 2018-01-01 RX ADMIN — FAMOTIDINE 20 MG: 20 INJECTION, SOLUTION INTRAVENOUS at 11:45

## 2018-01-01 RX ADMIN — Medication 5 ML: at 14:07

## 2018-01-01 RX ADMIN — ENOXAPARIN SODIUM 60 MG: 60 INJECTION SUBCUTANEOUS at 09:14

## 2018-01-01 RX ADMIN — FENTANYL CITRATE 50 MCG: 50 INJECTION, SOLUTION INTRAMUSCULAR; INTRAVENOUS at 08:20

## 2018-01-01 RX ADMIN — PANTOPRAZOLE SODIUM 40 MG: 40 TABLET, DELAYED RELEASE ORAL at 09:32

## 2018-01-01 RX ADMIN — CARBOPLATIN 215 MG: 10 INJECTION, SOLUTION INTRAVENOUS at 15:04

## 2018-01-01 RX ADMIN — FENTANYL CITRATE 25 MCG: 50 INJECTION, SOLUTION INTRAMUSCULAR; INTRAVENOUS at 16:59

## 2018-01-01 RX ADMIN — SIMVASTATIN 5 MG: 5 TABLET, FILM COATED ORAL at 20:38

## 2018-01-01 RX ADMIN — OYSTER SHELL CALCIUM WITH VITAMIN D 1 TABLET: 500; 200 TABLET, FILM COATED ORAL at 09:59

## 2018-01-01 RX ADMIN — DABIGATRAN ETEXILATE MESYLATE 150 MG: 150 CAPSULE ORAL at 09:32

## 2018-01-01 RX ADMIN — OYSTER SHELL CALCIUM WITH VITAMIN D 1 TABLET: 500; 200 TABLET, FILM COATED ORAL at 09:49

## 2018-01-01 RX ADMIN — DABIGATRAN ETEXILATE MESYLATE 150 MG: 150 CAPSULE ORAL at 09:15

## 2018-01-01 RX ADMIN — ENOXAPARIN SODIUM 60 MG: 60 INJECTION SUBCUTANEOUS at 09:59

## 2018-01-01 RX ADMIN — DIPHENHYDRAMINE HYDROCHLORIDE 50 MG: 25 CAPSULE ORAL at 09:04

## 2018-01-01 RX ADMIN — LIDOCAINE 2.5 G: 40 CREAM TOPICAL at 14:22

## 2018-01-01 RX ADMIN — SODIUM CHLORIDE, PRESERVATIVE FREE 5 ML: 5 INJECTION INTRAVENOUS at 14:24

## 2018-01-01 RX ADMIN — OYSTER SHELL CALCIUM WITH VITAMIN D 1 TABLET: 500; 200 TABLET, FILM COATED ORAL at 09:29

## 2018-01-01 RX ADMIN — GLUCAGON HYDROCHLORIDE 1 MG: KIT at 14:39

## 2018-01-01 RX ADMIN — DIPHENHYDRAMINE HYDROCHLORIDE 50 MG: 50 INJECTION, SOLUTION INTRAMUSCULAR; INTRAVENOUS at 13:21

## 2018-01-01 RX ADMIN — DABIGATRAN ETEXILATE MESYLATE 150 MG: 150 CAPSULE ORAL at 22:06

## 2018-01-01 RX ADMIN — MIDAZOLAM HYDROCHLORIDE 1 MG: 1 INJECTION, SOLUTION INTRAMUSCULAR; INTRAVENOUS at 13:57

## 2018-01-01 RX ADMIN — OYSTER SHELL CALCIUM WITH VITAMIN D 1 TABLET: 500; 200 TABLET, FILM COATED ORAL at 09:32

## 2018-01-01 RX ADMIN — PROPOFOL 20 MG: 10 INJECTION, EMULSION INTRAVENOUS at 08:22

## 2018-01-01 RX ADMIN — CARBOPLATIN 200 MG: 10 INJECTION, SOLUTION INTRAVENOUS at 10:58

## 2018-01-01 RX ADMIN — PACLITAXEL 88 MG: 6 INJECTION, SOLUTION INTRAVENOUS at 12:43

## 2018-01-01 RX ADMIN — SIMVASTATIN 5 MG: 5 TABLET, FILM COATED ORAL at 22:06

## 2018-01-01 RX ADMIN — FENTANYL CITRATE 25 MCG: 50 INJECTION, SOLUTION INTRAMUSCULAR; INTRAVENOUS at 14:40

## 2018-01-01 RX ADMIN — SIMVASTATIN 5 MG: 5 TABLET, FILM COATED ORAL at 21:01

## 2018-01-01 RX ADMIN — ENOXAPARIN SODIUM 60 MG: 60 INJECTION SUBCUTANEOUS at 20:38

## 2018-01-01 RX ADMIN — ACETAMINOPHEN 650 MG: 325 TABLET, FILM COATED ORAL at 05:04

## 2018-01-01 RX ADMIN — SODIUM CHLORIDE 500 ML: 9 INJECTION, SOLUTION INTRAVENOUS at 14:18

## 2018-01-01 RX ADMIN — SIMVASTATIN 5 MG: 5 TABLET, FILM COATED ORAL at 21:36

## 2018-01-01 RX ADMIN — PANTOPRAZOLE SODIUM 40 MG: 40 TABLET, DELAYED RELEASE ORAL at 09:09

## 2018-01-01 RX ADMIN — SODIUM CHLORIDE, PRESERVATIVE FREE 61 ML: 5 INJECTION INTRAVENOUS at 08:40

## 2018-01-01 RX ADMIN — SODIUM CHLORIDE, POTASSIUM CHLORIDE, SODIUM LACTATE AND CALCIUM CHLORIDE: 600; 310; 30; 20 INJECTION, SOLUTION INTRAVENOUS at 06:32

## 2018-01-01 RX ADMIN — FAMOTIDINE 20 MG: 20 INJECTION, SOLUTION INTRAVENOUS at 12:00

## 2018-01-01 RX ADMIN — SODIUM CHLORIDE, POTASSIUM CHLORIDE, SODIUM LACTATE AND CALCIUM CHLORIDE: 600; 310; 30; 20 INJECTION, SOLUTION INTRAVENOUS at 16:33

## 2018-01-01 RX ADMIN — MIDAZOLAM HYDROCHLORIDE 0.5 MG: 1 INJECTION, SOLUTION INTRAMUSCULAR; INTRAVENOUS at 14:22

## 2018-01-01 RX ADMIN — ENOXAPARIN SODIUM 60 MG: 60 INJECTION SUBCUTANEOUS at 21:05

## 2018-01-01 RX ADMIN — DIPHENHYDRAMINE HYDROCHLORIDE 25 MG: 50 INJECTION, SOLUTION INTRAMUSCULAR; INTRAVENOUS at 12:03

## 2018-01-01 RX ADMIN — FENTANYL CITRATE 25 MCG: 50 INJECTION, SOLUTION INTRAMUSCULAR; INTRAVENOUS at 14:22

## 2018-01-01 RX ADMIN — IOPAMIDOL 72 ML: 755 INJECTION, SOLUTION INTRAVENOUS at 08:40

## 2018-01-01 RX ADMIN — SODIUM CHLORIDE 250 ML: 9 INJECTION, SOLUTION INTRAVENOUS at 13:53

## 2018-01-01 RX ADMIN — SUCCINYLCHOLINE CHLORIDE 80 MG: 20 INJECTION, SOLUTION INTRAMUSCULAR; INTRAVENOUS; PARENTERAL at 16:39

## 2018-01-01 RX ADMIN — AMLODIPINE BESYLATE 5 MG: 5 TABLET ORAL at 08:27

## 2018-01-01 RX ADMIN — FENTANYL CITRATE 50 MCG: 50 INJECTION, SOLUTION INTRAMUSCULAR; INTRAVENOUS at 13:57

## 2018-01-01 RX ADMIN — SIMVASTATIN 5 MG: 5 TABLET, FILM COATED ORAL at 19:47

## 2018-01-01 RX ADMIN — AMLODIPINE BESYLATE 5 MG: 5 TABLET ORAL at 09:49

## 2018-01-01 RX ADMIN — DABIGATRAN ETEXILATE MESYLATE 150 MG: 150 CAPSULE ORAL at 09:28

## 2018-01-01 RX ADMIN — Medication 5 MG: at 17:12

## 2018-01-01 RX ADMIN — Medication 8 MCG: at 16:33

## 2018-01-01 RX ADMIN — SODIUM CHLORIDE, PRESERVATIVE FREE 5 ML: 5 INJECTION INTRAVENOUS at 16:19

## 2018-01-01 RX ADMIN — DABIGATRAN ETEXILATE MESYLATE 150 MG: 150 CAPSULE ORAL at 09:09

## 2018-01-01 RX ADMIN — PROPOFOL 20 MG: 10 INJECTION, EMULSION INTRAVENOUS at 08:24

## 2018-01-01 RX ADMIN — DIPHENHYDRAMINE HYDROCHLORIDE 50 MG: 25 CAPSULE ORAL at 12:18

## 2018-01-01 RX ADMIN — PANTOPRAZOLE SODIUM 40 MG: 40 TABLET, DELAYED RELEASE ORAL at 10:29

## 2018-01-01 RX ADMIN — SODIUM CHLORIDE, PRESERVATIVE FREE 5 ML: 5 INJECTION INTRAVENOUS at 13:55

## 2018-01-01 RX ADMIN — CARBOPLATIN 205 MG: 10 INJECTION, SOLUTION INTRAVENOUS at 13:48

## 2018-01-01 RX ADMIN — SODIUM CHLORIDE 250 ML: 9 INJECTION, SOLUTION INTRAVENOUS at 11:41

## 2018-01-01 RX ADMIN — AMLODIPINE BESYLATE 5 MG: 5 TABLET ORAL at 09:29

## 2018-01-01 RX ADMIN — HEPARIN SODIUM 10000 UNITS: 10000 INJECTION, SOLUTION INTRAVENOUS; SUBCUTANEOUS at 13:29

## 2018-01-01 RX ADMIN — Medication 5 ML: at 05:47

## 2018-01-01 ASSESSMENT — ACTIVITIES OF DAILY LIVING (ADL)
TOILETING: 0-->INDEPENDENT
ADLS_ACUITY_SCORE: 18
ADLS_ACUITY_SCORE: 18
ADLS_ACUITY_SCORE: 16
WHICH_OF_THE_ABOVE_FUNCTIONAL_RISKS_HAD_A_RECENT_ONSET_OR_CHANGE?: FALL HISTORY;AMBULATION
ADLS_ACUITY_SCORE: 12
SWALLOWING: 0-->SWALLOWS FOODS/LIQUIDS WITHOUT DIFFICULTY
ADLS_ACUITY_SCORE: 12
ADLS_ACUITY_SCORE: 12
ADLS_ACUITY_SCORE: 17
ADLS_ACUITY_SCORE: 12
ADLS_ACUITY_SCORE: 16
ADLS_ACUITY_SCORE: 12
BATHING: 0-->INDEPENDENT
ADLS_ACUITY_SCORE: 17
ADLS_ACUITY_SCORE: 12
ADLS_ACUITY_SCORE: 16
ADLS_ACUITY_SCORE: 16
ADLS_ACUITY_SCORE: 17
ADLS_ACUITY_SCORE: 12
ADLS_ACUITY_SCORE: 12
ADLS_ACUITY_SCORE: 18
ADLS_ACUITY_SCORE: 17
ADLS_ACUITY_SCORE: 12
ADLS_ACUITY_SCORE: 18
ADLS_ACUITY_SCORE: 12
ADLS_ACUITY_SCORE: 12
ADLS_ACUITY_SCORE: 17
ADLS_ACUITY_SCORE: 13
ADLS_ACUITY_SCORE: 16
ADLS_ACUITY_SCORE: 16
ADLS_ACUITY_SCORE: 12
ADLS_ACUITY_SCORE: 12
ADLS_ACUITY_SCORE: 13
ADLS_ACUITY_SCORE: 17
ADLS_ACUITY_SCORE: 12
ADLS_ACUITY_SCORE: 19
ADLS_ACUITY_SCORE: 11
ADLS_ACUITY_SCORE: 12
ADLS_ACUITY_SCORE: 16
ADLS_ACUITY_SCORE: 16
ADLS_ACUITY_SCORE: 17
ADLS_ACUITY_SCORE: 13
ADLS_ACUITY_SCORE: 18
ADLS_ACUITY_SCORE: 12
ADLS_ACUITY_SCORE: 16
ADLS_ACUITY_SCORE: 12
ADLS_ACUITY_SCORE: 13
ADLS_ACUITY_SCORE: 12
ADLS_ACUITY_SCORE: 13
ADLS_ACUITY_SCORE: 17
DRESS: 0-->INDEPENDENT
RETIRED_COMMUNICATION: 0-->UNDERSTANDS/COMMUNICATES WITHOUT DIFFICULTY
ADLS_ACUITY_SCORE: 11
ADLS_ACUITY_SCORE: 16
ADLS_ACUITY_SCORE: 17
ADLS_ACUITY_SCORE: 12
RETIRED_EATING: 0-->INDEPENDENT
ADLS_ACUITY_SCORE: 12

## 2018-01-01 ASSESSMENT — LIFESTYLE VARIABLES
TOBACCO_USE: 1
TOBACCO_USE: 1

## 2018-01-01 ASSESSMENT — ENCOUNTER SYMPTOMS
MYALGIAS: 0
HEADACHES: 0
ABDOMINAL PAIN: 1
UNEXPECTED WEIGHT CHANGE: 1
DYSRHYTHMIAS: 1
CHILLS: 0
CONSTIPATION: 0
DYSRHYTHMIAS: 1
VOMITING: 0
BLOOD IN STOOL: 0
NUMBNESS: 0
NECK PAIN: 0
FEVER: 0
DIARRHEA: 0
WEAKNESS: 1

## 2018-01-01 ASSESSMENT — PAIN SCALES - GENERAL
PAINLEVEL: NO PAIN (0)

## 2018-01-01 ASSESSMENT — COPD QUESTIONNAIRES
COPD: 0
COPD: 0

## 2018-02-08 NOTE — PROGRESS NOTES
SUBJECTIVE:   Deepak Arndt is a 82 year old male who presents to clinic today for the following health issues:      Diarrhea      Duration: 2 wks    Description:       Consistency of stool: watery, loose and explosive, gaseous       Blood in stool: no        Number of loose stools past 24 hours: 2-3    Intensity:  moderate, 0/10    Accompanying signs and symptoms:    **Respiratory of 36 - elevated    **Low pulse at 36       Fever: no        Nausea/vomitting: no        Abdominal pain: no        Weight loss: YES    History (recent antibiotics or travel/ill contacts/med changes/testing done): new med- propranolol x 2 months,     Precipitating or alleviating factors: see above    Therapies tried and outcome: Imodium AD and PeptoBismol      Cough with wheezing, sometimes productive    Problem list and histories reviewed & adjusted, as indicated.  Additional history: as documented    Patient Active Problem List   Diagnosis     HTN (hypertension)     Osteoporosis     Hypercholesterolemia     Past myocardial infarction     Advance Care Planning     Chronic atrial fibrillation (H)     Health Care Home     Myocardial infarction     Mild aortic stenosis     Coronary artery disease involving native coronary artery of native heart without angina pectoris     Aortic valve disorder     Abnormality of gait     Past Surgical History:   Procedure Laterality Date     CATARACT IOL, RT/LT       HC OR OCULAR DEVICE INTRAOP DETACHED RETINA         Social History   Substance Use Topics     Smoking status: Former Smoker     Packs/day: 1.00     Years: 20.00     Types: Cigarettes     Start date: 1954     Quit date: 1/1/1974     Smokeless tobacco: Never Used     Alcohol use No     No family history on file.      Current Outpatient Prescriptions   Medication Sig Dispense Refill     Multiple Vitamins-Minerals (DAILY MULTI PO) Take by mouth daily       Calcium carb-Vitamin D 500 mg [place]-200 units (OSCAL WITH D;OYSTER SHELL CALCIUM) 500-200  MG-UNIT per tablet Take 1 tablet by mouth daily       acetaminophen (TYLENOL) 325 MG tablet Take 2 tablets (650 mg) by mouth every 4 hours as needed for mild pain or fever 100 tablet      propranolol (INDERAL) 40 MG tablet Take 1 tablet (40 mg) by mouth 2 times daily 60 tablet      hydrochlorothiazide (HYDRODIURIL) 25 MG tablet TAKE ONE TABLET BY MOUTH ONE TIME DAILY 90 tablet 3     amLODIPine (NORVASC) 5 MG tablet Take 1 tablet (5 mg) by mouth daily 90 tablet 3     Dabigatran Etexilate Mesylate (PRADAXA PO) Take 150 mg by mouth 2 times daily       simvastatin (ZOCOR) 10 MG tablet Take 5 mg by mouth At Bedtime (Takes half of a 10mg tablet for a total of 5mg daily)       fish oil-omega-3 fatty acids (OMEGA-3 FISH OIL) 1000 MG capsule Take 1 capsule by mouth daily.       loperamide (IMODIUM) 2 MG capsule Take 2 mg by mouth 4 times daily as needed for diarrhea       NONFORMULARY 2 times daily Activa probiotic yoguart         Reviewed and updated as needed this visit by clinical staff       Reviewed and updated as needed this visit by Provider       APPEARANCE: = Relaxed and in no distress  Conj/Eyelids = noninjected and lids and lashes are without inflammation  PERRLA/Irises = Pupils Round Reactive to Light and Irisis without inflammation  Ears/Nose = External structures and Nares have usual shape and form  Ear canals and TM's = Canals are not inflammed and have none or little wax and the drums are not injected and have a light reflex   Lips/Teeth/Gums = No lesions seen, good dentition, and gums seem healthy  Oropharynx = No leukoplakia, No injection to the tissues, Normal Uvula  Neck = No anterior or posterior adenopathy appreciated.  Resp effort = Calm regular breathing  Breath Sounds = Good air movement with no rales or rhonchi in any lung fields  Mood/Affect = Cooperative and interested      Assessment/Plan:  Deepak was seen today for diarrhea.    Diagnoses and all orders for this visit:    Functional  diarrhea      Clinton Mills MD  [facility]  [phone]

## 2018-02-08 NOTE — MR AVS SNAPSHOT
"              After Visit Summary   2/8/2018    Deepak Arndt    MRN: [id number]           Patient Information     Date Of Birth          1935        Visit Information        Provider Department      2/8/2018 4:00 PM Clinton Mills MD [facility]        Today's Diagnoses     Functional diarrhea    -  1      Care Instructions    Lets try metamucil, one scoop in 8 oz of water every day.          Follow-ups after your visit        Who to contact     If you have questions or need follow up information about today's clinic visit or your schedule please contact [facility] directly at [phone].  Normal or non-critical lab and imaging results will be communicated to you by [employer], letter or phone within 4 business days after the clinic has received the results. If you do not hear from us within 7 days, please contact the clinic through [employer] or phone. If you have a critical or abnormal lab result, we will notify you by phone as soon as possible.  Submit refill requests through [employer] or call your pharmacy and they will forward the refill request to us. Please allow 3 business days for your refill to be completed.          Additional Information About Your Visit        MyChart Information     [employer] gives you secure access to your electronic health record. If you see a primary care provider, you can also send messages to your care team and make appointments. If you have questions, please call your primary care clinic.  If you do not have a primary care provider, please call [phone] and they will assist you.        Care EveryWhere ID     This is your Care EveryWhere ID. This could be used by other organizations to access your [facility] medical records  [id number]        Your Vitals Were     Temperature Height BMI (Body Mass Index)             97.8  F (36.6  C) (Oral) 1.689 m (5' 6.5\") 24.39 kg/m2          Blood Pressure from Last 3 Encounters:   02/08/18 138/64   12/11/17 " 120/56   11/09/17 104/64    Weight from Last 3 Encounters:   02/08/18 69.6 kg (153 lb 6.4 oz)   12/11/17 70.4 kg (155 lb 1.6 oz)   11/09/17 66.9 kg (147 lb 6.4 oz)              Today, you had the following     No orders found for display       Primary Care Provider Office Phone # Fax #    Clinton Mills [phone] [address]  [facility] [address]        Equal Access to Services     [facility]: Hadii peggy ku hadasho Soomaali, waaxda luqadaha, qaybta kaalmada adeegyatha, gallito gill . So [facility] [phone].    ATENCIÓN: Si habla español, tiene a esqueda disposición servicios gratuitos de asistencia lingüística. Antonio al [phone].    We comply with applicable federal civil rights laws and [place] laws. We do not discriminate on the basis of race, color, national origin, age, disability, sex, sexual orientation, or gender identity.            Thank you!     Thank you for choosing [facility]  for your care. Our goal is always to provide you with excellent care. Hearing back from our patients is one way we can continue to improve our services. Please take a few minutes to complete the written survey that you may receive in the mail after your visit with us. Thank you!             Your Updated Medication List - Protect others around you: Learn how to safely use, store and throw away your medicines at www.disposemymeds.org.          This list is accurate as of 2/8/18  4:54 PM.  Always use your most recent med list.                   Brand Name Dispense Instructions for use Diagnosis    acetaminophen 325 MG tablet    TYLENOL    100 tablet    Take 2 tablets (650 mg) by mouth every 4 hours as needed for mild pain or fever    Generalized muscle weakness       amLODIPine 5 MG tablet    NORVASC    90 tablet    Take 1 tablet (5 mg) by mouth daily    Essential hypertension, benign       Calcium carb-Vitamin D 500 mg [place]-200 units 500-200 MG-UNIT per  tablet    OSCAL with D;Oyster Shell Calcium     Take 1 tablet by mouth daily        DAILY MULTI PO      Take by mouth daily        fish oil-omega-3 fatty acids 1000 MG capsule      Take 1 capsule by mouth daily.        hydrochlorothiazide 25 MG tablet    HYDRODIURIL    90 tablet    TAKE ONE TABLET BY MOUTH ONE TIME DAILY    Encounter for medication refill       loperamide 2 MG capsule    IMODIUM     Take 2 mg by mouth 4 times daily as needed for diarrhea        NONFORMULARY      2 times daily Activa probiotic yoguart        PRADAXA PO      Take 150 mg by mouth 2 times daily        propranolol 40 MG tablet    INDERAL    60 tablet    Take 1 tablet (40 mg) by mouth 2 times daily    Essential tremor       simvastatin 10 MG tablet    ZOCOR     Take 5 mg by mouth At Bedtime (Takes half of a 10mg tablet for a total of 5mg daily)

## 2018-03-02 NOTE — MR AVS SNAPSHOT
After Visit Summary   3/2/2018    Deepak Arndt    MRN: [id number]           Patient Information     Date Of Birth          1935        Visit Information        Provider Department      3/2/2018 9:15 AM Chacha Leija MD [facility]        Today's Diagnoses     Cough    -  1    Screening for prostate cancer        Chronic atrial fibrillation (H)        Hyperlipidemia LDL goal <70          Care Instructions    Zithromax for bronchitis  Robitussin AC for cough    CXR looked ok  CBC reviewed    F/u if worsening or not better in 4 weeks          Follow-ups after your visit        Future tests that were ordered for you today     Open Future Orders        Priority Expected Expires Ordered    Lipid Panel ([facility]) Routine  3/2/2019 3/2/2018            Who to contact     If you have questions or need follow up information about today's clinic visit or your schedule please contact [facility] directly at [phone].  Normal or non-critical lab and imaging results will be communicated to you by MyChart, letter or phone within 4 business days after the clinic has received the results. If you do not hear from us within 7 days, please contact the clinic through TapS[employer]hart or phone. If you have a critical or abnormal lab result, we will notify you by phone as soon as possible.  Submit refill requests through [employer] or call your pharmacy and they will forward the refill request to us. Please allow 3 business days for your refill to be completed.          Additional Information About Your Visit        MyChart Information     [employer] gives you secure access to your electronic health record. If you see a primary care provider, you can also send messages to your care team and make appointments. If you have questions, please call your primary care clinic.  If you do not have a primary care provider, please call [phone] and they will assist you.        Care EveryWhere ID     This  is your Care EveryWhere ID. This could be used by other organizations to access your [place] medical records  [id number]        Your Vitals Were     Pulse Temperature Respirations Pulse Oximetry BMI (Body Mass Index)       71 97.7  F (36.5  C) 16 99% 25.02 kg/m2        Blood Pressure from Last 3 Encounters:   03/02/18 100/60   02/08/18 138/64   12/11/17 120/56    Weight from Last 3 Encounters:   03/02/18 71.4 kg (157 lb 6.4 oz)   02/08/18 69.6 kg (153 lb 6.4 oz)   12/11/17 70.4 kg (155 lb 1.6 oz)              We Performed the Following     CBC with Diff/Plt ([facility])     PSA Serum ([facility])     X-ray Chest 2 vws*          Today's Medication Changes          These changes are accurate as of 3/2/18 10:17 AM.  If you have any questions, ask your nurse or doctor.               Start taking these medicines.        Dose/Directions    azithromycin 250 MG tablet   Commonly known as:  ZITHROMAX   Used for:  Cough        Two tablets first day, then one tablet daily for four days.   Quantity:  6 tablet   Refills:  0       guaiFENesin-codeine 100-10 MG/5ML Soln solution   Commonly known as:  ROBITUSSIN AC   Used for:  Cough        Dose:  1 tsp.   Take 5 mLs by mouth every 4 hours as needed for cough   Quantity:  120 mL   Refills:  0            Where to get your medicines      These medications were sent to [place] [address] [place] [facility] [address]  [address] [facility] [address]     Phone:  [phone]     azithromycin 250 MG tablet         Some of these will need a paper prescription and others can be bought over the counter.  Ask your nurse if you have questions.     Bring a paper prescription for each of these medications     guaiFENesin-codeine 100-10 MG/5ML Soln solution                Primary Care Provider Office Phone # Fax #    Clinton Mills [phone] [address]  [facility] [address]        Equal Access to Services     NICK LOONEY AH: jackson Obregon  meka sanya blackgallito garcia. So [facility] [phone].    ATENCIÓN: Si zuhair pacheco, tiene a esqueda disposición servicios gratuitos de asistencia lingüística. Antonio al [phone].    We comply with applicable federal civil rights laws and [place] laws. We do not discriminate on the basis of race, color, national origin, age, disability, sex, sexual orientation, or gender identity.            Thank you!     Thank you for choosing [facility]  for your care. Our goal is always to provide you with excellent care. Hearing back from our patients is one way we can continue to improve our services. Please take a few minutes to complete the written survey that you may receive in the mail after your visit with us. Thank you!             Your Updated Medication List - Protect others around you: Learn how to safely use, store and throw away your medicines at www.disposemymeds.org.          This list is accurate as of 3/2/18 10:17 AM.  Always use your most recent med list.                   Brand Name Dispense Instructions for use Diagnosis    acetaminophen 325 MG tablet    TYLENOL    100 tablet    Take 2 tablets (650 mg) by mouth every 4 hours as needed for mild pain or fever    Generalized muscle weakness       amLODIPine 5 MG tablet    NORVASC    90 tablet    Take 1 tablet (5 mg) by mouth daily    Essential hypertension, benign       azithromycin 250 MG tablet    ZITHROMAX    6 tablet    Two tablets first day, then one tablet daily for four days.    Cough       Calcium carb-Vitamin D 500 mg [place]-200 units 500-200 MG-UNIT per tablet    OSCAL with D;Oyster Shell Calcium     Take 1 tablet by mouth daily        DAILY MULTI PO      Take by mouth daily        fish oil-omega-3 fatty acids 1000 MG capsule      Take 1 capsule by mouth daily.        guaiFENesin-codeine 100-10 MG/5ML Soln solution    ROBITUSSIN AC    120 mL    Take 5 mLs by mouth every 4 hours as needed for  cough    Cough       hydrochlorothiazide 25 MG tablet    HYDRODIURIL    90 tablet    TAKE ONE TABLET BY MOUTH ONE TIME DAILY    Encounter for medication refill       loperamide 2 MG capsule    IMODIUM     Take 2 mg by mouth 4 times daily as needed for diarrhea        NONFORMULARY      2 times daily Activa probiotic yoguart        PRADAXA PO      Take 150 mg by mouth 2 times daily        propranolol 40 MG tablet    INDERAL    60 tablet    Take 1 tablet (40 mg) by mouth 2 times daily    Essential tremor       simvastatin 10 MG tablet    ZOCOR     Take 5 mg by mouth At Bedtime (Takes half of a 10mg tablet for a total of 5mg daily)

## 2018-03-02 NOTE — PATIENT INSTRUCTIONS
Zithromax for bronchitis  Robitussin AC for cough    CXR looked ok  CBC reviewed    F/u if worsening or not better in 4 weeks

## 2018-03-02 NOTE — PROGRESS NOTES
Cc lingering cough      SUBJECTIVE:   Deepak Arndt is a 82 year old male who presents to clinic today for the following health issues:      RESPIRATORY SYMPTOMS- lingering cough  H/o heart disease saw Cardiology in December.   Former smoker  No ACEI  Had flu and pneumonia vaccines  CXR Oct 2017      Duration: x3-4 weeks    Description  Cough dry or phlegm and wheezing comes and goes, worse as day goes on  Bothering his wife at night and sleeping in other room    Severity: moderate    Accompanying signs and symptoms: None    History (predisposing factors):  none    Precipitating or alleviating factors: None    Therapies tried and outcome:  None    [facility]     ROS: 10 point ROS neg other than the symptoms noted above in the HPI.  NO HA  No sinus pressure  No CP  No edema  Forgetful?  Afib    Appetite ok  BELLO no  GERD no  Travel none recent  Exposure: no    ON EXAM  RUL crackles  AFib history  No edema            Problem list and histories reviewed & adjusted, as indicated.  Additional history: as documented    Patient Active Problem List   Diagnosis     HTN (hypertension)     Osteoporosis     Hypercholesterolemia     Past myocardial infarction     Advance Care Planning     Chronic atrial fibrillation (H)     Health Care Home     Myocardial infarction     Mild aortic stenosis     Coronary artery disease involving native coronary artery of native heart without angina pectoris     Aortic valve disorder     Abnormality of gait     Past Surgical History:   Procedure Laterality Date     CATARACT IOL, RT/LT       HC OR OCULAR DEVICE INTRAOP DETACHED RETINA         Social History   Substance Use Topics     Smoking status: Former Smoker     Packs/day: 1.00     Years: 20.00     Types: Cigarettes     Start date: 1954     Quit date: 1/1/1974     Smokeless tobacco: Never Used     Alcohol use No     No family history on file.      Current Outpatient Prescriptions   Medication Sig Dispense Refill     loperamide (IMODIUM)  2 MG capsule Take 2 mg by mouth 4 times daily as needed for diarrhea       NONFORMULARY 2 times daily Activa probiotic yoguart       Multiple Vitamins-Minerals (DAILY MULTI PO) Take by mouth daily       Calcium carb-Vitamin D 500 mg [place]-200 units (OSCAL WITH D;OYSTER SHELL CALCIUM) 500-200 MG-UNIT per tablet Take 1 tablet by mouth daily       acetaminophen (TYLENOL) 325 MG tablet Take 2 tablets (650 mg) by mouth every 4 hours as needed for mild pain or fever 100 tablet      propranolol (INDERAL) 40 MG tablet Take 1 tablet (40 mg) by mouth 2 times daily 60 tablet      hydrochlorothiazide (HYDRODIURIL) 25 MG tablet TAKE ONE TABLET BY MOUTH ONE TIME DAILY 90 tablet 3     amLODIPine (NORVASC) 5 MG tablet Take 1 tablet (5 mg) by mouth daily 90 tablet 3     Dabigatran Etexilate Mesylate (PRADAXA PO) Take 150 mg by mouth 2 times daily       simvastatin (ZOCOR) 10 MG tablet Take 5 mg by mouth At Bedtime (Takes half of a 10mg tablet for a total of 5mg daily)       fish oil-omega-3 fatty acids (OMEGA-3 FISH OIL) 1000 MG capsule Take 1 capsule by mouth daily.       No Known Allergies  Recent Labs   Lab Test  10/23/17   0423  05/15/17   1130  05/15/17   1035  05/04/17   1323  03/13/17   0945  07/12/16 06/01/16 06/02/15   A1C   --    --   5.5   --    --    --    --    --    --    LDL   --   41   --    --    --    --    --   50  59   HDL   --   55   --    --    --    --    --   53  49   TRIG   --   140   --    --    --    --    --   94  101   ALT   --    --    --   21  13   --    --    --    --    CR  0.95  0.9   --   0.89  0.86   --    --   0.9  0.9   GFRESTIMATED  76  >60   --    --    --    --    --    --    --    GFRESTBLACK  >90   --    --    --    --    --    --    --    --    POTASSIUM  3.4   --    --   4.2  3.7   < >   --    --    --    TSH  0.50   --    --    --    --    --   0.39   --    --     < > = values in this interval not displayed.      BP Readings from Last 3 Encounters:   03/02/18 100/60   02/08/18 138/64  "  12/11/17 120/56    Wt Readings from Last 3 Encounters:   03/02/18 71.4 kg (157 lb 6.4 oz)   02/08/18 69.6 kg (153 lb 6.4 oz)   12/11/17 70.4 kg (155 lb 1.6 oz)       Estimated body mass index is 25.02 kg/(m^2) as calculated from the following:    Height as of 2/8/18: 1.689 m (5' 6.5\").    Weight as of this encounter: 71.4 kg (157 lb 6.4 oz).             Labs reviewed in EPIC    Reviewed and updated as needed this visit by clinical staff       Reviewed and updated as needed this visit by Provider         ROS:  Constitutional, HEENT, cardiovascular, pulmonary, GI, , musculoskeletal, neuro, skin, endocrine and psych systems are negative, except as otherwise noted.AS ABOVE    OBJECTIVE:     /60  Pulse 71  Temp 97.7  F (36.5  C)  Resp 16  Wt 71.4 kg (157 lb 6.4 oz)  SpO2 99%  BMI 25.02 kg/m2  There is no height or weight on file to calculate BMI.  GENERAL: healthy, alert and no distress  EYES: Eyes grossly normal to inspection, PERRL and conjunctivae and sclerae normal  HENT: ear canals and TM's normal, nose and mouth without ulcers or lesions  NECK: no adenopathy, no asymmetry, masses, or scars and thyroid normal to palpation  RESP: lungs clear to auscultation - no rales, rhonchi or wheezes RUL CRACKLES  CV: regular rate and rhythm, normal S1 S2, no S3 or S4, no murmur, click or rub, no peripheral edema and peripheral pulses strong  ABDOMEN: soft, nontender, no hepatosplenomegaly, no masses and bowel sounds normal  MS: no gross musculoskeletal defects noted, no edema  SKIN: no suspicious lesions or rashes  NEURO: Normal strength and tone, mentation intact and speech normal  PSYCH: mentation appears normal, affect normal/bright  LYMPH: no cervical, supraclavicular, axillary, or inguinal adenopathy    Diagnostic Test Results:  Results for orders placed or performed during the hospital encounter of 10/23/17   XR Chest 2 Views    Narrative    XR CHEST 2 VW  10/23/2017 4:45 AM      HISTORY: Weakness. "     COMPARISON: 12/28/2016.    FINDINGS: The heart is at the upper limits of normal in size without  pulmonary edema. The thoracic aorta is calcified and tortuous. The  lungs are clear. No pneumothorax or pleural effusion.      Impression    IMPRESSION: No acute abnormality.    VINCE REZA MD   CT Head w/o Contrast    Narrative    CT HEAD W/O CONTRAST 10/23/2017 4:51 AM      HISTORY: Fall.    TECHNIQUE: Routine noncontrast head CT. Radiation dose for this scan  was reduced using automated exposure control, adjustment of the mA  and/or kV according to patient size, or iterative reconstruction  technique.    COMPARISON: 12/28/2016.    FINDINGS: There is mild generalized brain atrophy. No mass, mass  effect or intracranial hemorrhage. No evidence of acute infarct.  Periventricular low attenuation is consistent with chronic small  vessel ischemic disease. Retention cyst or polyp in the right  maxillary antrum.      Impression    IMPRESSION:  No acute abnormality.    VINCE REZA MD   UA reflex to Microscopic and Culture   Result Value Ref Range    Color Urine Yellow     Appearance Urine Clear     Glucose Urine Negative NEG^Negative mg/dL    Bilirubin Urine Negative NEG^Negative    Ketones Urine Negative NEG^Negative mg/dL    Specific Gravity Urine 1.020 1.003 - 1.035    Blood Urine Moderate (A) NEG^Negative    pH Urine 5.5 5.0 - 7.0 pH    Protein Albumin Urine 30 (A) NEG^Negative mg/dL    Urobilinogen mg/dL Normal 0.0 - 2.0 mg/dL    Nitrite Urine Negative NEG^Negative    Leukocyte Esterase Urine Negative NEG^Negative    Source Midstream Urine     RBC Urine 2 0 - 2 /HPF    WBC Urine 1 0 - 2 /HPF    Mucous Urine Present (A) NEG^Negative /LPF   Basic metabolic panel   Result Value Ref Range    Sodium 134 133 - 144 mmol/L    Potassium 3.4 3.4 - 5.3 mmol/L    Chloride 101 94 - 109 mmol/L    Carbon Dioxide 24 20 - 32 mmol/L    Anion Gap 9 3 - 14 mmol/L    Glucose 125 (H) 70 - 99 mg/dL    Urea Nitrogen 21 7 - 30 mg/dL     Creatinine 0.95 0.66 - 1.25 mg/dL    GFR Estimate 76 >60 mL/min/1.7m2    GFR Estimate If Black >90 >60 mL/min/1.7m2    Calcium 8.8 8.5 - 10.1 mg/dL   CBC with platelets differential   Result Value Ref Range    WBC 8.0 4.0 - 11.0 10e9/L    RBC Count 4.65 4.4 - 5.9 10e12/L    Hemoglobin 16.3 13.3 - 17.7 g/dL    Hematocrit 45.0 40.0 - 53.0 %    MCV 97 78 - 100 fl    MCH 35.1 (H) 26.5 - 33.0 pg    MCHC 36.2 31.5 - 36.5 g/dL    RDW 13.4 10.0 - 15.0 %    Platelet Count 104 (L) 150 - 450 10e9/L    Diff Method Automated Method     % Neutrophils 85.5 %    % Lymphocytes 4.7 %    % Monocytes 9.2 %    % Eosinophils 0.0 %    % Basophils 0.1 %    % Immature Granulocytes 0.5 %    Nucleated RBCs 0 0 /100    Absolute Neutrophil 6.9 1.6 - 8.3 10e9/L    Absolute Lymphocytes 0.4 (L) 0.8 - 5.3 10e9/L    Absolute Monocytes 0.7 0.0 - 1.3 10e9/L    Absolute Eosinophils 0.0 0.0 - 0.7 10e9/L    Absolute Basophils 0.0 0.0 - 0.2 10e9/L    Abs Immature Granulocytes 0.0 0 - 0.4 10e9/L    Absolute Nucleated RBC 0.0    TSH with free T4 reflex   Result Value Ref Range    TSH 0.50 0.40 - 4.00 mU/L   Troponin I   Result Value Ref Range    Troponin I ES 0.068 (H) 0.000 - 0.045 ug/L   Troponin I   Result Value Ref Range    Troponin I ES 0.063 (H) 0.000 - 0.045 ug/L   CK total   Result Value Ref Range    CK Total 185 30 - 300 U/L   UA with Microscopic reflex to Culture   Result Value Ref Range    Color Urine Yellow     Appearance Urine Slightly Cloudy     Glucose Urine Negative NEG^Negative mg/dL    Bilirubin Urine Negative NEG^Negative    Ketones Urine 10 (A) NEG^Negative mg/dL    Specific Gravity Urine 1.020 1.003 - 1.035    Blood Urine Moderate (A) NEG^Negative    pH Urine 5.5 5.0 - 7.0 pH    Protein Albumin Urine 100 (A) NEG^Negative mg/dL    Urobilinogen mg/dL Normal 0.0 - 2.0 mg/dL    Nitrite Urine Negative NEG^Negative    Leukocyte Esterase Urine Negative NEG^Negative    Source Catheterized Urine     WBC Urine 2 0 - 2 /HPF    RBC Urine 0 0 - 2  /HPF    Mucous Urine Present (A) NEG^Negative /LPF    Granular Casts 3 (A) NEG^Negative /LPF    Amorphous Crystals Few (A) NEG^Negative /HPF   EKG 12-lead, tracing only   Result Value Ref Range    Interpretation ECG Click View Image link to view waveform and result    Echo Complete with Lumason    Narrative    [id number]  [facility]  [id number]  [id number]^HARDY^M           [facility]  Echocardiography Laboratory  [address] [place] [address]        Name: LA LOCKE  MRN: [id number]  : 1935  Study Date: 10/23/2017 11:09 AM  Age: 82 yrs  Gender: Male  Patient Location: [facility]  Reason For Study: Troponin elevation  Ordering Physician: HARDY MCKEE  Referring Physician: [place]  Performed By: [place] RDCS     BSA: 1.8 m2  Height: 68 in  Weight: 145 lb  HR: 66  BP: 139/92 mmHg  _____________________________________________________________________________  __        Procedure  Complete Portable Echo Adult. Contrast Lumason.  _____________________________________________________________________________  __        Interpretation Summary     There is mild concentric left ventricular hypertrophy.  The visual ejection fraction is estimated at 55-60%.  No regional wall motion abnormalities noted.  The right ventricle is normal in structure, function and size.  The left atrium is severely dilated.  The right atrium is moderately dilated.  There is mild (1+) mitral regurgitation.  There is mild (1+) tricuspid regurgitation.  There is trace to mild aortic regurgitation.  There is mild (1+) pulmonic valvular regurgitation.  Mild aortic root dilatation.  The ascending aorta is Mildly dilated.  _____________________________________________________________________________  __        Left Ventricle  The left ventricle is normal in size. There is mild concentric left  ventricular hypertrophy. Left ventricular systolic function is normal. The  visual ejection fraction is estimated  at 55-60%. Left ventricular diastolic  function is indeterminate. No regional wall motion abnormalities noted.     Right Ventricle  The right ventricle is normal in structure, function and size.     Atria  The left atrium is severely dilated. The right atrium is moderately dilated.  There is no atrial shunt seen.     Mitral Valve  There is mild mitral annular calcification. There is mild (1+) mitral  regurgitation.        Tricuspid Valve  The tricuspid valve is normal in structure and function. There is mild (1+)  tricuspid regurgitation. The right ventricular systolic pressure is  approximated at 36.1 mmHg plus the right atrial pressure. Right ventricular  systolic pressure is elevated, consistent with mild to moderate pulmonary  hypertension.     Aortic Valve  There is mild trileaflet aortic sclerosis. There is trace to mild aortic  regurgitation.     Pulmonic Valve  The pulmonic valve is not well seen, but is grossly normal. There is mild (1+)  pulmonic valvular regurgitation.     Vessels  Mild aortic root dilatation. The ascending aorta is Mildly dilated.     Pericardium  The pericardium appears normal.        Rhythm  Sinus rhythm was noted.  _____________________________________________________________________________  __  MMode/2D Measurements & Calculations  IVSd: 1.4 cm     LVIDd: 4.9 cm  LVIDs: 2.7 cm  LVPWd: 1.2 cm  FS: 44.5 %  EDV(Teich): 111.5 ml  ESV(Teich): 27.1 ml  LV mass(C)d: 264.3 grams  LV mass(C)dI: 148.2 grams/m2  Ao root diam: 3.7 cm  LA dimension: 4.4 cm  asc Aorta Diam: 3.9 cm  LA/Ao: 1.2  LA Volume (BP): 90.7 ml  LA Volume Index (BP): 51.0 ml/m2           Doppler Measurements & Calculations  MV E max mary ann: 79.6 cm/sec  MV dec time: 0.28 sec  PA acc time: 0.10 sec  TR max mary ann: 300.6 cm/sec  TR max P.1 mmHg  Lateral E/e': 6.7  Med E to E': 12.1  Medial E/e': 12.1           _____________________________________________________________________________  __           Report approved by:  Kirk Malhotra 10/23/2017 01:14 PM          ASSESSMENT/PLAN:     ASSESSMENT / PLAN:  (R05) Cough  (primary encounter diagnosis)  Comment: ?bronchitis. CXR negative other than cardiomegaly  Plan: CBC with Diff/Plt ([facility]), X-ray Chest 2 vws*,         azithromycin (ZITHROMAX) 250 MG tablet,         guaiFENesin-codeine (ROBITUSSIN AC) 100-10         MG/5ML SOLN solution            (Z12.5) Screening for prostate cancer  Comment:   Plan: PSA Serum ([facility])            (I48.2) Chronic atrial fibrillation (H)  Comment:   Plan: Continue cares    (E78.5) Hyperlipidemia LDL goal <70  Comment:   Plan: Lipid Panel ([facility])                Patient Instructions   Zithromax for bronchitis  Robitussin AC for cough    CXR looked ok  CBC reviewed    F/u if worsening or not better in 4 weeks      Chacha Leija MD  [facility]

## 2018-08-28 NOTE — PROGRESS NOTES
SUBJECTIVE:   Deepak Arndt is a 83 year old male who presents to clinic today for the following health issues:  1-2 months of acid in stomach, happens every day, sharp burning pain sour taste after eating.  Pepto not helping. Has not experienced prior to this. No changes in meds or foods. No significant stress.       GERD/Heartburn  Onset: 1-2 months    Description:     Burning in chest: YES    Intensity: moderate    Progression of Symptoms: same    Accompanying Signs & Symptoms:  Does it feel like food gets stuck: no   Nausea: no   Vomiting (bloody?): no   Abdominal Pain: no   Black-Tarry stools: no :  Bloody stools: no     History:   Previous ulcers: no     Precipitating factors:   Caffeine use: YES  Alcohol use: YES  NSAID/Aspirin use: no   Tobacco use: no   Worse with no particular food or drink.    Alleviating factors:  nothing    Therapies Tried and outcome:liquid antacid and chewable antacid      Problem list and histories reviewed & adjusted, as indicated.  Additional history: as documented    Patient Active Problem List   Diagnosis     HTN (hypertension)     Osteoporosis     Hypercholesterolemia     Past myocardial infarction     Advance Care Planning     Chronic atrial fibrillation (H)     Health Care Home     Myocardial infarction     Mild aortic stenosis     Coronary artery disease involving native coronary artery of native heart without angina pectoris     Aortic valve disorder     Abnormality of gait     Past Surgical History:   Procedure Laterality Date     CATARACT IOL, RT/LT       HC OR OCULAR DEVICE INTRAOP DETACHED RETINA         Social History   Substance Use Topics     Smoking status: Former Smoker     Packs/day: 1.00     Years: 20.00     Types: Cigarettes     Start date: 1954     Quit date: 1/1/1974     Smokeless tobacco: Never Used     Alcohol use No     No family history on file.        Reviewed and updated as needed this visit by clinical staff       Reviewed and updated as needed this  visit by Provider         ROS:  Constitutional, HEENT, cardiovascular, pulmonary, gi and gu systems are negative, except as otherwise noted.    OBJECTIVE:     /70  Pulse (!) 48  Resp 16  Wt 71.2 kg (157 lb)  SpO2 98%  BMI 24.96 kg/m2  Body mass index is 24.96 kg/(m^2).  GENERAL: healthy, alert and no distress  NECK: no adenopathy, no asymmetry, masses, or scars and thyroid normal to palpation  RESP: lungs clear to auscultation - no rales, rhonchi or wheezes  CV: regular rate and rhythm, normal S1 S2, no S3 or S4, no murmur, click or rub, no peripheral edema and peripheral pulses strong  ABDOMEN: soft, nontender, no hepatosplenomegaly, no masses and bowel sounds normal  ABDOMEN: soft, nontender, without hepatosplenomegaly or masses and bowel sounds normal  MS: no gross musculoskeletal defects noted, no edema  NEURO: Normal strength and tone, mentation intact and speech normal  PSYCH: mentation appears normal, affect normal/bright    Diagnostic Test Results:  none     ASSESSMENT/PLAN:       ICD-10-CM    1. Gastroesophageal reflux disease, esophagitis presence not specified K21.9 omeprazole (PRILOSEC) 20 MG CR capsule   referral for Upper GI if pain persists after trial of omeprazole  Discussed the functional nature of this condition regarding what they eat, how they eat, when they eat, and how much they eat as all contributing to gastroesophageal symptoms.    Recommend anti-reflux diet and eating patterns emphasizing smaller more frequent meals and timing relative to bedtime.  Also recommend avoiding tomatoes, onions, spicy foods, caffeine, or any other food/beverage that cause increased reflux.    If symptoms not controlled on current therapies, then need to return for further evaluation and consideration of further studies.    FUTURE APPOINTMENTS:       - Follow-up for annual visit or as needed    SHILPA Babin [facility]

## 2018-08-28 NOTE — MR AVS SNAPSHOT
After Visit Summary   8/28/2018    Deepak Arndt    MRN: [id number]           Patient Information     Date Of Birth          1935        Visit Information        Provider Department      8/28/2018 9:00 AM Zuri Ko APRN CNP [facility]        Today's Diagnoses     Gastroesophageal reflux disease, esophagitis presence not specified    -  1       Follow-ups after your visit        Follow-up notes from your care team     Return if symptoms worsen or fail to improve.      Who to contact     If you have questions or need follow up information about today's clinic visit or your schedule please contact [facility] directly at [phone].  Normal or non-critical lab and imaging results will be communicated to you by MyChart, letter or phone within 4 business days after the clinic has received the results. If you do not hear from us within 7 days, please contact the clinic through [employer] or phone. If you have a critical or abnormal lab result, we will notify you by phone as soon as possible.  Submit refill requests through [employer] or call your pharmacy and they will forward the refill request to us. Please allow 3 business days for your refill to be completed.          Additional Information About Your Visit        MyChart Information     [employer] gives you secure access to your electronic health record. If you see a primary care provider, you can also send messages to your care team and make appointments. If you have questions, please call your primary care clinic.  If you do not have a primary care provider, please call [phone] and they will assist you.        Care EveryWhere ID     This is your Care EveryWhere ID. This could be used by other organizations to access your [place] medical records  [id number]        Your Vitals Were     Pulse Respirations Pulse Oximetry BMI (Body Mass Index)          48 16 98% 24.96 kg/m2         Blood Pressure from Last 3  Encounters:   08/28/18 126/70   03/02/18 100/60   02/08/18 138/64    Weight from Last 3 Encounters:   08/28/18 71.2 kg (157 lb)   03/02/18 71.4 kg (157 lb 6.4 oz)   02/08/18 69.6 kg (153 lb 6.4 oz)              Today, you had the following     No orders found for display         Today's Medication Changes          These changes are accurate as of 8/28/18 10:42 AM.  If you have any questions, ask your nurse or doctor.               Start taking these medicines.        Dose/Directions    omeprazole 20 MG CR capsule   Commonly known as:  priLOSEC   Used for:  Gastroesophageal reflux disease, esophagitis presence not specified   Started by:  Zuri Ko APRN CNP        Dose:  20 mg   Take 1 capsule (20 mg) by mouth daily   Quantity:  30 capsule   Refills:  1            Where to get your medicines      These medications were sent to [facility] [facility] - [place], [place] - [address]  [address] [facility] [address]     Phone:  [phone]     omeprazole 20 MG CR capsule                Primary Care Provider Office Phone # Fax #    [place] [phone] [address]  [facility] [address]        Equal Access to Services     NICK LOONEY : Hadii peggy ku hadasho Soomaali, waaxda luqadaha, qaybta kaalmada adeegyada, gallito dick hayjorge gill . So [facility] [phone].    ATENCIÓN: Si habla español, tiene a esqueda disposición servicios gratuitos de asistencia lingüística. Llame al [phone].    We comply with applicable federal civil rights laws and [place] laws. We do not discriminate on the basis of race, color, national origin, age, disability, sex, sexual orientation, or gender identity.            Thank you!     Thank you for choosing [facility]  for your care. Our goal is always to provide you with excellent care. Hearing back from our patients is one way we can continue to improve our services. Please take a few minutes to complete the written  survey that you may receive in the mail after your visit with us. Thank you!             Your Updated Medication List - Protect others around you: Learn how to safely use, store and throw away your medicines at www.disposemymeds.org.          This list is accurate as of 8/28/18 10:42 AM.  Always use your most recent med list.                   Brand Name Dispense Instructions for use Diagnosis    acetaminophen 325 MG tablet    TYLENOL    100 tablet    Take 2 tablets (650 mg) by mouth every 4 hours as needed for mild pain or fever    Generalized muscle weakness       amLODIPine 5 MG tablet    NORVASC    90 tablet    Take 1 tablet (5 mg) by mouth daily    Essential hypertension, benign       azithromycin 250 MG tablet    ZITHROMAX    6 tablet    Two tablets first day, then one tablet daily for four days.    Cough       calcium carbonate 500 mg-vitamin D 200 units 500-200 MG-UNIT per tablet    OSCAL with D;OYSTER SHELL CALCIUM     Take 1 tablet by mouth daily        DAILY MULTI PO      Take by mouth daily        fish oil-omega-3 fatty acids 1000 MG capsule      Take 1 capsule by mouth daily.        guaiFENesin-codeine 100-10 MG/5ML Soln solution    ROBITUSSIN AC    120 mL    Take 5 mLs by mouth every 4 hours as needed for cough    Cough       hydrochlorothiazide 25 MG tablet    HYDRODIURIL    90 tablet    TAKE 1 TABLET BY MOUTH ONCE DAILY    Encounter for medication refill       loperamide 2 MG capsule    IMODIUM     Take 2 mg by mouth 4 times daily as needed for diarrhea        NONFORMULARY      2 times daily Activa probiotic yoguart        omeprazole 20 MG CR capsule    priLOSEC    30 capsule    Take 1 capsule (20 mg) by mouth daily    Gastroesophageal reflux disease, esophagitis presence not specified       PRADAXA PO      Take 150 mg by mouth 2 times daily        propranolol 40 MG tablet    INDERAL    60 tablet    TAKE 1 TABLET BY MOUTH TWICE DAILY FOR ESSENTIAL TREMOR    Essential tremor       simvastatin 10 MG  tablet    ZOCOR     Take 5 mg by mouth At Bedtime (Takes half of a 10mg tablet for a total of 5mg daily)

## 2018-09-07 NOTE — MR AVS SNAPSHOT
After Visit Summary   9/7/2018    Deepak Arndt    MRN: [id number]           Patient Information     Date Of Birth          1935        Visit Information        Provider Department      9/7/2018 11:00 AM Zuri Ko APRN CNP [facility]        Today's Diagnoses     Gastroesophageal reflux disease, esophagitis presence not specified          Care Instructions        Lifestyle Changes for Controlling GERD  When you have GERD, stomach acid feels as if it s backing up toward your mouth. Whether or not you take medicine to control your GERD, your symptoms can often be improved with lifestyle changes. Talk to your healthcare provider about the following suggestions. These suggestions may help you get relief from your symptoms.      Raise your head  Reflux is more likely to strike when you re lying down flat, because stomach fluid can flow backward more easily. Raising the head of your bed 4 to 6 inches can help. To do this:    Slide blocks or books under the legs at the head of your bed. Or, place a wedge under the mattress. Many [employer] can make a suitable wedge for you. The wedge should run from your waist to the top of your head.    Don t just prop your head on several pillows. This increases pressure on your stomach. It can make GERD worse.  Watch your eating habits  Certain foods may increase the acid in your stomach or relax the lower esophageal sphincter. This makes GERD more likely. It s best to avoid the following if they cause you symptoms:    Coffee, tea, and carbonated drinks (with and without caffeine)    Fatty, fried, or spicy food    Mint, chocolate, onions, and tomatoes    Peppermint    Any other foods that seem to irritate your stomach or cause you pain  Relieve the pressure  Tips include the following:    Eat smaller meals, even if you have to eat more often.    Don t lie down right after you eat. Wait a few hours for your stomach to empty.    Avoid tight  belts and tight-fitting clothes.    Lose excess weight.  Tobacco and alcohol  Avoid smoking tobacco and drinking alcohol. They can make GERD symptoms worse.  Date Last Reviewed: 7/1/2016 2000-2017 [employer]. [address] [place] [address], [place] [address]. All rights reserved. This information is not intended as a substitute for professional medical care. Always follow your healthcare professional's instructions.                      Follow-ups after your visit        Who to contact     If you have questions or need follow up information about today's clinic visit or your schedule please contact [facility] directly at [phone].  Normal or non-critical lab and imaging results will be communicated to you by [employer], letter or phone within 4 business days after the clinic has received the results. If you do not hear from us within 7 days, please contact the clinic through [employer] or phone. If you have a critical or abnormal lab result, we will notify you by phone as soon as possible.  Submit refill requests through [employer] or call your pharmacy and they will forward the refill request to us. Please allow 3 business days for your refill to be completed.          Additional Information About Your Visit        [employer] Information     [employer] gives you secure access to your electronic health record. If you see a primary care provider, you can also send messages to your care team and make appointments. If you have questions, please call your primary care clinic.  If you do not have a primary care provider, please call [phone] and they will assist you.        Care EveryWhere ID     This is your Care EveryWhere ID. This could be used by other organizations to access your [place] medical records  [id number]        Your Vitals Were     Pulse Respirations Pulse Oximetry BMI (Body Mass Index)          57 16 98% 24.17 kg/m2         Blood Pressure from Last 3 Encounters:   09/07/18 128/62    08/28/18 126/70   03/02/18 100/60    Weight from Last 3 Encounters:   09/07/18 68.9 kg (152 lb)   08/28/18 71.2 kg (157 lb)   03/02/18 71.4 kg (157 lb 6.4 oz)              Today, you had the following     No orders found for display         Today's Medication Changes          These changes are accurate as of 9/7/18 11:10 AM.  If you have any questions, ask your nurse or doctor.               These medicines have changed or have updated prescriptions.        Dose/Directions    omeprazole 20 MG CR capsule   Commonly known as:  priLOSEC   This may have changed:  when to take this   Used for:  Gastroesophageal reflux disease, esophagitis presence not specified   Changed by:  Zuri Ko APRN CNP        Dose:  20 mg   Take 1 capsule (20 mg) by mouth 2 times daily   Quantity:  60 capsule   Refills:  1            Where to get your medicines      Some of these will need a paper prescription and others can be bought over the counter.  Ask your nurse if you have questions.     You don't need a prescription for these medications     omeprazole 20 MG CR capsule                Primary Care Provider Office Phone # Fax #    [place] [phone] [address]  [facility] [address]        Equal Access to Services     NICK LOONEY AH: Hadii peggy ku hadasho Soomaali, waaxda luqadaha, qaybta kaalmada adeegyada, waxay mayelin barth. So [facility] [phone].    ATENCIÓN: Si habla español, tiene a esqueda disposición servicios gratuitos de asistencia lingüística. Llame al [phone].    We comply with applicable federal civil rights laws and [place] laws. We do not discriminate on the basis of race, color, national origin, age, disability, sex, sexual orientation, or gender identity.            Thank you!     Thank you for choosing [facility]  for your care. Our goal is always to provide you with excellent care. Hearing back from our patients is one way we can  continue to improve our services. Please take a few minutes to complete the written survey that you may receive in the mail after your visit with us. Thank you!             Your Updated Medication List - Protect others around you: Learn how to safely use, store and throw away your medicines at www.disposemymeds.org.          This list is accurate as of 9/7/18 11:10 AM.  Always use your most recent med list.                   Brand Name Dispense Instructions for use Diagnosis    acetaminophen 325 MG tablet    TYLENOL    100 tablet    Take 2 tablets (650 mg) by mouth every 4 hours as needed for mild pain or fever    Generalized muscle weakness       amLODIPine 5 MG tablet    NORVASC    90 tablet    Take 1 tablet (5 mg) by mouth daily    Essential hypertension, benign       calcium carbonate 500 mg-vitamin D 200 units 500-200 MG-UNIT per tablet    OSCAL with D;OYSTER SHELL CALCIUM     Take 1 tablet by mouth daily        DAILY MULTI PO      Take by mouth daily        fish oil-omega-3 fatty acids 1000 MG capsule      Take 1 capsule by mouth daily.        guaiFENesin-codeine 100-10 MG/5ML Soln solution    ROBITUSSIN AC    120 mL    Take 5 mLs by mouth every 4 hours as needed for cough    Cough       hydrochlorothiazide 25 MG tablet    HYDRODIURIL    90 tablet    TAKE 1 TABLET BY MOUTH ONCE DAILY    Encounter for medication refill       loperamide 2 MG capsule    IMODIUM     Take 2 mg by mouth 4 times daily as needed for diarrhea        NONFORMULARY      2 times daily Activa probiotic yoguart        omeprazole 20 MG CR capsule    priLOSEC    60 capsule    Take 1 capsule (20 mg) by mouth 2 times daily    Gastroesophageal reflux disease, esophagitis presence not specified       PRADAXA PO      Take 150 mg by mouth 2 times daily        propranolol 40 MG tablet    INDERAL    60 tablet    TAKE 1 TABLET BY MOUTH TWICE DAILY FOR ESSENTIAL TREMOR    Essential tremor       simvastatin 10 MG tablet    ZOCOR     Take 5 mg by mouth At  Bedtime (Takes half of a 10mg tablet for a total of 5mg daily)

## 2018-09-07 NOTE — PROGRESS NOTES
Problem(s) Oriented visit        SUBJECTIVE:                                                    Deepak Arndt is a 83 year old male who presents to clinic today for the following health issues : food burns on the way down. No bloating, no vomiting, no acid taste, no hoarse voice, no choking. Tried Omeorazole since 8/28/18 20 mg in am and did not help. Has been avoiding tomatoes, spicy foods, vinegar. Weight loss due to not eating.            Problem list, Medication list, Allergies, and Medical/Social/Surgical histories reviewed in EPIC and updated as appropriate.   Additional history: as documented    ROS:  5 point ROS completed and negative except noted above, including Gen, CV, Resp, GI, MS    Histories:   Patient Active Problem List   Diagnosis     HTN (hypertension)     Osteoporosis     Hypercholesterolemia     Past myocardial infarction     Advance Care Planning     Chronic atrial fibrillation (H)     Health Care Home     Myocardial infarction     Mild aortic stenosis     Coronary artery disease involving native coronary artery of native heart without angina pectoris     Aortic valve disorder     Abnormality of gait     Past Surgical History:   Procedure Laterality Date     CATARACT IOL, RT/LT       HC OR OCULAR DEVICE INTRAOP DETACHED RETINA         Social History   Substance Use Topics     Smoking status: Former Smoker     Packs/day: 1.00     Years: 20.00     Types: Cigarettes     Start date: 1954     Quit date: 1/1/1974     Smokeless tobacco: Never Used     Alcohol use No     No family history on file.        OBJECTIVE:                                                    /62  Pulse 57  Resp 16  Wt 68.9 kg (152 lb)  SpO2 98%  BMI 24.17 kg/m2  Body mass index is 24.17 kg/(m^2).   APPEARANCE: = Relaxed and in no distress  Conj/Eyelids = noninjected and lids and lashes are without inflammation  Oropharynx = No leukoplakia, No injection to the tissues, Normal Uvula  Neck = No anterior or posterior  adenopathy appreciated.  Thyroid = Not enlarged and no masses felt  Resp effort = Calm regular breathing  Breath Sounds = Good air movement with no rales or rhonchi in any lung fields  Heart Rate, Rhythm, & sounds (no Murm)  = Regular rate and rhythm with no S3, S4, or murmur appreciated.  Carotid Art's = Pulses full and equal and no bruits appreciated  Abdomen = Soft, nontender, no masses, & bowel sounds in all quadrants  SKIN = absent significant rashes or lesions   Recent/Remote Memory = Alert and Oriented x 3  Mood/Affect = Cooperative and interested     ASSESSMENT/PLAN:                                                    1. Gastroesophageal reflux disease, esophagitis presence not specified  Lifestyle printout given  - omeprazole (PRILOSEC) 20 MG CR capsule; Take 1 capsule (20 mg) by mouth 2 times daily  Dispense: 60 capsule;     Will call next week to update if any relief. Will try new med next week if no relief.             The following health maintenance items are reviewed in Epic and correct as of today:  Health Maintenance   Topic Date Due     MEDICARE ANNUAL WELLNESS VISIT  06/06/2018     FALL RISK ASSESSMENT  06/06/2018     PHQ-2 Q1 YR  06/06/2018     INFLUENZA VACCINE (1) 09/01/2018     LIPID MONITORING Q6 MO  11/25/2018     PSA Q1 YR  03/02/2019     ADVANCE DIRECTIVE PLANNING Q5 YRS  10/29/2020     TETANUS IMMUNIZATION (SYSTEM ASSIGNED)  02/27/2023     PNEUMOCOCCAL  Completed       SHILPA Babin CNP  [facility]  Family Practice  [facility]  [phone]    For any issues my office # is [phone]

## 2018-09-07 NOTE — PATIENT INSTRUCTIONS
Lifestyle Changes for Controlling GERD  When you have GERD, stomach acid feels as if it s backing up toward your mouth. Whether or not you take medicine to control your GERD, your symptoms can often be improved with lifestyle changes. Talk to your healthcare provider about the following suggestions. These suggestions may help you get relief from your symptoms.      Raise your head  Reflux is more likely to strike when you re lying down flat, because stomach fluid can flow backward more easily. Raising the head of your bed 4 to 6 inches can help. To do this:    Slide blocks or books under the legs at the head of your bed. Or, place a wedge under the mattress. Many [employer] can make a suitable wedge for you. The wedge should run from your waist to the top of your head.    Don t just prop your head on several pillows. This increases pressure on your stomach. It can make GERD worse.  Watch your eating habits  Certain foods may increase the acid in your stomach or relax the lower esophageal sphincter. This makes GERD more likely. It s best to avoid the following if they cause you symptoms:    Coffee, tea, and carbonated drinks (with and without caffeine)    Fatty, fried, or spicy food    Mint, chocolate, onions, and tomatoes    Peppermint    Any other foods that seem to irritate your stomach or cause you pain  Relieve the pressure  Tips include the following:    Eat smaller meals, even if you have to eat more often.    Don t lie down right after you eat. Wait a few hours for your stomach to empty.    Avoid tight belts and tight-fitting clothes.    Lose excess weight.  Tobacco and alcohol  Avoid smoking tobacco and drinking alcohol. They can make GERD symptoms worse.  Date Last Reviewed: 7/1/2016 2000-2017 [employer]. [address] [place] [address]. All rights reserved. This information is not intended as a substitute for professional medical care. Always follow your healthcare  professional's instructions.

## 2018-09-12 NOTE — TELEPHONE ENCOUNTER
Patient calls to report to Zuri JAQUEZ that burning/discomfort with swallowing food has not improved since increased omeprazole 20mg BID - increased from QD at 9/7/18 visit with Zuri JAQUEZ.   Plan: per Zuri JAQUEZ, recommend EGD for eval and while waiting for this procedure appt, try adding ranitidine 150mg BID.   Patient agrees. Ranitidine rx sent to pharmacy and referral for EGD sent to [place] [facility] to call patient.  Terri Sumner RN

## 2018-09-19 NOTE — ED PROVIDER NOTES
"  History     Chief Complaint:  Abdominal Pain    HPI   Deepak Arndt is a 83 year old male with a history of GERD, atrial fibrillation, HTN, an MI, and hyperlipidemia who presents to the ED for evaluation of abdominal pain. He reports that he had a pre-EGD physical scheduled for this morning, but his stomach felt so bad that he canceled that appointment and decided to present to the ED. In the ED, he describes the pain as a burning sensation, and while it is worse after eating, it is almost constant. He reports the pain as being localized to the upper region of his abdomen. He denies any black or tarry stools, diarrhea, constipation, vomiting, fevers, or chills. He does report that he has lost some weight in the last month because he is not eating well. He reports a history of TIAs, but no MIs.      Allergies:  NKDA    Medications:    Amlodipine  Pradaxa  Hydrochlorothiazide  Zantac  Zocor  Imodiium  Prilosec  Inderal    Past Medical History:    A-fib  Cancer  CAD  ED  Hyperlipidemia  HTN  Mild aortic stenosis  Multiple thyroid nodules  MI  Osteoporosis    Past Surgical History:    Cataract surgery    Family History:    No past pertinent family history.    Social History:  Marital Status:   [2]  Former smoker; Quit 1974  Negative for alcohol use.     Review of Systems   Constitutional: Positive for unexpected weight change. Negative for chills and fever.   Gastrointestinal: Positive for abdominal pain. Negative for blood in stool, constipation, diarrhea and vomiting.   All other systems reviewed and are negative.    Physical Exam     Patient Vitals for the past 24 hrs:   BP Temp Temp src Pulse Heart Rate Resp SpO2 Height Weight   09/19/18 1606 122/70 - - (!) 41 - 16 - - -   09/19/18 1601 122/70 - - (!) 41 - - 95 % - -   09/19/18 1217 149/74 98.1  F (36.7  C) Oral - 50 16 97 % 1.753 m (5' 9\") 72.6 kg (160 lb)       Physical Exam  GENERAL: well developed, pleasant  HEAD: atraumatic  EYES: pupils reactive, " extraocular muscles intact, conjunctivae normal  ENT:  mucus membranes moist  NECK:  trachea midline, normal range of motion  RESPIRATORY: no tachypnea, breath sounds clear to auscultation   CVS: normal S1/S2, no murmurs, intact distal pulses  ABDOMEN: soft, nontender, nondistention  MUSCULOSKELETAL: no deformities  SKIN: warm and dry, no acute rashes or ulceration  NEURO: GCS 15, cranial nerves intact, alert and oriented x3  PSYCH:  Mood/affect normal    Emergency Department Course   ECG:  Indication: Upper Abdominal pain.   Time: 1223  Vent. Rate 48 bpm. ME interval *. QRS duration 96. QT/QTc 482/430. P-R-T axis * 66 48.  Junctinoal rhythm. Abnormal ECG. Read time: 1231.    Laboratory:  CBC: WBC: 12.5 (H), HGB: 17.0, PLT: 253  CMP: Glucose 100 (H), o/w WNL (Creatinine: 1.09)  Lipase: 200  1412 Troponin: <0.015    Interventions:  1448 Protonix, 40 mg, IV   GI cocktail, 30 mL, oral    Emergency Department Course:  Nursing notes and vitals reviewed. (9483) I performed an exam of the patient as documented above.     IV inserted. Medicine administered as documented above. Blood drawn. This was sent to the lab for further testing, results above.    EKG obtained in the ED, see results above.      (9023) I rechecked the patient and discussed the results of his workup thus far.     Findings and plan explained to the Patient. Patient discharged home with instructions regarding supportive care, medications, and reasons to return. The importance of close follow-up was reviewed. The patient was prescribed Protonix and Carafate.     I personally reviewed the laboratory results with the Patient and answered all related questions prior to discharge.     Impression & Plan      Medical Decision Making:  Patient presents with GERD symptoms and frustration over this for the past month with an upcoming EGD on Monday. They have been struggling to find foods that he can tolerate. They have been cutting up meat into small pieces and he  feels that induces tense pain in the midesophagus and then acid and burning after this. He has been on Zantac and now Prilosec and without improvements. Denies any melena or coffee-ground emesis. He has not had any EGD or dilation in the past. He has not become obstructed. Puddings and liquids seem to be best. Certainly other etiologies for GERD are considered as workup as above is unremarkable. He was given a GI cocktail with Protonix IV and his improvements of symptoms. Discussed admission versus going home with him as he does have an upcoming EGD. Patient feels well enough to go home. Discussed with him trying Ensure for protein and other general recommendations for GERD as well as starting him on Protonix and Carafate.      Diagnosis:    ICD-10-CM    1. Gastroesophageal reflux disease, esophagitis presence not specified K21.9        Disposition:  discharged to home    Discharge Medications:  Discharge Medication List as of 9/19/2018  4:01 PM      START taking these medications    Details   pantoprazole (PROTONIX) 40 MG EC tablet Take 1 tablet (40 mg) by mouth daily for 30 doses, Disp-30 tablet, R-0, Local Print      sucralfate (CARAFATE) 1 GM/10ML suspension Take 10 mLs (1 g) by mouth 4 times daily, Disp-420 mL, R-1, Local Print           Scribe Disclosure:  I, Zuri Cool, am serving as a scribe on 9/19/2018 at 2:02 PM to personally document services performed by Yaron Miranda MD based on my observations and the provider's statements to me.       Zuri Cool  9/19/2018    EMERGENCY DEPARTMENT       Yaron Miranda MD  09/20/18 5691

## 2018-09-19 NOTE — ED AVS SNAPSHOT
Emergency Department    [address] [place] [address]    Phone:  [phone]    Fax:  [phone]                                       Deepak Arndt   MRN: [id number]    Department:   Emergency Department   Date of Visit:  9/19/2018           After Visit Summary Signature Page     I have received my discharge instructions, and my questions have been answered. I have discussed any challenges I see with this plan with the nurse or doctor.    ..........................................................................................................................................  Patient/Patient Representative Signature      ..........................................................................................................................................  Patient Representative Print Name and Relationship to Patient    ..................................................               ................................................  Date                                   Time    ..........................................................................................................................................  Reviewed by Signature/Title    ...................................................              ..............................................  Date                                               Time          22EPIC Rev 08/18

## 2018-09-19 NOTE — ED AVS SNAPSHOT
Emergency Department    [address] [facility] [address]    Phone:  [phone]    Fax:  [phone]                                       Deepak Arndt   MRN: [id number]    Department:   Emergency Department   Date of Visit:  9/19/2018           Patient Information     Date Of Birth          1935        Your diagnoses for this visit were:     Gastroesophageal reflux disease, esophagitis presence not specified        You were seen by Yaron Miranda MD.      Follow-up Information     Follow up with Aicha Graves MD.    Specialty:  Gastroenterology    Contact information:    [facility]  [address]  [facility] [id number] [phone]          Discharge Instructions         GERD (Adult)    The esophagus is a tube that carries food from the mouth to the stomach. A valve at the lower end of the esophagus prevents stomach acid from flowing upward. When this valve doesn't work properly, stomach contents may repeatedly flow back up (reflux) into the esophagus. This is called gastroesophageal reflux disease (GERD). GERD can irritate the esophagus. It can cause problems with swallowing or breathing. In severe cases, GERD can cause recurrent pneumonia or other serious problems.  Symptoms of reflux include burning, pressure or sharp pain in the upper abdomen or mid to lower chest. The pain can spread to the neck, back, or shoulder. There may be belching, an acid taste in the back of the throat, chronic cough, or sore throat or hoarseness. GERD symptoms often occur during the day after a big meal. They can also occur at night when lying down.   Home care  Lifestyle changes can help reduce symptoms. If needed, medicines may be prescribed. Symptoms often improve with treatment, but if treatment is stopped, the symptoms often return after a few months. So most persons with GERD will need to continue treatment.  Lifestyle changes    Limit or avoid fatty, fried, and spicy  "foods, as well as coffee, chocolate, mint, and foods with high acid content such as tomatoes and citrus fruit and juices (orange, grapefruit, lemon).    Don t eat large meals, especially at night. Frequent, smaller meals are best. Do not lie down right after eating. And don t eat anything 3 hours before going to bed.    Avoid drinking alcohol and smoking. As much as possible, stay away from second hand smoke.    If you are overweight, losing weight will reduce symptoms.     Avoid wearing tight clothing around your stomach area.    If your symptoms occur during sleep, use a foam wedge to elevate your upper body (not just your head.) Or, place 4\" blocks under the head of your bed.  Medicines  If needed, medicines can help relieve the symptoms of GERD and prevent damage to the esophagus. Discuss a medicine plan with your healthcare provider. This may include one or more of the following medicines:    Antacids to help neutralize the normal acids in your stomach.    Acid blockers (H2 blockers) to decrease acid production.    Acid inhibitors (PPIs) to decrease acid production in a different way than the blockers. They may work better, but can take a little longer to take effect.  Take an antacid 30-60 minutes after eating and at bedtime, but not at the same time as an acid blocker.  Try not to take medicines such as ibuprofen and aspirin. If you are taking aspirin for your heart or other medical reasons, talk to your healthcare provider about stopping it.  Follow-up care  Follow up with your healthcare provider or as advised by our staff.  When to seek medical advice  Call your healthcare provider if any of the following occur:    Stomach pain gets worse or moves to the lower right abdomen (appendix area)    Chest pain appears or gets worse, or spreads to the back, neck, shoulder, or arm    Frequent vomiting (can t keep down liquids)    Blood in the stool or vomit (red or black in color)    Feeling weak or dizzy    Fever " of 100.4 F (38 C) or higher, or as directed by your healthcare provider  Date Last Reviewed: 6/23/2015 2000-2017 [employer]. [address] [place] [address], [place] [address]. All rights reserved. This information is not intended as a substitute for professional medical care. Always follow your healthcare professional's instructions.          Your next 10 appointments already scheduled     Sep 24, 2018   Procedure with Aicha Graves MD   [facility] ([facility])    [address]  [place] [address] [phone]           [facility] is located at [address] [facility] [address]              24 Hour Appointment Hotline       To make an appointment at any [facility] clinic, call [phone] ([phone]). If you don't have a family doctor or clinic, we will help you find one. [facility] clinics are conveniently located to serve the needs of you and your family.             Review of your medicines      START taking        Dose / Directions Last dose taken    pantoprazole 40 MG EC tablet   Commonly known as:  PROTONIX   Dose:  40 mg   Quantity:  30 tablet        Take 1 tablet (40 mg) by mouth daily for 30 doses   Refills:  0        sucralfate 1 GM/10ML suspension   Commonly known as:  CARAFATE   Dose:  1 g   Quantity:  420 mL        Take 10 mLs (1 g) by mouth 4 times daily   Refills:  1          Our records show that you are taking the medicines listed below. If these are incorrect, please call your family doctor or clinic.        Dose / Directions Last dose taken    acetaminophen 325 MG tablet   Commonly known as:  TYLENOL   Dose:  650 mg   Quantity:  100 tablet        Take 2 tablets (650 mg) by mouth every 4 hours as needed for mild pain or fever   Refills:  0        amLODIPine 5 MG tablet   Commonly known as:  NORVASC   Dose:  5 mg   Indication:  High Blood Pressure Disorder   Quantity:  90 tablet        Take 1 tablet (5 mg) by mouth daily    Refills:  1        calcium carbonate 500 mg-vitamin D 200 units 500-200 MG-UNIT per tablet   Commonly known as:  OSCAL with D;OYSTER SHELL CALCIUM   Dose:  1 tablet        Take 1 tablet by mouth daily   Refills:  0        DAILY MULTI PO        Take by mouth daily   Refills:  0        fish oil-omega-3 fatty acids 1000 MG capsule   Dose:  1 capsule        Take 1 capsule by mouth daily.   Refills:  0        guaiFENesin-codeine 100-10 MG/5ML Soln solution   Commonly known as:  ROBITUSSIN AC   Dose:  1 tsp.   Quantity:  120 mL        Take 5 mLs by mouth every 4 hours as needed for cough   Refills:  0        hydrochlorothiazide 25 MG tablet   Commonly known as:  HYDRODIURIL   Quantity:  90 tablet        TAKE 1 TABLET BY MOUTH ONCE DAILY   Refills:  3        loperamide 2 MG capsule   Commonly known as:  IMODIUM   Dose:  2 mg        Take 2 mg by mouth 4 times daily as needed for diarrhea   Refills:  0        NONFORMULARY        2 times daily Activa probiotic yoguart   Refills:  0        omeprazole 20 MG CR capsule   Commonly known as:  priLOSEC   Dose:  20 mg   Quantity:  60 capsule        Take 1 capsule (20 mg) by mouth 2 times daily   Refills:  1        PRADAXA PO   Dose:  150 mg        Take 150 mg by mouth 2 times daily   Refills:  0        propranolol 40 MG tablet   Commonly known as:  INDERAL   Quantity:  60 tablet        TAKE 1 TABLET BY MOUTH TWICE DAILY FOR ESSENTIAL TREMOR   Refills:  1        ranitidine 150 MG tablet   Commonly known as:  ZANTAC   Dose:  150 mg   Quantity:  60 tablet        Take 1 tablet (150 mg) by mouth 2 times daily   Refills:  1        simvastatin 10 MG tablet   Commonly known as:  ZOCOR   Dose:  5 mg   Indication:  cuts 10 mg in half        Take 5 mg by mouth At Bedtime (Takes half of a 10mg tablet for a total of 5mg daily)   Refills:  0                Prescriptions were sent or printed at these locations (2 Prescriptions)                   Other Prescriptions                Printed at  Department/Unit printer (2 of 2)         pantoprazole (PROTONIX) 40 MG EC tablet               sucralfate (CARAFATE) 1 GM/10ML suspension                Procedures and tests performed during your visit     CBC with platelets differential    Comprehensive metabolic panel    EKG 12 lead    Lipase    Peripheral IV: Standard    Troponin I      Orders Needing Specimen Collection     None      Pending Results     No orders found from 9/17/2018 to 9/20/2018.            Pending Culture Results     No orders found from 9/17/2018 to 9/20/2018.            Pending Results Instructions     If you had any lab results that were not finalized at the time of your Discharge, you can call the ED Lab Result RN at [phone]. You will be contacted by this team for any positive Lab results or changes in treatment. The nurses are available 7 days a week from 10A to 6:30P.  You can leave a message 24 hours per day and they will return your call.        Test Results From Your Hospital Stay        9/19/2018  2:53 PM      Component Results     Component Value Ref Range & Units Status    WBC 12.5 (H) 4.0 - 11.0 10e9/L Final    RBC Count 4.93 4.4 - 5.9 10e12/L Final    Hemoglobin 17.0 13.3 - 17.7 g/dL Final    Hematocrit 48.1 40.0 - 53.0 % Final    MCV 98 78 - 100 fl Final    MCH 34.5 (H) 26.5 - 33.0 pg Final    MCHC 35.3 31.5 - 36.5 g/dL Final    RDW 13.0 10.0 - 15.0 % Final    Platelet Count 253 150 - 450 10e9/L Final    Diff Method Automated Method  Final    % Neutrophils 76.8 % Final    % Lymphocytes 13.6 % Final    % Monocytes 6.6 % Final    % Eosinophils 2.0 % Final    % Basophils 0.2 % Final    % Immature Granulocytes 0.8 % Final    Nucleated RBCs 0 0 /100 Final    Absolute Neutrophil 9.6 (H) 1.6 - 8.3 10e9/L Final    Absolute Lymphocytes 1.7 0.8 - 5.3 10e9/L Final    Absolute Monocytes 0.8 0.0 - 1.3 10e9/L Final    Absolute Eosinophils 0.3 0.0 - 0.7 10e9/L Final    Absolute Basophils 0.0 0.0 - 0.2 10e9/L Final    Abs Immature  Granulocytes 0.1 0 - 0.4 10e9/L Final    Absolute Nucleated RBC 0.0  Final         9/19/2018  3:15 PM      Component Results     Component Value Ref Range & Units Status    Sodium 139 133 - 144 mmol/L Final    Potassium 3.5 3.4 - 5.3 mmol/L Final    Chloride 103 94 - 109 mmol/L Final    Carbon Dioxide 27 20 - 32 mmol/L Final    Anion Gap 9 3 - 14 mmol/L Final    Glucose 100 (H) 70 - 99 mg/dL Final    Urea Nitrogen 22 7 - 30 mg/dL Final    Creatinine 1.09 0.66 - 1.25 mg/dL Final    GFR Estimate 65 >60 mL/min/1.7m2 Final    Non  GFR Calc    GFR Estimate If Black 78 >60 mL/min/1.7m2 Final    African American GFR Calc    Calcium 9.5 8.5 - 10.1 mg/dL Final    Bilirubin Total 0.8 0.2 - 1.3 mg/dL Final    Albumin 3.5 3.4 - 5.0 g/dL Final    Protein Total 8.0 6.8 - 8.8 g/dL Final    Alkaline Phosphatase 79 40 - 150 U/L Final    ALT 27 0 - 70 U/L Final    AST 19 0 - 45 U/L Final         9/19/2018  3:11 PM      Component Results     Component Value Ref Range & Units Status    Lipase 200 73 - 393 U/L Final         9/19/2018  3:15 PM      Component Results     Component Value Ref Range & Units Status    Troponin I ES <0.015 0.000 - 0.045 ug/L Final    The 99th percentile for upper reference range is 0.045 ug/L.  Troponin values   in the range of 0.045 - 0.120 ug/L may be associated with risks of adverse   clinical events.                  Clinical Quality Measure: Blood Pressure Screening     Your blood pressure was checked while you were in the emergency department today. The last reading we obtained was  BP: 149/74 . Please read the guidelines below about what these numbers mean and what you should do about them.  If your systolic blood pressure (the top number) is less than 120 and your diastolic blood pressure (the bottom number) is less than 80, then your blood pressure is normal. There is nothing more that you need to do about it.  If your systolic blood pressure (the top number) is 120-139 or your  diastolic blood pressure (the bottom number) is 80-89, your blood pressure may be higher than it should be. You should have your blood pressure rechecked within a year by a primary care provider.  If your systolic blood pressure (the top number) is 140 or greater or your diastolic blood pressure (the bottom number) is 90 or greater, you may have high blood pressure. High blood pressure is treatable, but if left untreated over time it can put you at risk for heart attack, stroke, or kidney failure. You should have your blood pressure rechecked by a primary care provider within the next 4 weeks.  If your provider in the emergency department today gave you specific instructions to follow-up with your doctor or provider even sooner than that, you should follow that instruction and not wait for up to 4 weeks for your follow-up visit.        Thank you for choosing [facility]       Thank you for choosing [facility] for your care. Our goal is always to provide you with excellent care. Hearing back from our patients is one way we can continue to improve our services. Please take a few minutes to complete the written survey that you may receive in the mail after you visit with us. Thank you!        [facility] Information     [facility] gives you secure access to your electronic health record. If you see a primary care provider, you can also send messages to your care team and make appointments. If you have questions, please call your primary care clinic.  If you do not have a primary care provider, please call [phone] and they will assist you.        Care EveryWhere ID     This is your Care EveryWhere ID. This could be used by other organizations to access your [facility] medical records  [id number]        Equal Access to Services     NICK LOONEY : Hadii peggy zengo Sodavid, waaxda luqadaha, qaybta kaalmada yovana, gallito barth. So [facility] [phone].    ATENCIÓN: tanmay Conner  disposición servicios gratuitos de asistencia lingüística. Antonio al [phone].    We comply with applicable federal civil rights laws and [place] laws. We do not discriminate on the basis of race, color, national origin, age, disability, sex, sexual orientation, or gender identity.            After Visit Summary       This is your record. Keep this with you and show to your community pharmacist(s) and doctor(s) at your next visit.

## 2018-09-19 NOTE — DISCHARGE INSTRUCTIONS
"  GERD (Adult)    The esophagus is a tube that carries food from the mouth to the stomach. A valve at the lower end of the esophagus prevents stomach acid from flowing upward. When this valve doesn't work properly, stomach contents may repeatedly flow back up (reflux) into the esophagus. This is called gastroesophageal reflux disease (GERD). GERD can irritate the esophagus. It can cause problems with swallowing or breathing. In severe cases, GERD can cause recurrent pneumonia or other serious problems.  Symptoms of reflux include burning, pressure or sharp pain in the upper abdomen or mid to lower chest. The pain can spread to the neck, back, or shoulder. There may be belching, an acid taste in the back of the throat, chronic cough, or sore throat or hoarseness. GERD symptoms often occur during the day after a big meal. They can also occur at night when lying down.   Home care  Lifestyle changes can help reduce symptoms. If needed, medicines may be prescribed. Symptoms often improve with treatment, but if treatment is stopped, the symptoms often return after a few months. So most persons with GERD will need to continue treatment.  Lifestyle changes    Limit or avoid fatty, fried, and spicy foods, as well as coffee, chocolate, mint, and foods with high acid content such as tomatoes and citrus fruit and juices (orange, grapefruit, lemon).    Don t eat large meals, especially at night. Frequent, smaller meals are best. Do not lie down right after eating. And don t eat anything 3 hours before going to bed.    Avoid drinking alcohol and smoking. As much as possible, stay away from second hand smoke.    If you are overweight, losing weight will reduce symptoms.     Avoid wearing tight clothing around your stomach area.    If your symptoms occur during sleep, use a foam wedge to elevate your upper body (not just your head.) Or, place 4\" blocks under the head of your bed.  Medicines  If needed, medicines can help relieve " the symptoms of GERD and prevent damage to the esophagus. Discuss a medicine plan with your healthcare provider. This may include one or more of the following medicines:    Antacids to help neutralize the normal acids in your stomach.    Acid blockers (H2 blockers) to decrease acid production.    Acid inhibitors (PPIs) to decrease acid production in a different way than the blockers. They may work better, but can take a little longer to take effect.  Take an antacid 30-60 minutes after eating and at bedtime, but not at the same time as an acid blocker.  Try not to take medicines such as ibuprofen and aspirin. If you are taking aspirin for your heart or other medical reasons, talk to your healthcare provider about stopping it.  Follow-up care  Follow up with your healthcare provider or as advised by our staff.  When to seek medical advice  Call your healthcare provider if any of the following occur:    Stomach pain gets worse or moves to the lower right abdomen (appendix area)    Chest pain appears or gets worse, or spreads to the back, neck, shoulder, or arm    Frequent vomiting (can t keep down liquids)    Blood in the stool or vomit (red or black in color)    Feeling weak or dizzy    Fever of 100.4 F (38 C) or higher, or as directed by your healthcare provider  Date Last Reviewed: 6/23/2015 2000-2017 [employer]. [address] [place] [address], [place] [address]. All rights reserved. This information is not intended as a substitute for professional medical care. Always follow your healthcare professional's instructions.

## 2018-09-21 NOTE — PROGRESS NOTES
[facility]  [address] [place] [id number] [phone]  Dept: [phone]    PRE-OP EVALUATION:  Today's date: 2018    Deepak Arndt (: 1935) presents for pre-operative evaluation assessment as requested by Dr. Aicha Graves.  He requires evaluation and anesthesia risk assessment prior to undergoing surgery/procedure for treatment of stomach pain . Was in ED 18 for the pain.     Proposed Surgery/ Procedure: COMBINED ESOPHAGOSCOPY, GASTROSCOPY, DUODENOSCOPY (EGD)  Date of Surgery/ Procedure: 18  Time of Surgery/ Procedure: 0800 am  Hospital/Surgical Facility: [facility]  Same day surger  Primary Physician: [place]/Zuri SHEFFIELD  Type of Anesthesia Anticipated: to be monitored    Patient has a Health Care Directive or Living Will:  YES form given    1. NO - Do you have a history of heart attack, stroke, stent, bypass or surgery on an artery in the head, neck, heart or legs?  2. NO - Do you ever have any pain or discomfort in your chest?  3. NO - Do you have a history of  Heart Failure?  4. NO - Are you troubled by shortness of breath when: walking on the level, up a slight hill or at night?  5. NO - Do you currently have a cold, bronchitis or other respiratory infection?  6. NO - Do you have a cough, shortness of breath or wheezing?  7. NO - Do you sometimes get pains in the calves of your legs when you walk?  8. NO - Do you or anyone in your family have previous history of blood clots?  9. NO - Do you or does anyone in your family have a serious bleeding problem such as prolonged bleeding following surgeries or cuts?  10. NO - Have you ever had problems with anemia or been told to take iron pills?  11. NO - Have you had any abnormal blood loss such as black, tarry or bloody stools, or abnormal vaginal bleeding?  12. NO - Have you ever had a blood transfusion?  13. NO - Have you or any of your relatives ever had problems with anesthesia?  14.  NO - Do you have sleep apnea, excessive snoring or daytime drowsiness?  15. NO - Do you have any prosthetic heart valves?  16. NO - Do you have prosthetic joints?  17. NO - Is there any chance that you may be pregnant?      HPI:     HPI related to upcoming procedure: uncontrolled GERD/stomach pain      CAD - Patient has a longstanding history of moderate-severe CAD. Patient denies recent chest pain or NTG use, denies exercise induced dyspnea or PND. Last EKG 9/18/18                                                                                                                                                   .                                                                                                                          .  HYPERLIPIDEMIA - Patient has a long history of significant Hyperlipidemia requiring medication for treatment with recent good control. Patient reports no problems or side effects with the medication.                                                                                                                                                       .  HYPERTENSION - Patient has longstanding history of HTN , currently denies any symptoms referable to elevated blood pressure. Specifically denies chest pain, palpitations, dyspnea, orthopnea, PND or peripheral edema. Blood pressure readings have been in normal range. Current medication regimen is as listed below. Patient denies any side effects of medication.                                                                                                                                                                                          .    MEDICAL HISTORY:     Patient Active Problem List    Diagnosis Date Noted     Abnormality of gait 10/23/2017     Priority: Medium     Aortic valve disorder 12/16/2015     Priority: Medium     Mild aortic stenosis      Priority: Medium     Coronary artery disease involving native coronary artery of  native heart without angina pectoris      Priority: Medium     Health Care Home 07/07/2014     Priority: Medium     State Tier 1       Chronic atrial fibrillation (H) 08/20/2013     Priority: Medium     Myocardial infarction 06/01/2012     Priority: Medium     inferior       Advance Care Planning 09/29/2011     Priority: Medium     Advance Care Planning 10/29/2015: Receipt of ACP document:  Received: Health Care Directive which was witnessed or notarized on 9-17-03.  Document previously scanned on 3-12-15.  Validation form completed and sent to be scanned.  Code Status needs to be updated to reflect choices in most recent ACP document.  Confirmed/documented designated decision maker(s).  Added by Danyell Soria RN, System ACP Coordinator Honoring Choices   Advance Care Planning 9/29/11: As per reasonable care for Seniors, wants in the Short term aggressive care. No desire for long term prolongation of life through artificial means if no hope to bring back to a reasonable status.Dr. Clinton Mills         HTN (hypertension) 02/11/2011     Priority: Medium     Osteoporosis 02/11/2011     Priority: Medium     Hypercholesterolemia 02/11/2011     Priority: Medium     Past myocardial infarction 02/11/2011     Priority: Medium      Past Medical History:   Diagnosis Date     Actinic keratosis      Atrial fibrillation (H)      Cancer (H)     skin     Cataract     both     Coronary artery disease      Detached retina 2007    both     ED (erectile dysfunction)      Hyperlipidemia      Hypertension      Lung nodules      Mild aortic stenosis     6/2012 Echo - trivial AS, mild-mod AR     Multiple thyroid nodules      Murmur     6/2012 Echo - trivial AS, mild-mod AR, mild-mod MR, mild-mod TR, mod CO     Myocardial infarction 6/2012    inferior - noted on 2007 stress test     Osteoporosis      Past Surgical History:   Procedure Laterality Date     CATARACT IOL, RT/LT       HC OR OCULAR DEVICE INTRAOP DETACHED RETINA       Current  Outpatient Prescriptions   Medication Sig Dispense Refill     amLODIPine (NORVASC) 5 MG tablet Take 1 tablet (5 mg) by mouth daily 90 tablet 1     Calcium carb-Vitamin D 500 mg [place]-200 units (OSCAL WITH D;OYSTER SHELL CALCIUM) 500-200 MG-UNIT per tablet Take 1 tablet by mouth daily       Dabigatran Etexilate Mesylate (PRADAXA PO) Take 150 mg by mouth 2 times daily       fish oil-omega-3 fatty acids (OMEGA-3 FISH OIL) 1000 MG capsule Take 1 capsule by mouth daily.       hydrochlorothiazide (HYDRODIURIL) 25 MG tablet TAKE 1 TABLET BY MOUTH ONCE DAILY 90 tablet 3     loperamide (IMODIUM) 2 MG capsule Take 2 mg by mouth 4 times daily as needed for diarrhea       Multiple Vitamins-Minerals (DAILY MULTI PO) Take by mouth daily       NONFORMULARY See Admin Instructions Activa probiotic yoguart 4 x per week       propranolol (INDERAL) 40 MG tablet TAKE 1 TABLET BY MOUTH TWICE DAILY FOR ESSENTIAL TREMOR 60 tablet 1     simvastatin (ZOCOR) 10 MG tablet Take 5 mg by mouth At Bedtime (Takes half of a 10mg tablet for a total of 5mg daily)       sucralfate (CARAFATE) 1 GM/10ML suspension Take 10 mLs (1 g) by mouth 4 times daily 420 mL 1     acetaminophen (TYLENOL) 325 MG tablet Take 2 tablets (650 mg) by mouth every 4 hours as needed for mild pain or fever (Patient not taking: Reported on 3/2/2018) 100 tablet      guaiFENesin-codeine (ROBITUSSIN AC) 100-10 MG/5ML SOLN solution Take 5 mLs by mouth every 4 hours as needed for cough (Patient not taking: Reported on 9/21/2018) 120 mL 0     pantoprazole (PROTONIX) 40 MG EC tablet Take 1 tablet (40 mg) by mouth daily for 30 doses 30 tablet 0     ranitidine (ZANTAC) 150 MG tablet Take 1 tablet (150 mg) by mouth 2 times daily (Patient not taking: Reported on 9/21/2018) 60 tablet 1     OTC products: herbals and vitamins - fish oil    No Known Allergies   Latex Allergy: NO    Social History   Substance Use Topics     Smoking status: Former Smoker     Packs/day: 1.00     Years: 20.00      "Types: Cigarettes     Start date: 1954     Quit date: 1/1/1974     Smokeless tobacco: Never Used     Alcohol use No     History   Drug Use No       REVIEW OF SYSTEMS:   CONSTITUTIONAL: NEGATIVE for fever, chills, positive for weight loss  INTEGUMENTARY/SKIN: NEGATIVE for worrisome rashes, moles or lesions  EYES: NEGATIVE for vision changes or irritation  ENT/MOUTH: NEGATIVE for ear, mouth and throat problems  RESP: NEGATIVE for significant cough or SOB  BREAST: NEGATIVE for masses, tenderness or discharge  CV: NEGATIVE for chest pain, palpitations or peripheral edema  GI: Positive for heartburn or reflux and abdominal pain  : NEGATIVE for frequency, dysuria, or hematuria  MUSCULOSKELETAL: NEGATIVE for significant arthralgias or myalgia  NEURO: NEGATIVE for weakness, dizziness or paresthesias  ENDOCRINE: NEGATIVE for temperature intolerance, skin/hair changes  HEME: NEGATIVE for bleeding problems  PSYCHIATRIC: NEGATIVE for changes in mood or affect    EXAM:   /70  Pulse 60  Temp 98.7  F (37.1  C) (Oral)  Resp 24  Ht 1.689 m (5' 6.5\")  Wt 66.1 kg (145 lb 12.8 oz)  SpO2 93%  BMI 23.18 kg/m2    GENERAL APPEARANCE: alert and mild distress     EYES: EOMI,  PERRL     HENT: ear canals and TM's normal and nose and mouth without ulcers or lesions     NECK: no adenopathy, no asymmetry, masses, or scars and thyroid normal to palpation     RESP: lungs clear to auscultation - no rales, rhonchi or wheezes     CV: regular rates and rhythm, normal S1 S2, no S3 or S4 and no murmur, click or rub     ABDOMEN:  soft, nontender, no HSM or masses and bowel sounds normal     MS: extremities normal- no gross deformities noted     SKIN: no suspicious lesions or rashes     NEURO: Normal strength and tone, sensory exam grossly normal, mentation intact and speech normal     PSYCH: mentation appears normal. and affect normal/bright    DIAGNOSTICS:   EKG 9/19/18 in hospital [facility]  Labs as well.     Recent Labs   Lab Test  09/19/18   " 1412 05/25/18 03/02/18   1003  10/23/17   0423   05/15/17   1035   HGB  17.0   --   14.5  16.3   --    --    PLT  253   --   149  104*   --    --    NA  139   --    --   134   --    --    POTASSIUM  3.5   --    --   3.4   --    --    CR  1.09  1.0  1.0   --   0.95   < >   --    A1C   --   5.4   --    --    --   5.5    < > = values in this interval not displayed.        IMPRESSION:   Reason for surgery/procedure: GERD/COMBINED ESOPHAGOSCOPY, GASTROSCOPY, DUODENOSCOPY (EGD)  Diagnosis/reason for consult: COMBINED ESOPHAGOSCOPY, GASTROSCOPY, DUODENOSCOPY (EGD)/pre op   The proposed surgical procedure is considered LOW risk.    REVISED CARDIAC RISK INDEX  The patient has the following serious cardiovascular risks for perioperative complications such as (MI, PE, VFib and 3  AV Block):  No serious cardiac risks  INTERPRETATION: 0 risks: Class I (very low risk - 0.4% complication rate)    The patient has the following additional risks for perioperative complications:  No identified additional risks      ICD-10-CM    1. Preop general physical exam Z01.818    2. Gastroesophageal reflux disease, esophagitis presence not specified K21.9        RECOMMENDATIONS:     --Consult hospital rounder / IM to assist post-op medical management    --Patient is to take all scheduled medications on the day of surgery EXCEPT for modifications listed below. Hold fish oil until surgery    1. Preop general physical exam  Okay for surgery    2. Gastroesophageal reflux disease, esophagitis presence not specified  Per GI/surgeon    3. Chronic atrial fibrillation (H)  Stable, followed by cardiology    4. Essential hypertension  stable    5. Hypercholesterolemia  stable    APPROVAL GIVEN to proceed with proposed procedure, without further diagnostic evaluation       Signed Electronically by: SHILPA Babin CNP    Copy of this evaluation report is provided to requesting physician.    [facility] Preop Guidelines    Revised Cardiac Risk Index

## 2018-09-21 NOTE — MR AVS SNAPSHOT
After Visit Summary   9/21/2018    Deepak Arndt    MRN: [id number]           Patient Information     Date Of Birth          1935        Visit Information        Provider Department      9/21/2018 1:15 PM Zuri Ko APRN [facility]        Today's Diagnoses     Preop general physical exam    -  1    Gastroesophageal reflux disease, esophagitis presence not specified        Chronic atrial fibrillation (H)        Essential hypertension        Hypercholesterolemia          Care Instructions      Before Your Surgery      Call your surgeon if there is any change in your health. This includes signs of a cold or flu (such as a sore throat, runny nose, cough, rash or fever).    Do not smoke, drink alcohol or take over the counter medicine (unless your surgeon or primary care doctor tells you to) for the 24 hours before and after surgery.    If you take prescribed drugs: Follow your doctor s orders about which medicines to take and which to stop until after surgery.    Eating and drinking prior to surgery: follow the instructions from your surgeon    Take a shower or bath the night before surgery. Use the soap your surgeon gave you to gently clean your skin. If you do not have soap from your surgeon, use your regular soap. Do not shave or scrub the surgery site.  Wear clean pajamas and have clean sheets on your bed.           Follow-ups after your visit        Follow-up notes from your care team     Return if symptoms worsen or fail to improve.      Your next 10 appointments already scheduled     Sep 24, 2018   Procedure with Aicha Graves MD   [facility] ([facility])    [address]  [place] [address]   [phone]           [facility] is located at [address] [place]              Who to contact     If you have questions or need follow up information about today's clinic visit or your schedule please  "contact [facility] directly at [phone].  Normal or non-critical lab and imaging results will be communicated to you by MyChart, letter or phone within 4 business days after the clinic has received the results. If you do not hear from us within 7 days, please contact the clinic through [employer] or phone. If you have a critical or abnormal lab result, we will notify you by phone as soon as possible.  Submit refill requests through [employer] or call your pharmacy and they will forward the refill request to us. Please allow 3 business days for your refill to be completed.          Additional Information About Your Visit        [employer] Information     [facility] [employer] gives you secure access to your electronic health record. If you see a primary care provider, you can also send messages to your care team and make appointments. If you have questions, please call your primary care clinic.  If you do not have a primary care provider, please call [phone] and they will assist you.        Care EveryWhere ID     This is your Care EveryWhere ID. This could be used by other organizations to access your [place] medical records  [id number]        Your Vitals Were     Pulse Temperature Respirations Height Pulse Oximetry BMI (Body Mass Index)    60 98.7  F (37.1  C) (Oral) 24 1.689 m (5' 6.5\") 93% 23.18 kg/m2       Blood Pressure from Last 3 Encounters:   09/21/18 114/70   09/19/18 122/70   09/07/18 128/62    Weight from Last 3 Encounters:   09/21/18 66.1 kg (145 lb 12.8 oz)   09/19/18 72.6 kg (160 lb)   09/07/18 68.9 kg (152 lb)              Today, you had the following     No orders found for display         Today's Medication Changes          These changes are accurate as of 9/21/18  1:58 PM.  If you have any questions, ask your nurse or doctor.               Stop taking these medicines if you haven't already. Please contact your care team if you have questions.     omeprazole 20 MG CR capsule   Commonly known as:  " priLOSEC   Stopped by:  Zuri Ko APRN CNP                    Primary Care Provider Office Phone # Fax #    Cl[place] Mills [phone] [address]  [facility] [address]        Equal Access to Services     [facility] Hospital[facility]ON[facility] : Hadii aad ku hadasho Soomaali, waaxda luqadaha, qaybta kaalmada adeegyada, waxay jacquelinein hayshahlan juan jose carlariadnejag gill . So [facility] [phone].    ATENCIÓN: Si habla español, tiene a esqueda disposición servicios gratuitos de asistencia lingüística. Devonmatthew al [phone].    We comply with applicable federal civil rights laws and [place] laws. We do not discriminate on the basis of race, color, national origin, age, disability, sex, sexual orientation, or gender identity.            Thank you!     Thank you for choosing [facility]  for your care. Our goal is always to provide you with excellent care. Hearing back from our patients is one way we can continue to improve our services. Please take a few minutes to complete the written survey that you may receive in the mail after your visit with us. Thank you!             Your Updated Medication List - Protect others around you: Learn how to safely use, store and throw away your medicines at www.disposemymeds.org.          This list is accurate as of 9/21/18  1:58 PM.  Always use your most recent med list.                   Brand Name Dispense Instructions for use Diagnosis    acetaminophen 325 MG tablet    TYLENOL    100 tablet    Take 2 tablets (650 mg) by mouth every 4 hours as needed for mild pain or fever    Generalized muscle weakness       amLODIPine 5 MG tablet    NORVASC    90 tablet    Take 1 tablet (5 mg) by mouth daily    Essential hypertension, benign       calcium carbonate 500 mg-vitamin D 200 units 500-200 MG-UNIT per tablet    OSCAL with D;OYSTER SHELL CALCIUM     Take 1 tablet by mouth daily        DAILY MULTI PO      Take by mouth daily        fish oil-omega-3 fatty acids 1000 MG  capsule      Take 1 capsule by mouth daily.        guaiFENesin-codeine 100-10 MG/5ML Soln solution    ROBITUSSIN AC    120 mL    Take 5 mLs by mouth every 4 hours as needed for cough    Cough       hydrochlorothiazide 25 MG tablet    HYDRODIURIL    90 tablet    TAKE 1 TABLET BY MOUTH ONCE DAILY    Encounter for medication refill       loperamide 2 MG capsule    IMODIUM     Take 2 mg by mouth 4 times daily as needed for diarrhea        NONFORMULARY      See Admin Instructions Activa probiotic yoguart 4 x per week        pantoprazole 40 MG EC tablet    PROTONIX    30 tablet    Take 1 tablet (40 mg) by mouth daily for 30 doses        PRADAXA PO      Take 150 mg by mouth 2 times daily        propranolol 40 MG tablet    INDERAL    60 tablet    TAKE 1 TABLET BY MOUTH TWICE DAILY FOR ESSENTIAL TREMOR    Essential tremor       ranitidine 150 MG tablet    ZANTAC    60 tablet    Take 1 tablet (150 mg) by mouth 2 times daily    Dysphagia       simvastatin 10 MG tablet    ZOCOR     Take 5 mg by mouth At Bedtime (Takes half of a 10mg tablet for a total of 5mg daily)        sucralfate 1 GM/10ML suspension    CARAFATE    420 mL    Take 10 mLs (1 g) by mouth 4 times daily

## 2018-09-24 NOTE — ANESTHESIA POSTPROCEDURE EVALUATION
Patient: Deepak Arndt    Procedure(s):  ESOPHAGOGASTRODUODENOSCOPY  - Wound Class: II-Clean Contaminated    Diagnosis:dysphagia   Diagnosis Additional Information: No value filed.    Anesthesia Type:  MAC    Note:  Anesthesia Post Evaluation    Patient location during evaluation: PACU  Patient participation: Able to fully participate in evaluation  Level of consciousness: awake and alert  Pain management: adequate  Airway patency: patent  Cardiovascular status: acceptable  Respiratory status: acceptable  Hydration status: acceptable  PONV: none     Anesthetic complications: None          Last vitals:  Vitals:    09/24/18 1002 09/24/18 1010 09/24/18 1012   BP:  149/83    Resp:      Temp:      SpO2: 92% 95% 96%         Electronically Signed By: Fernando Cooper MD  September 24, 2018  11:13 AM

## 2018-09-24 NOTE — ANESTHESIA CARE TRANSFER NOTE
Patient: Deepak Arndt    Procedure(s):  ESOPHAGOGASTRODUODENOSCOPY  - Wound Class: II-Clean Contaminated    Diagnosis: dysphagia   Diagnosis Additional Information: No value filed.    Anesthesia Type:   MAC     Note:  Airway :Nasal Cannula  Patient transferred to:PACU  Handoff Report: Identifed the Patient, Identified the Reponsible Provider, Reviewed the pertinent medical history, Discussed the surgical course, Reviewed Intra-OP anesthesia mangement and issues during anesthesia, Set expectations for post-procedure period and Allowed opportunity for questions and acknowledgement of understanding      Vitals: (Last set prior to Anesthesia Care Transfer)    CRNA VITALS  9/24/2018 0814 - 9/24/2018 0848      9/24/2018             Ht Rate: (!)  43    SpO2: 91 %    Resp Rate (set): 10                Electronically Signed By: SHILPA Starr CRNA  September 24, 2018  8:48 AM

## 2018-09-24 NOTE — H&P
[facility]  Pre-Endoscopy History and Physical     Deepak Arndt MRN# [id number]   YOB: 1935 Age: 83 year old   Today's Date: 09/24/2018    Date of Procedure: 9/24/2018  Primary care provider: [place] Mills  Type of Endoscopy: esophagogastroduodenoscopy (upper GI endoscopy)  Reason for Procedure: Odynophagia and dysphagia  Type of Anesthesia Anticipated: MAC    HPI:    Deepak is a 83 year old male who will be undergoing the above procedure.      A history and physical has been performed. The patient's medications and allergies have been reviewed. The risks and benefits of the procedure and the sedation options and risks were discussed with the patient.  All questions were answered and informed consent was obtained.      No Known Allergies     No current facility-administered medications for this encounter.        Patient Active Problem List   Diagnosis     HTN (hypertension)     Osteoporosis     Hypercholesterolemia     Past myocardial infarction     Advance Care Planning     Chronic atrial fibrillation (H)     Health Care Home     Myocardial infarction     Mild aortic stenosis     Coronary artery disease involving native coronary artery of native heart without angina pectoris     Aortic valve disorder     Abnormality of gait        Past Medical History:   Diagnosis Date     Actinic keratosis      Atrial fibrillation (H)      Cancer (H)     skin     Cataract     both     Coronary artery disease      Detached retina 2007    both     ED (erectile dysfunction)      Hyperlipidemia      Hypertension      Lung nodules      Mild aortic stenosis     6/2012 Echo - trivial AS, mild-mod AR     Multiple thyroid nodules      Murmur     6/2012 Echo - trivial AS, mild-mod AR, mild-mod MR, mild-mod TR, mod ME     Myocardial infarction 6/2012    inferior - noted on 2007 stress test     Osteoporosis         Past Surgical History:   Procedure Laterality Date     CATARACT IOL, RT/LT       [facility] OR  "OCULAR DEVICE INTRAOP DETACHED RETINA         Social History   Substance Use Topics     Smoking status: Former Smoker     Packs/day: 1.00     Years: 20.00     Types: Cigarettes     Start date: 1954     Quit date: 1/1/1974     Smokeless tobacco: Never Used     Alcohol use No       History reviewed. No pertinent family history.      PHYSICAL EXAM:   /65  Temp 97.7  F (36.5  C) (Oral)  Resp 24  Ht 1.727 m (5' 8\")  Wt 67.1 kg (148 lb)  SpO2 94%  BMI 22.5 kg/m2 Estimated body mass index is 22.5 kg/(m^2) as calculated from the following:    Height as of this encounter: 1.727 m (5' 8\").    Weight as of this encounter: 67.1 kg (148 lb).   RESP: lungs clear to auscultation - no rales, rhonchi or wheezes  CV: regular rates and rhythm    IMPRESSION   ASA Class 2 - Mild systemic disease      Aicha Graves MD  [facility]  Office: [phone]  After 5 pm  Pager:  [phone]               "

## 2018-09-24 NOTE — ANESTHESIA PREPROCEDURE EVALUATION
Anesthesia Evaluation     . Pt has had prior anesthetic. Type: General    No history of anesthetic complications          ROS/MED HX    ENT/Pulmonary:     (+)tobacco use, Past use , . Other pulmonary disease tachypneic, but denies any cold, cough, or respiratory disease.   (-) asthma, COPD and sleep apnea   Neurologic:     (+)TIA    (-) CVA   Cardiovascular:     (+) Dyslipidemia, hypertension--CAD, -past MI,-. Taking blood thinners Pt has not received instructions: . . . :. dysrhythmias a-fib, valvular problems/murmurs type: AS mild:.      (-) stent and CABG   METS/Exercise Tolerance:     Hematologic:         Musculoskeletal:         GI/Hepatic:     (+) GERD      (-) liver disease   Renal/Genitourinary:      (-) renal disease   Endo:     (+) thyroid problem  Thyroid disease - Other multiple thyroid nodules, .   (-) Type I DM and Type II DM   Psychiatric:         Infectious Disease:         Malignancy:         Other:                     Physical Exam      Airway   Mallampati: II  TM distance: >3 FB  Neck ROM: full    Dental   (+) caps  Comment: The patient denies any loose, chipped, or missing teeth.    Cardiovascular   Rhythm and rate: irregular and normal  (-) no murmur    Pulmonary    breath sounds clear to auscultation(-) no decreased breath sounds and no wheezes    Other findings: Trouble answering questions, needs help from spouse.                 Anesthesia Plan      History & Physical Review  History and physical reviewed and following examination; no interval change.    ASA Status:  3 .    NPO Status:  > 8 hours    Plan for MAC Reason for MAC:  Deep or markedly invasive procedure (G8)  PONV prophylaxis:  Ondansetron (or other 5HT-3)  No Versed please. Concerned that mild dementia may exist. He appears to breathe fast but he is not short of breathe unless mobile. Will convert to general as needed.       Postoperative Care  Postoperative pain management:  IV analgesics.      Consents  Anesthetic plan, risks,  benefits and alternatives discussed with:  Patient..                          .

## 2018-10-01 NOTE — TELEPHONE ENCOUNTER
Leny from Dr. Graves's office at [facility] calls stating Dr. Graves requests Dr. Mills make referral to oncology. Dr. Graves and Dr. 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 Patient has since had CT chest/abd/pelvis 9/28/18 indicating paraesophageal adenopathy and upper abdominal adenopathy compatible with metastatic disease.   Per Leny, Dr. Graves has spoken with patient multiple times with results and plan-- needs oncology consult and endoscopic ultrasound.   They have scheduled the endoscopic ultrasound for 10/2/18 at [facility] with Dr. Cruz.   We scheduled oncology consult with Dr. Mcleod 10/5/18.   At Leny's request - faxed 9/21/18 preop with Zuri JAQUEZ to [facility] at fax# [phone].   Dr. Nelson called patient today and discussed.   Called patient this evening with oncology appt info.   Terri Sumner RN

## 2018-10-02 NOTE — ANESTHESIA PREPROCEDURE EVALUATION
Anesthesia Evaluation     . Pt has had prior anesthetic. Type: General    No history of anesthetic complications          ROS/MED HX    ENT/Pulmonary:     (+)tobacco use, Past use , . .   (-) asthma, COPD and sleep apnea   Neurologic:     (+)TIA    (-) CVA   Cardiovascular:     (+) Dyslipidemia, hypertension--CAD, -past MI,-. Taking blood thinners Pt has not received instructions: . . . :. dysrhythmias a-fib, valvular problems/murmurs type: AS mild:.      (-) stent and CABG   METS/Exercise Tolerance:     Hematologic:         Musculoskeletal:         GI/Hepatic: Comment: Esophageal cancer    (+) GERD Symptomatic,      (-) liver disease   Renal/Genitourinary:      (-) renal disease   Endo:     (+) thyroid problem  Thyroid disease - Other multiple thyroid nodules, .   (-) Type I DM and Type II DM   Psychiatric:         Infectious Disease:         Malignancy:   (+) Malignancy History of GI          Other:                     Physical Exam  Normal systems: dental    Airway   Mallampati: III  TM distance: >3 FB  Neck ROM: full    Dental     Cardiovascular   Rhythm and rate: regular and normal      Pulmonary    breath sounds clear to auscultation                    Anesthesia Plan      History & Physical Review  History and physical reviewed and following examination; no interval change.    ASA Status:  3 .    NPO Status:  > 8 hours    Plan for General, ETT and RSI with Intravenous and Propofol induction. Maintenance will be Balanced.    PONV prophylaxis:  Ondansetron (or other 5HT-3) and Dexamethasone or Solumedrol  Additional equipment: Videolaryngoscope Previous EGD aborted due to undigested food in stomach unexpectedly      Postoperative Care  Postoperative pain management:  IV analgesics and Oral pain medications.      Consents  Anesthetic plan, risks, benefits and alternatives discussed with:  Patient and Spouse..                          .

## 2018-10-02 NOTE — ANESTHESIA POSTPROCEDURE EVALUATION
Patient: Deepak Arndt    Procedure(s):  ENDOSCOPIC ULTRASOUND, ESOPHAGOSCOPY, GASTROSCOPY, DUODENOSCOPY (EGD), FINE NEEDLE ASPIRATE (mac sedation) - Wound Class: II-Clean Contaminated    Diagnosis:ESOPHEGEAL CANCER STAGING  Diagnosis Additional Information: No value filed.    Anesthesia Type:  General, ETT, RSI    Note:  Anesthesia Post Evaluation    Patient location during evaluation: Phase 2  Patient participation: Able to fully participate in evaluation  Level of consciousness: awake and alert  Pain management: adequate  Airway patency: patent  Cardiovascular status: acceptable and hemodynamically stable  Respiratory status: acceptable and unassisted  Hydration status: acceptable  PONV: none             Last vitals:  Vitals:    10/02/18 1745 10/02/18 1800 10/02/18 1815   BP: 138/85 112/82 140/83   Resp: 18 12 24   Temp:      SpO2: 93% 92% 94%         Electronically Signed By: Juarez Ng DO  October 2, 2018  6:53 PM

## 2018-10-02 NOTE — ANESTHESIA CARE TRANSFER NOTE
Patient: Deepak Arndt    Procedure(s):  ENDOSCOPIC ULTRASOUND, ESOPHAGOSCOPY, GASTROSCOPY, DUODENOSCOPY (EGD), FINE NEEDLE ASPIRATE (mac sedation) - Wound Class: II-Clean Contaminated    Diagnosis: ESOPHEGEAL CANCER STAGING  Diagnosis Additional Information: No value filed.    Anesthesia Type:   General, ETT, RSI     Note:  Airway :Face Mask  Patient transferred to:PACU        Vitals: (Last set prior to Anesthesia Care Transfer)    CRNA VITALS  10/2/2018 1702 - 10/2/2018 1736      10/2/2018             NIBP: 155/75    Pulse: 55    NIBP Mean: 104    SpO2: 100 %    Resp Rate (set): 10                Electronically Signed By: SHILPA Whyte CRNA  October 2, 2018  5:36 PM

## 2018-10-08 NOTE — DISCHARGE INSTRUCTIONS
Port Insertion Discharge Instructions     After you go home:      Have an adult stay with you for the first 6 hours    You may resume your normal diet       For 24 hours - due to the sedation you received:    Relax and take it easy    Do NOT make any important or legal decisions    Do NOT drive or operate machines at home or at work    Do NOT drink alcohol    Care of Incision sites:      You have a liquid adhesive covering on your incisions. Do not remove the adhesive residue. It will come off on it's own.    You may shower tomorrow.    You may cover the wound with a bandaid if needed for comfort.      Activity       Avoid heavy lifting (greater than 10 pounds) or the overuse of your shoulder for 3 days    Bleeding:      If you start bleeding from the incision sites in your chest or neck - or have swelling in your neck, sit down and press on the site for 5-10 minutes.     If bleeding has not stopped after 10 minutes, call your provider.        Call 911 right away if you have heavy bleeding or bleeding that does not stop.      Medicines:      You may resume all medications    Resume your Warfarin/Coumadin at your regular dose today. Follow up with your provider to have your INR rechecked    Resume your Platelet Inhibitors and Aspirin tomorrow at your regular dose    For minor pain, you may take Acetaminophen (Tylenol) or Ibuprofen (Advil)    Call the provider who ordered this procedure if:      You have swelling in your neck or over your port site    The incision area is red, swollen, hot or tender    You have chills or a fever greater than 101 F (38 C)    Any questions or concerns    Call  911 or go to the Emergency Room if you have:      Severe chest pain or trouble breathing    Bleeding that you cannot control    Additional Information:      Your port may be accessed right away.     You will need to have your port flushed every 30 days or after each use.      If you have questions call:          [facility]  Radiology Dept @ [phone]      G-Tube Insertion (New) Discharge Instructions     After you go home:      You should not eat anything or use the tube for 6 hours    You may resume your normal diet after 6 hours    Have an adult stay with you for 6 hours if you received sedation       For 24 hours - due to the sedation you received:    Relax and take it easy    Do NOT make any important or legal decisions    Do NOT drive or operate machines at home or at work    Do NOT drink alcohol    Care of Insertion Site:      For the first 48 hrs, check your puncture site every couple hours while you are awake     You may remove/change the dressing tomorrow    You may shower tomorrow    No tub baths, whirlpools or swimming until your puncture site has fully healed     Activity       You may go back to normal activity in 24 hours     Wait 48 hours before lifting, straining, exercise or other strenuous activity    Medicines:      You may resume all medications    Resume your Warfarin/Coumadin at your regular dose today. Follow up with your provider to have your INR rechecked    Resume your Platelet Inhibitors and Aspirin tomorrow at your regular dose    For minor pain, you may take Acetaminophen (Tylenol) or Ibuprofen (Advil)                 Call the provider who ordered this procedure if:      Blood or fluid is draining from the site    The site is red, swollen, hot, tender or there is foul-smelling drainage    Chills or a fever greater than 101 F (38 C)    Increased pain at the site    Any questions or concerns    Call  911 or go to the Emergency Room if:      Severe pain or trouble breathing    Bleeding that you cannot control        If you have questions call:          [facility] Radiology Dept @ [phone]          Caring for your G-tube    T-stay Removal:      Remove the T-stays 10 days after placement    During the placement of this tube - 1 or 2  T-stays  (small plastic buttons) were used to secure the stomach  wall to the abdominal wall. These stays must be removed 10 days after tube placement to prevent skin irritation.    Please remove these stays by gently pulling up on the buttons and cutting the suture (thread) under the button. Cut between the button and skin with clean scissors. Be careful not to cut the tube.    Please call us at [phone] if you need assistance or further clarification.    Tube Maintenance:    Possible problems with your tube may include:      Clogged with medications or feedings - most obstructions can be cleared with a small (3cc) syringe and warm water. This may be repeated until the tube is unclogged. This can be prevented by frequently flushing the tube with water (60cc) during the day and always after medications & feedings.      Tube pulls out or falls out -cover the opening with gauze & tape. Call [phone] for further instructions      Skin breakdown and/or yeast infections at the insertion site - use of skin barrier ointments and anti-fungal powders can treat most site irritations.  Ask your pharmacist or provider for assistance (a prescription is not necessary).    In general, tube problems (including pulled tubes) are NOT emergency situations. Unless the pulling out of a tube is accompanied by uncontrollable bleeding, please DO NOT GO TO THE EMERGENCY ROOM!  Call [phone] with problems.    Tube Care:      Change the gauze dressing every 24 hours and if soiled (dirty).  Stabilize all tubes securely by using gauze and tape.  Clean tube site with soap & water using a cotton applicator (Q-tip) as needed to prevent irritation.    Flush feeding tube with 60cc of warm or room temperature water before and after meds.  To prevent the tube from clogging, ask your provider or pharmacist if liquid forms of your medications are available. If not, crush the pills well & be sure to flush the tube before & after all medications.    Flush feeding tube a minimum of every 4 hours and when  feeding is completed with 60cc of water to keep the tube clear and avoid clogging.    Pt may use an abdominal (waist) binder to protect the G-tube.    If there is continued oozing or bleeding, redness, yellow/green/foul smelling drainage    STOP the feedings & use of the tube immediately if there is:      Continued oozing or bleeding at the site    Redness    Yellow/green or foul smelling drainage at the site    Uncontrolled stomach pain    Many of the supplies mentioned above can be purchased at your local pharmacy      For issues with your tube, please call:    [facility] at [phone]

## 2018-10-08 NOTE — PROGRESS NOTES
1455 Pt returned from IR. Drowsy, but responds appropriately when spoken to. Drsgs to right neck & right chest CDI. Drsg CDI to abdomen.  G- tube securely intact.   1500 Pt's wife at bedside. Detailed update given.   1720 Discharge teaching & instructions reinforced with both pt & wife w/ verbal understanding received.  Wife instructed on how to flush G-tube & how to change G-tube drsg. All questions & concerns addressed. G-tube drsg changed at this time - small amt bloody drng noted to david.  1730 OOB - pt slightly unsteady on feet at times. Cane used.  1750 Pt discharged per w/c to private vehicle. All personal belongings taken with pt.

## 2018-10-08 NOTE — IR NOTE
VSS. Pt remains on RA. No c/o pain at this time. Right chest site sutured into place, dermabond,gauze and tegaderm into place. Right neck site has dermabond in place. Both sites are CDI and soft. No further questions from RN or pt. See epic for meds, LOC, and vitals. Pt had opposite reaction to versed. Pt started to be combative. 50mg of benadryl was given. Pt remains calm now. G-tube placement CDI, site dressed with split 4x4 and tape.

## 2018-10-08 NOTE — IP AVS SNAPSHOT
[place] [address]    [place] [address]    Phone:  [phone]                                       After Visit Summary   10/8/2018    Deepak Arndt    MRN: [id number]           After Visit Summary Signature Page     I have received my discharge instructions, and my questions have been answered. I have discussed any challenges I see with this plan with the nurse or doctor.    ..........................................................................................................................................  Patient/Patient Representative Signature      ..........................................................................................................................................  Patient Representative Print Name and Relationship to Patient    ..................................................               ................................................  Date                                   Time    ..........................................................................................................................................  Reviewed by Signature/Title    ...................................................              ..............................................  Date                                               Time          22EPIC Rev 08/18

## 2018-10-08 NOTE — PROGRESS NOTES
Interventional Radiology Pre-Procedure Sedation Assessment   Time of Assessment: 1:14 PM    Expected Level: Moderate Sedation    Indication: Sedation is required for the following type of Procedure: Gastrostomy tube placement, port a catheter placement with IV moderate sedation    Sedation and procedural consent: Risks, benefits and alternatives were discussed with Patient and Spouse    PO Intake: Appropriately NPO for procedure    ASA Class: Class 2 - MILD SYSTEMIC DISEASE, NO ACUTE PROBLEMS, NO FUNCTIONAL LIMITATIONS.    Mallampati: Grade 2:  Soft palate, base of uvula, tonsillar pillars, and portion of posterior pharyngeal wall visible    Lungs: Lungs Clear with good breath sounds bilaterally    Heart: Normal heart sounds and rate    History and physical reviewed and no updates needed. I have reviewed the lab findings, diagnostic data, medications, and the plan for sedation. I have determined this patient to be an appropriate candidate for the planned sedation and procedure and have reassessed the patient IMMEDIATELY PRIOR to sedation and procedure.    Magaly Stewart, APRN CNP

## 2018-10-08 NOTE — IP AVS SNAPSHOT
MRN:[id number]                      After Visit Summary   10/8/2018    Deepak Arndt    MRN: [id number]           Visit Information        Department      10/8/2018 11:39 AM [facility]          Review of your medicines      UNREVIEWED medicines. Ask your doctor about these medicines        Dose / Directions    acetaminophen 325 MG tablet   Commonly known as:  TYLENOL   Used for:  Generalized muscle weakness        Dose:  650 mg   Take 2 tablets (650 mg) by mouth every 4 hours as needed for mild pain or fever   Quantity:  100 tablet   Refills:  0       amLODIPine 5 MG tablet   Commonly known as:  NORVASC   Indication:  High Blood Pressure Disorder   Used for:  Essential hypertension, benign        Dose:  5 mg   Take 1 tablet (5 mg) by mouth daily   Quantity:  90 tablet   Refills:  0       calcium carbonate 500 mg-vitamin D 200 units 500-200 MG-UNIT per tablet   Commonly known as:  OSCAL with D;OYSTER SHELL CALCIUM        Dose:  1 tablet   Take 1 tablet by mouth daily   Refills:  0       DAILY MULTI PO        Take by mouth daily   Refills:  0       fish oil-omega-3 fatty acids 1000 MG capsule        Dose:  1 capsule   Take 1 capsule by mouth daily.   Refills:  0       hydrochlorothiazide 25 MG tablet   Commonly known as:  HYDRODIURIL   Used for:  Encounter for medication refill        TAKE 1 TABLET BY MOUTH ONCE DAILY   Quantity:  90 tablet   Refills:  3       NONFORMULARY        See Admin Instructions Activa probiotic yoguart 4 x per week   Refills:  0       pantoprazole 40 MG EC tablet   Commonly known as:  PROTONIX        Dose:  40 mg   Take 1 tablet (40 mg) by mouth daily for 30 doses   Quantity:  30 tablet   Refills:  0       PRADAXA PO        Dose:  150 mg   Take 150 mg by mouth 2 times daily   Refills:  0       propranolol 40 MG tablet   Commonly known as:  INDERAL   Used for:  Essential tremor        TAKE 1 TABLET BY MOUTH TWICE DAILY FOR ESSENTIAL TREMOR   Quantity:   60 tablet   Refills:  1       simvastatin 10 MG tablet   Commonly known as:  ZOCOR   Indication:  cuts 10 mg in half        Dose:  5 mg   Take 5 mg by mouth At Bedtime (Takes half of a 10mg tablet for a total of 5mg daily)   Refills:  0                Protect others around you: Learn how to safely use, store and throw away your medicines at www.disposemymeds.org.         Follow-ups after your visit        Your next 10 appointments already scheduled     Oct 08, 2018  1:00 PM CDT   (Arrive by 11:30 AM)   IR GASTROSTOMY TUBE PERCUTANEOUS PLCMNT with SHIR1   [facility] ([facility])    [address] [id number] [phone]           The day before the exam:   You may eat your regular diet.   You will be asked to drink 1 bottle of oral CT contrast the night before your procedure (12 hours prior to procedure). You can obtain this contrast from the pharmacy.  The day of the exam:   Do not eat any solid food or milk products for 6 hours prior to the exam. You may drink clear liquids until 2 hours prior to the exam. Clear liquids include the following: water, Jell-O, clear broth, apple juice or any non-carbonated drink that you can see through (no pop!)   The morning of the exam you may take medications as directed with a sip of water.  Please wear loose clothing, such as a sweat suit or jogging clothes. Avoid snaps, zippers and other metal. We may ask you to undress and put on a hospital gown.  Please bring any scans or X-rays taken at other hospitals, if similar tests were done. Also bring a list of your medicines, including vitamins, minerals and over-the-counter drugs. It is safest to leave personal items at home.  Someone will need to drive you to and from the hospital.  Tell your doctor in advance:   If you are or may be pregnant.   If you are taking Coumadin (or any other blood thinners) 5 days prior to the exam for any special instructions.   If  you are diabetic to determine if your insulin needs have to be adjusted for the exam.  You will receive patient education to care for your tube prior to the exam.  Your doctor will:   Obtain necessary laboratory tests prior to the exam (creatinine, Hgb/Hct, platelet count, and INR).   Discuss possible hospital admission post procedure.   May refer you to a dietician.  If you were given sedation, you cannot drive for 24 hours after the procedure, and an adult must be with you until then.  If you have any questions, please call the Imaging Department where you will have your exam. Directions, parking instructions, and other information are available on our website, [email or web address].            Oct 08, 2018  1:30 PM CDT   (Arrive by 12:00 PM)   IR CHEST PORT PLACEMENT > 5 YRS OF AGE with SHIR1   [facility] ([facility])    [address] [place] [id number] [phone]           The day before the exam:   You may eat your regular diet.   You are encouraged to drink at least 8 eight ounce glasses of clear liquids.  Please wear loose clothing, such as a sweat suit or jogging clothes. Avoid snaps, zippers and other metal. We may ask you to undress and put on a hospital gown.  Please bring any scans or X-rays taken at other hospitals, if similar tests were done. Also bring a list of your medicines, including vitamins, minerals and over-the-counter drugs. It is safest to leave personal items at home.  Someone will need to drive you to and from the hospital.  Tell your doctor in advance:   If you have allergies to x-ray contrast or iodine.   If you are or may be pregnant.   If you are taking Coumadin (or any other blood thinners) 5 days prior to the exam for any special instructions.   If you are diabetic to determine if your insulin needs have to be adjusted for the exam.  Your doctor will:   Need to do a history and physical within 30 days before this  procedure.   Obtain necessary laboratory tests prior to the exam (Basic Metabolic Panel, CBCP, PTT and INR)   (No labs needed if you are having a tunneled catheter exchange or removal)  If you were given sedation, you cannot drive for 24 hours after the procedure, and an adult must be with you until then.  If you have any questions, please call the Imaging Department where you will have your exam. Directions, parking instructions, and other information are available on our website, [email or web address].               Care Instructions        Further instructions from your care team       Port Insertion Discharge Instructions     After you go home:      Have an adult stay with you for the first 6 hours    You may resume your normal diet       For 24 hours - due to the sedation you received:    Relax and take it easy    Do NOT make any important or legal decisions    Do NOT drive or operate machines at home or at work    Do NOT drink alcohol    Care of Incision sites:      You have a liquid adhesive covering on your incisions. Do not remove the adhesive residue. It will come off on it's own.    You may shower tomorrow.    You may cover the wound with a bandaid if needed for comfort.      Activity       Avoid heavy lifting (greater than 10 pounds) or the overuse of your shoulder for 3 days    Bleeding:      If you start bleeding from the incision sites in your chest or neck - or have swelling in your neck, sit down and press on the site for 5-10 minutes.     If bleeding has not stopped after 10 minutes, call your provider.        Call 911 right away if you have heavy bleeding or bleeding that does not stop.      Medicines:      You may resume all medications    Resume your Warfarin/Coumadin at your regular dose today. Follow up with your provider to have your INR rechecked    Resume your Platelet Inhibitors and Aspirin tomorrow at your regular dose    For minor pain, you may take Acetaminophen (Tylenol) or Ibuprofen  (Advil)    Call the provider who ordered this procedure if:      You have swelling in your neck or over your port site    The incision area is red, swollen, hot or tender    You have chills or a fever greater than 101 F (38 C)    Any questions or concerns    Call  911 or go to the Emergency Room if you have:      Severe chest pain or trouble breathing    Bleeding that you cannot control    Additional Information:      Your port may be accessed right away.     You will need to have your port flushed every 30 days or after each use.      If you have questions call:          [facility] Radiology Dept @ [phone]      G-Tube Insertion (New) Discharge Instructions     After you go home:      You should not eat anything or use the tube for 6 hours    You may resume your normal diet after 6 hours    Have an adult stay with you for 6 hours if you received sedation       For 24 hours - due to the sedation you received:    Relax and take it easy    Do NOT make any important or legal decisions    Do NOT drive or operate machines at home or at work    Do NOT drink alcohol    Care of Insertion Site:      For the first 48 hrs, check your puncture site every couple hours while you are awake     You may remove/change the dressing tomorrow    You may shower tomorrow    No tub baths, whirlpools or swimming until your puncture site has fully healed     Activity       You may go back to normal activity in 24 hours     Wait 48 hours before lifting, straining, exercise or other strenuous activity    Medicines:      You may resume all medications    Resume your Warfarin/Coumadin at your regular dose today. Follow up with your provider to have your INR rechecked    Resume your Platelet Inhibitors and Aspirin tomorrow at your regular dose    For minor pain, you may take Acetaminophen (Tylenol) or Ibuprofen (Advil)                 Call the provider who ordered this procedure if:      Blood or fluid is draining from the site    The  site is red, swollen, hot, tender or there is foul-smelling drainage    Chills or a fever greater than 101 F (38 C)    Increased pain at the site    Any questions or concerns    Call  911 or go to the Emergency Room if:      Severe pain or trouble breathing    Bleeding that you cannot control        If you have questions call:          [facility] Radiology Dept @ [phone]          Caring for your G-tube    T-stay Removal:      Remove the T-stays 10 days after placement    During the placement of this tube - 1 or 2  T-stays  (small plastic buttons) were used to secure the stomach wall to the abdominal wall. These stays must be removed 10 days after tube placement to prevent skin irritation.    Please remove these stays by gently pulling up on the buttons and cutting the suture (thread) under the button. Cut between the button and skin with clean scissors. Be careful not to cut the tube.    Please call us at [phone] if you need assistance or further clarification.    Tube Maintenance:    Possible problems with your tube may include:      Clogged with medications or feedings - most obstructions can be cleared with a small (3cc) syringe and warm water. This may be repeated until the tube is unclogged. This can be prevented by frequently flushing the tube with water (60cc) during the day and always after medications & feedings.      Tube pulls out or falls out -cover the opening with gauze & tape. Call [phone] for further instructions      Skin breakdown and/or yeast infections at the insertion site - use of skin barrier ointments and anti-fungal powders can treat most site irritations.  Ask your pharmacist or provider for assistance (a prescription is not necessary).    In general, tube problems (including pulled tubes) are NOT emergency situations. Unless the pulling out of a tube is accompanied by uncontrollable bleeding, please DO NOT GO TO THE EMERGENCY ROOM!  Call [phone] with  problems.    Tube Care:      Change the gauze dressing every 24 hours and if soiled (dirty).  Stabilize all tubes securely by using gauze and tape.  Clean tube site with soap & water using a cotton applicator (Q-tip) as needed to prevent irritation.    Flush feeding tube with 60cc of warm or room temperature water before and after meds.  To prevent the tube from clogging, ask your provider or pharmacist if liquid forms of your medications are available. If not, crush the pills well & be sure to flush the tube before & after all medications.    Flush feeding tube a minimum of every 4 hours and when feeding is completed with 60cc of water to keep the tube clear and avoid clogging.    Pt may use an abdominal (waist) binder to protect the G-tube.    If there is continued oozing or bleeding, redness, yellow/green/foul smelling drainage    STOP the feedings & use of the tube immediately if there is:      Continued oozing or bleeding at the site    Redness    Yellow/green or foul smelling drainage at the site    Uncontrolled stomach pain    Many of the supplies mentioned above can be purchased at your local pharmacy      For issues with your tube, please call:    [place] [facility] Dept at [phone]               Additional Information About Your Visit        [employer] Information     [employer] gives you secure access to your electronic health record. If you see a primary care provider, you can also send messages to your care team and make appointments. If you have questions, please call your primary care clinic.  If you do not have a primary care provider, please call [phone] and they will assist you.        Care EveryWhere ID     This is your Care EveryWhere ID. This could be used by other organizations to access your [place] medical records  [id number]        Your Vitals Were     Blood Pressure Temperature Respirations Pulse Oximetry          140/60 (BP Location: Left arm) 96  F (35.6  C) (Oral) 18  99%         Primary Care Provider Office Phone # Fax #    [place] [phone]      Equal Access to Services     NICK LOONEY : Hadii aad ku hadrustylia Giron, duncantha buiallisonha, sanya yen, gallito jacquelinein hayaarichy merazdimas acevedo jairo barth. So [facility] [phone].    ATENCIÓN: Si habla español, tiene a esqueda disposición servicios gratuitos de asistencia lingüística. Llame al [phone].    We comply with applicable federal civil rights laws and [place] laws. We do not discriminate on the basis of race, color, national origin, age, disability, sex, sexual orientation, or gender identity.            Thank you!     Thank you for choosing [facility] for your care. Our goal is always to provide you with excellent care. Hearing back from our patients is one way we can continue to improve our services. Please take a few minutes to complete the written survey that you may receive in the mail after you visit with us. Thank you!             Medication List: This is a list of all your medications and when to take them. Check marks below indicate your daily home schedule. Keep this list as a reference.      Medications           Morning Afternoon Evening Bedtime As Needed    acetaminophen 325 MG tablet   Commonly known as:  TYLENOL   Take 2 tablets (650 mg) by mouth every 4 hours as needed for mild pain or fever                                amLODIPine 5 MG tablet   Commonly known as:  NORVASC   Take 1 tablet (5 mg) by mouth daily                                calcium carbonate 500 mg-vitamin D 200 units 500-200 MG-UNIT per tablet   Commonly known as:  OSCAL with D;OYSTER SHELL CALCIUM   Take 1 tablet by mouth daily                                DAILY MULTI PO   Take by mouth daily                                fish oil-omega-3 fatty acids 1000 MG capsule   Take 1 capsule by mouth daily.                                hydrochlorothiazide 25 MG tablet   Commonly known as:  HYDRODIURIL   TAKE 1  TABLET BY MOUTH ONCE DAILY                                NONFORMULARY   See Admin Instructions Activa probiotic yoguart 4 x per week                                pantoprazole 40 MG EC tablet   Commonly known as:  PROTONIX   Take 1 tablet (40 mg) by mouth daily for 30 doses                                PRADAXA PO   Take 150 mg by mouth 2 times daily                                propranolol 40 MG tablet   Commonly known as:  INDERAL   TAKE 1 TABLET BY MOUTH TWICE DAILY FOR ESSENTIAL TREMOR                                simvastatin 10 MG tablet   Commonly known as:  ZOCOR   Take 5 mg by mouth At Bedtime (Takes half of a 10mg tablet for a total of 5mg daily)

## 2018-10-10 NOTE — PROGRESS NOTES
10/1/18 faxed this preop to [facility] @ [phone]    Barron Rodriguez,   [facility]  [phone]

## 2018-10-11 PROBLEM — R00.1 SYMPTOMATIC BRADYCARDIA: Status: ACTIVE | Noted: 2018-01-01

## 2018-10-11 NOTE — ED PROVIDER NOTES
History     Chief Complaint:  Generalized Weakness    HPI   Deepak Arndt is an 83 year old male with a recent diagnosis of esophageal cancer, and history of atrial fibrillation, CAD, MI, hypertension and osteoporosis who presents with generalized weakness. The patient reports that this evening he was sleeping when he fell out of bed onto the hardwood floors about 2.5 hours ago. He states he did not hit his head or lose consciousness, but he was unable to get off the floor by himself. He notes his wife woke up when he fell and placed a pillow under his head. Since he was unable to get off the floor, she called EMS to transport him to the ED for evaluation. He reports that he is feeling slightly short of breath. He denies having any headache, neck pain, numbness or tingling or any other complaints or injuries. Of note, the patient was recently taken off his Pradaxa, but then started back on the medication yesterday.    Allergies:  Versed     Medications:    amLODIPine (NORVASC) 5 MG tablet   Calcium carb-Vitamin D 500 mg T[place]lina-200 units   Dabigatran Etexilate Mesylate (PRADAXA PO)   hydrochlorothiazide (HYDRODIURIL) 25 MG tablet   Multiple Vitamins-Minerals (DAILY MULTI PO)   pantoprazole (PROTONIX) 40 MG EC tablet   propranolol (INDERAL) 40 MG tablet   simvastatin (ZOCOR) 10 MG tablet     Past Medical History:    Abnormality of gait  Aortic valve disorder  Actinic keratosis  Atrial fibrillation   Esophageal cancer   Cataract  Coronary artery disease  Detached retina  ED (erectile dysfunction)  Gastroesophageal reflux disease  Hyperlipidemia  Hypertension  Lung nodules  Mild aortic stenosis  Multiple thyroid nodules  Murmur  Myocardial infarction  Osteoporosis    Past Surgical History:    Cataract IOL, RT/LT   Esophagogastroduodenoscopy x2   Ocular Device Intraop detached retina     Family History:    History reviewed. No pertinent family history.     Social History:  Marital Status:   [2]  Smoking status:  "Former Smoker, quit 1974  Alcohol use: Yes     Review of Systems   Musculoskeletal: Negative for neck pain.   Neurological: Positive for weakness. Negative for numbness and headaches.   All other systems reviewed and are negative.    Physical Exam     Patient Vitals for the past 24 hrs:   BP Temp Temp src Heart Rate Resp SpO2 Height Weight   10/11/18 0336 132/87 - - (!) 41 22 98 % - -   10/11/18 0325 121/61 - - 52 (!) 32 100 % - -   10/11/18 0245 111/86 - - (!) 49 12 98 % - -   10/11/18 0224 (!) 177/116 97.7  F (36.5  C) Oral 53 16 100 % 1.727 m (5' 8\") 65.8 kg (145 lb)       Physical Exam  Nursing note and vitals reviewed.  Constitutional:  Oriented to person, and place, but disoriented to the actual date. Cooperative.   HENT:   Nose:    Nose normal.   Mouth/Throat:   Mucous membranes are normal.   Eyes:    Conjunctivae normal and EOM are normal.      Pupils are equal, round, and reactive to light.   Neck:    Trachea normal.   Cardiovascular:  Bradycardic rate, irregularly irregular rhythm, normal heart sounds and normal pulses. No murmur heard.  Pulmonary/Chest:  Tachypneic but breath sounds normal.  Abdominal:   Soft. Normal appearance and bowel sounds are normal.      There is no tenderness.      There is no rebound and no CVA tenderness.   Musculoskeletal:  Extremities atraumatic x 4.   Lymphadenopathy:  No cervical adenopathy.   Neurological:   Alert and oriented to person and place but disoriented to the actual date. Normal strength. No cranial nerve deficit or sensory deficit. GCS eye subscore is 4. GCS verbal subscore is 5. GCS motor subscore is 6.   Skin:    Skin is intact. No rash noted.   Psychiatric:   Normal mood and affect.      Emergency Department Course   ECG (2:33:23):  Rate 47 bpm. MA interval *. QRS duration 98. QT/QTc 506/447. P-R-T axes * 71 70. Atrial fibrillation with slow ventricular response. ST & T wave abnormality, consider anterolateral ischemia. Abnormal ECG. Interpreted at 0235 by " Sherwin Walker MD.    Imaging:  Radiographic findings were communicated with the patient who voiced understanding of the findings.    XR CHEST 2 VIEWS:  Borderline prominent heart size. No focal air-space  disease or other acute findings. Right chest wall port with catheter  tip in the SVC. Old right rib fractures.  As read by Radiology.    CT HEAD W/O CONTRAST:  No acute intracranial abnormality.  As read by Radiology.    CT CHEST PULMONARY EMBOLISM W CONTRAST:  1. Study is positive for right-sided pulmonary embolus.  2. Distal esophageal mass consistent with known cancer is again seen.  Mediastinal and upper abdominal adenopathy have slightly increased.  3. Emphysema.  As read by Radiology.    Laboratory:  CBC: WBC 15.5 (H), o/w WNL (HGB 15.1, )  CMP: Glucose 104 (H), Albumin 3.3 (L), o/w WNL (Creatinine 1.04)  BNP: 486  Lipase: 154  Troponin: 0.057 (H)  Lactic Acid Whole Blood: 1.5  CK Total: 40  UA: Urineketon 40, Protein Albumin 10, Urobilinogen 8.0 (H), Mucous Present, o/w Negative    Emergency Department Course:  The patient arrived in the emergency department via EMS.    Past medical records, nursing notes, and vitals reviewed.  0234: I performed an exam of the patient and obtained history, as documented above.   EKG was obtained, results above.  IV inserted and blood samples were collected and sent for laboratory testing, findings above.  A urine sample was collected and sent for laboratory testing, findings above.  The patient was sent for a chest x-ray, head CT and chest CT while in the emergency department, findings above.    0430: I spoke to radiology regarding the patient's CT imaging.    0433: I rechecked the patient. Explained findings to the patient.    0438: I spoke to Dr. Mercado of the hospitalist service who accepts the patient for admission.     Findings and plan explained to the patient who consents to admission.     Discussed the patient with Dr. Mercado, who will admit the  patient to a [facility] bed for further monitoring, evaluation, and treatment.     Impression & Plan      Medical Decision Making:  This is an 83-year-old male brought in by ambulance for further evaluation of weakness after a fall and inability to get up off the floor.  He was quite weak even on my exam.  He also was quite bradycardic and tachypneic.  I proceeded with the above workup to see if I might find a cause.  While my suspicion was slightly low for a pulmonary embolism, that also is on my differential especially given his cancer diagnosis and the fact that he was off of his anticoagulation for a week or so.  Therefore I proceeded with a CT scan as well, which shows that he does have a small pulmonary embolism.  He has already restarted his Pradaxa though, and therefore I am not sure that the treatment would differ with regards to that finding.  However he certainly is too weak to go home, and he is extremely bradycardic, which could be contributing to his weakness in addition to the cancer.  He also has an elevated troponin today, which could be contributing to his weakness.  He very well could have sick sinus syndrome as a cause of his bradycardia, and therefore it is possible he would need a pacemaker.  Regardless, he clearly needs to be admitted to the hospital for further evaluation and management.  I spoke with Dr. Mercado, who will be admitting the patient.    Diagnosis:    ICD-10-CM   1. Generalized muscle weakness M62.81   2. Malignant neoplasm of esophagus, unspecified location (H) C15.9   3. Other acute pulmonary embolism without acute cor pulmonale (H) I26.99   4. Elevated troponin R74.8   5. Symptomatic bradycardia R00.1       Disposition:  Admitted to [facility] in the care of Dr. Rogelio Arguello  10/11/2018    EMERGENCY DEPARTMENT  I, Zainab Arguello, am serving as a scribe at 2:34 AM on 10/11/2018 to document services personally performed by Sherwin Walker MD based on my observations  and the provider's statements to me.        Sherwin Walker MD  10/11/18 0502

## 2018-10-11 NOTE — PROGRESS NOTES
10/11/18 0920   Quick Adds   Type of Visit Initial PT Evaluation   Living Environment   Lives With spouse   Living Arrangements house   Home Accessibility bed and bath on same level;stairs to enter home;tub/shower is not walk in   Number of Stairs to Enter Home 2   Transportation Available car;family or friend will provide   Self-Care   Usual Activity Tolerance moderate   Current Activity Tolerance poor   Regular Exercise no   Equipment Currently Used at Home cane, straight   Activity/Exercise/Self-Care Comment Pt uses cane when he leaves the house adn furniture in cristina house.Pt is able to do ADLs independently and use bar on shower door for suport to get in shower/tub.   Functional Level Prior   Ambulation 1-->assistive equipment   Transferring 0-->independent   Toileting 0-->independent   Bathing 0-->independent   Dressing 0-->independent   Eating 0-->independent   Communication 0-->understands/communicates without difficulty   Swallowing 0-->swallows foods/liquids without difficulty   Cognition 0 - no cognition issues reported   Fall history within last six months yes   Number of times patient has fallen within last six months 2   Which of the above functional risks had a recent onset or change? fall history   Prior Functional Level Comment Pt strength has been declining since diagnosis of esphogeal cancer.   General Information   Onset of Illness/Injury or Date of Surgery - Date 10/10/18   Referring Physician Dr. Mercado   Patient/Family Goals Statement Pt did not state   Pertinent History of Current Problem (include personal factors and/or comorbidities that impact the POC) Pt is a 83 year old male,admitted after a fall. Pt has new PE, recent esophageal CA, chronic bradycardia   Precautions/Limitations fall precautions;other (see comments)  (monitor vitals)   Weight-Bearing Status - LLE full weight-bearing   Weight-Bearing Status - RLE full weight-bearing   Cognitive Status Examination   Orientation  "person;place   Level of Consciousness lethargic/somnolent   Follows Commands and Answers Questions 75% of the time   Personal Safety and Judgment intact   Memory impaired   Pain Assessment   Patient Currently in Pain Yes, see Vital Sign flowsheet   Posture    Posture Comments kyphosis   Range of Motion (ROM)   ROM Comment WFL   Strength   Strength Comments Generalized deconditioned with gross weakness. Decreased endurance for Antigravity strength   Bed Mobility   Bed Mobility Comments supine to sit: modA   Transfer Skills   Transfer Comments sit to stand: repeated cues and modA.   Gait   Gait Comments Pt able to take a few steps with walker and modA before needing to sit.   Balance   Balance Comments requires AD and support   Sensory Examination   Sensory Perception no deficits were identified   Coordination   Coordination no deficits were identified   General Therapy Interventions   Planned Therapy Interventions balance training;bed mobility training;gait training;strengthening;transfer training;progressive activity/exercise   Clinical Impression   Criteria for Skilled Therapeutic Intervention yes, treatment indicated   PT Diagnosis Decraesed strength for activity   Influenced by the following impairments decreased endurance, decreased strength, bradycardia, fall risk, pain, decreased balance   Functional limitations due to impairments increased assist for all functional mobility   Clinical Presentation Evolving/Changing   Clinical Presentation Rationale clinical judgement, medical history   Clinical Decision Making (Complexity) Moderate complexity   Therapy Frequency` 5 times/week   Predicted Duration of Therapy Intervention (days/wks) 3-5 days   Anticipated Discharge Disposition Transitional Care Facility   Risk & Benefits of therapy have been explained Yes   Patient, Family & other staff in agreement with plan of care Yes   [facility] AM-PAC TM \"6 Clicks\"   2016, Trustees of [facility], under " "license to [employer].  All rights reserved.   6 Clicks Short Forms Basic Mobility Inpatient Short Form   [facility] AM-PAC  \"6 Clicks\" V.2 Basic Mobility Inpatient Short Form   1. Turning from your back to your side while in a flat bed without using bedrails? 2 - A Lot   2. Moving from lying on your back to sitting on the side of a flat bed without using bedrails? 2 - A Lot   3. Moving to and from a bed to a chair (including a wheelchair)? 2 - A Lot   4. Standing up from a chair using your arms (e.g., wheelchair, or bedside chair)? 2 - A Lot   5. To walk in hospital room? 2 - A Lot   6. Climbing 3-5 steps with a railing? 1 - Total   Basic Mobility Raw Score (Score out of 24.Lower scores equate to lower levels of function) 11   Total Evaluation Time   Total Evaluation Time (Minutes) 20     "

## 2018-10-11 NOTE — IP AVS SNAPSHOT
"    [place] [address] ONCOLOGY: [phone]                                              INTERAGENCY TRANSFER FORM - LAB / IMAGING / EKG / EMG RESULTS   10/11/2018                    Hospital Admission Date: 10/11/2018  LA LOCKE   : 1935  Sex: Male        Attending Provider: Nicolás Mercado DO     Allergies:  Versed [Midazolam]    Infection:  None   Service:  HOSPITALIST    Ht:  1.727 m (5' 7.99\")   Wt:  58.7 kg (129 lb 4.8 oz)   Admission Wt:  65.8 kg (145 lb)    BMI:  19.66 kg/m 2   BSA:  1.68 m 2            Patient PCP Information     Provider PCP Type    Clinton Mills MD General         Lab Results - 3 Days      Creatinine [[id number]]  Resulted: 10/20/18 0700, Result status: Final result    Ordering provider: Donnell Larsen MD  10/20/18 0620 Resulting lab: [facility]    Specimen Information    Type Source Collected On   Blood  10/20/18 0635          Components       Value Reference Range Flag Lab   Creatinine 0.73 0.66 - 1.25 mg/dL  FrStHsLb   GFR Estimate >90 >60 mL/min/1.7m2  FrStHsLb   Comment:  Non  GFR Calc   GFR Estimate If Black >90 >60 mL/min/1.7m2  FrStHsLb   Comment:   GFR Calc            Platelet count [[id number]]  Resulted: 10/18/18 1456, Result status: Final result    Ordering provider: Nicolás Mercado DO  10/18/18 0000 Resulting lab: [facility]    Specimen Information    Type Source Collected On   Blood  10/18/18 1445          Components       Value Reference Range Flag Lab   Platelet Count 213 150 - 450 10e9/L  FrStHsLb            Blood culture [[id number]]  Resulted: 10/17/18 0728, Result status: Final result    Ordering provider: Kwan Benoit MD  10/11/18 1326 Resulting lab: [facility]    Specimen Information    Type Source Collected On   Blood Arm, Left 10/11/18 1625   Comment:  Right Arm          Components       Value Reference Range Flag Lab "   Specimen Description Blood Right Arm      Special Requests Aerobic and anaerobic bottles received   FrStHsLb   Culture Micro No growth   225            Blood culture [[id number]]  Resulted: 10/17/18 0728, Result status: Final result    Ordering provider: Kwan Benoit MD  10/11/18 1326 Resulting lab: [facility]    Specimen Information    Type Source Collected On   Blood  10/11/18 1620   Comment:  Left Arm          Components       Value Reference Range Flag Lab   Specimen Description Blood Left Arm      Special Requests Received in aerobic bottle only   75   Culture Micro No growth   225            CBC with platelets [[id number]] (Abnormal)  Resulted: 10/17/18 0639, Result status: Final result    Ordering provider: Efrem Sifuentes APRN CNP  10/17/18 0000 Resulting lab: [facility]    Specimen Information    Type Source Collected On   Blood  10/17/18 0600          Components       Value Reference Range Flag Lab   WBC 10.6 4.0 - 11.0 10e9/L  FrStHsLb   RBC Count 4.08 4.4 - 5.9 10e12/L L FrStHsLb   Hemoglobin 13.6 13.3 - 17.7 g/dL  FrStHsLb   Hematocrit 39.7 40.0 - 53.0 % L FrStHsLb   MCV 97 78 - 100 fl  FrStHsLb   MCH 33.3 26.5 - 33.0 pg H FrStHsLb   MCHC 34.3 31.5 - 36.5 g/dL  FrStHsLb   RDW 13.3 10.0 - 15.0 %  FrStHsLb   Platelet Count 207 150 - 450 10e9/L  FrStHsLb            Testing Performed By     Lab - Abbreviation Name Director Address Valid Date Range    14 - FrStHsLb [facility] Unknown [address] [place] [address] 05/08/15 1057 - Present    75 - Unknown [facility] Unknown [address] [facility] [address] 01/15/15 1019 - Present    225 - Unknown [facility] Unknown [address] [facility] [address] 12/19/14 0954 - Present            Unresulted Labs (24h ago through future)    Start       Ordered    10/20/18 0600  Creatinine  EVERY THREE DAYS,   Routine    "  Comments:  Last Lab Result: Creatinine (mg/dL)       Date                     Value                 10/15/2018               0.63 (L)         ----------    10/19/18 0839    10/18/18 0600  Platelet count  EVERY THREE DAYS,   Routine     Comments:  If no result is listed, this lab has not been done the past 365 days. LATEST LAB RESULT: Platelet Count (10e9/L)       Date                     Value                 10/16/2018               190              ----------    10/16/18 1022    10/15/18 0600  Basic metabolic panel  EVERY MONDAY,   Routine     Comments:  Every Monday while on enteral tube feeding.    10/11/18 1153    10/15/18 0600  Magnesium  EVERY MONDAY,   Routine     Comments:  Every Monday while on enteral tube feeding.    10/11/18 1153    10/15/18 0600  Phosphorus  EVERY MONDAY,   Routine     Comments:  Every Monday while on enteral tube feeding.    10/11/18 1153    Unscheduled  Phosphorus  (POTASSIUM Phosphate - \"Standard\" - Replacement for levels less than or equal to 2.4 mg/dL )  CONDITIONAL (SPECIFY),   Routine     Comments:  Obtain Phosphorus Level for these conditions:  *IF no phosphorus result within 24 hrs before initiation of order set, draw phosphorus level with next lab collect.    *2 HOURS AFTER last phosphorus replacement dose for levels less than 2.0.  *Next morning after phosphorus dose.     Repeat Phosphorus Replacement if necessary.    10/15/18 1017         ECG/EMG Results      Echocardiogram Complete [[id number]]  Resulted: 10/11/18 0824, Result status: Edited Result - FINAL    Ordering provider: Bree Mercado DO  10/11/18 0556 Resulted by: Juan Carlos Gan MD    Performed: 10/11/18 0822 - 10/11/18 09 Resulting lab: RADIOLOGY RESULTS    Narrative:       [id number]  [id number]  [id number]  [id number]^AFSHIN^BREE^SHIRA           [facility]  Echocardiography Laboratory  [facility] [place] [address]        Name: LA LOCKE  MRN: [id number]  : " 1935  Study Date: 10/11/2018 08:24 AM  Age: 83 yrs  Gender: Male  Patient Location: [facility] [address]  Reason For Study: Pulmonary Embolism  Ordering Physician: BREE PEPE  Referring Physician: BREE PEPE  Performed By: Sonia Sanchez     BSA: 1.8 m2  Height: 68 in  Weight: 145 lb  HR: 48  BP: 140/98 mmHg  _____________________________________________________________________________  __        Procedure  Complete Echo Adult.  _____________________________________________________________________________  __        Interpretation Summary     The left ventricle is normal in size.  The visual ejection fraction is estimated at 60-65%.  Diastolic function not assessed due to atrial fibrillation.  No regional wall motion abnormalities noted.  The right ventricle is mildly dilated.  The right ventricular systolic function is normal.  Mild (35-45mmHg) pulmonary hypertension is present.  The rhythm was atrial fibrillation.  _____________________________________________________________________________  __        Left Ventricle  The left ventricle is normal in size. There is normal left ventricular wall  thickness. The visual ejection fraction is estimated at 60-65%. Diastolic  function not assessed due to atrial fibrillation. No regional wall motion  abnormalities noted.     Right Ventricle  The right ventricle is mildly dilated. The right ventricular systolic function  is normal.     Atria  The left atrium is severely dilated. The right atrium is severely dilated.  There is no color Doppler evidence of an atrial shunt.     Mitral Valve  The mitral valve leaflets are mildly thickened. There is mild (1+) mitral  regurgitation.        Tricuspid Valve  There is mild to moderate (1-2+) tricuspid regurgitation. Normal IVC (1.5-  2.5cm) with >50% respiratory collapse; right atrial pressure is estimated at  5-10mmHg. The right ventricular systolic pressure is approximated at 36.8 mmHg  plus the right atrial pressure. Mild  (35-45mmHg) pulmonary hypertension is  present.     Aortic Valve  There is trivial trileaflet aortic sclerosis. There is trace aortic  regurgitation. The mean AoV pressure gradient is 13.7 mmHg.     Pulmonic Valve  There is mild to moderate (1-2+) pulmonic valvular regurgitation.     Vessels  Mild aortic root dilatation. The ascending aorta is Mildly dilated. Dilation  of the inferior vena cava is present with normal respiratory variation in  diameter.     Pericardium  There is no pericardial effusion.        Rhythm  The rhythm was atrial fibrillation.  _____________________________________________________________________________  __  MMode/2D Measurements & Calculations  IVSd: 1.2 cm     LVIDd: 4.4 cm  LVIDs: 3.1 cm  LVPWd: 0.94 cm  FS: 28.9 %  LV mass(C)d: 160.6 grams  LV mass(C)dI: 90.1 grams/m2  Ao root diam: 3.9 cm  asc Aorta Diam: 4.0 cm  LVOT diam: 2.2 cm  LVOT area: 3.9 cm2  LA Volume (BP): 111.0 ml  LA Volume Index (BP): 62.4 ml/m2  RWT: 0.43           Doppler Measurements & Calculations  MV E max emile: 87.6 cm/sec  MV dec time: 0.21 sec  Ao V2 max: 254.2 cm/sec  Ao max P.0 mmHg  Ao V2 mean: 174.1 cm/sec  Ao mean P.7 mmHg  Ao V2 VTI: 53.5 cm  KENDRICK(I,D): 1.9 cm2  KENDRICK(V,D): 2.1 cm2  LV V1 max P.6 mmHg  LV V1 max: 138.2 cm/sec  LV V1 VTI: 26.3 cm  SV(LVOT): 101.8 ml  SI(LVOT): 57.1 ml/m2  PA V2 max: 143.9 cm/sec  PA max P.3 mmHg  PA acc time: 0.07 sec  PI end-d emile: 137.1 cm/sec  TR max emile: 303.4 cm/sec  TR max P.8 mmHg  AV Emile Ratio (DI): 0.54  KENDRICK Index (cm2/m2): 1.1  E/E' av.9  Lateral E/e': 7.6  Medial E/e': 12.1              _____________________________________________________________________________  __        Report approved by: Kirk Duval 10/11/2018 09:46 AM       1    Type Source Collected On     10/11/18 0824          View Image (below)              Encounter-Level Documents:     There are no encounter-level documents.      Order-Level Documents:     There are no  order-level documents.

## 2018-10-11 NOTE — PLAN OF CARE
Problem: Patient Care Overview  Goal: Plan of Care/Patient Progress Review  Discharge Planner OT   Patient plan for discharge: unknown.  Current status: Order rec'd. PT has evaluated patient with attention to HR with activity, which remained in the 40s. PT recommends TCU.  Barriers to return to prior living situation: Defer to PT.   Recommendations for discharge: Defer to PT.   Rationale for recommendations: OT deferring eval to TCU at this time as LOS expected to be short. Cognition can be formally assessed there.       Entered by: Radha Pereira 10/11/2018 5:02 PM

## 2018-10-11 NOTE — CONSULTS
"CLINICAL NUTRITION SERVICES  -  ASSESSMENT NOTE      Recommendations Ordered by Registered Dietitian (RD):     Wife very concerned about TF process.  She does not want pt to be hooked up to a feeding pump - \"he is eating and mobile during the day and is an active sleeper.\"  She feels bolus feedings would be better.  As pt is still eating, will plan to have TF provide ~60% est needs  Isosource 1.5 - 3 cans per day (given via syringe bolus over 30 minutes) = 1125 cals, 51 gm pro, 11 gm fiber, 570 mL free water  Flush G-tube with 60 mL water before and after each feeding    Will plan to give 1 can at 9am - 2pm - 7pm (given over 30 minutes)  Start today with only 1/2 can (125 mL) feedings, and work toward full can feedings tomorrow  .     Malnutrition: (10/11)  % Weight Loss:  > 5% in 1 month (severe malnutrition)  % Intake:  <75% for >/= 1 month (non-severe malnutrition)  Subcutaneous Fat Loss:  None observed  Muscle Loss:  Anterior thigh region  - moderate depletion and Posterior calf region  - moderate depletion  Fluid Retention:  None noted    Malnutrition Diagnosis: Severe malnutrition  In Context of:  Chronic illness or disease (GE junction CA)          REASON FOR ASSESSMENT  Deepak Arndt is a 83 year old male seen by Registered Dietitian for Provider Order - \"Esophageal cancer. Weight loss. G tube. Help with initiation of tube feeding.\"      NUTRITION HISTORY  Chart reviewed  8/28/18: pt presented to clinic visit - \"1-2 months of acid in stomach, happens every day, sharp burning pain sour taste after eating. \"  9/7/18: returned to clinic - \"Tried Omeorazole since 8/28/18 20 mg in am and did not help. Has been avoiding tomatoes, spicy foods, vinegar. Weight loss due to not eating. 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     Visited with pt's wife (pt sleeping " "soundly under the covers)  \"He has been very hungry, but just not able to eat/swallow well\"  Wife notes that she has been using the  and feeding pt very soft/smooth foods  Soups, cottage cheese, Greek yogurt, canned meats mixed with soup, fruits, veggies, milk, apple juice  Pt has been taking some Ensure - \"not always a bottle a day\"    Wife states she was not aware pt was going to be using the FT right now - \"I thought it was for the future, no one has talked to me about that\"  She is very concerned about managing the TF at home - \"will someone be available to help me?\"        CURRENT NUTRITION ORDERS  Diet Order:     Mechanical Soft  Room Service with Assist    Wife ordered pt lunch - cream soup, applesauce, 1% milk, cottage cheese, pudding      PHYSICAL FINDINGS  Observed  Muscle Wasting  - legs, arms (felt through the blankets)  Obtained from Chart/Interdisciplinary Team  10/8/18: 14 [place] G-tube placed  Pt scheduled to begin chemo and RT      ANTHROPOMETRICS  Height: 5' 8\"  Weight:(10/11) 62.6 kg / 138 lbs .13 oz  Body mass index is 20.98 kg/(m^2).  Weight Status:  Normal BMI  IBW: 70 kg  % IBW: 89%  Weight History: Wt is down ~14# (9%) over the past month.  Wife has noticed muscle and energy loss.    Wt Readings from Last 10 Encounters:   10/11/18 62.6 kg (138 lb 0.1 oz)   10/02/18 67.1 kg (148 lb)   09/24/18 67.1 kg (148 lb)   09/21/18 66.1 kg (145 lb 12.8 oz)   09/19/18 72.6 kg (160 lb)   09/07/18 68.9 kg (152 lb)   08/28/18 71.2 kg (157 lb)   03/02/18 71.4 kg (157 lb 6.4 oz)   02/08/18 69.6 kg (153 lb 6.4 oz)   12/11/17 70.4 kg (155 lb 1.6 oz)         LABS  Labs reviewed    MEDICATIONS  IVF (LR) at 100 mL/hr      ASSESSED NUTRITION NEEDS PER APPROVED PRACTICE GUIDELINES:    Dosing Weight (10/11) 62.6 kg  Estimated Energy Needs: 0155-1619 kcals (25-30 Kcal/Kg)  Justification: maintenance  Estimated Protein Needs: 75-95 grams protein (1.2-1.5 g pro/Kg)  Justification: preservation of lean body " "mass  Estimated Fluid Needs: 6142-6081  mL (1 mL/Kcal)  Justification: maintenance    MALNUTRITION:  % Weight Loss:  > 5% in 1 month (severe malnutrition)  % Intake:  <75% for >/= 1 month (non-severe malnutrition)  Subcutaneous Fat Loss:  None observed  Muscle Loss:  Anterior thigh region  - moderate depletion and Posterior calf region  - moderate depletion  Fluid Retention:  None noted    Malnutrition Diagnosis: Severe malnutrition  In Context of:  Chronic illness or disease (GE junction CA)    NUTRITION DIAGNOSIS:  Inadequate oral intake related to difficulty swallowing with new dx GE junction CA as evidenced by pt with 9% wt loss past month      NUTRITION INTERVENTIONS  Recommendations / Nutrition Prescription  Mechanical soft diet    Wife very concerned about TF process.  She does not want pt to be hooked up to a feeding pump - \"he is eating and mobile during the day and is an active sleeper.\"  She feels bolus feedings would be better.  As pt is still eating, will plan to have TF provide ~60% est needs  Isosource 1.5 - 3 cans per day (given via syringe bolus over 30 minutes) = 1125 cals, 51 gm pro, 11 gm fiber, 570 mL free water  Flush G-tube with 60 mL water before and after each feeding    Will plan to give 1 can at 9am - 2pm - 7pm (given over 30 minutes)  Start today with only 1/2 can (125 mL) feedings, and work toward full can feedings tomorrow  .      Implementation  Nutrition education ---> Spent time talking to wife about TF plans.  She has many questions.  Pt will need home care set up at discharge.  Pt will also need to be followed by either the home care dietitian or the Hem/Onc clinic dietitian for continued assessment.  EN Composition and EN Schedule ---> TF orders entered in EPIC  .      Nutrition Goals  EN to meet 60% est needs while pt still taking po  .      MONITORING AND EVALUATION:  Progress towards goals will be monitored and evaluated per protocol and Practice Guidelines                "

## 2018-10-11 NOTE — IP AVS SNAPSHOT
"` `           [place] [address] ONCOLOGY: [phone]                                              INTERAGENCY TRANSFER FORM - NURSING   10/11/2018                    Hospital Admission Date: 10/11/2018  LA LOCKE   : 1935  Sex: Male        Attending Provider: Nicolás Mercado DO     Allergies:  Versed [Midazolam]    Infection:  None   Service:  HOSPITALIST    Ht:  1.727 m (5' 7.99\")   Wt:  58.7 kg (129 lb 4.8 oz)   Admission Wt:  65.8 kg (145 lb)    BMI:  19.66 kg/m 2   BSA:  1.68 m 2            Patient PCP Information     Provider PCP Type    Clinton Mills MD General      Current Code Status     Date Active Code Status Order ID Comments User Context       10/11/2018  5:56 AM Full Code [id number]  Nicolás Mercado DO Inpatient       Questions for Current Code Status     Question Answer Comment    Code status determined by: Discussion with patient/legal decision maker       Code Status History     Date Active Date Inactive Code Status Order ID Comments User Context    10/23/2017  6:32 AM 10/24/2017 12:52 PM Full Code [id number]  Madhu Leal MD Inpatient    2011  2:32 PM 10/23/2017  6:32 AM Full Code [id number] As per reasonable care for Seniors, wants in the Short term aggressive care.   No desire for long term prolongation of life through artificial means if no hope to bring back to a reasonable status.   Clinton Mills MD Outpatient      Advance Directives        Scanned docmt in ACP Activity?           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) Medium  10/15/2018 Unknown      Non-Hospital Problems as of 10/20/2018              Priority Class Noted    HTN (hypertension) Medium  2011    Osteoporosis Medium  2011    Hypercholesterolemia Medium  2011    Past myocardial infarction Medium  2011 "    Myocardial infarction (H) Medium  6/1/2012    Chronic atrial fibrillation (H) Medium  8/20/2013    Health Care Home Medium  7/7/2014    Mild aortic stenosis Medium  Unknown    Coronary artery disease involving native coronary artery of native heart without angina pectoris Medium  Unknown    Aortic valve disorder Medium  12/16/2015    Abnormality of gait Medium  10/23/2017      Immunizations     Name Date      HEPA 02/27/13     Influenza (High Dose) 3 valent vaccine 11/01/15     Influenza (IIV3) PF 09/19/13     Influenza (IIV3) PF 10/01/12     Influenza Vaccine IM 3yrs+ 4 Valent IIV4 09/18/17     Pneumo Conj 13-V (2010&after) 06/01/16     Pneumo Conj 13-V (2010&after) 01/01/02     Pneumococcal 23 valent 09/18/17     TD (ADULT, 7+) 05/01/07     TDAP Vaccine (Boostrix) 02/27/13          END      ASSESSMENT     Discharge Profile Flowsheet     EXPECTED DISCHARGE     Patient's communication style  spoken language (English or Bilingual) 10/11/18 0222    Expected Discharge Date  10/20/18 (Lynette 10:30AM) 10/19/18 1043   FINAL RESOURCES      DISCHARGE NEEDS ASSESSMENT     Resources List  Home Care 12/11/17 1328    Concerns To Be Addressed  all concerns addressed in this encounter 12/12/17 0955   PAS Number  -- 12/11/17 1328    Concerns Comments  -- 12/12/17 0955   SKIN      Equipment Currently Used at Home  cane, straight 10/11/18 1213   Inspection of bony prominences  Full 10/20/18 0305    Transportation Available  car;family or friend will provide 10/11/18 0924   Inspection under devices  Full 10/20/18 0305    # of Referrals Placed by CTS  Post Acute Facilities 10/18/18 1135   Skin WDL  ex 10/20/18 0305    Primary Care Clinic Name  [facility] 10/18/18 1055   Skin Temperature  warm 10/20/18 0305    Primary Care MD Name  Clinton Mills 10/18/18 1053   Skin Moisture  dry 10/20/18 0305    GASTROINTESTINAL (ADULT,PEDIATRIC,OB)     Skin Elasticity  slow return to original state 10/18/18 2236    GI WDL  WDL 10/20/18 0305   Skin  "Integrity  rash(es);scab(s);scar(s);bruise(s);drain/device(s) 10/20/18 0305    All Quadrants Bowel Sounds  audible and active in all quadrants 10/20/18 0305   Skin Color/Characteristics  bruised (ecchymotic);pale 10/20/18 0305    Last Bowel Movement  10/19/18 10/19/18 2203   SAFETY      Passing flatus  yes 10/20/18 0305   Safety WDL  WDL 10/20/18 0305    COMMUNICATION ASSESSMENT     All Alarms  alarm(s) activated and audible 10/20/18 0305                 Assessment WDL (Within Defined Limits) Definitions           Safety WDL     Effective: 09/28/15    Row Information: <b>WDL Definition:</b> Bed in low position, wheels locked; call light in reach; upper side rails up x 2; ID band on<br> <font color=\"gray\"><i>Item=AS safety wdl>>List=AS safety wdl>>Version=F14</i></font>      Skin WDL     Effective: 09/28/15    Row Information: <b>WDL Definition:</b> Warm; dry; intact; elastic; without discoloration; pressure points without redness<br> <font color=\"gray\"><i>Item=AS skin wdl>>List=AS skin wdl>>Version=F14</i></font>      Vitals     Vital Signs Flowsheet     VITAL SIGNS     Height Method  Stated 10/11/18 0553    Temp  96.5  F (35.8  C) 10/20/18 0803   Height Method  Stated 10/11/18 0225    Temp src  Oral 10/20/18 0803   Weight  58.7 kg (129 lb 4.8 oz) 10/20/18 0645    Resp  16 10/20/18 0803   Weight Method  Bed scale 10/20/18 0645    Pulse  54 10/15/18 0736   Bed Scale  Standard (fitted sheet, draw sheet/ pad, cover/flat sheet, blanket, two pillows);Fitted sheet;Cover/flat sheet (add comment for number) 10/20/18 0645    Heart Rate  64 10/20/18 0803   BSA (Calculated - sq m)  1.73 10/11/18 0601    Pulse/Heart Rate Source  Monitor 10/20/18 0803   BMI (Calculated)  21.03 10/11/18 0601    BP  153/58 10/20/18 0803   POSITIONING      BP Location  Right arm 10/20/18 0803   Body Position  independently positioning 10/20/18 0131    OXYGEN THERAPY     Head of Bed (HOB)  HOB at 15 degrees 10/20/18 0305    SpO2  91 % 10/20/18 " "0803   Chair  Upright in chair 10/19/18 2106    O2 Device  None (Room air) 10/20/18 0803   Positioning/Transfer Devices  pillows;in use 10/20/18 0305    PAIN/COMFORT     DAILY CARE      Patient Currently in Pain  denies 10/20/18 0131   Activity Management  ambulated to bathroom 10/20/18 0416    Preferred Pain Scale  number (Numeric Rating Pain Scale) 10/19/18 2106   Activity Assistance Provided  assistance, 1 person 10/20/18 0416    Patient's Stated Pain Goal  No pain 10/11/18 0951   Assistive Device Utilized  gait belt;walker 10/20/18 0416    0-10 Pain Scale  0 10/19/18 2106   ECG      KARELY COMA SCALE     ECG Rhythm  Atrial fibrillation 10/13/18 1432    Best Eye Response  4-->(E4) spontaneous 10/20/18 0305   Ectopy  None 10/13/18 1100    Best Motor Response  6-->(M6) obeys commands 10/20/18 0305   Ectopy Frequency  Occasional 10/11/18 0951    Best Verbal Response  5-->(V5) oriented 10/20/18 0305   Equipment  electrodes changed;telemetry batteries changed 10/11/18 0626    Karely Coma Scale Score  15 10/20/18 0305   EKG MONITORING      HEIGHT AND WEIGHT     Cardiac Regularity  Regular 10/11/18 0303    Height  1.727 m (5' 7.99\") 10/16/18 1013   Cardiac Rhythm  SB 10/11/18 0303            Patient Lines/Drains/Airways Status    Active LINES/DRAINS/AIRWAYS     Name: Placement date: Placement time: Site: Days: Last dressing change:    Gastrostomy/Enterostomy Gastrostomy LUQ 1 14 fr lotD [id number] 10/08/18   1446   LUQ   11     Rash 10/16/18 0335 other (see comments) back papule 10/16/18   0335    4     Incision/Surgical Site 10/12/18 Right Chest 10/12/18   2002    7             Patient Lines/Drains/Airways Status    Active PICC/CVC     Name: Placement date: Placement time: Site: Days: Additional Info Last dressing change:    Port A Cath Single 10/08/18 Right Chest wall 10/08/18   1358   Chest wall   11 Orientation: Right            Power Port: Yes            Inserted by: Geeta            Total Catheter Length (cm): " 19 cm            Line Flushed (See MAR): Yes            MRI Compatible: Yes            Diameter [place] Size: 6 Fr            Tip location: SVC/RA Junction            Lot #: [id number]               Intake/Output Detail Report     Date Intake         Output   Net    Shift P.O. I.V. NG/GT IV Piggyback Enteral Total Urine Emesis/NG output Total       Noc 10/18/18 2300 - 10/19/18 0659 -- -- -- -- -- -- 100 -- 100 -100    Day 10/19/18 0700 - 10/19/18 1459 -- -- 370 -- -- 370 175 -- 175 195    Kallie 10/19/18 1500 - 10/19/18 2259 -- -- -- -- -- -- -- -- -- 0    Noc 10/19/18 2300 - 10/20/18 0659 -- -- -- -- -- -- -- -- -- 0    Day 10/20/18 0700 - 10/20/18 1459 -- -- -- -- -- -- 200 -- 200 -200      Last Void/BM       Most Recent Value    Urine Occurrence 3 at 10/20/2018 0339    Stool Occurrence 1 at 10/16/2018 0300      Case Management/Discharge Planning     Case Management/Discharge Planning Flowsheet     REFERRAL INFORMATION     EMPLOYMENT      Did the Initial Social Work Assessment result in a Social Work Case?  Yes 10/12/18 1637   Do you work full or part-time?  no 10/12/18 1637    Admission Type  inpatient 10/12/18 1637   COPING/STRESS      Arrived From  home or self-care 10/12/18 1637   Major Change/Loss/Stressor  illness;hospitalization 10/11/18 0613    Referral Source  physician 10/12/18 1637   EXPECTED DISCHARGE      # of Referrals Placed by CTS  Post Acute Facilities 10/18/18 1135   Expected Discharge Date  10/20/18 (Lynette 10:30AM) 10/19/18 1043    Post Acute Facilities  TCU 10/18/18 1135   ASSESSMENT/CONCERNS TO BE ADDRESSED      Reason For Consult  other (see comments) (referral to [facility] for IV chemo TX while in TCU) 10/18/18 1055   Concerns To Be Addressed  all concerns addressed in this encounter 12/12/17 0955    Record Reviewed  medical record 10/12/18 1637   Concerns Comments  -- 12/12/17 0955    CTS Assigned to Case  Radha Lofton 10/18/18 1137   DISCHARGE PLANNING      Primary Care Clinic Name  [facility]  10/18/18 1055   Transportation Available  car;family or friend will provide 10/11/18 0924    Primary Care MD Name  Clinton Mills 10/18/18 1053   FINAL RESOURCES      LIVING ENVIRONMENT     Equipment Currently Used at Home  cane, straight 10/11/18 1213    Lives With  spouse 10/12/18 1637   Resources List  Home Care 12/11/17 1328    Living Arrangements  house 10/12/18 1637   PAS Number  -- 12/11/17 1328    Provides Primary Care For  no one 10/12/18 1637   ABUSE RISK SCREEN      Quality Of Family Relationships  supportive;involved 10/12/18 1637   QUESTION TO PATIENT:  Has a member of your family or a partner(now or in the past) intimidated, hurt, manipulated, or controlled you in any way?  no 10/11/18 0226    ASSESSMENT OF FAMILY/SOCIAL SUPPORT     QUESTION TO PATIENT: Do you feel safe going back to the place where you are living?  yes 10/11/18 0226    Marital Status   10/12/18 1637   OBSERVATION: Is there reason to believe there has been maltreatment of a vulnerable adult (ie. Physical/Sexual/Emotional abuse, self neglect, lack of adequate food, shelter, medical care, or financial exploitation)?  no 10/11/18 0226    Who is your support system?  Wife 10/12/18 1637   OTHER      Spouse's Name  Caitlyn 10/12/18 1637   Are you depressed or being treated for depression?  No 10/11/18 0616    Description of Support System  Supportive;Involved 10/12/18 1637   HOMICIDE RISK      Support Assessment  Adequate family and caregiver support 10/12/18 1637   Feels Like Hurting Others  no 10/11/18 0226

## 2018-10-11 NOTE — PLAN OF CARE
Problem: Patient Care Overview  Goal: Plan of Care/Patient Progress Review  Outcome: No Change  Vague . Forgetful.  Stubborn. Incont . Of large amt. Urine. [facility] site c/d/i.  On right  Shoulder  Site.  Seen by cards and onc PA. wIFE AT BEDSIDE

## 2018-10-11 NOTE — IP AVS SNAPSHOT
` ` Patient Information     Patient Name Sex     Deepak Arndt ([id number]) Male 1935       Room Bed    [facility]      Patient Demographics     Address Phone E-mail Address    [address] [address] [phone] (Home) *Preferred*  [phone] (Mobile) [email or web address]      Patient Ethnicity & Race     Ethnic Group Patient Race    American White      Emergency Contact(s)     Name Relation Home Work Mobile    Caitlyn Arndt Spouse [phone]    Cris Patino Daughter [phone]      Documents on File        Status Date Received Description       Documents for the Patient    Consent for Services - [facility] Received () 11     Privacy Notice - [facility] Received () 11     Insurance Card Received () 11 BC/BS    Patient ID Received 13 -    Insurance Card Received () 11 MEDICARE    HIM [place] Authorization   MY CHART 11    Insurance Card Received () 12 [facility]-BC/BS-Basic Medicare [employer] SENIOR GOLD    Insurance Card Received () 12 [facility]-MEDICARE    Privacy Notice - [place] Received 12     External Medication Information Consent Accepted 13 [facility]-EXTERNAL    Consent for Services - Hospital/Clinic Received () 12     Consent for Services - Hospital/Clinic  () 12     Privacy Notice - [place]  12 ACKNOWLEDGMENT OF RECEIPT OF NOTICE OF PRIVACY PRACTICE    Consent for Services - [facility] Received () 13 [facility]-CONSENT    Privacy Notice - [facility] Received 13 [facility]-PRIVACY    Insurance Card Received () 13 [facility]-MEDICARE    Insurance Card Received () 13 [facility]-BCBS    Consent for Services - Hospital/Clinic   13 ABN [facility]    Consent for EHR Access  13 Copied from existing Consent for services - C/HOD collected on 2012    [facility] Specified Other       Consent for Services - Hospital/Clinic Received () 13      Consent for Services - Hospital/Clinic       HIM [place] Authorization  13     Patient ID Received 14 -    Insurance Card Received 14 -    HIM [place] Authorization  14     Consent for Services - [facility] Received () 14 [facility]-CONSENT    Insurance Card Received () 14 [facility]-BCBS    Consent for Services - Hospital/Clinic Received () 14     Advance Directives and Living Will Received 03/12/15 Health Care Directive 03    Advance Directives and Living Will Not Received  Validation of AD 03    Consent for Services - Hospital/Clinic Received () 12/15/15     Insurance Card Received 12/15/15 -    HIM TYSON Authorization - File Only  02/15/16 PATIENT    Consent for Services - [facility] Received () 16 [facility]-CONSENT    Insurance Card Received () 16 [facility]-BCBS    Consent for Services/Privacy Notice - Hospital/Clinic Received () 16     Insurance Card Received 18 [facility]-BCBS    [facility] TYSON Authorization - File Only   17 MEDICAL RELEASE TO [facility] CLINIC OF NEUROLOGY    Consent for Services - [facility] Received () 17 [facility]-CONSENT    Care Everywhere Prospective Auth Received 10/23/17     Consent for Services/Privacy Notice - Hospital/Clinic Received 10/23/17     Consent for Services - [facility] Received 18 [facility]-CONSENT    Insurance Card Received 18 [facility]-MEDICARE    Consent for Services - Hospital and Clinic Received 18     HIE Auth Received 18     Patient ID Received 10/02/18     Insurance Card  (Deleted)         Documents for the Encounter    CMS IM for Patient Signature Received 10/11/18     EMS/Ambulance Record  10/15/18 PREHOSPITAL CARE REPORT SUMMARY      Admission Information     Attending Provider Admitting Provider Admission Type Admission Date/Time    Nicolás Mercado DO [place], Justin Bennett, DO Emergency 10/11/18  0222    Discharge Date Hospital Service Auth/Cert Status Service Area     Hospitalist  Incomplete [facility]    Unit Room/Bed Admission Status        88 ONCOLOGY 8830/8830-02 Admission (Confirmed)       Admission     Complaint    Symptomatic bradycardia      Hospital Account     Name Acct ID Class Status Primary Coverage    Deepak Arndt [id number] Inpatient Open MEDICARE - MEDICARE            Guarantor Account (for Hospital Account #[id number])     Name Relation to Pt Service Area Active? Acct Type    Deepak Arndt Self FCS Yes Personal/Family    Address Phone          [address] [address] [phone](H)              Coverage Information (for Hospital Account #[id number])     1. MEDICARE/MEDICARE     F/O Payor/Plan Precert #    MEDICARE/MEDICARE     Subscriber Subscriber #    Deepak Arndt [id number]    Address Phone    [employer]  [address]  [place] [address] [phone]          2. BCBS/[place]     F/O Payor/Plan Precert #    [place]     Subscriber Subscriber #    Deepak Arndt [id number]    Address Phone    [address] [address]  [place], [place] [id number] [phone]

## 2018-10-11 NOTE — IP AVS SNAPSHOT
[address]    [place] [address]    Phone:  [phone]                                       After Visit Summary   10/11/2018    Deepak Arndt    MRN: [id number]           After Visit Summary Signature Page     I have received my discharge instructions, and my questions have been answered. I have discussed any challenges I see with this plan with the nurse or doctor.    ..........................................................................................................................................  Patient/Patient Representative Signature      ..........................................................................................................................................  Patient Representative Print Name and Relationship to Patient    ..................................................               ................................................  Date                                   Time    ..........................................................................................................................................  Reviewed by Signature/Title    ...................................................              ..............................................  Date                                               Time          22EPIC Rev 08/18

## 2018-10-11 NOTE — PROGRESS NOTES
Full consult dictated.  In brief, pt has Afib with slow VR but unclear if symptoms are related to HR as HR seems to be chronically slow.    No significant pauses seen on telemetry.  No indication for PPM at this time. Recommend evaluate HR with activity.    Horacio Gaming MD

## 2018-10-11 NOTE — PLAN OF CARE
Problem: Patient Care Overview  Goal: Plan of Care/Patient Progress Review  PT: Orders received. Chart reviewed. History obtained from spouse and pt. Pt has not had cardiology consult. Pt HR 43-49 which is below parameters. Mobility assessment deferred.

## 2018-10-11 NOTE — CONSULTS
[facility] Consultation    Deepak Arndt MRN# [id number]   YOB: 1935 Age: 83 year old   Date of Admission: 10/11/2018  Requesting physician: Dr. Mercado  Reason for consult: Esophageal cancer           Assessment and Plan:     Deepak is an 83 year old man admitted 10/11 with weakness    1. Esophageal cancer  - Having issues with dysphagia with weight loss  - EGD 9/24/2018 shows distal esophageal mass  - Biopsy positive for poorly differentiated carcinoma with adeno and squamous differentiation. HER2 was not amplified.  - CT chest, abdomen and pelvis 9/28/2018 demonstrated thickening of the distal esophageal wall extending to the GE junction.  Paraesophageal adenopathy along with upper abdominal adenopathy was noted compatible with metastatic disease.  - EUS 10/2/2018 showed complete obstruction of lower third of esophagus with malignant appearing lymph nodes in the subcarinal region and aortopulmonary region.   - Staged as T3N2MX  - G tube and Port placed 10/5/2018  - PET/CT 10/9/2018 showing hypermetabolic lymphadenopathy present in the superior, middle, and posterior mediastinum, the retroperitoneum, and along the gastrohepatic ligament. 7 mm lung lesion is too small to characterize. This might upstage cancer to stage IV disease.  - Radiation Oncology consulted with plans to give weekly paclitaxel/carboplatin with XRT. This was initially planned for neoadjuvant with possible surgery, but with the degree of adenopathy surgery might not be an option.  - Hold initiation of treatment until weakness improves    2. Afib/PE  - Has been on dabigatran, but was off for procedures on 10/5  - CT chest with contrast 10/11 shows small PE in the right upper lung anteriorly  - Dabigatran resumed  - ? Need for LMWH with hypercoagulability of malignancy. I will review with Dr. Good.  - Cardiology consulted with bradycardia    3. Weakness  - Likely from malignancy, poor intake and other comorbidities  -  WBC up, but no obvious infection on lab, imaging studies  - PT/OT  - G tube in place. Consult dietician.    Efrem MASSEY, CNP  [facility]  [phone]             Chief Complaint:   Generalized Weakness (recently had procedure recently for possible esophageal cancer. patient found on floor by EMS unable to get up.) and Fall (patient fell out of bed tonight onto hardwood floor. Did not hit head , no LOC. Pt taking blood thinners )         History of Present Illness:   This patient is a 83 year old male admitted 10/11/2018 with weakness.    He has recently been diagnosed with esophageal cancer. A work up has showed metastatic adenopathy. He has been referred to Radiation Oncology. Dr. Mcleod has planned weekly chemotherapy with paclitaxel and carboplatin with radiation. That has not started. On 10/5 he underwent placement of G tube and Port in anticipation of treatment.    He has been eating soft foods. Deepak has not yet started tube feedings. He has lost weight in the past 3 months as his swallowing has been impacted.     ONCOLOGY HISTORY:  -  Progressive difficulty with dysphasia, weight loss, weakness and fatigue over past 3 months.   - Upper GI endoscopy on 9/24/2018 demonstrated a mass lesion in the distal esophagus measured at 32 cm from the incisors.  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  The mass was described as ulcerated and with circumferential involvement.    - The biopsy specimen showed poorly differentiated carcinoma with features of both adeno and squamous differentiation.  Additional studies performed of the tumor showed no evidence for CDX-2.  Additional studies performed of the tumor showed no evidence for HER-2 amplification using FISH analysis.  Specifically, the HER-2/EUGENIA 17 ratio was 1.5.    - A CT scan of the chest, abdomen pelvis on 9/28/2018 demonstrated thickening of the distal esophageal wall extending to the GE junction.  Paraesophageal adenopathy along with upper  "abdominal adenopathy was noted compatible with metastatic disease.    - An endoscopic ultrasound on 10/2/2018 demonstrated complete obstruction of the lower third of the esophagus.  A mass identified in this region was staged by endosonography as T3 N2 MX.  A malignant appearing lymph node in the subcarinal was noted as well as a lymph node in the aortopulmonary region.  Biopsy of both lymph node showed metastatic disease.    - G tube and Port placed 10/5  - PET/CT 10/9 there is metastatic lymphadenopathy in the mediastinum as well as nodes in the retroperitoneum and near the gastrohepatic ligament. 7mm lung nodule, not PET avid.  - Referred to Radiation Oncology         Physical Exam:   Vitals were reviewed  Blood pressure (!) 140/98, temperature 97.7  F (36.5  C), temperature source Oral, resp. rate 22, height 1.727 m (5' 8\"), weight 62.6 kg (138 lb 0.1 oz), SpO2 98 %.  Temperatures:  Current - Temp: 97.7  F (36.5  C); Max - Temp  Av.7  F (36.5  C)  Min: 97.6  F (36.4  C)  Max: 97.7  F (36.5  C)  Respiration range: Resp  Av.6  Min: 12  Max: 32  Pulse range: No Data Recorded  Blood pressure range: Systolic (24hrs), Av , Min:111 , Max:177   ; Diastolic (24hrs), Av, Min:61, Max:116    Pulse oximetry range: SpO2  Av.7 %  Min: 98 %  Max: 100 %    Intake/Output Summary (Last 24 hours) at 10/11/18 0941  Last data filed at 10/11/18 0658   Gross per 24 hour   Intake                0 ml   Output               10 ml   Net              -10 ml     GENERAL: No acute distress.  SKIN: No rashes or jaundice.  HEENT: Normocephalic, atraumatic. Eyes anicteric. Oropharynx is clear.  HEART: Bradycardia with irregular rate and rhythm with no murmurs.  LUNGS: Clear bilaterally.  ABDOMEN: Soft, nontender, nondistended with no palpable hepatosplenomegaly. Gtube in place.  EXTREMITIES: No clubbing, cyanosis, or edema.  MENTAL: Alert and oriented to person, place, and time.  NEURO: Mildly hard of hearing. Globally " weak.            Past Medical History:   I have reviewed this patient's past medical history  Past Medical History:   Diagnosis Date     Actinic keratosis      Atrial fibrillation (H)      Cancer (H)     skin     Cataract     both     Coronary artery disease      Detached retina 2007    both     ED (erectile dysfunction)      Gastroesophageal reflux disease      Hyperlipidemia      Hypertension      Lung nodules      Mild aortic stenosis     6/2012 Echo - trivial AS, mild-mod AR     Multiple thyroid nodules      Murmur     6/2012 Echo - trivial AS, mild-mod AR, mild-mod MR, mild-mod TR, mod SD     Myocardial infarction (H) 6/2012    inferior - noted on 2007 stress test     Osteoporosis              Past Surgical History:   I have reviewed this patient's past surgical history  Past Surgical History:   Procedure Laterality Date     CATARACT IOL, RT/LT       ESOPHAGOSCOPY, GASTROSCOPY, DUODENOSCOPY (EGD), COMBINED N/A 9/24/2018    Procedure: COMBINED ESOPHAGOSCOPY, GASTROSCOPY, DUODENOSCOPY (EGD), BIOPSY SINGLE OR MULTIPLE;  ESOPHAGOGASTRODUODENOSCOPY ;  Surgeon: Aicha Graves MD;  Location:  GI     ESOPHAGOSCOPY, GASTROSCOPY, DUODENOSCOPY (EGD), COMBINED N/A 10/2/2018    Procedure: COMBINED ENDOSCOPIC ULTRASOUND, ESOPHAGOSCOPY, GASTROSCOPY, DUODENOSCOPY (EGD), FINE NEEDLE ASPIRATE/BIOPSY;  ENDOSCOPIC ULTRASOUND, ESOPHAGOSCOPY, GASTROSCOPY, DUODENOSCOPY (EGD), FINE NEEDLE ASPIRATE (mac sedation);  Surgeon: Sanju Cruz MD;  Location:  GI     HC OR OCULAR DEVICE INTRAOP DETACHED RETINA                 Social History:   I have reviewed this patient's social history  Social History   Substance Use Topics     Smoking status: Former Smoker     Packs/day: 1.00     Years: 20.00     Types: Cigarettes     Start date: 1954     Quit date: 1/1/1974     Smokeless tobacco: Never Used     Alcohol use Yes      Comment: 1 glass of wine per day             Family History:   I have reviewed this patient's family  history  History reviewed. No pertinent family history.          Allergies:     Allergies   Allergen Reactions     Versed [Midazolam] Other (See Comments)     Pt has adverse/opposite reaction to versed. Given in small doses for a port and g-tube placement. Patient was aggressive.             Medications:   I have reviewed this patient's current medications  Prescriptions Prior to Admission   Medication Sig Dispense Refill Last Dose     acetaminophen (TYLENOL) 325 MG tablet Take 2 tablets (650 mg) by mouth every 4 hours as needed for mild pain or fever 100 tablet  PRN     amLODIPine (NORVASC) 5 MG tablet Take 1 tablet (5 mg) by mouth daily 90 tablet 0 10/10/2018 at Unknown time     Calcium carb-Vitamin D 500 mg [place]-200 units (OSCAL WITH D;OYSTER SHELL CALCIUM) 500-200 MG-UNIT per tablet Take 1 tablet by mouth daily   10/10/2018 at Unknown time     Dabigatran Etexilate Mesylate (PRADAXA PO) Take 150 mg by mouth 2 times daily   10/10/2018 at Unknown time     fish oil-omega-3 fatty acids (OMEGA-3 FISH OIL) 1000 MG capsule Take 1 capsule by mouth daily.   10/10/2018 at Unknown time     hydrochlorothiazide (HYDRODIURIL) 25 MG tablet TAKE 1 TABLET BY MOUTH ONCE DAILY 90 tablet 3 10/10/2018 at Unknown time     Multiple Vitamins-Minerals (DAILY MULTI PO) Take by mouth daily   10/10/2018 at Unknown time     pantoprazole (PROTONIX) 40 MG EC tablet Take 1 tablet (40 mg) by mouth daily for 30 doses 30 tablet 0 10/10/2018 at Unknown time     propranolol (INDERAL) 40 MG tablet TAKE 1 TABLET BY MOUTH TWICE DAILY FOR ESSENTIAL TREMOR 60 tablet 1 10/10/2018 at Unknown time     simvastatin (ZOCOR) 10 MG tablet Take 5 mg by mouth At Bedtime (Takes half of a 10mg tablet for a total of 5mg daily)   10/10/2018 at Unknown time     Current Facility-Administered Medications Ordered in Epic   Medication Dose Route Frequency Last Rate Last Dose     acetaminophen (TYLENOL) Suppository 650 mg  650 mg Rectal Q4H PRN         acetaminophen  (TYLENOL) tablet 650 mg  650 mg Oral Q4H PRN         amLODIPine (NORVASC) tablet 5 mg  5 mg Oral Daily   5 mg at 10/11/18 0928     calcium carbonate 500 mg-vitamin D 200 units (OSCAL with D;OYSTER SHELL CALCIUM) per tablet 1 tablet  1 tablet Oral Daily   1 tablet at 10/11/18 0928     dabigatran ANTICOAGULANT (PRADAXA) capsule 150 mg  150 mg Oral BID   150 mg at 10/11/18 0928     hydrochlorothiazide (HYDRODIURIL) tablet 25 mg  25 mg Oral Daily   25 mg at 10/11/18 0928     lactated ringers infusion   Intravenous Continuous 100 mL/hr at 10/11/18 0632       melatonin tablet 1 mg  1 mg Oral At Bedtime PRN         naloxone (NARCAN) injection 0.1-0.4 mg  0.1-0.4 mg Intravenous Q2 Min PRN         ondansetron (ZOFRAN-ODT) ODT tab 4 mg  4 mg Oral Q6H PRN        Or     ondansetron (ZOFRAN) injection 4 mg  4 mg Intravenous Q6H PRN         pantoprazole (PROTONIX) EC tablet 40 mg  40 mg Oral Daily   40 mg at 10/11/18 0928     Patient is already receiving anticoagulation with heparin, enoxaparin (LOVENOX), warfarin (COUMADIN)  or other anticoagulant medication   Does not apply Continuous PRN         senna-docusate (SENOKOT-S;PERICOLACE) 8.6-50 MG per tablet 1 tablet  1 tablet Oral BID PRN        Or     senna-docusate (SENOKOT-S;PERICOLACE) 8.6-50 MG per tablet 2 tablet  2 tablet Oral BID PRN         simvastatin (ZOCOR) tablet 5 mg  5 mg Oral At Bedtime         sodium chloride (PF) 0.9% PF flush 3 mL  3 mL Intracatheter Q1H PRN         sodium chloride (PF) 0.9% PF flush 3 mL  3 mL Intracatheter Q8H         No current Epic-ordered outpatient prescriptions on file.             Review of Systems:   The 10 point Review of Systems is negative other than noted in the HPI.            Data:   Data   Results for orders placed or performed during the hospital encounter of 10/11/18 (from the past 24 hour(s))   EKG 12-lead, tracing only   Result Value Ref Range    Interpretation ECG Click View Image link to view waveform and result    CBC with  platelets differential   Result Value Ref Range    WBC 15.5 (H) 4.0 - 11.0 10e9/L    RBC Count 4.37 (L) 4.4 - 5.9 10e12/L    Hemoglobin 15.1 13.3 - 17.7 g/dL    Hematocrit 42.2 40.0 - 53.0 %    MCV 97 78 - 100 fl    MCH 34.6 (H) 26.5 - 33.0 pg    MCHC 35.8 31.5 - 36.5 g/dL    RDW 13.1 10.0 - 15.0 %    Platelet Count 173 150 - 450 10e9/L    Diff Method Automated Method     % Neutrophils 75.0 %    % Lymphocytes 13.4 %    % Monocytes 8.9 %    % Eosinophils 2.1 %    % Basophils 0.1 %    % Immature Granulocytes 0.5 %    Nucleated RBCs 0 0 /100    Absolute Neutrophil 11.6 (H) 1.6 - 8.3 10e9/L    Absolute Lymphocytes 2.1 0.8 - 5.3 10e9/L    Absolute Monocytes 1.4 (H) 0.0 - 1.3 10e9/L    Absolute Eosinophils 0.3 0.0 - 0.7 10e9/L    Absolute Basophils 0.0 0.0 - 0.2 10e9/L    Abs Immature Granulocytes 0.1 0 - 0.4 10e9/L    Absolute Nucleated RBC 0.0    Comprehensive metabolic panel   Result Value Ref Range    Sodium 135 133 - 144 mmol/L    Potassium 3.5 3.4 - 5.3 mmol/L    Chloride 98 94 - 109 mmol/L    Carbon Dioxide 25 20 - 32 mmol/L    Anion Gap 12 3 - 14 mmol/L    Glucose 104 (H) 70 - 99 mg/dL    Urea Nitrogen 27 7 - 30 mg/dL    Creatinine 1.04 0.66 - 1.25 mg/dL    GFR Estimate 68 >60 mL/min/1.7m2    GFR Estimate If Black 83 >60 mL/min/1.7m2    Calcium 9.0 8.5 - 10.1 mg/dL    Bilirubin Total 1.2 0.2 - 1.3 mg/dL    Albumin 3.3 (L) 3.4 - 5.0 g/dL    Protein Total 7.2 6.8 - 8.8 g/dL    Alkaline Phosphatase 68 40 - 150 U/L    ALT 21 0 - 70 U/L    AST 20 0 - 45 U/L   Lipase   Result Value Ref Range    Lipase 154 73 - 393 U/L   Lactic acid whole blood   Result Value Ref Range    Lactic Acid 1.5 0.7 - 2.0 mmol/L   Troponin I   Result Value Ref Range    Troponin I ES 0.057 (H) 0.000 - 0.045 ug/L   Nt probnp inpatient (BNP)   Result Value Ref Range    N-Terminal Pro BNP Inpatient 486 0 - 1800 pg/mL   CK total   Result Value Ref Range    CK Total 40 30 - 300 U/L   XR Chest 2 Views    Narrative    XR CHEST 2 VW  10/11/2018 3:07 AM      INDICATION: Weakness, shortness of breath.    COMPARISON: CT 9/28/2018. Chest x-ray 10/23/2017.      Impression    IMPRESSION: Borderline prominent heart size. No focal air-space  disease or other acute findings. Right chest wall port with catheter  tip in the SVC. Old right rib fractures.    LEBRON LANZA MD   CT Head w/o Contrast    Narrative    CT HEAD W/O CONTRAST  10/11/2018 3:21 AM     HISTORY: Fall, weakness.     TECHNIQUE: Axial images of the head and coronal reformations without  IV contrast material. Radiation dose for this scan was reduced using  automated exposure control, adjustment of the mA and/or kV according  to patient size, or iterative reconstruction technique.    COMPARISON: 10/23/2017.    FINDINGS: No intracranial hemorrhage, mass or mass effect. Low  attenuation areas are present in white matter of the cerebral  hemispheres that are nonspecific but consistent with chronic small  vessel ischemic changes in this age patient. No acute infarct  identified. No shift of midline structures. No skull fractures.      Impression    IMPRESSION: No acute intracranial abnormality.    LEBRON LANZA MD   CT Chest Pulmonary Embolism w Contrast   Result Value Ref Range    Radiologist flags Pulmonary embolism (AA)     Narrative    CT CHEST PULMONARY EMBOLISM W CONTRAST  10/11/2018 4:13 AM     HISTORY: Dyspnea.    TECHNIQUE: Volumetric acquisition of the chest after the  administration of 59 mL Isovue-370 IV contrast. Radiation dose for  this scan was reduced using automated exposure control, adjustment of  the mA and/or kV according to patient size, or iterative  reconstruction technique.     COMPARISON: CT 9/28/2018.    FINDINGS: Study is positive for a small pulmonary embolus in the right  upper lung anteriorly (series 5, image 75). No acute infiltrates or  pleural effusions. Emphysema. 2.5 cm right thyroid nodule as seen  previously. Right chest wall port with catheter tip in the SVC.  Coronary  artery calcifications. Mediastinal adenopathy, slightly more  prominent than on the previous study. Distal esophageal mass is again  seen. A prominent left lower mediastinal lymph node in the  paraesophageal region has increased in size measuring 2.3 x 2.4 cm  (previously 1.5 x 2.1 cm). A right retrocrural lymph node is more  prominent. Enlarged lymph nodes in the gastrohepatic ligament region  are slightly more prominent. Renal and liver cysts.      Impression    IMPRESSION:  1. Study is positive for right-sided pulmonary embolus.  2. Distal esophageal mass consistent with known cancer is again seen.  Mediastinal and upper abdominal adenopathy have slightly increased.  3. Emphysema.    [Critical Result: Pulmonary embolism]    Finding was identified on 10/11/2018 4:21 AM.     Dr. Walker was contacted by me on 10/11/2018 4:29 AM and  verbalized understanding of the critical result.    LEBRON LANZA MD   UA with Microscopic   Result Value Ref Range    Color Urine Yellow     Appearance Urine Clear     Glucose Urine Negative NEG^Negative mg/dL    Bilirubin Urine Negative NEG^Negative    Ketones Urine 40 (A) NEG^Negative mg/dL    Specific Gravity Urine 1.028 1.003 - 1.035    Blood Urine Negative NEG^Negative    pH Urine 6.5 5.0 - 7.0 pH    Protein Albumin Urine 10 (A) NEG^Negative mg/dL    Urobilinogen mg/dL 8.0 (H) 0.0 - 2.0 mg/dL    Nitrite Urine Negative NEG^Negative    Leukocyte Esterase Urine Negative NEG^Negative    Source Midstream Urine     WBC Urine 1 0 - 5 /HPF    RBC Urine <1 0 - 2 /HPF    Mucous Urine Present (A) NEG^Negative /LPF

## 2018-10-11 NOTE — IP AVS SNAPSHOT
"[place] [address] ONCOLOGY: [phone]                                              INTERAGENCY TRANSFER FORM - PHYSICIAN ORDERS   10/11/2018                    Hospital Admission Date: 10/11/2018  LA LOCKE   : 1935  Sex: Male        Attending Provider: Nicolás Mercado DO     Allergies:  Versed [Midazolam]    Infection:  None   Service:  HOSPITALIST    Ht:  1.727 m (5' 7.99\")   Wt:  58.7 kg (129 lb 4.8 oz)   Admission Wt:  65.8 kg (145 lb)    BMI:  19.66 kg/m 2   BSA:  1.68 m 2            Patient PCP Information     Provider PCP Type    Clinton Mills MD General      ED Clinical Impression     Diagnosis Description Comment Added By Time Added    Generalized muscle weakness [M62.81] Generalized muscle weakness [M62.81]  Sherwin Walker MD 10/11/2018  4:32 AM    Malignant neoplasm of esophagus, unspecified location (H) [C15.9] Malignant neoplasm of esophagus, unspecified location (H) [C15.9]  Sherwin Walker MD 10/11/2018  4:32 AM    Other acute pulmonary embolism without acute cor pulmonale (H) [I26.99] Other acute pulmonary embolism without acute cor pulmonale (H) [I26.99]  Sherwin Walker MD 10/11/2018  4:32 AM    Elevated troponin [R74.8] Elevated troponin [R74.8]  Sherwin Walker MD 10/11/2018  4:33 AM    Symptomatic bradycardia [R00.1] Symptomatic bradycardia [R00.1]  Sherwin Walker MD 10/11/2018  4:42 AM      Hospital Problems as of 10/20/2018              Priority Class Noted POA    Symptomatic bradycardia Medium  10/11/2018 Yes    Cancer of distal third of esophagus (H) Medium  10/15/2018 Unknown      Non-Hospital Problems as of 10/20/2018              Priority Class Noted    HTN (hypertension) Medium  2011    Osteoporosis Medium  2011    Hypercholesterolemia Medium  2011    Past myocardial infarction Medium  2011    Myocardial infarction (H) Medium  2012    Chronic atrial fibrillation (H) Medium  2013    Health Care " Home Medium  7/7/2014    Mild aortic stenosis Medium  Unknown    Coronary artery disease involving native coronary artery of native heart without angina pectoris Medium  Unknown    Aortic valve disorder Medium  12/16/2015    Abnormality of gait Medium  10/23/2017      Code Status History     Date Active Date Inactive Code Status Order ID Comments User Context    10/23/2017  6:32 AM 10/24/2017 12:52 PM Full Code [id number]  Madhu Leal MD Inpatient    9/29/2011  2:32 PM 10/23/2017  6:32 AM Full Code [id number] As per reasonable care for Seniors, wants in the Short term aggressive care.   No desire for long term prolongation of life through artificial means if no hope to bring back to a reasonable status.   Clinton Mills MD Outpatient         Medication Review      START taking        Dose / Directions Comments    enoxaparin 60 MG/0.6ML injection   Commonly known as:  LOVENOX   Used for:  Other acute pulmonary embolism without acute cor pulmonale (H)        Dose:  60 mg   Inject 0.6 mLs (60 mg) Subcutaneous every 12 hours   Refills:  0        pantoprazole 2 mg/mL Susp suspension   Commonly known as:  PROTONIX   Replaces:  pantoprazole 40 MG EC tablet        Dose:  40 mg   20 mLs (40 mg) by Per G Tube route every morning   Refills:  0          CONTINUE these medications which have NOT CHANGED        Dose / Directions Comments    acetaminophen 325 MG tablet   Commonly known as:  TYLENOL   Used for:  Generalized muscle weakness        Dose:  650 mg   Take 2 tablets (650 mg) by mouth every 4 hours as needed for mild pain or fever   Quantity:  100 tablet   Refills:  0        amLODIPine 5 MG tablet   Commonly known as:  NORVASC   Indication:  High Blood Pressure Disorder   Used for:  Essential hypertension, benign        Dose:  5 mg   Take 1 tablet (5 mg) by mouth daily   Quantity:  90 tablet   Refills:  0        calcium carbonate 500 mg-vitamin D 200 units 500-200 MG-UNIT per tablet   Commonly known as:  OSCAL  with D;OYSTER SHELL CALCIUM        Dose:  1 tablet   Take 1 tablet by mouth daily   Refills:  0        DAILY MULTI PO        Take by mouth daily   Refills:  0        fish oil-omega-3 fatty acids 1000 MG capsule        Dose:  1 capsule   Take 1 capsule by mouth daily.   Refills:  0        simvastatin 10 MG tablet   Commonly known as:  ZOCOR   Indication:  cuts 10 mg in half        Dose:  5 mg   Take 5 mg by mouth At Bedtime (Takes half of a 10mg tablet for a total of 5mg daily)   Refills:  0          STOP taking     hydrochlorothiazide 25 MG tablet   Commonly known as:  HYDRODIURIL           pantoprazole 40 MG EC tablet   Commonly known as:  PROTONIX   Replaced by:  pantoprazole 2 mg/mL Susp suspension           PRADAXA PO           propranolol 40 MG tablet   Commonly known as:  INDERAL                   Summary of Visit     Reason for your hospital stay       Were admitted to the hospital with falls/physical deconditioning.             After Care     Activity - Up with nursing assistance           Additional Discharge Instructions       Speech therapy recommend n.p.o. due to esophageal obstruction.  Patient and her family can consider pleasure p.o. intake with full liquid texture, upright positioning for 1-2 hours after any intake [as per oncology]  Dietary supplements with Ensure.  Tube feeding as per nutrition recommendation.       Advance Diet as Tolerated       Adult Formula Bolus Feeding: QID Isosource 1.5; Route: Gastrostomy; 4; Can(s); Medication - Tube Feeding Flush Frequency: At least 15-30 mL water before and after medication administration and with tube clogging;       Encourage PO fluids       Dysphagia Diet Level 1 Pureed Thin Liquids (water, ice chips, juice, milk gelatin, ice cream, etc)       Fall precautions           General info for SNF       Length of Stay Estimate: Short Term Care: Estimated # of Days <30  Condition at Discharge: Stable  Level of care:skilled   Rehabilitation Potential:  Fair  Admission H&P remains valid and up-to-date: Yes  Recent Chemotherapy: plan to start soon                Use Nursing Home Standing Orders: Yes       Mantoux instructions       Give two-step Mantoux (PPD) Per Facility Policy Yes             Referrals     Follow-Up with Cardiac Advanced Practice Provider           Nutrition Services Adult IP Consult       Reason: Tube feeding       Occupational Therapy Adult Consult       Evaluate and treat as clinically indicated.    Reason: Physical deconditioning/cognitive decline       Physical Therapy Adult Consult       Evaluate and treat as clinically indicated.    Reason:  deconditioning       Speech Language Path Adult Consult       Evaluate and treat as clinically indicated.    Reason: Risk for aspiration.             Your next 10 appointments already scheduled     Oct 23, 2018  7:30 AM CDT   Level 4 with  INFUSION CHAIR 2   [facility]    [facility]  [address]  [place] [address]   [phone]            Oct 23, 2018  8:20 AM CDT   Return Visit with El Bolden MD   [facility]    [facility]  [address]  [place] [address]   [phone]              Follow-Up Appointment Instructions     Future Labs/Procedures    Follow Up and recommended labs and tests     Comments:    Schedule an outpatient consult with [facility] for ongoing weekly chemotherapy with Taxol/Carboplatin while at [place]. Chemotherapy is due 10/23/2018.    Follow Up and recommended labs and tests     Comments:    Follow up with halfway physician.  The following labs/tests are recommended: BMP in 1 week .    Follow-up with oncology as per schedule.  Monitor thyroid function tests in 4-6 weeks.  Event monitor at discharge, follow-up with cardiology as outpatient.  Monitor daily blood pressure/heart rate and  review on provider visit.  Review volume status and consider optimizing dose of diuretic.      Follow-Up Appointment Instructions     Follow Up and recommended labs and tests       Schedule an outpatient consult with [facility] for ongoing weekly chemotherapy with Taxol/Carboplatin while at [place]. Chemotherapy is due 10/23/2018.       Follow Up and recommended labs and tests       Follow up with group home physician.  The following labs/tests are recommended: BMP in 1 week .    Follow-up with oncology as per schedule.  Monitor thyroid function tests in 4-6 weeks.  Event monitor at discharge, follow-up with cardiology as outpatient.  Monitor daily blood pressure/heart rate and review on provider visit.  Review volume status and consider optimizing dose of diuretic.             Statement of Approval     Ordered          10/20/18 1012  I have reviewed and agree with all the recommendations and orders detailed in this document.  EFFECTIVE NOW     Approved and electronically signed by:  Donnell Larsen MD           10/13/18 3909  I have reviewed and agree with all the recommendations and orders detailed in this document.  EFFECTIVE NOW     Approved and electronically signed by:  Kwan Benoit MD

## 2018-10-11 NOTE — LETTER
Transition Communication Hand-off for Care Transitions to Next Level of Care Provider    Name: Deepak Arndt  : 1935  MRN #: [id number]  Primary Care Provider: Clinton Mills  Primary Care MD Name: Clinton Mills  Primary Clinic: [address]  [facility] [address]  Primary Care Clinic Name: [facility]  Reason for Hospitalization:  Generalized muscle weakness [M62.81]  Elevated troponin [R74.8]  Symptomatic bradycardia [R00.1]  Other acute pulmonary embolism without acute cor pulmonale (H) [I26.99]  Malignant neoplasm of esophagus, unspecified location (H) [C15.9]  Admit Date/Time: 10/11/2018  2:22 AM  Discharge Date: 10/20/2018    Payor Source: Payor: MEDICARE / Plan: MEDICARE / Product Type: Medicare /     Readmission Assessment Measure (JASIEL) Risk Score/category: Average             Reason for Communication Hand-off Referral: Other Discharging to [facility]    Discharge Plan:       Concern for non-adherence with plan of care:   Y/N N  Discharge Needs Assessment:  Needs       Most Recent Value    Equipment Currently Used at Home cane, straight    Transportation Available car, family or friend will provide    # of Referrals Placed by [facility]    Transitional Care -- [[facility]]    PAS Number -- [[id number]]          Already enrolled in Tele-monitoring program and name of program:  N/A  Follow-up specialty is recommended: Yes-Oncology    Follow-up plan:  Future Appointments  Date Time Provider Department Center   10/23/2018 7:30 AM  INFUSION CHAIR 2 [facility] JOSE [facility]   10/23/2018 8:20 AM El Bolden MD [facility]       Any outstanding tests or procedures:        Referrals     Future Labs/Procedures    Follow-Up with Cardiac Advanced Practice Provider     Nutrition Services Adult IP Consult     Comments:    Reason: Tube feeding    Occupational Therapy Adult Consult     Comments:    Evaluate and treat as clinically indicated.    Reason: Physical  deconditioning/cognitive decline    Physical Therapy Adult Consult     Comments:    Evaluate and treat as clinically indicated.    Reason:  deconditioning    Speech Language Path Adult Consult     Comments:    Evaluate and treat as clinically indicated.    Reason: Risk for aspiration.            Key Recommendations:    Patient admitted with PE and afib. Patient underwent EGD with biopsy on 9/24/18.  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  He has since had a gastrostomy tube and port placed by IR.  He has just started seeing Dr. Mcleod with [facility] and was actually scheduled to meet with radiation oncology today as he states the current plan is for chemotherapy with radiation treatments. Discharged on Lovenox.  PT recommended TCU.  To follow-up with Dr. Bolden ( Oncology) for weekly paclitaxel and carboplatin at [facility].     Thank you for following up with this patient,   Luci Babin    AVS/Discharge Summary is the source of truth; this is a helpful guide for improved communication of patient story

## 2018-10-11 NOTE — PLAN OF CARE
Problem: Patient Care Overview  Goal: Plan of Care/Patient Progress Review  LACTIC ACID 2.1, WBC  15.5, . /98, Dr. Benoit texted. 500 CC BOLUS infusing  OVER 2 hours. Very difficult for him to follow instructions. Tires easily. Remains forgetful

## 2018-10-11 NOTE — ED NOTES
Bed: ED25  Expected date: 10/11/18  Expected time: 2:15 AM  Means of arrival: Ambulance  Comments:  [place] LOYOLA 83M Post op/weak

## 2018-10-11 NOTE — PROGRESS NOTES
[facility]    Sepsis Evaluation Progress Note    Date of Service: 10/11/2018    I was called to see Deepak Arndt due to abnormal vital signs triggering the Sepsis SIRS screening alert. He is not known to have an infection.     Physical Exam    Vital Signs:  Temp: 97.7  F (36.5  C) Temp src: Oral BP: (!) 139/96   Heart Rate: (!) 46 Resp: 24 SpO2: 96 % O2 Device: None (Room air)         Lab:  Lactic Acid   Date Value Ref Range Status   10/11/2018 1.5 0.7 - 2.0 mmol/L Final     Lactate for Sepsis Protocol   Date Value Ref Range Status   10/11/2018 2.1 (H) 0.7 - 2.0 mmol/L Final     Comment:     Significant value called to and read back by  EMMANUEL JIMENES [facility] 13:12 ML       Drowsy.  Lungs no wheezing.  Abdomen soft nontender bowel sounds heard   Skin warm and dry.    Assessment and Plan    The SIRS and exam findings are likely due to dehydration/ poor oral intake, there is no sign of sepsis at this time.    Disposition: The patient will remain on the current unit. We will continue to monitor this patient closely.    Urine analysis bland  Chest x-ray no acute pathology.    Continue LR at 100 mL/h.  Normal saline bolus 500 mL to run over 2 hours.  Recheck lactate at 8 PM.  All blood cultures sent out.      Kwan Benoit MD

## 2018-10-11 NOTE — PLAN OF CARE
Problem: Patient Care Overview  Goal: Plan of Care/Patient Progress Review  VSS except HR 50s. Alert but disoriented to situation. Tele: A fib w/ occasional PVCs. Room air. Lactated ringers @ 100 ml/hr. Ax2. Denies pain. Port at right shoulder, G tube. Pt NPO since midnight. Plan: Cards consult. Social work consult, hematology and oncology consult, echo. Will continue to monitor.

## 2018-10-11 NOTE — PROGRESS NOTES
84 y/o male admitted earlier today with weakness & bradycardia.  Pt was scheduled to meet with me later today to be evaluated for XRT rx with chemorx for mixed adeno/squamous CA of distal esophagus. PET/CT from 10/9 shows esophagus primary tumor & tracer uptake in multiple lymph nodes extending from upper mediastinum to retroperitoneum - likely C/W clinical stage T3N2-3 disease involvement.    I discussed with Efrem Sifuentes CNP with Dr. Mcleod from [facility].    Will wait for cardiac status to stabilize before proceeding with treatment for esophageal CA. Will follow.  CHRISTIAN Peter MD.

## 2018-10-11 NOTE — IP AVS SNAPSHOT
` `     [place] [address] ONCOLOGY: [phone]                 INTERAGENCY TRANSFER FORM - NOTES (H&P, Discharge Summary, Consults, Procedures, Therapies)   10/11/2018                    Hospital Admission Date: 10/11/2018  DEEPAK LOCKE   : 1935  Sex: Male        Patient PCP Information     Provider PCP Type    Clinton Mills MD General         History & Physicals      H&P by Nicolás Mercado DO at 10/11/2018  5:08 AM     Author:  Nicolás Mercado DO Service:  Hospitalist Author Type:  Physician    Filed:  10/11/2018  5:47 AM Date of Service:  10/11/2018  5:08 AM Creation Time:  10/11/2018  5:08 AM    Status:  Signed :  Nicolás Mercado DO (Physician)         [facility]    History and Physical  Hospitalist       Date of Admission:  10/11/2018[JH1.1]    Assessment & Plan[JH1.2]   Deepak Locke is a 83 year old male[JH1.1] with a history of atrial fibrillation currently on Pradaxa, CAD w/MI in , HTN, HLP, and who approximately a month ago was diagnosed with pancreatic carcinoma who recently had a gastrostomy tube and port placed who presented with generalized weakness     Generalized weakness  Possible failure to thrive  Patient reports gradually worsening generalized weakness for the past year to the point that this evening he fell out of bed and was then unable to get back up.  At this poin the etiology for his weakness is unclear.  It could be related to symptomatic bradycardia as below or possibly deconditioning from his pancreatic carcinoma.  At this time there is no evidence of infection with a normal UA and no evidence of infiltrate on CXR/CT to suggest pneumonia   - Admission to medical bed  - IVF with LR at 100 mL/hr for 1 L total  - Social work/PT/OT consulted     Bradycardia- Atrial fibrillation w/slow ventricular response  Slight troponin elevation   On evaluation patient had an irregular rhythm with rates in the 40s.  It appears  as if a year ago the patient had rate controlled a fib with heart rates in the 60s.  Then an EKG from 3 weeks ago showed a junctional rhythm with a rate of 48 BPM.  EKG today showed atrial fibrillation with slow ventricular response and a rate of 47 BPM.  The patient had been on Propranolol in the past but states he had stopped taking it.  On exam the patient also is noted to have a systolic murmur present and cardiomegaly on CXR.  Of note, patient did have an echocardiogram a year ago with mild concentric LVH but a normal EF of 55-60%, mild MR, mild TR, and no regional WMA.  Troponin today was slightly elevated today at 0.057 but I don't think this is related right heart strain from below and more likely related to increased demand    - Repeat echocardiogram   - Serial troponins   - Cardiology consulted and appreciate their recommendations    Pancreatic carcinoma   PE   Patient underwent EGD with biopsy on 9/24/18.  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  He has since had a gastrostomy tube and port placed by IR.  He has just started seeing Dr. Mcleod with [facility] and was actually scheduled to meet with radiation oncology today as he states the current plan is for chemotherapy with radiation treatments.   Upon presentation here the patient was found to be tachypneic and given his malignancy a CT chest PE study was done.  This showed a small right sided PE in the right upper lung.  Of note, the patient had been having his Pradaxa held until yesterday for his IR procedures.    - Will consult [facility]  - Resume Pradaxa.  Given malignancy history patient may benefit from Lovenox therapy  - O2 as needed     Reported H/o CAD   HTN   HLP   Patient had a stress MPI with lexiscan in 2016 that showed a fixed basal inferior defect which could represent prior MI but patient denied any history of MI today.  No complaints of chest pain or SOB currently.  Blood pressure well  controlled   - PTA Simvastatin 10 mg, hydrochlorothiazide 25 mg and Norvasc 5 mg[JH1.3]          DVT Prophylaxis:[JH1.1] Pradaxa[JH1.3]  Code Status:[JH1.1] Full Code[JH1.3]    Disposition: Expected discharge[JH1.1] TBD.  Needs improvement in strength and further evaluation[JH1.3]     Nicolás Mercado[JH1.2], DO[JH1.1]    Primary Care Physician   Clinton Mills    Chief Complaint[JH1.2]   Generalized weakness    History is obtained from the patient[JH1.3]    History of Present Illness[JH1.2]   Deepak Arndt is a 83 year old male[JH1.1] with a history of atrial fibrillation currently on Pradaxa, CAD w/MI in 2012, HTN, HLP, and who approximately a month ago was diagnosed with pancreatic carcinoma who recently had a gastrostomy tube and port placed who presented with generalized weakness.  Patient reports that for the past year he has been having gradually worsening generalized weakness to the point that last night he fell out of bed and was unable to get back up.  He laid on the ground for approximately 2 hours until EMS was called to help the patient up and bring him in for further evaluation.  Patient otherwise has no significant complaints.  He does report some tenderness at his recently placed chest port and gastrostomy tube.  Denies any fevers, chills, chest pain, SOB, light headedness, palpitations, headache, abdominal pain, N/V/D, problems with urination including dysuria, hematuria, increased urinary urgency or leg pain or swelling.      In the ED lab work was fairly unremarkable other than a slightly elevated troponin of 0.057 and WBC count of 15.5.  A UA was performed and this did not show evidence of infection.  CT head was obtained with no acute intracranial abnormality.  CT chest did show a right sided PE.[JH1.3]     Past Medical History[JH1.2]    I have reviewed this patient's medical history and updated it with pertinent information if needed.[JH1.3]   Past Medical History:   Diagnosis Date      Actinic keratosis      Atrial fibrillation (H)      Cancer (H)     skin     Cataract     both     Coronary artery disease      Detached retina 2007    both     ED (erectile dysfunction)      Gastroesophageal reflux disease      Hyperlipidemia      Hypertension      Lung nodules      Mild aortic stenosis     6/2012 Echo - trivial AS, mild-mod AR     Multiple thyroid nodules      Murmur     6/2012 Echo - trivial AS, mild-mod AR, mild-mod MR, mild-mod TR, mod HI     Myocardial infarction (H) 6/2012    inferior - noted on 2007 stress test     Osteoporosis[JH1.4]        Past Surgical History[JH1.2]   I have reviewed this patient's surgical history and updated it with pertinent information if needed.[JH1.3]  Past Surgical History:   Procedure Laterality Date     CATARACT IOL, RT/LT       ESOPHAGOSCOPY, GASTROSCOPY, DUODENOSCOPY (EGD), COMBINED N/A 9/24/2018    Procedure: COMBINED ESOPHAGOSCOPY, GASTROSCOPY, DUODENOSCOPY (EGD), BIOPSY SINGLE OR MULTIPLE;  ESOPHAGOGASTRODUODENOSCOPY ;  Surgeon: Aicha Graves MD;  Location:  GI     ESOPHAGOSCOPY, GASTROSCOPY, DUODENOSCOPY (EGD), COMBINED N/A 10/2/2018    Procedure: COMBINED ENDOSCOPIC ULTRASOUND, ESOPHAGOSCOPY, GASTROSCOPY, DUODENOSCOPY (EGD), FINE NEEDLE ASPIRATE/BIOPSY;  ENDOSCOPIC ULTRASOUND, ESOPHAGOSCOPY, GASTROSCOPY, DUODENOSCOPY (EGD), FINE NEEDLE ASPIRATE (mac sedation);  Surgeon: Sanju Cruz MD;  Location:  GI     HC OR OCULAR DEVICE INTRAOP DETACHED RETINA[JH1.4]         Prior to Admission Medications   Prior to Admission Medications   Prescriptions Last Dose Informant Patient Reported? Taking?   Calcium carb-Vitamin D 500 mg [place]-200 units (OSCAL WITH D;OYSTER SHELL CALCIUM) 500-200 MG-UNIT per tablet   Yes No   Sig: Take 1 tablet by mouth daily   Dabigatran Etexilate Mesylate (PRADAXA PO)  Self Yes No   Sig: Take 150 mg by mouth 2 times daily   Multiple Vitamins-Minerals (DAILY MULTI PO)   Yes No   Sig: Take by mouth daily   NONFORMULARY    Yes No   Sig: See Admin Instructions Activa probiotic yoguart 4 x per week   acetaminophen (TYLENOL) 325 MG tablet   No No   Sig: Take 2 tablets (650 mg) by mouth every 4 hours as needed for mild pain or fever   amLODIPine (NORVASC) 5 MG tablet   No No   Sig: Take 1 tablet (5 mg) by mouth daily   fish oil-omega-3 fatty acids (OMEGA-3 FISH OIL) 1000 MG capsule  Self Yes No   Sig: Take 1 capsule by mouth daily.   hydrochlorothiazide (HYDRODIURIL) 25 MG tablet   No No   Sig: TAKE 1 TABLET BY MOUTH ONCE DAILY   pantoprazole (PROTONIX) 40 MG EC tablet   No No   Sig: Take 1 tablet (40 mg) by mouth daily for 30 doses   propranolol (INDERAL) 40 MG tablet   No No   Sig: TAKE 1 TABLET BY MOUTH TWICE DAILY FOR ESSENTIAL TREMOR   simvastatin (ZOCOR) 10 MG tablet  Self Yes No   Sig: Take 5 mg by mouth At Bedtime (Takes half of a 10mg tablet for a total of 5mg daily)      Facility-Administered Medications: None     Allergies   Allergies   Allergen Reactions     Versed [Midazolam] Other (See Comments)     Pt has adverse/opposite reaction to versed. Given in small doses for a port and g-tube placement. Patient was aggressive.       Social History[JH1.2]   I have reviewed this patient's social history and updated it with pertinent information if needed. Deepak Arndt[JH1.3]  reports that he quit smoking about 44 years ago. His smoking use included Cigarettes. He started smoking about 64 years ago. He has a 20.00 pack-year smoking history. He has never used smokeless tobacco. He reports that he drinks alcohol. He reports that he does not use illicit drugs.[JH1.4]    Family History[JH1.2]   I have reviewed this patient's family history and updated it with pertinent information if needed.   Heart disease- father[JH1.3]     Review of Systems[JH1.2]   The 10 point Review of Systems is negative other than noted in the HPI[JH1.3]    Physical Exam   Temp: 97.7  F (36.5  C) Temp src: Oral BP: 131/63   Heart Rate: (!) 47 Resp: 26 SpO2: 98  % O2 Device: None (Room air)[JH1.2]    Vital Signs with Ranges[JH1.1]  Temp:  [97.7  F (36.5  C)] 97.7  F (36.5  C)  Heart Rate:  [41-53] 47  Resp:  [12-32] 26  BP: (111-177)/() 131/63  SpO2:  [98 %-100 %] 98 %  145 lbs 0 oz[JH1.2]    Constitutional:[JH1.1] Frail appearing.[JH1.3]  Awake, alert, cooperative, no apparent distress.  Eyes: Conjunctiva and pupils examined and normal.  HEENT: Moist mucous membranes, normal dentition.  Respiratory: Clear to auscultation bilaterally, no crackles or wheezing.  Cardiovascular: Regular rate and rhythm, normal S1 and S2, and[JH1.1] systolic[JH1.3] murmur noted.  GI: Soft, non-distended, non-tender, normal bowel sounds.  Lymph/Hematologic: No a[JH1.1]bnormal bruising[JH1.3]  Skin: No rashes, no cyanosis, no edema.  Musculoskeletal: No joint swelling, erythema or tenderness.  Neurologic:[JH1.1] 3+/5 strength to all 4 extremities.  No facial droop[JH1.3] .  Psychiatric: Alert, no obvious anxiety or depression.[JH1.1]    Data[JH1.2]   Data reviewed today:  I personally reviewed[JH1.1]   EKG: Atrial fibrillation with slow ventricular response.  Rate 47 BPM.  No ST elevations.  When compared with EKG from 9/17/18, Junctional rhythm no longer present  CXR:  Cardiomegaly.  No obvious infiltrates or pleural effusion.  Port in place.[JH1.3]     Recent Labs  Lab 10/11/18  0255 10/08/18  1209   WBC 15.5* 12.4*   HGB 15.1 15.2   MCV 97 98    180   INR  --  1.18*     --    POTASSIUM 3.5  --    CHLORIDE 98  --    CO2 25  --    BUN 27  --    CR 1.04  --    ANIONGAP 12  --    TRIPP 9.0  --    *  --    ALBUMIN 3.3*  --    PROTTOTAL 7.2  --    BILITOTAL 1.2  --    ALKPHOS 68  --    ALT 21  --    AST 20  --    LIPASE 154  --    TROPI 0.057*  --        Imaging:[JH1.1]  Recent Results (from the past 24 hour(s))   XR Chest 2 Views    Narrative    XR CHEST 2 VW  10/11/2018 3:07 AM     INDICATION: Weakness, shortness of breath.    COMPARISON: CT 9/28/2018. Chest x-ray  10/23/2017.      Impression    IMPRESSION: Borderline prominent heart size. No focal air-space  disease or other acute findings. Right chest wall port with catheter  tip in the SVC. Old right rib fractures.    LEBRON LANZA MD   CT Head w/o Contrast    Narrative    CT HEAD W/O CONTRAST  10/11/2018 3:21 AM     HISTORY: Fall, weakness.     TECHNIQUE: Axial images of the head and coronal reformations without  IV contrast material. Radiation dose for this scan was reduced using  automated exposure control, adjustment of the mA and/or kV according  to patient size, or iterative reconstruction technique.    COMPARISON: 10/23/2017.    FINDINGS: No intracranial hemorrhage, mass or mass effect. Low  attenuation areas are present in white matter of the cerebral  hemispheres that are nonspecific but consistent with chronic small  vessel ischemic changes in this age patient. No acute infarct  identified. No shift of midline structures. No skull fractures.      Impression    IMPRESSION: No acute intracranial abnormality.    LEBRON LANZA MD   CT Chest Pulmonary Embolism w Contrast   Result Value    Radiologist flags Pulmonary embolism (AA)    Narrative    CT CHEST PULMONARY EMBOLISM W CONTRAST  10/11/2018 4:13 AM     HISTORY: Dyspnea.    TECHNIQUE: Volumetric acquisition of the chest after the  administration of 59 mL Isovue-370 IV contrast. Radiation dose for  this scan was reduced using automated exposure control, adjustment of  the mA and/or kV according to patient size, or iterative  reconstruction technique.     COMPARISON: CT 9/28/2018.    FINDINGS: Study is positive for a small pulmonary embolus in the right  upper lung anteriorly (series 5, image 75). No acute infiltrates or  pleural effusions. Emphysema. 2.5 cm right thyroid nodule as seen  previously. Right chest wall port with catheter tip in the SVC.  Coronary artery calcifications. Mediastinal adenopathy, slightly more  prominent than on the previous study.  Distal esophageal mass is again  seen. A prominent left lower mediastinal lymph node in the  paraesophageal region has increased in size measuring 2.3 x 2.4 cm  (previously 1.5 x 2.1 cm). A right retrocrural lymph node is more  prominent. Enlarged lymph nodes in the gastrohepatic ligament region  are slightly more prominent. Renal and liver cysts.      Impression    IMPRESSION:  1. Study is positive for right-sided pulmonary embolus.  2. Distal esophageal mass consistent with known cancer is again seen.  Mediastinal and upper abdominal adenopathy have slightly increased.  3. Emphysema.    [Critical Result: Pulmonary embolism]    Finding was identified on 10/11/2018 4:21 AM.     Dr. Walker was contacted by me on 10/11/2018 4:29 AM and  verbalized understanding of the critical result.    LEBRON LANZA MD[JH1.2]         Revision History        User Key Date/Time User Provider Type Action    > JH1.4 10/11/2018  5:47 AM Nicolás Mercado, DO Physician Sign     JH1.3 10/11/2018  5:09 AM Nicolás Mercado,  Physician      JH1.2 10/11/2018  5:08 AM Nicolás Mercado, DO Physician      JH1.1 10/11/2018  5:07 AM Nicolás Mercado DO Physician                      Discharge Summaries      Discharge Summaries by Donnell Larsen MD at 10/20/2018  8:18 AM     Author:  Donnell Larsen MD Service:  Hospitalist Author Type:  Physician    Filed:  10/20/2018  8:27 AM Date of Service:  10/20/2018  8:18 AM Creation Time:  10/20/2018  8:18 AM    Status:  Signed :  Donnell Larsen MD (Physician)         [facility]    Discharge Summary  Hospitalist    Date of Admission:  10/11/2018  Date of Discharge:  10/20/2018  Discharging Provider:[SF1.1] Donnell Larsen[SF1.2], MD[SF1.1]    Discharge Diagnoses[SF1.2]   Acute pulmonary embolism  Adult failure to thrive  Physical deconditioning from acute illness/malignancy/senile fraility  Mechanical fall at home due to  physical deconditioning  Possible mild cognitive impairment/dementia at baseline.  Severe malnutrition in the context of chronic illness  Afib with slow ventricular response[SF1.1]    History of Present Illness[SF1.2]   Deepak Arndt is an 82yo M with PMH of recently diagnosed esophageal and pancreatic cancer s/p gastrostomy tube, CAD w/ MI 2012, HTN, who was admitted on 10/11/2018 with weakness, failure to thrive and small pulmonary embolism. Patient had been having ongoing generalized weakness, fell out of bed on the night of admission and unable to get back up. Speech therapy hadconcerns for aspiration and recommend n.p.o. with pleasure foods and thin liquids. [facility] recommendations were for soft foods and liquids as long as not having N/V, pain or cough. Patient is receiving most of his nutrition through the G-tube, takes some pills orally and eats for pleasure. He is being discharged to TCU for ongoing rehabilitation before returning home.      Acute pulmonary embolism: Pt was off Pradaxa 150 mg BID from 10/5-10/10/2018 for G-Tube/Port placement. Unfortunately on presentation 10/11 CT chest was notable for right-sided pulmonary embolus. CT also noted distal esophageal mass consistent with known cancer; mediastinal and upper abdominal adenopathy have slightly increased. Patient was switched to Lovenox at discharge.    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 Had a G-tube and port placed on 10/5/2018. Weekly paclitaxel and carboplatinum -started 10/16.  - Dr. Peter of radiation oncology - will consider radiation therapy in the future  - [facility] following - Patient will be going to TCU.  Because of insurance reason, he cannot get chemotherapy at Dr. Mcleod's office ([facility]) while he is in the TCU.  office will arrange for continuation of chemotherapy at [facility].      Atrial fibrillation with slow ventricular response: He is on chronic therapy with propranolol which is on hold since admission. CHADS-Vasc of 4.  - Cardiology / EP recommend to continue to hold beta-blocker.  - Holter monitor at discharge.  - Lovenox as above      Abnormal thyroid function test: TSH mildly low at 0.27.  - Monitor thyroid function tests in 4-6 weeks.      Essential tremor: Stopped PTA propranolol given bradycardia[SF1.1]    Hospital Course[SF1.2]   Deepak Arndt was admitted on 10/11/2018.  The following problems were addressed during his hospitalization:[SF1.1]    Active Problems:    Symptomatic bradycardia    Cancer of distal third of esophagus (H)[SF1.2]      Donnell Larsen MD[SF1.1]    Significant Results and Procedures[SF1.2]   CT C/A/P 10/11/2018[SF1.1]    Pending Results[SF1.2]   These results will be followed up by PCP, Kym[SF1.1]  Unresulted Labs Ordered in the Past 30 Days of this Admission     Date and Time Order Name Status Description    10/11/2018 1040 T4 free In process     10/2/2018 1656 Fine needle aspiration In process           Code Status[SF1.2]   Full Code[SF1.1]       Primary Care Physician   Clinton Mills    Physical Exam   Temp: 96.5  F (35.8  C) Temp src: Oral BP: 153/58   Heart Rate: 64 Resp: 16 SpO2: 91 % O2 Device: None (Room air)    Vitals:    10/18/18 0332 10/19/18 0544 10/20/18 0645   Weight: 62.7 kg (138 lb 3.2 oz) 61.2 kg (135 lb) 58.7 kg (129 lb 4.8 oz)[SF1.2]     Vital Signs with Ranges[SF1.1]  Temp:  [96.5  F (35.8  C)-97.5  F (36.4  C)] 96.5  F (35.8  C)  Heart Rate:  [] 64  Resp:  [16] 16  BP: (147-153)/(58-68) 153/58  SpO2:  [91 %-98 %] 91 %  I/O last 3 completed shifts:  In: 370 [NG/GT:370]  Out: 175 [Urine:175][SF1.2]    Constitutional: Elderly male, tremulous, in no acute distress  Eyes: PERRL, nonicteriv  HEENT: Normocephalic, atraumatic, oral mucosa moist  Respiratory: CTAB, no wheezing or crackles  Cardiovascular: Irregular,  normal S1/2, no m/r/g  GI: G tube noted dressings c/d/i  Skin: No rashes, noted 2cm cystic lesion over upper back, port noted  Musculoskeletal: Moves all extremities, tremulous overall  Neurologic: A&Ox3  Psychiatric: Appropriate affect and mood[SF1.1]    Discharge Disposition[SF1.2]   Discharged to short-term care facility  Condition at discharge: Stable[SF1.1]    Consultations This Hospital Stay   CARDIOLOGY IP CONSULT  SOCIAL WORK IP CONSULT  PHYSICAL THERAPY ADULT IP CONSULT  OCCUPATIONAL THERAPY ADULT IP CONSULT  HEMATOLOGY & ONCOLOGY IP CONSULT  NUTRITION SERVICES ADULT IP CONSULT  PHARMACY IP CONSULT  PHYSICAL THERAPY ADULT IP CONSULT  OCCUPATIONAL THERAPY ADULT IP CONSULT  SPEECH LANGUAGE PATH ADULT IP CONSULT  NUTRITION SERVICES ADULT IP CONSULT  PHYSICAL THERAPY ADULT IP CONSULT  OCCUPATIONAL THERAPY ADULT IP CONSULT  SPEECH LANGUAGE PATH ADULT IP CONSULT  PHYSICAL THERAPY ADULT IP CONSULT  OCCUPATIONAL THERAPY ADULT IP CONSULT  CARE TRANSITION RN/SW IP CONSULT  HEMATOLOGY & ONCOLOGY IP CONSULT  HEMATOLOGY & ONCOLOGY IP CONSULT    Time Spent on this Encounter[SF1.2]   IDonnell, personally saw the patient today and spent less than or equal to 30 minutes discharging this patient.[SF1.1]    Discharge Orders     Follow-Up with Cardiac Advanced Practice Provider     General info for SNF   Length of Stay Estimate: Short Term Care: Estimated # of Days <30  Condition at Discharge: Stable  Level of care:skilled   Rehabilitation Potential: Fair  Admission H&P remains valid and up-to-date: Yes  Recent Chemotherapy: plan to start soon                Use Nursing Home Standing Orders: Yes     Mantoux instructions   Give two-step Mantoux (PPD) Per Facility Policy Yes     Reason for your hospital stay   Were admitted to the hospital with falls/physical deconditioning.     Additional Discharge Instructions   Speech therapy recommend n.p.o. due to esophageal obstruction.  Patient and her family can consider  pleasure p.o. intake with full liquid texture, upright positioning for 1-2 hours after any intake [as per oncology]  Dietary supplements with Ensure.  Tube feeding as per nutrition recommendation.     Activity - Up with nursing assistance     Follow Up and recommended labs and tests   Schedule an outpatient consult with [facility] for ongoing weekly chemotherapy with Taxol/Carboplatin while at [place]. Chemotherapy is due 10/23/2018.     Follow Up and recommended labs and tests   Follow up with [facility] physician.  The following labs/tests are recommended: BMP in 1 week .    Follow-up with oncology as per schedule.  Monitor thyroid function tests in 4-6 weeks.  Event monitor at discharge, follow-up with cardiology as outpatient.  Monitor daily blood pressure/heart rate and review on provider visit.  Review volume status and consider optimizing dose of diuretic.     Encourage PO fluids   Dysphagia Diet Level 1 Pureed Thin Liquids (water, ice chips, juice, milk gelatin, ice cream, etc)     Nutrition Services Adult IP Consult   Reason: Tube feeding     Physical Therapy Adult Consult   Evaluate and treat as clinically indicated.    Reason:  deconditioning     Occupational Therapy Adult Consult   Evaluate and treat as clinically indicated.    Reason: Physical deconditioning/cognitive decline     Speech Language Path Adult Consult   Evaluate and treat as clinically indicated.    Reason: Risk for aspiration.     Fall precautions     Advance Diet as Tolerated   Adult Formula Bolus Feeding: QID Isosource 1.5; Route: Gastrostomy; 4; Can(s); Medication - Tube Feeding Flush Frequency: At least 15-30 mL water before and after medication administration and with tube clogging;       Discharge Medications   Current Discharge Medication List      START taking these medications    Details   enoxaparin (LOVENOX) 60 MG/0.6ML injection Inject 0.6 mLs (60 mg) Subcutaneous every 12 hours    Associated Diagnoses: Other acute  pulmonary embolism without acute cor pulmonale (H)      pantoprazole (PROTONIX) 2 mg/mL SUSP suspension 20 mLs (40 mg) by Per G Tube route every morning  Refills: 0    Associated Diagnoses: Cancer of distal third of esophagus (H)         CONTINUE these medications which have NOT CHANGED    Details   acetaminophen (TYLENOL) 325 MG tablet Take 2 tablets (650 mg) by mouth every 4 hours as needed for mild pain or fever  Qty: 100 tablet    Associated Diagnoses: Generalized muscle weakness      amLODIPine (NORVASC) 5 MG tablet Take 1 tablet (5 mg) by mouth daily  Qty: 90 tablet, Refills: 0    Associated Diagnoses: Essential hypertension, benign      Calcium carb-Vitamin D 500 mg [place]-200 units (OSCAL WITH D;OYSTER SHELL CALCIUM) 500-200 MG-UNIT per tablet Take 1 tablet by mouth daily      fish oil-omega-3 fatty acids (OMEGA-3 FISH OIL) 1000 MG capsule Take 1 capsule by mouth daily.      Multiple Vitamins-Minerals (DAILY MULTI PO) Take by mouth daily      simvastatin (ZOCOR) 10 MG tablet Take 5 mg by mouth At Bedtime (Takes half of a 10mg tablet for a total of 5mg daily)         STOP taking these medications       Dabigatran Etexilate Mesylate (PRADAXA PO) Comments:   Reason for Stopping:         hydrochlorothiazide (HYDRODIURIL) 25 MG tablet Comments:   Reason for Stopping:         pantoprazole (PROTONIX) 40 MG EC tablet Comments:   Reason for Stopping:             Allergies   Allergies   Allergen Reactions     Versed [Midazolam] Other (See Comments)     Pt has adverse/opposite reaction to versed. Given in small doses for a port and g-tube placement. Patient was aggressive.     Data[SF1.2]   Most Recent 3 CBC's:[SF1.1]  Recent Labs   Lab Test  10/18/18   1445  10/17/18   0600  10/16/18   0623  10/13/18   0555   10/11/18   0255   WBC   --   10.6  14.3*  11.3*   < >  15.5*   HGB   --   13.6  13.9   --    --   15.1   MCV   --   97  97   --    --   97   PLT  213  207  190   --    --   173    < > = values in this interval not  displayed.[SF1.2]      Most Recent 3 BMP's:[SF1.1]  Recent Labs   Lab Test  10/20/18   0635  10/16/18   0623  10/15/18   0915  10/12/18   0615   NA   --   137  135  136   POTASSIUM   --   4.0  3.5  3.6   CHLORIDE   --   108  103  101   CO2   --   22  27  25   BUN   --   16  9  14   CR  0.73  0.61*  0.63*  0.78   ANIONGAP   --   7  5  10   TRIPP   --   8.6  8.8  8.7   GLC   --   109*  114*  97[SF1.2]     Most Recent 2 LFT's:[SF1.1]  Recent Labs   Lab Test  10/11/18   0255  09/19/18   1412   AST  20  19   ALT  21  27   ALKPHOS  68  79   BILITOTAL  1.2  0.8[SF1.2]     Most Recent INR's and Anticoagulation Dosing History:  Anticoagulation Dose History     Recent Dosing and Labs Latest Ref Rng & Units 10/8/2018    INR 0.86 - 1.14 1.18(H)        Most Recent 3 Troponin's:[SF1.1]  Recent Labs   Lab Test  10/11/18   1408  10/11/18   1040  10/11/18   0255   TROPI  0.047*  0.057*  0.057*[SF1.2]     Most Recent Cholesterol Panel:[SF1.1]  Recent Labs   Lab Test 05/25/18   CHOL  114   LDL  47   HDL  49   TRIG  91[SF1.2]     Most Recent 6 Bacteria Isolates From Any Culture (See EPIC Reports for Culture Details):[SF1.1]  Recent Labs   Lab Test  10/11/18   1625  10/11/18   1620  01/08/14   1341  01/08/14   1339   CULT  No growth  No growth  Culture negative for acid fast bacilli Assayed at [facility] [address]  Culture negative after 4 weeks  Normal smiley  Culture negative for acid fast bacilli Assayed at [facility] [address]  Penicillium species isolated No additional fungi cultured after 4 weeks incubation*  Normal respiratory smiley[SF1.2]     Most Recent TSH, T4 and A1c Labs:[SF1.1]  Recent Labs   Lab Test  10/11/18   1040   TSH  0.27*   T4  1.52*   A1C  5.7*[SF1.2]     Results for orders placed or performed during the hospital encounter of 10/11/18   XR Chest 2 Views    Narrative    XR CHEST 2 VW  10/11/2018 3:07 AM     INDICATION: Weakness, shortness of  breath.    COMPARISON: CT 9/28/2018. Chest x-ray 10/23/2017.      Impression    IMPRESSION: Borderline prominent heart size. No focal air-space  disease or other acute findings. Right chest wall port with catheter  tip in the SVC. Old right rib fractures.    LEBRON LANZA MD   CT Head w/o Contrast    Narrative    CT HEAD W/O CONTRAST  10/11/2018 3:21 AM     HISTORY: Fall, weakness.     TECHNIQUE: Axial images of the head and coronal reformations without  IV contrast material. Radiation dose for this scan was reduced using  automated exposure control, adjustment of the mA and/or kV according  to patient size, or iterative reconstruction technique.    COMPARISON: 10/23/2017.    FINDINGS: No intracranial hemorrhage, mass or mass effect. Low  attenuation areas are present in white matter of the cerebral  hemispheres that are nonspecific but consistent with chronic small  vessel ischemic changes in this age patient. No acute infarct  identified. No shift of midline structures. No skull fractures.      Impression    IMPRESSION: No acute intracranial abnormality.    LEBRON LANZA MD   CT Chest Pulmonary Embolism w Contrast     Value    Radiologist flags Pulmonary embolism (AA)    Narrative    CT CHEST PULMONARY EMBOLISM W CONTRAST  10/11/2018 4:13 AM     HISTORY: Dyspnea.    TECHNIQUE: Volumetric acquisition of the chest after the  administration of 59 mL Isovue-370 IV contrast. Radiation dose for  this scan was reduced using automated exposure control, adjustment of  the mA and/or kV according to patient size, or iterative  reconstruction technique.     COMPARISON: CT 9/28/2018.    FINDINGS: Study is positive for a small pulmonary embolus in the right  upper lung anteriorly (series 5, image 75). No acute infiltrates or  pleural effusions. Emphysema. 2.5 cm right thyroid nodule as seen  previously. Right chest wall port with catheter tip in the SVC.  Coronary artery calcifications. Mediastinal adenopathy, slightly  more  prominent than on the previous study. Distal esophageal mass is again  seen. A prominent left lower mediastinal lymph node in the  paraesophageal region has increased in size measuring 2.3 x 2.4 cm  (previously 1.5 x 2.1 cm). A right retrocrural lymph node is more  prominent. Enlarged lymph nodes in the gastrohepatic ligament region  are slightly more prominent. Renal and liver cysts.      Impression    IMPRESSION:  1. Study is positive for right-sided pulmonary embolus.  2. Distal esophageal mass consistent with known cancer is again seen.  Mediastinal and upper abdominal adenopathy have slightly increased.  3. Emphysema.    [Critical Result: Pulmonary embolism]    Finding was identified on 10/11/2018 4:21 AM.     Dr. Walker was contacted by me on 10/11/2018 4:29 AM and  verbalized understanding of the critical result.    LEBRON LANZA MD[SF1.1]          Revision History        User Key Date/Time User Provider Type Action    > SF1.2 10/20/2018  8:27 AM Donnell Larsen MD Physician Sign     SF1.1 10/20/2018  8:18 AM Donnell Larsen MD Physician                      Consult Notes      Consults by El Bolden MD at 10/18/2018  5:25 PM     Author:  El Bolden MD Service:  Hem/Onc Author Type:  Physician    Filed:  10/18/2018  5:30 PM Date of Service:  10/18/2018  5:25 PM Creation Time:  10/18/2018  5:24 PM    Status:  Signed :  El Bolden MD (Physician)     Consult Orders:    1. Hematology & Oncology IP Consult: Esophageal cancer. Continue chemotherapy while at [facility].; Consultant may enter orders: Yes; Patient to be seen: Routine - within 24 hours; Call back #: [phone]; Requested Clinic/Group: [facility] [[id number]] ordered by Efrem Sifuentes APRN CNP at 10/18/18 1026     2. Hematology & Oncology IP Consult: ? Chemotherpay pt of [facility] going to [facility]; Consultant may enter orders: Yes; Patient to be seen: Routine - within 24 hours; Requested  Clinic/Group: [facility] [[id number]] ordered by Kwan Benoit MD at 10/18/18 0911                Consult received for arranging chemotherapy in [facility].  No formal consult done.    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  Patient being followed by Dr. Mcleod from [facility].  Patient is currently on radiation with weekly carboplatin and Taxol.  Patient will be going to TCU.  Because of insurance reason, he cannot get chemotherapy at Dr. Mcleod's office while he is in the TCU.    Our office will arrange for continuation of chemotherapy at [facility].  I will see him in clinic when he comes for chemotherapy.[BK1.1]     Revision History        User Key Date/Time User Provider Type Action    > BK1.1 10/18/2018  5:30 PM El Bolden MD Physician Sign            Consults by Tika Desai RN at 10/17/2018  3:55 PM     Author:  Tika Desai RN Service:  Care Coordinator Author Type:      Filed:  10/17/2018  4:30 PM Date of Service:  10/17/2018  3:55 PM Creation Time:  10/17/2018  3:55 PM    Status:  Signed :  Tika Desai RN ()     Consult Orders:    1. Care Transition RN/SW IP Consult [[id number]] ordered by Kwan Beonit MD at 10/17/18 1555                Care Transition Initial Assessment - RN    Reason For Consult: discharge planning   Met with: Called spouse SHAZIA Brady phone [phone]  DATA   Active Problems:    Symptomatic bradycardia    Cancer of distal third of esophagus (H)       Cognitive Status: did not meet with the patient discussed with wife        Contact information and PCP information verified: Yes  Lives With: spouse  Living Arrangements: house  Quality Of Family Relationships: supportive, involved  Description of Support System: Supportive, Involved   Who is your support system?: Wife   Support Assessment: Adequate family and  caregiver support   Insurance concerns: Other  ASSESSMENT  Patient currently receives the following services:  n/a        Identified issues/concerns regarding health management: patient admitted 10/11 with generalized weakness r. Sided PE new Esophogeal CA requiring Gtube for nutrition and initiation of chemotherapy and possible R[BB1.1]adiation[BB1.2] T[BB1.1]herapy[BB1.2] (Peter following with [facility]) and Dr. Mcleod with [facility].  Plan is for the patient to receive weekly chemotherapy taxol/carboplatin and is requiring a TCU for deconditioning.  Referrals are in the process of being submitted to TCU's and [facility] has declined due to chemotherapy.  Called patient's wife Caitlyn phone [phone] to discuss potential barriers for Deepak being accepted at TCU.[BB1.1] Explained that Deepak[BB1.2] is undergoing weekly chemotherapy infusions[BB1.1] and[BB1.2] TCU would most likely be responsible for the cost of the chemotherapy with [facility] therefore we would most likely continue to get denials[BB1.1] for TCU[BB1.2].  Caitlyn was asking about the cost of the chemotherapy I informed her that I did not have that information available but it would definitely not be something that[BB1.1] she would want to pay.  Spoke to Caitlyn about a possible referral for inpatient [facility] to see if they would be able to offer the patient a chemotherapy protocol as a free standing clinic and bill separately from the TCU, she is hesitant about this as she would like Deepak to remain under Dr. Mcleod's care but she does not want his chemotherapy delayed.  Informed Caitlyn that a Care Coordinator will also be available to answer any additional questions regarding discharge planning.   will also reach out for more referrals if needed.  Informed Caitlyn that if [facility] were to take over the infusion chemotherapy we could ask [facility] to review again.  Writer put sticky note in for provider to get a  [facility] consult inpatient for discharge planning.  Writer called [facility] [phone] and discussed with Haydee in Care Coordination.  She is aware that they may be getting an inpatient consult for this patient.  RITESH did inform writer that [facility] was assessing the patient and had questions about chemo plan and where the patient was receiving the chemotherapy.  Writer called Tomas at [facility] [phone] and gave the [facility] information.  Per Tomas they need their business office to review for billing purposes for chemotherapy and should get back to the  if they are able to take the patient with [facility] by tomorrow. Tomas also mentioned that they have a private pay transport company that could do a w/c ride to and from chemo if they approve it.[BB1.2]     PLAN  Financial costs for the patient include[BB1.1] w/c transport for chemo[BB1.2] .  Patient given options and choices for discharge[BB1.1] yes[BB1.2] .  Patient/family is agreeable to the plan?[BB1.1]  Yes:[BB1.2]   Patient anticipates discharging to[BB1.1] tcu[BB1.2] .        Patient anticipates needs for home equipment:[BB1.1] No[BB1.2]  Plan/Disposition:[BB1.1] TCU[BB1.2]   Appointments:[BB1.1]     Pending [facility] infusions weekly to be set up at discharge vs potential appointment with [facility][BB1.2]  Care  (CTS) will continue to follow as needed.[BB1.1]               Revision History        User Key Date/Time User Provider Type Action    > BB1.2 10/17/2018  4:30 PM Tika Desai RN Case Manager Sign     BB1.1 10/17/2018  3:55 PM Tika Desai RN Case Manager             Consults by Felicity Carbajal LICSW at 10/12/2018  4:49 PM     Author:  Felicity Carbajal LICSW Service:  Social Work Author Type:      Filed:  10/12/2018  4:49 PM Date of Service:  10/12/2018  4:49 PM Creation Time:  10/12/2018  4:38 PM    Status:  Signed :  Felicity Carbajal  AKASH IBARRASW ()     Consult Orders:    1. Social Work IP Consult [[id number]] ordered by Nicolás Mercado DO at 10/11/18 0506                Care Transition Initial Assessment -   Reason For Consult: discharge planning  Met with: Family  (wife)  Active Problems:    Symptomatic bradycardia       DATA  Lives With: spouse  Living Arrangements: house  Description of Support System: Supportive, Involved  Who is your support system?: Wife  Support Assessment: Adequate family and caregiver support.   Identified issues/concerns regarding health management:       Quality Of Family Relationships: supportive, involved  Transportation Available: car, family or friend will provide    Per social work consult for discharge planning.  Patient was admitted on 10-11-18 with symptomatic bradycardia.  The tentative date of discharge is yet to be determined.  Reviewed chart and spoke with patient's wife regarding discharge plans.  Per patient's wife report. They live in a house with 2 steps to enter.  Patient furniture walks within the house and a straight cane when he leaves the house.  Reviewed the therapy discharge recommendations of tcu placement on discharge and patient's wife is in agreement.  Patient's wife states that she would like to have patient close to home therefore she is asking for referrals to be sent to [facility] and [facility].  Referrals sent, via discharge on the double, to check bed availability.  Calls placed and messages left at both [facility] and [facility] to follow up on the referrals.  Awaiting return calls.    ASSESSMENT  Cognitive Status:  Did not meet patient as he was sleeping.  Call placed to patient's wife  Concerns to be addressed: discharge planning, tcu placement.     PLAN  Financial costs for the patient includes N/A.  Patient given options and choices for discharge TCU choices.  Patient/family is agreeable to the plan?  Yes  Patient Goals and Preferences: TCU on  discharge.  Patient anticipates discharging to:  TCU.    Will confirm a bed, continue to follow, and assist with a safe discharge plan.[SJ1.1]    Felicity Carbajal[SJ1.2], MSW, [facility]  [phone][SJ1.1]           Revision History        User Key Date/Time User Provider Type Action    > SJ1.2 10/12/2018  4:49 PM Felicity Carbajal LICSW  Sign     SJ1.1 10/12/2018  4:38 PM Felicity Carbajal LICSW              Consults signed by Horacio Gaming MD at 10/12/2018 11:03 AM      Author:  Horacio Gaming MD Service:  Cardiology Author Type:  Physician    Filed:  10/12/2018 11:03 AM Date of Service:  10/11/2018  9:39 AM Creation Time:  10/11/2018 10:45 AM    Status:  Signed :  Horacio Gaming MD (Physician)         Consult Date:  10/11/2018      REASON FOR CONSULTATION:  Evaluation of bradycardia.      HISTORY OF PRESENT ILLNESS:  Mr. Arndt is a pleasant 83-year-old gentleman with a history of hypertension, hyperlipidemia, coronary artery disease (previous MI in 2012) and permanent atrial fibrillation who was recently found to have pancreatic cancer (status post gastrostomy tube and IV port placement this week) and was admitted with failure to thrive and weakness.  He was noticed to have bradycardia on telemetry/EKG and EP was consulted to help with management.      The patient is somewhat confused today.  Apparently he fell off the bed last night.  I talked to him and his wife, and it seems that bradycardia is a chronic problem for him.  Significant bradycardia was also reported during endoscopy procedure, however he was asymptomatic.      He denies any other symptoms such as chest pain, lightheadedness, near-syncope or syncopal episode.      EKG and telemetry were reviewed.  He has AFib with slow ventricular response.  No significant pauses were noticed.  Heart rates have been ranging from 40s to 60s.  Echocardiogram obtained in 10/2017 revealed EF of 55-60% with mild MR,  "TR and trace AI.  He had an echo done today; however, results are still pending.      ASSESSMENT AND PLAN:  Atrial fibrillation with slow ventricular response.  He is on chronic therapy with propranolol which was held.  At this time, it is difficult to tease out whether his symptoms are related to his slow ventricular rates.  However,I  doubt it as bradycardia seems to be a chronic problem for him.      I recommend evaluating heart rates with activities and if heart rates increase appropriately, no indication for pacemaker at this time.  We wiill continue to hold beta blockers and have him wear a monitor at discharge.      Horacio Gaming MD    Physical Exam:  Vitals:[LA1.1] /71 (BP Location: Right arm)  Temp 98.6  F (37  C) (Oral)  Resp 20  Ht 1.727 m (5' 8\")  Wt 63.2 kg (139 lb 5.3 oz)  SpO2 96%  BMI 21.19 kg/m2[LA1.2]      Intake/Output Summary (Last 24 hours) at 10/12/18 1102  Last data filed at 10/12/18 1057   Gross per 24 hour   Intake              770 ml   Output             1105 ml   Net             -335 ml[LA1.1]     Vitals:    10/11/18 0224 10/11/18 0550 10/12/18 0148   Weight: 65.8 kg (145 lb) 62.6 kg (138 lb 0.1 oz) 63.2 kg (139 lb 5.3 oz)[LA1.2]       Constitutional:  AAO x3.  Pt is in NAD.  HEAD: Normocephalic.  SKIN: Skin normal color, texture and turgor with no lesions or eruptions.  Eyes: PERRL, EOMI.  ENT:  Supple, normal JVP. No lymphadenopathy or thyroid enlargement.  Chest:  CTAB.  Cardiac:  IIR, variable sound of S1 and Normal S2.  Systolic murmur in LLSB II/VI.  Abdomen:  Normal BS.  Soft, non-tender and non-distended.  No rebound or guarding.    Extremities:  Pedious pulses palpable B/L.  No LE edema noticed.   Neurological: Strength and sensation grossly symmetric and intact throughout.         Review of Systems:  Complete review of system is otherwise negative with the exception of what was described above.     CURRENT MEDICATIONS:[LA1.1]    amLODIPine  5 mg Oral Daily     " calcium carbonate 500 mg-vitamin D 200 units  1 tablet Oral Daily     dabigatran ANTICOAGULANT (PRADAXA) PO  150 mg Oral BID     pantoprazole  40 mg Oral Daily     simvastatin  5 mg Oral At Bedtime     sodium chloride (PF)  3 mL Intracatheter Q8H[LA1.2]     PRN Meds:[LA1.1] acetaminophen, acetaminophen, IV fluid REPLACEMENT ONLY, melatonin, naloxone, ondansetron **OR** ondansetron, - MEDICATION INSTRUCTIONS -, senna-docusate **OR** senna-docusate, sodium chloride (PF)[LA1.2]    ALLERGIES[LA1.1]     Allergies   Allergen Reactions     Versed [Midazolam] Other (See Comments)     Pt has adverse/opposite reaction to versed. Given in small doses for a port and g-tube placement. Patient was aggressive.[LA1.2]       PAST MEDICAL HISTORY:[LA1.1]  Past Medical History:   Diagnosis Date     Actinic keratosis      Atrial fibrillation (H)      Cancer (H)     skin     Cataract     both     Coronary artery disease      Detached retina 2007    both     ED (erectile dysfunction)      Gastroesophageal reflux disease      Hyperlipidemia      Hypertension      Lung nodules      Mild aortic stenosis     6/2012 Echo - trivial AS, mild-mod AR     Multiple thyroid nodules      Murmur     6/2012 Echo - trivial AS, mild-mod AR, mild-mod MR, mild-mod TR, mod SC     Myocardial infarction (H) 6/2012    inferior - noted on 2007 stress test     Osteoporosis[LA1.2]        PAST SURGICAL HISTORY:[LA1.1]  Past Surgical History:   Procedure Laterality Date     CATARACT IOL, RT/LT       ESOPHAGOSCOPY, GASTROSCOPY, DUODENOSCOPY (EGD), COMBINED N/A 9/24/2018    Procedure: COMBINED ESOPHAGOSCOPY, GASTROSCOPY, DUODENOSCOPY (EGD), BIOPSY SINGLE OR MULTIPLE;  ESOPHAGOGASTRODUODENOSCOPY ;  Surgeon: Aicha Graves MD;  Location:  GI     ESOPHAGOSCOPY, GASTROSCOPY, DUODENOSCOPY (EGD), COMBINED N/A 10/2/2018    Procedure: COMBINED ENDOSCOPIC ULTRASOUND, ESOPHAGOSCOPY, GASTROSCOPY, DUODENOSCOPY (EGD), FINE NEEDLE ASPIRATE/BIOPSY;  ENDOSCOPIC ULTRASOUND,  ESOPHAGOSCOPY, GASTROSCOPY, DUODENOSCOPY (EGD), FINE NEEDLE ASPIRATE (mac sedation);  Surgeon: Sanju Cruz MD;  Location:  GI     HC OR OCULAR DEVICE INTRAOP DETACHED RETINA[LA1.2]         FAMILY HISTORY:  Noncontributory.    SOCIAL HISTORY:[LA1.1]  Social History     Social History     Marital status:      Spouse name: N/A     Number of children: N/A     Years of education: N/A     Social History Main Topics     Smoking status: Former Smoker     Packs/day: 1.00     Years: 20.00     Types: Cigarettes     Start date:      Quit date: 1974     Smokeless tobacco: Never Used     Alcohol use Yes      Comment: 1 glass of wine per day     Drug use: No     Sexual activity: Yes     Partners: Female     Other Topics Concern     Caffeine Concern No     Exercise No     golf     Social History Narrative[LA1.2]         Recent Lab Results:[LA1.1]    Recent Labs  Lab 10/12/18  0615 10/11/18  1408 10/11/18  1040 10/11/18  0255 10/08/18  1209   WBC 12.4*  --   --  15.5* 12.4*   HGB  --   --   --  15.1 15.2   MCV  --   --   --  97 98   PLT  --   --   --  173 180   INR  --   --   --   --  1.18*     --   --  135  --    POTASSIUM 3.6  --   --  3.5  --    CHLORIDE 101  --   --  98  --    CO2 25  --   --  25  --    BUN 14  --   --  27  --    CR 0.78  --   --  1.04  --    ANIONGAP 10  --   --  12  --    TRIPP 8.7  --   --  9.0  --    GLC 97  --   --  104*  --    ALBUMIN  --   --   --  3.3*  --    PROTTOTAL  --   --   --  7.2  --    BILITOTAL  --   --   --  1.2  --    ALKPHOS  --   --   --  68  --    ALT  --   --   --  21  --    AST  --   --   --  20  --    LIPASE  --   --   --  154  --    TROPI  --  0.047* 0.057* 0.057*  --[LA1.2]            MARK MCKEON MD             D: 10/11/2018   T: 10/11/2018   MT: NTS      Name:     LA LOCKE   MRN:      [id number]        Account:       [id number]   :      1935           Consult Date:  10/11/2018      Document: [id number][LA1.1]          Revision History        User Key Date/Time User Provider Type Action    > LA1.1 10/12/2018 11:03 AM Horacio Gaming MD Physician Sign     LA1.2 10/12/2018 11:02 AM Horacio Gaming MD Physician      [N/A] 10/11/2018 10:45 AM Horacio Gaming MD Physician Edit            Consults by Efrem Sifuentes APRN CNP at 10/11/2018  9:41 AM     Author:  Efrem Sifuentes APRN CNP Service:  Hem/Onc Author Type:  Nurse Practitioner    Filed:  10/11/2018 10:25 AM Date of Service:  10/11/2018  9:41 AM Creation Time:  10/11/2018  9:40 AM    Status:  Attested :  Efrem Sifuentes APRN CNP (Nurse Practitioner)    Cosigner:  Lurdes Good MD at 10/11/2018  5:33 PM         Consult Orders:    1. Hematology & Oncology IP Consult: Recently diagnosed esophageal carcinoma.  Newly found PE; Consultant may enter orders: Yes; Patient to be seen: Routine - within 24 hours; Requested Clinic/Group: [facility] [[id number]] ordered by Nicolás Mercado DO at 10/11/18 0506           Attestation signed by Lurdes Good MD at 10/11/2018  5:33 PM        Physician Attestation   ILurdes, saw and evaluated Deepak Arndt as part of a shared visit.  I have reviewed and discussed with the advanced practice provider their history, physical and plan.    I personally reviewed the vital signs, lab CT chest.    My key history or physical exam findings: weakness, lung CTA, had G tube placed, will be starting feeding soon.    Key management decisions made by me:    1- New small PE, could have developed during off pradaxa (held between 10/5 and 10/10)          2- locally advanced esophageal cancer, ready to start chemo/RT        3- weakness, arrhythmia, weight loss  P. He is currently back on pradaxa, I believe it is reasonable based on small incidental PE (not symptomatic and occurred probably off anticoagulation), other options to consider lovenox or edoxaban for hypercoag of  malignancy (will defer to Dr Mcleod).  Chemo/RT to start after he improves, agree with tube feeing initiation.    Lurdes Good  Date of Service (when I saw the patient): 10/11/18                               [facility] Consultation    Deepak Arndt MRN# [id number]   YOB: 1935 Age: 83 year old   Date of Admission: 10/11/2018  Requesting physician:[BU1.1] Dr. Mercado[BU1.2]  Reason for consult:[BU1.1] Esophageal cancer[BU1.2]           Assessment and Plan:[BU1.1]     Deepak is an 83 year old man admitted 10/11 with weakness    1. Esophageal cancer  - Having issues with dysphagia with weight loss  - EGD 9/24/2018 shows distal esophageal mass  - Biopsy positive for poorly differentiated carcinoma with adeno and squamous differentiation. HER2 was not amplified.  - CT chest, abdomen and pelvis 9/28/2018 demonstrated thickening of the distal esophageal wall extending to the GE junction.  Paraesophageal adenopathy along with upper abdominal adenopathy was noted compatible with metastatic disease.  - EUS 10/2/2018 showed complete obstruction of lower third of esophagus with malignant appearing lymph nodes in the subcarinal region and aortopulmonary region.   - Staged as [id number]  - G tube and Port placed 10/5/2018  - PET/CT 10/9/2018 showing hypermetabolic[BU1.2] lymphadenopathy present in the superior, middle, and posterior mediastinum, the retroperitoneum, and along the gastrohepatic ligament.[BU1.1] 7 mm lung lesion is too small to characterize. This might upstage cancer to stage IV disease.  - Radiation Oncology consulted with plans to give weekly paclitaxel/carboplatin with XRT. This was initially planned for neoadjuvant with possible surgery, but with the degree of adenopathy surgery might not be an option.  - Hold initiation of treatment until weakness improves    2. Afib/PE  - Has been on dabigatran, but was off for procedures on 10/5  - CT chest with contrast 10/11 shows small PE in  the right upper lung anteriorly  - Dabigatran resumed  - ? Need for LMWH with hypercoagulability of malignancy. I will review with Dr. Good.  - Cardiology consulted with bradycardia    3. Weakness  - Likely from malignancy, poor intake and other comorbidities  - WBC up, but no obvious infection on lab, imaging studies  - PT/OT  - G tube in place. Consult dietician.[BU1.2]    Efrem Sifuentes APRN, CNP  [facility]  [phone]             Chief Complaint:[BU1.1]   Generalized Weakness (recently had procedure recently for possible esophageal cancer. patient found on floor by EMS unable to get up.) and Fall (patient fell out of bed tonight onto hardwood floor. Did not hit head , no LOC. Pt taking blood thinners )[BU1.3]         History of Present Illness:   This patient is a 83 year old male admitted 10/11/2018 with weakness.[BU1.1]    He has recently been diagnosed with esophageal cancer. A work up has showed metastatic adenopathy. He has been referred to Radiation Oncology. Dr. Mcleod has planned weekly chemotherapy with paclitaxel and carboplatin with radiation. That has not started. On 10/5 he underwent placement of G tube and Port in anticipation of treatment.    He has been eating soft foods. Deepak has not yet started tube feedings. He has lost weight in the past 3 months as his swallowing has been impacted.     ONCOLOGY HISTORY:  -  P[BU1.2]rogressive difficulty with dysphasia[BU1.1],[BU1.2] weight loss[BU1.1],[BU1.2] weakness and fatigue[BU1.1] over past 3 months[BU1.2].[BU1.1]   - U[BU1.2]pper GI endoscopy on 9/24/2018 demonstrated a mass lesion in the distal esophagus measured at 32 cm from the incisors.  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  The mass was described as ulcerated and with circumferential involvement.[BU1.1]    -[BU1.2] The biopsy specimen showed poorly differentiated carcinoma with features of both adeno and squamous differentiation.  Additional studies performed of the tumor  "showed no evidence for CDX-2.  Additional studies performed of the tumor showed no evidence for HER-2 amplification using FISH analysis.  Specifically, the HER-2/EUGENIA 17 ratio was 1.5.[BU1.1]    - A[BU1.2] CT scan of the chest, abdomen pelvis on 2018 demonstrated thickening of the distal esophageal wall extending to the GE junction.  Paraesophageal adenopathy along with upper abdominal adenopathy was noted compatible with metastatic disease.[BU1.1]    - A[BU1.2]n endoscopic ultrasound on 10/2/2018 demonstrated complete obstruction of the lower third of the esophagus.  A mass identified in this region was staged by endosonography as T3 N2 MX.  A malignant appearing lymph node in the subcarinal was noted as well as a lymph node in the aortopulmonary region.  Biopsy of both lymph node showed metastatic disease.[BU1.1]    - G tube and Port placed 10/5  - PET/CT 10/9 there is metastatic lymphadenopathy in the mediastinum as well as nodes in the retroperitoneum and near the gastrohepatic ligament. 7mm lung nodule, not PET avid.  - Referred to Radiation Oncology[BU1.2]         Physical Exam:   Vitals were reviewed[BU1.1]  Blood pressure (!) 140/98, temperature 97.7  F (36.5  C), temperature source Oral, resp. rate 22, height 1.727 m (5' 8\"), weight 62.6 kg (138 lb 0.1 oz), SpO2 98 %.[BU1.3]  Temperatures:  Current - Temp: 97.7  F (36.5  C); Max - Temp  Av.7  F (36.5  C)  Min: 97.6  F (36.4  C)  Max: 97.7  F (36.5  C)  Respiration range: Resp  Av.6  Min: 12  Max: 32  Pulse range: No Data Recorded  Blood pressure range: Systolic (24hrs), Av , Min:111 , Max:177   ; Diastolic (24hrs), Av, Min:61, Max:116    Pulse oximetry range: SpO2  Av.7 %  Min: 98 %  Max: 100 %    Intake/Output Summary (Last 24 hours) at 10/11/18 0931  Last data filed at 10/11/18 0658   Gross per 24 hour   Intake                0 ml   Output               10 ml   Net              -10 ml     GENERAL: No acute distress.  SKIN: No " rashes or jaundice.  HEENT: Normocephalic, atraumatic. Eyes anicteric. Oropharynx is clear.  HEART:[BU1.1] Bradycardia with irr[BU1.2]egular rate and rhythm with no murmurs.  LUNGS: Clear bilaterally.  ABDOMEN: Soft, nontender, nondistended with no palpable hepatosplenomegaly.[BU1.1] Gtube in place.[BU1.2]  EXTREMITIES: No clubbing, cyanosis, or edema.  MENTAL: Alert and oriented to person, place, and time.  NEURO:[BU1.1] Mildly hard of hearing. Globally weak.[BU1.2]            Past Medical History:   I have reviewed this patient's past medical history[BU1.1]  Past Medical History:   Diagnosis Date     Actinic keratosis      Atrial fibrillation (H)      Cancer (H)     skin     Cataract     both     Coronary artery disease      Detached retina 2007    both     ED (erectile dysfunction)      Gastroesophageal reflux disease      Hyperlipidemia      Hypertension      Lung nodules      Mild aortic stenosis     6/2012 Echo - trivial AS, mild-mod AR     Multiple thyroid nodules      Murmur     6/2012 Echo - trivial AS, mild-mod AR, mild-mod MR, mild-mod TR, mod CA     Myocardial infarction (H) 6/2012    inferior - noted on 2007 stress test     Osteoporosis[BU1.3]              Past Surgical History:   I have reviewed this patient's past surgical history[BU1.1]  Past Surgical History:   Procedure Laterality Date     CATARACT IOL, RT/LT       ESOPHAGOSCOPY, GASTROSCOPY, DUODENOSCOPY (EGD), COMBINED N/A 9/24/2018    Procedure: COMBINED ESOPHAGOSCOPY, GASTROSCOPY, DUODENOSCOPY (EGD), BIOPSY SINGLE OR MULTIPLE;  ESOPHAGOGASTRODUODENOSCOPY ;  Surgeon: Aicha Graves MD;  Location: [facility]     ESOPHAGOSCOPY, GASTROSCOPY, DUODENOSCOPY (EGD), COMBINED N/A 10/2/2018    Procedure: COMBINED ENDOSCOPIC ULTRASOUND, ESOPHAGOSCOPY, GASTROSCOPY, DUODENOSCOPY (EGD), FINE NEEDLE ASPIRATE/BIOPSY;  ENDOSCOPIC ULTRASOUND, ESOPHAGOSCOPY, GASTROSCOPY, DUODENOSCOPY (EGD), FINE NEEDLE ASPIRATE (mac sedation);  Surgeon: Sanju Cruz,  MD;  Location:  GI     HC OR OCULAR DEVICE INTRAOP DETACHED RETINA[BU1.3]                 Social History:   I have reviewed this patient's social history[BU1.1]  Social History   Substance Use Topics     Smoking status: Former Smoker     Packs/day: 1.00     Years: 20.00     Types: Cigarettes     Start date: 1954     Quit date: 1/1/1974     Smokeless tobacco: Never Used     Alcohol use Yes      Comment: 1 glass of wine per day[BU1.3]             Family History:   I have reviewed this patient's family history[BU1.1]  History reviewed. No pertinent family history.[BU1.3]          Allergies:[BU1.1]     Allergies   Allergen Reactions     Versed [Midazolam] Other (See Comments)     Pt has adverse/opposite reaction to versed. Given in small doses for a port and g-tube placement. Patient was aggressive.[BU1.3]             Medications:   I have reviewed this patient's current medications[BU1.1]  Prescriptions Prior to Admission   Medication Sig Dispense Refill Last Dose     acetaminophen (TYLENOL) 325 MG tablet Take 2 tablets (650 mg) by mouth every 4 hours as needed for mild pain or fever 100 tablet  PRN     amLODIPine (NORVASC) 5 MG tablet Take 1 tablet (5 mg) by mouth daily 90 tablet 0 10/10/2018 at Unknown time     Calcium carb-Vitamin D 500 mg [place]-200 units (OSCAL WITH D;OYSTER SHELL CALCIUM) 500-200 MG-UNIT per tablet Take 1 tablet by mouth daily   10/10/2018 at Unknown time     Dabigatran Etexilate Mesylate (PRADAXA PO) Take 150 mg by mouth 2 times daily   10/10/2018 at Unknown time     fish oil-omega-3 fatty acids (OMEGA-3 FISH OIL) 1000 MG capsule Take 1 capsule by mouth daily.   10/10/2018 at Unknown time     hydrochlorothiazide (HYDRODIURIL) 25 MG tablet TAKE 1 TABLET BY MOUTH ONCE DAILY 90 tablet 3 10/10/2018 at Unknown time     Multiple Vitamins-Minerals (DAILY MULTI PO) Take by mouth daily   10/10/2018 at Unknown time     pantoprazole (PROTONIX) 40 MG EC tablet Take 1 tablet (40 mg) by mouth daily for 30  doses 30 tablet 0 10/10/2018 at Unknown time     propranolol (INDERAL) 40 MG tablet TAKE 1 TABLET BY MOUTH TWICE DAILY FOR ESSENTIAL TREMOR 60 tablet 1 10/10/2018 at Unknown time     simvastatin (ZOCOR) 10 MG tablet Take 5 mg by mouth At Bedtime (Takes half of a 10mg tablet for a total of 5mg daily)   10/10/2018 at Unknown time[BU1.3]     Current Facility-Administered Medications Ordered in Epic   Medication Dose Route Frequency Last Rate Last Dose     acetaminophen (TYLENOL) Suppository 650 mg  650 mg Rectal Q4H PRN         acetaminophen (TYLENOL) tablet 650 mg  650 mg Oral Q4H PRN         amLODIPine (NORVASC) tablet 5 mg  5 mg Oral Daily   5 mg at 10/11/18 0928     calcium carbonate 500 mg-vitamin D 200 units (OSCAL with D;OYSTER SHELL CALCIUM) per tablet 1 tablet  1 tablet Oral Daily   1 tablet at 10/11/18 0928     dabigatran ANTICOAGULANT (PRADAXA) capsule 150 mg  150 mg Oral BID   150 mg at 10/11/18 0928     hydrochlorothiazide (HYDRODIURIL) tablet 25 mg  25 mg Oral Daily   25 mg at 10/11/18 0928     lactated ringers infusion   Intravenous Continuous 100 mL/hr at 10/11/18 0632       melatonin tablet 1 mg  1 mg Oral At Bedtime PRN         naloxone (NARCAN) injection 0.1-0.4 mg  0.1-0.4 mg Intravenous Q2 Min PRN         ondansetron (ZOFRAN-ODT) ODT tab 4 mg  4 mg Oral Q6H PRN        Or     ondansetron (ZOFRAN) injection 4 mg  4 mg Intravenous Q6H PRN         pantoprazole (PROTONIX) EC tablet 40 mg  40 mg Oral Daily   40 mg at 10/11/18 0928     Patient is already receiving anticoagulation with heparin, enoxaparin (LOVENOX), warfarin (COUMADIN)  or other anticoagulant medication   Does not apply Continuous PRN         senna-docusate (SENOKOT-S;PERICOLACE) 8.6-50 MG per tablet 1 tablet  1 tablet Oral BID PRN        Or     senna-docusate (SENOKOT-S;PERICOLACE) 8.6-50 MG per tablet 2 tablet  2 tablet Oral BID PRN         simvastatin (ZOCOR) tablet 5 mg  5 mg Oral At Bedtime         sodium chloride (PF) 0.9% PF flush 3  mL  3 mL Intracatheter Q1H PRN         sodium chloride (PF) 0.9% PF flush 3 mL  3 mL Intracatheter Q8H         No current Epic-ordered outpatient prescriptions on file.             Review of Systems:   The 10 point Review of Systems is negative other than noted in the HPI.            Data:[BU1.1]   Data   Results for orders placed or performed during the hospital encounter of 10/11/18 (from the past 24 hour(s))   EKG 12-lead, tracing only   Result Value Ref Range    Interpretation ECG Click View Image link to view waveform and result    CBC with platelets differential   Result Value Ref Range    WBC 15.5 (H) 4.0 - 11.0 10e9/L    RBC Count 4.37 (L) 4.4 - 5.9 10e12/L    Hemoglobin 15.1 13.3 - 17.7 g/dL    Hematocrit 42.2 40.0 - 53.0 %    MCV 97 78 - 100 fl    MCH 34.6 (H) 26.5 - 33.0 pg    MCHC 35.8 31.5 - 36.5 g/dL    RDW 13.1 10.0 - 15.0 %    Platelet Count 173 150 - 450 10e9/L    Diff Method Automated Method     % Neutrophils 75.0 %    % Lymphocytes 13.4 %    % Monocytes 8.9 %    % Eosinophils 2.1 %    % Basophils 0.1 %    % Immature Granulocytes 0.5 %    Nucleated RBCs 0 0 /100    Absolute Neutrophil 11.6 (H) 1.6 - 8.3 10e9/L    Absolute Lymphocytes 2.1 0.8 - 5.3 10e9/L    Absolute Monocytes 1.4 (H) 0.0 - 1.3 10e9/L    Absolute Eosinophils 0.3 0.0 - 0.7 10e9/L    Absolute Basophils 0.0 0.0 - 0.2 10e9/L    Abs Immature Granulocytes 0.1 0 - 0.4 10e9/L    Absolute Nucleated RBC 0.0    Comprehensive metabolic panel   Result Value Ref Range    Sodium 135 133 - 144 mmol/L    Potassium 3.5 3.4 - 5.3 mmol/L    Chloride 98 94 - 109 mmol/L    Carbon Dioxide 25 20 - 32 mmol/L    Anion Gap 12 3 - 14 mmol/L    Glucose 104 (H) 70 - 99 mg/dL    Urea Nitrogen 27 7 - 30 mg/dL    Creatinine 1.04 0.66 - 1.25 mg/dL    GFR Estimate 68 >60 mL/min/1.7m2    GFR Estimate If Black 83 >60 mL/min/1.7m2    Calcium 9.0 8.5 - 10.1 mg/dL    Bilirubin Total 1.2 0.2 - 1.3 mg/dL    Albumin 3.3 (L) 3.4 - 5.0 g/dL    Protein Total 7.2 6.8 - 8.8 g/dL     Alkaline Phosphatase 68 40 - 150 U/L    ALT 21 0 - 70 U/L    AST 20 0 - 45 U/L   Lipase   Result Value Ref Range    Lipase 154 73 - 393 U/L   Lactic acid whole blood   Result Value Ref Range    Lactic Acid 1.5 0.7 - 2.0 mmol/L   Troponin I   Result Value Ref Range    Troponin I ES 0.057 (H) 0.000 - 0.045 ug/L   Nt probnp inpatient (BNP)   Result Value Ref Range    N-Terminal Pro BNP Inpatient 486 0 - 1800 pg/mL   CK total   Result Value Ref Range    CK Total 40 30 - 300 U/L   XR Chest 2 Views    Narrative    XR CHEST 2 VW  10/11/2018 3:07 AM     INDICATION: Weakness, shortness of breath.    COMPARISON: CT 9/28/2018. Chest x-ray 10/23/2017.      Impression    IMPRESSION: Borderline prominent heart size. No focal air-space  disease or other acute findings. Right chest wall port with catheter  tip in the SVC. Old right rib fractures.    LEBRON LANZA MD   CT Head w/o Contrast    Narrative    CT HEAD W/O CONTRAST  10/11/2018 3:21 AM     HISTORY: Fall, weakness.     TECHNIQUE: Axial images of the head and coronal reformations without  IV contrast material. Radiation dose for this scan was reduced using  automated exposure control, adjustment of the mA and/or kV according  to patient size, or iterative reconstruction technique.    COMPARISON: 10/23/2017.    FINDINGS: No intracranial hemorrhage, mass or mass effect. Low  attenuation areas are present in white matter of the cerebral  hemispheres that are nonspecific but consistent with chronic small  vessel ischemic changes in this age patient. No acute infarct  identified. No shift of midline structures. No skull fractures.      Impression    IMPRESSION: No acute intracranial abnormality.    LEBRON LANZA MD   CT Chest Pulmonary Embolism w Contrast   Result Value Ref Range    Radiologist flags Pulmonary embolism (AA)     Narrative    CT CHEST PULMONARY EMBOLISM W CONTRAST  10/11/2018 4:13 AM     HISTORY: Dyspnea.    TECHNIQUE: Volumetric acquisition of the chest  after the  administration of 59 mL Isovue-370 IV contrast. Radiation dose for  this scan was reduced using automated exposure control, adjustment of  the mA and/or kV according to patient size, or iterative  reconstruction technique.     COMPARISON: CT 9/28/2018.    FINDINGS: Study is positive for a small pulmonary embolus in the right  upper lung anteriorly (series 5, image 75). No acute infiltrates or  pleural effusions. Emphysema. 2.5 cm right thyroid nodule as seen  previously. Right chest wall port with catheter tip in the SVC.  Coronary artery calcifications. Mediastinal adenopathy, slightly more  prominent than on the previous study. Distal esophageal mass is again  seen. A prominent left lower mediastinal lymph node in the  paraesophageal region has increased in size measuring 2.3 x 2.4 cm  (previously 1.5 x 2.1 cm). A right retrocrural lymph node is more  prominent. Enlarged lymph nodes in the gastrohepatic ligament region  are slightly more prominent. Renal and liver cysts.      Impression    IMPRESSION:  1. Study is positive for right-sided pulmonary embolus.  2. Distal esophageal mass consistent with known cancer is again seen.  Mediastinal and upper abdominal adenopathy have slightly increased.  3. Emphysema.    [Critical Result: Pulmonary embolism]    Finding was identified on 10/11/2018 4:21 AM.     Dr. Walker was contacted by me on 10/11/2018 4:29 AM and  verbalized understanding of the critical result.    LEBRON LANZA MD   UA with Microscopic   Result Value Ref Range    Color Urine Yellow     Appearance Urine Clear     Glucose Urine Negative NEG^Negative mg/dL    Bilirubin Urine Negative NEG^Negative    Ketones Urine 40 (A) NEG^Negative mg/dL    Specific Gravity Urine 1.028 1.003 - 1.035    Blood Urine Negative NEG^Negative    pH Urine 6.5 5.0 - 7.0 pH    Protein Albumin Urine 10 (A) NEG^Negative mg/dL    Urobilinogen mg/dL 8.0 (H) 0.0 - 2.0 mg/dL    Nitrite Urine Negative NEG^Negative     "Leukocyte Esterase Urine Negative NEG^Negative    Source Midstream Urine     WBC Urine 1 0 - 5 /HPF    RBC Urine <1 0 - 2 /HPF    Mucous Urine Present (A) NEG^Negative /LPF[BU1.3]            Revision History        User Key Date/Time User Provider Type Action    > BU1.2 10/11/2018 10:25 AM Efrem Sifuentes APRN CNP Nurse Practitioner Sign     BU1.3 10/11/2018  9:41 AM Efrem Sifuentes APRN CNP Nurse Practitioner      BU1.1 10/11/2018  9:40 AM Efrem Sifuentes APRN CNP Nurse Practitioner             Consults by Selina Reed RD, LD at 10/11/2018 11:13 AM     Author:  Selina Reed RD, LD Service:  Nutrition Author Type:  Registered Dietitian    Filed:  10/11/2018 11:58 AM Date of Service:  10/11/2018 11:13 AM Creation Time:  10/11/2018 11:05 AM    Status:  Signed :  Selina Reed RD, LD (Registered Dietitian)         CLINICAL NUTRITION SERVICES  -  ASSESSMENT NOTE      Recommendations Ordered by Registered Dietitian (RD):[SJ1.1]     Wife very concerned about TF process.  She does not want pt to be hooked up to a feeding pump - \"he is eating and mobile during the day and is an active sleeper.\"  She feels bolus feedings would be better.  As pt is still eating, will plan to have TF provide ~60% est needs  Isosource 1.5 - 3 cans per day (given via syringe bolus over 30 minutes) = 1125 cals, 51 gm pro, 11 gm fiber, 570 mL free water  Flush G-tube with 60 mL water before and after each feeding    Will plan to give 1 can at 9am - 2pm - 7pm (given over 30 minutes)  Start today with only 1/2 can (125 mL) feedings, and work toward full can feedings tomorrow[SJ1.2]  .     Malnutrition:[SJ1.1] (10/11)[SJ1.2]  % Weight Loss:  > 5% in 1 month (severe malnutrition)  % Intake:[SJ1.1]  <75% for >/= 1 month (non-severe malnutrition)[SJ1.2]  Subcutaneous Fat Loss:[SJ1.1]  None observed[SJ1.2]  Muscle Loss:[SJ1.1]  Anterior thigh region  - moderate depletion and Posterior calf region  - moderate " "depletion[SJ1.2]  Fluid Retention:  None noted    Malnutrition Diagnosis:[SJ1.1] Severe malnutrition[SJ1.2]  In Context of:[SJ1.1]  Chronic illness or disease (GE junction CA)[SJ1.2]          REASON FOR ASSESSMENT  Deepak Arndt is a 83 year old male seen by Registered Dietitian for Provider Order - \"Esophageal cancer. Weight loss. G tube. Help with initiation of tube feeding.\"      NUTRITION HISTORY  Chart reviewed  8/28/18: pt presented to clinic visit - \"1-2 months of acid in stomach, happens every day, sharp burning pain sour taste after eating. \"  9/7/18: returned to clinic - \"Tried Omeorazole since 8/28/18 20 mg in am and did not help. Has been avoiding tomatoes, spicy foods, vinegar. Weight loss due to not eating. 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  9/28/18: CT chest/abd/pelvis indicating paraesophageal adenopathy and upper abdominal adenopathy compatible with metastatic disease.[SJ1.1]     Visited with pt's wife (pt sleeping soundly under the covers)  \"He has been very hungry, but just not able to eat/swallow well\"  Wife notes that she has been using the  and feeding pt very soft/smooth foods  Soups, cottage cheese, Greek yogurt, canned meats mixed with soup, fruits, veggies, milk, apple juice  Pt has been taking some Ensure - \"not always a bottle a day\"    Wife states she was not aware pt was going to be using the FT right now - \"I thought it was for the future, no one has talked to me about that\"  She is very concerned about managing the TF at home - \"will someone be available to help me?\"[SJ1.2]        CURRENT NUTRITION ORDERS  Diet Order:     Mechanical Soft  Room Service with Assist[SJ1.1]    Wife ordered pt lunch - cream soup, applesauce, 1% milk, cottage cheese, pudding[SJ1.2]      PHYSICAL FINDINGS  Observed[SJ1.1]  Muscle Wasting  - legs, arms (felt through the blankets)[SJ1.2]  Obtained from Chart/Interdisciplinary Team  10/8/18: 14 Welsh G-tube placed  Pt scheduled " "to begin chemo and RT      ANTHROPOMETRICS  Height: 5' 8\"  Weight:(10/11) 62.6 kg / 138 lbs .13 oz  Body mass index is 20.98 kg/(m^2).  Weight Status:  Normal BMI  IBW: 70 kg  % IBW: 89%  Weight History: Wt is down ~14# (9%) over the past month[SJ1.1].  Wife has noticed muscle and energy loss.[SJ1.2]    Wt Readings from Last 10 Encounters:   10/11/18 62.6 kg (138 lb 0.1 oz)   10/02/18 67.1 kg (148 lb)   09/24/18 67.1 kg (148 lb)   09/21/18 66.1 kg (145 lb 12.8 oz)   09/19/18 72.6 kg (160 lb)   09/07/18 68.9 kg (152 lb)   08/28/18 71.2 kg (157 lb)   03/02/18 71.4 kg (157 lb 6.4 oz)   02/08/18 69.6 kg (153 lb 6.4 oz)   12/11/17 70.4 kg (155 lb 1.6 oz)[SJ1.3]         LABS  Labs reviewed    MEDICATIONS  IVF (LR) at 100 mL/hr      ASSESSED NUTRITION NEEDS PER APPROVED PRACTICE GUIDELINES:    Dosing Weight (10/11) 62.6 kg  Estimated Energy Needs: 3147-7097 kcals (25-30 Kcal/Kg)  Justification: maintenance  Estimated Protein Needs: 75-95 grams protein (1.2-1.5 g pro/Kg)  Justification: preservation of lean body mass  Estimated Fluid Needs: 5595-8747  mL (1 mL/Kcal)  Justification: maintenance    MALNUTRITION:  % Weight Loss:  > 5% in 1 month (severe malnutrition)  % Intake:[SJ1.1]  <75% for >/= 1 month (non-severe malnutrition)[SJ1.2]  Subcutaneous Fat Loss:[SJ1.1]  None observed[SJ1.2]  Muscle Loss:[SJ1.1]  Anterior thigh region  - moderate depletion and Posterior calf region  - moderate depletion[SJ1.2]  Fluid Retention:  None noted    Malnutrition Diagnosis:[SJ1.1] Severe malnutrition[SJ1.2]  In Context of:[SJ1.1]  Chronic illness or disease (GE junction CA)[SJ1.2]    NUTRITION DIAGNOSIS:[SJ1.1]  Inadequate oral intake[SJ1.2] related to[SJ1.1] difficulty swallowing with new dx GE junction CA[SJ1.2] as evidenced by[SJ1.1] pt with 9% wt loss past month[SJ1.2]      NUTRITION INTERVENTIONS  Recommendations / Nutrition Prescription[SJ1.1]  Mechanical soft diet    Wife very concerned about TF process.  She does not want pt to " "be hooked up to a feeding pump - \"he is eating and mobile during the day and is an active sleeper.\"  She feels bolus feedings would be better.  As pt is still eating, will plan to have TF provide ~60% est needs  Isosource 1.5 - 3 cans per day (given via syringe bolus over 30 minutes) = 1125 cals, 51 gm pro, 11 gm fiber, 570 mL free water  Flush G-tube with 60 mL water before and after each feeding    Will plan to give 1 can at 9am - 2pm - 7pm (given over 30 minutes)  Start today with only 1/2 can (125 mL) feedings, and work toward full can feedings tomorrow[SJ1.2]  .      Implementation  Nutrition education[SJ1.1] ---> Spent time talking to wife about TF plans.  She has many questions.  Pt will need home care set up at discharge.  Pt will also need to be followed by either the home care dietitian or the Hem/Onc clinic dietitian for continued assessment.[SJ1.2]  EN Composition and EN Schedule ---> TF orders entered in EPIC[SJ1.4]  .      Nutrition Goals[SJ1.1]  EN to meet 60% est needs while pt still taking po[SJ1.2]  .      MONITORING AND EVALUATION:[SJ1.1]  Progress towards goals will be monitored and evaluated per protocol and Practice Guidelines[SJ1.2]                   Revision History        User Key Date/Time User Provider Type Action    > SJ1.4 10/11/2018 11:58 AM Selina Reed RD, LD Registered Dietitian Sign     SJ1.2 10/11/2018 11:32 AM Selina Reed RD, LD Registered Dietitian      SJ1.3 10/11/2018 11:07 AM Selina Reed RD, LD Registered Dietitian      SJ1.1 10/11/2018 11:05 AM Selina Reed RD, LD Registered Dietitian                      Progress Notes - Physician (Notes from 10/17/18 through 10/20/18)      Progress Notes by Dot Angeles MD at 10/20/2018  7:51 AM     Author:  Dot Angeles MD Service:  Hem/Onc Author Type:  Physician    Filed:  10/20/2018  7:53 AM Date of Service:  10/20/2018  7:51 AM Creation Time:  10/20/2018  7:51 AM    Status:  Signed :  " Dot Angeles MD (Physician)         Chart reviewed.  Patient anticipated to discharge to [place]. To follow-up with Dr. Bolden ( Oncology) for weekly paclitaxel and carboplatin [facility].  No new recommendations.[JH1.1]     Revision History        User Key Date/Time User Provider Type Action    > JH1.1 10/20/2018  7:53 AM Dot Angeles MD Physician Sign            Progress Notes by Kwan Benoit MD at 10/19/2018  1:15 PM     Author:  Kwan Benoit MD Service:  Hospitalist Author Type:  Physician    Filed:  10/19/2018  1:19 PM Date of Service:  10/19/2018  1:15 PM Creation Time:  10/19/2018  1:15 PM    Status:  Signed :  Kwan Benoit MD (Physician)         [facility]  Hospitalist Progress Note   10/19/2018          Assessment and Plan:       Deepak Arndt is a 83 year old male  admitted on 10/11/2018. He has a history of recently diagnosed pancreatic CA s/p gastrostomy tube, CAD, HTN and is admitted for weakness, failure to thrive and small pulmonary embolism.      Adult failure to thrive  Physical deconditioning from acute illness/malignancy/senile fraility  Mechanical fall at home due to physical deconditioning  Possible mild cognitive impairment/dementia at baseline.  Severe malnutrition in the context of chronic illness  Patient has been having ongoing generalized weakness, fell out of bed on the night of admission and unable to get back up.   CT head no acute intracranial pathology.   G-tube tube placed 10/15.   Appreciate nutrition team assistance with tube feeding.  Free water through feeding tube.  Dietary supplements with Ensure  Speech therapy has concerns for aspiration and recommend n.p.o. with pleasure foods and thin liquids.   [facility] recommend - Okay for soft foods and liquids as long as not having N/V, pain or cough.   Pt getting most of his nutrition through the G-tube, takes some pills orally and eats for pleausure  Physical therapy,  occupational therapy -recommend TCU.  Not safe to go home.    Discussed with care coordinator on floor for assistance with transition of care -accepted at [facility] pending bed availability      Pulmonary embolism in the setting of malignancy/anticoagulation on hold for procedure.  Pt was off Pradaxa 150 mg BID from 10/5-10/10 for G-Tube/Port placement.    CT chest done in the emergency room positive for right-sided pulmonary embolus and noted emphysema. Distal esophageal mass consistent with known cancer is again seen. Mediastinal and upper abdominal adenopathy have slightly increased.  PTA dabigatran switched to low molecular weight heparin (10/16) for hypercoagulation of malignancy/swallowing difficulty.  Aggressive incentive spirometry.      Reactive leukocytosis likely from malignancy. Improved  Lactic acidosis likely from dehydration and poor oral intake.  WBC count stable/improved, lactate of 2.1 >1.3 UA -no signs of infection.   Chest x-ray right chest wall port with catheter tip in SVC.  No acute pathology.   Blood cultures no growth..  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    Had a G-tube and port placed on 10/5/2018.  Weekly paclitaxel and carboplatinum -started 10/16.  Dr. Peter of radiation oncology - will consider radiation therapy in the future  [facility] following - Patient will be going to TCU.  Because of insurance reason, he cannot get chemotherapy at Dr. Mcleod's office ([facility]) while he is in the TCU.  office will arrange for continuation of chemotherapy at [facility].   Appreciate [facility] [facility] comanagement.    Atrial fibrillation with slow ventricular response.  He is on chronic therapy with propranolol which is on hold since admission.  Cardiology / EP recommend to continue to hold beta-blocker.  Holter monitor at discharge.  CHADS-Vasc of 4.   PTA dabigatran switch to low molecular weight heparin (10/16) for hypercoagulation of malignancy/swallowing difficulty.      Abnormal thyroid function test.  TSH 0.27, T4 1.52. Patient having bradycardia hence will hold off treatment for hyperthyroidism.  Monitor thyroid function tests in 4-6 weeks.      Coronary artery disease  History of MI.  Hyperlipidemia  Hypertension  Continue PTA amlodipine 5 mg oral daily, simvastatin 5 mg.  Holding PTA hydrochlorothiazide 25 mg - consider restarting as needed      Essential tremor   Hold PTA propranolol given bradycardia      Gastroesophageal reflux disease.  Continue PTA Protonix 40 mg oral daily.      Osteoporosis.  Follow-up as outpatient.     Contact dermatitis: on back improved   hydroxyzine prn    Active Diet Order      Advance Diet as Tolerated      Dysphagia Diet Level 1 Pureed Thin Liquids (water, ice chips, juice, milk gelatin, ice cream, etc)    Adult Formula Bolus Feeding: QID Isosource 1.5; Route: Gastrostomy; 4; Can(s); Medication - Tube Feeding Flush Frequency: At least 15-30 mL water before and after medication administration and with tube clogging;  DVT Prophylaxis:  Sequential compression device. Pradaxa  Code Status: Full Code  Disposition: Expected discharge pending bed availability at [facility].  Discharge orders completed.    Patient, interdisciplinary team involved in care and agrees with plan.  Total time - 15 min. More than 50% of time spent in direct patient care, care coordination, patient counseling, and formalizing plan of care.     Kwan Benoit MD        Interval History:      Patient sitting up in the chair.  Denies any chest pain or shortness of breath.  Able to answer simple commands  Minimal oral intake.       Physical Exam:        Physical Exam   Temp:  [96.7  F (35.9  C)-98  F (36.7  C)] 98  F (36.7  C)  Heart Rate:  [70-71] 71  Resp:  [16-18] 16  BP: (144-150)/(54-69) 150/54  SpO2:   [95 %-98 %] 95 %    Intake/Output Summary (Last 24 hours) at 10/17/18 1534  Last data filed at 10/17/18 1300   Gross per 24 hour   Intake             1210 ml   Output             1075 ml   Net              135 ml     Admission Weight: 65.8 kg (145 lb)  Current Weight: 62.8 kg (138 lb 6.4 oz)    PHYSICAL EXAM  GENERAL: Patient is in no distress. Alert and able to answer simple commands.  Appears chronically ill.    HEART: Regular rate and rhythm. S1S2. No murmurs  LUNGS: Bilateral slightly decreased breath sounds, no wheezing no crackles.  ABDOMEN: Soft, no abdominal tenderness, bowel sounds heard   NEURO: Moving all extremities.  EXTREMITIES: No pedal edema. 2+ peripheral pulses.  SKIN: Warm, dry.  Rash over the back improved  PSYCHIATRY Cooperative       Medications:          alteplase  2 mg Intravenous Once     amLODIPine  5 mg Oral Daily     calcium carbonate 500 mg-vitamin D 200 units  1 tablet Oral Daily     diphenhydrAMINE  50 mg Oral Once     enoxaparin  1 mg/kg Subcutaneous Q12H     heparin  5 mL Intracatheter Q28 Days     heparin lock flush  5-10 mL Intracatheter Q24H     pantoprazole  40 mg Per G Tube QAM     simvastatin  5 mg Oral At Bedtime     acetaminophen, acetaminophen, albuterol, albuterol, IV fluid REPLACEMENT ONLY, diphenhydrAMINE, EPINEPHrine, heparin lock flush, hydrOXYzine, lidocaine 4%, LORazepam, - MEDICATION INSTRUCTIONS -, melatonin, meperidine, methylPREDNISolone, mineral oil-white petrolatum, naloxone, ondansetron **OR** ondansetron, - MEDICATION INSTRUCTIONS -, potassium phosphate (KPHOS) in D5W IV, potassium phosphate (KPHOS) in D5W IV, potassium phosphate (KPHOS) in D5W IV, potassium phosphate (KPHOS) in D5W IV, senna-docusate **OR** senna-docusate, sodium chloride (PF), sodium chloride (PF), sodium chloride         Data:      All new lab and imaging data was reviewed.[SN1.1]                  Revision History        User Key Date/Time User Provider Type Action    > SN1.1 10/19/2018   "1:19 PM Kwan Benoit MD Physician Sign            Progress Notes by Janel Mcintosh RD, LD at 10/19/2018 11:28 AM     Author:  Janel Mcintosh RD, LD Service:  Nutrition Author Type:  Registered Dietitian    Filed:  10/19/2018 11:37 AM Date of Service:  10/19/2018 11:28 AM Creation Time:  10/19/2018 11:28 AM    Status:  Signed :  Janel Mcintosh RD, LD (Registered Dietitian)         CALORIE COUNT      Approximate Oral Intake for:     10/18/18  Calories: 250 Kcal   Protein: 7 g[AP1.1]     Diet: Dysphagia diet level 1[AP1.2]     Intake from TF/PN:       Isosource 1.5 - 4 cans per day, providing 1500 kcals, 68 gm pro, 15 gm fiber, 750 mL free water  Flush G-tube with 120 mL water before and after each feeding    Estimated Needs:    Calories: 3196-0713 (25-30)  Protein: 75-95 (1.2-1.5)  Dosing wt: 62.6 kg       Summary:   Calorie Counts:  10/16 - 124 kcal, 1 g protein  10/17 - 1164 kcal;   37 g protein  10/18 - 250 kcal; 7 g protein   - 3 day average PO intake, 513 kcal and 15 g protein, meeting 32% of energy and 20% of protein needs     New Findings:[AP1.1]  10/19:[AP1.2] Per SW: \" Plan is for patient to discharge on 10/20 at 11:00 AM\"[AP1.1]    Janel Mcintosh RD, LD[AP1.2]     Revision History        User Key Date/Time User Provider Type Action    > AP1.2 10/19/2018 11:37 AM Janel Mcintosh RD, LD Registered Dietitian Sign     AP1.1 10/19/2018 11:28 AM Janel Mcintosh RD, LD Registered Dietitian             Progress Notes by Jerica Smiley RN at 10/19/2018 10:33 AM     Author:  Jerica Smiley RN Service:  Care Coordinator Author Type:      Filed:  10/19/2018 10:49 AM Date of Service:  10/19/2018 10:33 AM Creation Time:  10/19/2018 10:33 AM    Status:  Addendum :  Jerica Smiley RN ()         Per Oncology, pt is cleared for discharge.  Pt is discharging to [facility] room 2114 tomorrow at 11AM, as they do not have a bed available today.  Pt's wife will " come at 10:30 to  pt tomorrow.  Pt aware.  SW[VW1.1],bedside nurse, and[VW1.2] charge nurse notified.[VW1.1]      Revision History        User Key Date/Time User Provider Type Action    > VW1.2 10/19/2018 10:49 AM Jerica Smiley RN Case Manager Addend     VW1.1 10/19/2018 10:43 AM Jerica Smiley RN Case Manager Sign            Progress Notes by Dennis Ivan LSW at 10/19/2018 10:38 AM     Author:  Dennis Ivan LSW Service:  Social Work Author Type:      Filed:  10/19/2018 10:39 AM Date of Service:  10/19/2018 10:38 AM Creation Time:  10/19/2018 10:38 AM    Status:  Signed :  Dennis Ivan LSW ()         RITESH Note:    D/I:  Patient has been accepted at [facility].  Plan is for patient to discharge on 10/20 at 11:00 AM.      P:  RITESH will continue to assist with discharge.    VIRGIE Steel, OMAYRA[AH1.1]         Revision History        User Key Date/Time User Provider Type Action    > AH1.1 10/19/2018 10:39 AM Dennis Ivan LSW  Sign            Progress Notes by Efrem Sifuentes APRN CNP at 10/19/2018  9:49 AM     Author:  Efrem Sifuentes APRN CNP Service:  Hem/Onc Author Type:  Nurse Practitioner    Filed:  10/19/2018  9:50 AM Date of Service:  10/19/2018  9:49 AM Creation Time:  10/19/2018  9:49 AM    Status:  Signed :  Efrem Sifuentes APRN CNP (Nurse Practitioner)         Mr. Arndt is okay to discharge to TCU from Oncology perspective. He will follow up with Dr. Bolden for chemotherapy next week.    Efrem MASSEY CNP[BU1.1]     Revision History        User Key Date/Time User Provider Type Action    > BU1.1 10/19/2018  9:50 AM Efrem Sifuentes APRN CNP Nurse Practitioner Sign            Progress Notes by Donnell Mcleod MD at 10/19/2018  8:20 AM     Author:  Donnell Mcleod MD Service:  Oncology Author Type:  Physician    Filed:  10/19/2018  8:27 AM Date of Service:  10/19/2018  8:20 AM Creation Time:  10/19/2018   8:20 AM    Status:  Signed :  Donnell Mcleod MD (Physician)         [facility] Progress Note          Assessment and Plan:   Mr. Deepak Arndt is an 83 year old man with a recent diagnosis of adenocarcinoma of the esophagus who was admitted on 10/11/18 with weakness      1. Esophageal cancer  - presented with dysphagia with weight loss  - EGD 9/24/2018 shows distal esophageal mass  - Biopsy positive for poorly differentiated carcinoma with adeno and squamous differentiation. HER2 was not amplified.  - CT chest, abdomen and pelvis 9/28/2018 demonstrated thickening of the distal esophageal wall extending to the GE junction.  Paraesophageal adenopathy along with upper abdominal adenopathy was noted compatible with metastatic disease.  - EUS 10/2/2018 showed complete obstruction of lower third of esophagus with malignant appearing lymph nodes in the subcarinal region and aortopulmonary region.   - Staged as T3N2MX  - G tube and Port placed 10/5/2018  - PET/CT 10/9/2018 showing hypermetabolic lymphadenopathy present in the superior, middle, and posterior mediastinum, the retroperitoneum, and along the gastrohepatic ligament. 7 mm lung lesion is too small to characterize. This might upstage cancer to stage IV disease.  - Radiation Oncology consulted with plans to give weekly paclitaxel/carboplatin with XRT. This was initially planned for neoadjuvant with possible surgery  - treatment options discussed at length with patient and his wife and also reviewed with Dr. Peter  - Initiation of paclitaxel and carboplatin 10/16. This will be weekly treatment, next due 10/23.   - No side effects from chemotherapy  - Consider adding XRT in the future  - Okay for soft foods and liquids as long as not having N/V, pain or cough  - Tolerating tube feedings  - SW looking at discharge to [place] with oncology care through [facility] until discharge to home.       2. Afib/PE  - Has been on dabigatran, but was off  "for procedures on 10/5  - CT chest with contrast 10/11 shows small PE in the right upper lung anteriorly  - Dabigatran resumed  - Cardiology consulted with bradycardia  - Difficultly with swallowing dabigatran whole (capsule that can't be opened)  - Start LMWH 1 mg/kg every 12 hours. Monitor blood counts and renal function.      3. Weakness  - Likely from malignancy, poor intake and other comorbidities  - PT/OT recommending TCU  - He will continue enteral feeding per recommendations from nutrition services               Interval History:   Patient reports no new symptoms over night. No complaints of nausea or vomiting              Medications:       alteplase  2 mg Intravenous Once     amLODIPine  5 mg Oral Daily     calcium carbonate 500 mg-vitamin D 200 units  1 tablet Oral Daily     diphenhydrAMINE  50 mg Oral Once     enoxaparin  1 mg/kg Subcutaneous Q12H     heparin  5 mL Intracatheter Q28 Days     heparin lock flush  5-10 mL Intracatheter Q24H     pantoprazole  40 mg Per G Tube QAM     simvastatin  5 mg Oral At Bedtime     acetaminophen, acetaminophen, albuterol, albuterol, IV fluid REPLACEMENT ONLY, diphenhydrAMINE, EPINEPHrine, heparin lock flush, hydrOXYzine, lidocaine 4%, LORazepam, - MEDICATION INSTRUCTIONS -, melatonin, meperidine, methylPREDNISolone, mineral oil-white petrolatum, naloxone, ondansetron **OR** ondansetron, - MEDICATION INSTRUCTIONS -, potassium phosphate (KPHOS) in D5W IV, potassium phosphate (KPHOS) in D5W IV, potassium phosphate (KPHOS) in D5W IV, potassium phosphate (KPHOS) in D5W IV, senna-docusate **OR** senna-docusate, sodium chloride (PF), sodium chloride (PF), sodium chloride               Physical Exam:   Blood pressure 150/54, pulse 54, temperature 98  F (36.7  C), temperature source Oral, resp. rate 16, height 1.727 m (5' 7.99\"), weight 61.2 kg (135 lb), SpO2 95 %.  Wt Readings from Last 4 Encounters:   10/19/18 61.2 kg (135 lb)   10/02/18 67.1 kg (148 lb)   09/24/18 67.1 kg " (148 lb)   18 66.1 kg (145 lb 12.8 oz)         Vital Sign Ranges  Temperature Temp  Av.3  F (36.3  C)  Min: 96.3  F (35.7  C)  Max: 98  F (36.7  C)   Blood pressure Systolic (24hrs), Av , Min:110 , Max:150        Diastolic (24hrs), Av, Min:54, Max:85      Pulse No Data Recorded   Respirations Resp  Av.5  Min: 16  Max: 18   Pulse oximetry SpO2  Av %  Min: 95 %  Max: 98 %         Intake/Output Summary (Last 24 hours) at 10/19/18 0820  Last data filed at 10/19/18 0500   Gross per 24 hour   Intake             1480 ml   Output              400 ml   Net             1080 ml       Constitutional: Awake, alert, cooperative, no apparent distress   Lungs: Clear to auscultation bilaterally, no crackles or wheezing   Cardiovascular: Regular rate and rhythm, normal S1 and S2, and no murmur noted   Abdomen: Normal bowel sounds, soft, non-distended, non-tender. Liver edge and spleen tip not palpable   Skin: No rashes, no cyanosis, no upper or lower extremity edema   Other: Port non tender. Feeding tube non tender          Data:   Laboratory:  CMP  Recent Labs  Lab 10/16/18  0623 10/15/18  0915    135   POTASSIUM 4.0 3.5   CHLORIDE 108 103   CO2 22 27   ANIONGAP 7 5   * 114*   BUN 16 9   CR 0.61* 0.63*   GFRESTIMATED >90 >90   GFRESTBLACK >90 >90   TRIPP 8.6 8.8   MAG  --  2.2   PHOS 3.5 2.4*     CBC  Recent Labs  Lab 10/18/18  1445 10/17/18  0600 10/16/18  0623 10/13/18  0555   WBC  --  10.6 14.3* 11.3*   RBC  --  4.08* 4.18*  --    HGB  --  13.6 13.9  --    HCT  --  39.7* 40.7  --    MCV  --  97 97  --    MCH  --  33.3* 33.3*  --    MCHC  --  34.3 34.2  --    RDW  --  13.3 13.5  --     207 190  --      INRNo lab results found in last 7 days.    Imaging data:[SR1.1]  Results for orders placed or performed during the hospital encounter of 10/11/18   XR Chest 2 Views    Narrative    XR CHEST 2 VW  10/11/2018 3:07 AM     INDICATION: Weakness, shortness of breath.    COMPARISON: CT  9/28/2018. Chest x-ray 10/23/2017.      Impression    IMPRESSION: Borderline prominent heart size. No focal air-space  disease or other acute findings. Right chest wall port with catheter  tip in the SVC. Old right rib fractures.    LEBRON LANZA MD   CT Head w/o Contrast    Narrative    CT HEAD W/O CONTRAST  10/11/2018 3:21 AM     HISTORY: Fall, weakness.     TECHNIQUE: Axial images of the head and coronal reformations without  IV contrast material. Radiation dose for this scan was reduced using  automated exposure control, adjustment of the mA and/or kV according  to patient size, or iterative reconstruction technique.    COMPARISON: 10/23/2017.    FINDINGS: No intracranial hemorrhage, mass or mass effect. Low  attenuation areas are present in white matter of the cerebral  hemispheres that are nonspecific but consistent with chronic small  vessel ischemic changes in this age patient. No acute infarct  identified. No shift of midline structures. No skull fractures.      Impression    IMPRESSION: No acute intracranial abnormality.    LEBRON LANZA MD   CT Chest Pulmonary Embolism w Contrast     Value    Radiologist flags Pulmonary embolism (AA)    Narrative    CT CHEST PULMONARY EMBOLISM W CONTRAST  10/11/2018 4:13 AM     HISTORY: Dyspnea.    TECHNIQUE: Volumetric acquisition of the chest after the  administration of 59 mL Isovue-370 IV contrast. Radiation dose for  this scan was reduced using automated exposure control, adjustment of  the mA and/or kV according to patient size, or iterative  reconstruction technique.     COMPARISON: CT 9/28/2018.    FINDINGS: Study is positive for a small pulmonary embolus in the right  upper lung anteriorly (series 5, image 75). No acute infiltrates or  pleural effusions. Emphysema. 2.5 cm right thyroid nodule as seen  previously. Right chest wall port with catheter tip in the SVC.  Coronary artery calcifications. Mediastinal adenopathy, slightly more  prominent than on  the previous study. Distal esophageal mass is again  seen. A prominent left lower mediastinal lymph node in the  paraesophageal region has increased in size measuring 2.3 x 2.4 cm  (previously 1.5 x 2.1 cm). A right retrocrural lymph node is more  prominent. Enlarged lymph nodes in the gastrohepatic ligament region  are slightly more prominent. Renal and liver cysts.      Impression    IMPRESSION:  1. Study is positive for right-sided pulmonary embolus.  2. Distal esophageal mass consistent with known cancer is again seen.  Mediastinal and upper abdominal adenopathy have slightly increased.  3. Emphysema.    [Critical Result: Pulmonary embolism]    Finding was identified on 10/11/2018 4:21 AM.     Dr. Walker was contacted by me on 10/11/2018 4:29 AM and  verbalized understanding of the critical result.    LEBRON LANZA MD[SR1.2]         Donnell Mcleod MD[SR1.1]         Revision History        User Key Date/Time User Provider Type Action    > SR1.2 10/19/2018  8:27 AM Donnell Mcleod MD Physician Sign     SR1.1 10/19/2018  8:20 AM Donnell Mcleod MD Physician             Progress Notes by Jerica Smiley RN at 10/18/2018  2:49 PM     Author:  Jerica Smiley RN Service:  Care Coordinator Author Type:      Filed:  10/18/2018  3:17 PM Date of Service:  10/18/2018  2:49 PM Creation Time:  10/18/2018  2:49 PM    Status:  Addendum :  Jerica Smiley RN ()         I[VW1.1] met and[VW1.2] spoke wt[VW1.1] pt and[VW1.2] Farida (pt's wife) regarding d/c plan.  Caitlyn and pt are agreeable[VW1.1] for[VW1.2] pt[VW1.1] to d/c[VW1.2]to [facility] and[VW1.1] to be[VW1.2] followed at  Oncology while pt needs weekly IV chemo.[VW1.1]  An appt has been arranged for pt's chemo treatment on 10/23 at 7:30AM then Dr Bolden to see him at 8:30 AM[VW1.2]; pt and wife are aware[VW1.3].  Pt is medically sta[VW1.2]bl[VW1.4]e to d[VW1.2]/c[VW1.4] but per SW[VW1.2],[VW1.4] [facility] has no beds today.  D/C  pending  placement.[VW1.2]       Revision History        User Key Date/Time User Provider Type Action    > VW1.3 10/18/2018  3:17 PM Jerica Smiley RN Case Manager Addend     [N/A] 10/18/2018  3:10 PM Jerica Smiley RN Case Manager Addend     VW1.4 10/18/2018  3:10 PM Jerica Smiley RN Case Manager Sign     VW1.2 10/18/2018  3:07 PM Jerica Smiley RN Case Manager      VW1.1 10/18/2018  2:49 PM Jerica Smiley RN Case Manager             Progress Notes by Radha Lofton LSW at 10/18/2018 11:35 AM     Author:  Radha Lofton LSW Service:  Social Work Author Type:      Filed:  10/18/2018  2:59 PM Date of Service:  10/18/2018 11:35 AM Creation Time:  10/18/2018 11:35 AM    Status:  Addendum :  Radha Lofton LSW ()         D: RITESH following for discharge planning. Per discussion with care transition RNEdie, pt moved to [facility] for chemo.   I: RITESH spoke with Anu at [facility]. She can accept pt. Anticipate next open bed Friday[JJ1.1] or Sat[JJ1.2].   P: RITESH will follow.     VIRGIE Manzanares, [facility]  [id number][JJ1.1]       Revision History        User Key Date/Time User Provider Type Action    > JJ1.2 10/18/2018  2:59 PM Radha Lofton LSW  Addend     JJ1.1 10/18/2018 11:37 AM Radha Lofton LSW  Sign            Progress Notes by Kwan Benoit MD at 10/18/2018  9:11 AM     Author:  Kwan Benoit MD Service:  Hospitalist Author Type:  Physician    Filed:  10/18/2018  2:36 PM Date of Service:  10/18/2018  9:11 AM Creation Time:  10/18/2018  9:11 AM    Status:  Signed :  Kwan Benoit MD (Physician)         [facility]  Hospitalist Progress Note   10/18/2018          Assessment and Plan:       Deepak Arndt is a 83 year old male  admitted on 10/11/2018. He has a history of recently diagnosed pancreatic CA s/p gastrostomy tube, CAD,  H[SN1.1]TN[SN1.2] and is admitted for weakness, failure to thrive and small pulmonary embolism.      Adult failure to thrive  Physical deconditioning from acute illness/malignancy/senile fraility  Mechanical fall at home due to physical deconditioning  Possible mild cognitive impairment/dementia at baseline.  Severe malnutrition in the context of chronic illness  Patient has been having ongoing generalized weakness, fell out of bed on the night of admission and unable to get back up.   CT head no acute intracranial pathology.   G-tube tube placed 10/15.   Appreciate nutrition team assistance with tube feeding.  Free water through feeding tube.  Dietary supplements with Ensure  Speech therapy has concerns for aspiration and recommend n.p.o. with pleasure foods and thin liquids.[SN1.1]   [facility] recommend - Okay for soft foods and liquids as long as not having N/V, pain or cough[SN1.1].[SN1.2]   Pt getting most of his nutrition through the G-tube, takes some pills orally and eats for pleausure  Physical therapy, occupational therapy -recommend TCU.  Not safe to go home.    Discussed with care coordinator on floor for assistance with transition of care[SN1.1] -accepted at [facility] pending bed availability[SN1.2]  Care team has been having challenge with TCU given patient on chemotherapy.        Pulmonary embolism in the setting of malignancy/anticoagulation on hold for procedure.  Pt was off Pradaxa 150 mg BID from 10/5-10/10 for G-Tube/Port placement.    CT chest done in the emergency room positive for right-sided pulmonary embolus and noted Emphysema. Distal esophageal mass consistent with known cancer is again seen. Mediastinal and upper abdominal adenopathy have slightly increased.  PTA dabigatran switch[SN1.1]ed[SN1.2] to low molecular weight heparin (10/16) for hypercoagulation of malignancy/swallowing difficulty.  Aggressive incentive spirometry.      Reactive leukocytosis likely from malignancy.  Improved  Lactic acidosis likely from dehydration and poor oral intake.  WBC count stable/improved, lactate of 2.1 >1.3 UA -no signs of infection.   Chest x-ray right chest wall port with catheter tip in SVC.  No acute pathology.   Blood cultures no growth..  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    Had a G-tube and port placed on 10/5/2018.  Weekly paclitaxel and carboplatinum -started 10/16.  Dr. Peter of radiation oncology following to consider radiation therapy in the future  [facility] following[SN1.1], discussed with oncology team today.  [facility] (free standing clinic) consulted, as barriers to weekly chemotherapy infusion with [facility][SN1.2]-appreciate comanagement      Atrial fibrillation with slow ventricular response.  He is on chronic therapy with propranolol which is on hold since admission.  Cardiology / EP recommend to continue to hold beta-blocker. Holter monitor at discharge.  CHADS-Vasc of 4.   PTA dabigatran switch to low molecular weight heparin (10/16) for hypercoagulation of malignancy/swallowing difficulty.      Abnormal thyroid function test.  TSH 0.27, T4 1.52. Patient having bradycardia hence will hold off treatment for hyperthyroidism.  Monitor thyroid function tests in 4-6 weeks.      Coronary artery disease  History of MI.  Hyperlipidemia  Hypertension  Continue PTA amlodipine 5 mg oral daily, simvastatin 5 mg.[SN1.1]  H[SN1.2]olding PTA hydrochlorothiazide 25 mg - consider restarting as needed      Essential tremor   Hold PTA propranolol given bradycardia      Gastroesophageal reflux disease.  Continue PTA Protonix 40 mg oral daily.      Osteoporosis.  Follow-up as outpatient.     Contact dermatitis:[SN1.1] on back improved[SN1.2]   hydroxyzine prn    Active Diet Order      Advance Diet as Tolerated      Dysphagia Diet  Level 1 Pureed Thin Liquids (water, ice chips, juice, milk gelatin, ice cream, etc)    Adult Formula Bolus Feeding: QID Isosource 1.5; Route: Gastrostomy; 4; Can(s); Medication - Tube Feeding Flush Frequency: At least 15-30 mL water before and after medication administration and with tube clogging;  DVT Prophylaxis:  Sequential compression device. Pradaxa  Code Status: Full Code  Disposition: Expected discharge pendin[SN1.1]g bed availability at [facility].[SN1.2]    Patient, interdisciplinary team involved in care and agrees with plan.  Total time - 25 min. More than 50% of time spent in direct patient care, care coordination, patient counseling, and formalizing plan of care.     Kwan Benoit MD        Interval History:      Patient sitting up in the chair.  Denies any chest pain or shortness of breath.  Able to answer simple commands  Minimal oral intake.       Physical Exam:        Physical Exam   Temp:  [96.3  F (35.7  C)-97.1  F (36.2  C)] 96.3  F (35.7  C)  Heart Rate:  [50-56] 56  Resp:  [16] 16  BP: (110-136)/(60-85) 110/85  SpO2:  [95 %-98 %] 98 %    Intake/Output Summary (Last 24 hours) at 10/17/18 1534  Last data filed at 10/17/18 1300   Gross per 24 hour   Intake             1210 ml   Output             1075 ml   Net              135 ml     Admission Weight: 65.8 kg (145 lb)  Current Weight: 62.8 kg (138 lb 6.4 oz)    PHYSICAL EXAM  GENERAL: Patient is in no distress. Alert and able to answer simple commands.  Appears chronically ill.    HEART: Regular rate and rhythm. S1S2. No murmurs  LUNGS: Bilateral slightly decreased breath sounds, no wheezing no crackles.  ABDOMEN: Soft, no abdominal tenderness, bowel sounds heard   NEURO: Moving all extremities.  EXTREMITIES: No pedal edema. 2+ peripheral pulses.  SKIN: Warm, dry.  Rash over the back improved  PSYCHIATRY Cooperative       Medications:          amLODIPine  5 mg Oral Daily     calcium carbonate 500 mg-vitamin D 200 units  1 tablet Oral Daily      diphenhydrAMINE  50 mg Oral Once     enoxaparin  1 mg/kg Subcutaneous Q12H     heparin  5 mL Intracatheter Q28 Days     heparin lock flush  5-10 mL Intracatheter Q24H     pantoprazole  40 mg Per G Tube QAM     simvastatin  5 mg Oral At Bedtime     acetaminophen, acetaminophen, albuterol, albuterol, IV fluid REPLACEMENT ONLY, diphenhydrAMINE, EPINEPHrine, heparin lock flush, hydrOXYzine, lidocaine 4%, LORazepam, - MEDICATION INSTRUCTIONS -, melatonin, meperidine, methylPREDNISolone, mineral oil-white petrolatum, naloxone, ondansetron **OR** ondansetron, - MEDICATION INSTRUCTIONS -, potassium phosphate (KPHOS) in D5W IV, potassium phosphate (KPHOS) in D5W IV, potassium phosphate (KPHOS) in D5W IV, potassium phosphate (KPHOS) in D5W IV, senna-docusate **OR** senna-docusate, sodium chloride (PF), sodium chloride (PF), sodium chloride         Data:      All new lab and imaging data was reviewed.[SN1.1]                  Revision History        User Key Date/Time User Provider Type Action    > SN1.2 10/18/2018  2:36 PM Kwan Benoit MD Physician Sign     SN1.1 10/18/2018  9:11 AM Kwan Benoit MD Physician             Progress Notes by Jerica Smiley RN at 10/18/2018 10:48 AM     Author:  Jerica Smiley RN Service:  Care Coordinator Author Type:      Filed:  10/18/2018 10:53 AM Date of Service:  10/18/2018 10:48 AM Creation Time:  10/18/2018 10:48 AM    Status:  Signed :  Jerica Smiley RN ()         Per Efrem (NP at [facility]), pt can discharge to [place] and receive her weekly IV chemo (treatment (next treatment is 10/23) at [facility] and continue to follow with Dr Mcleod once pt is out of TCU.  I spoke wt Xiomara at [facility]([phone]) regarding the plan, and she will call me back with pt's schedule.[VW1.1]       Revision History        User Key Date/Time User Provider Type Action    > VW1.1 10/18/2018 10:53 AM Jerica Smiley, LUCAS Case Manager Sign             Progress Notes by Janel Mcintosh RD, LD at 10/18/2018 10:04 AM     Author:  Janel Mcintosh RD, LD Service:  Nutrition Author Type:  Registered Dietitian    Filed:  10/18/2018 10:33 AM Date of Service:  10/18/2018 10:04 AM Creation Time:  10/18/2018 10:04 AM    Status:  Signed :  Janel Mcintosh RD, LD (Registered Dietitian)         CLINICAL NUTRITION SERVICES - REASSESSMENT NOTE      Future/Additional Recommendations:   Once PO intake improves, consider decreasing enteral nutrition amount    Malnutrition: (10/11)  % Weight Loss:  > 5% in 1 month (severe malnutrition)  % Intake:  <75% for >/= 1 month (non-severe malnutrition)  Subcutaneous Fat Loss:  None observed  Muscle Loss:  Anterior thigh region  - moderate depletion and Posterior calf region  - moderate depletion  Fluid Retention:  None noted      Malnutrition Diagnosis: Severe malnutrition  In Context of:  Chronic illness or disease (GE junction CA)       EVALUATION OF PROGRESS TOWARD GOALS   Diet:  Dysphagia diet level 1 + Thin Liquids (10/15)     Nutrition Support:   10/8/18: G-tube placed  10/11/18: Bolus enteral nutrition initiated, 3 cans Isosource 1.5 daily  10/15/18: Increased bolus Isosource 1.5 to 4 can daily      Nutrition Support Enteral:  Type of Feeding Tube: G-tube  Enteral Frequency:  Continuous  Enteral Regimen: Isosource 1.5 4 cans per day (9am - 2pm - 6pm - 9pm)  Total Enteral Provisions: 1500 kcal, 68 gm protein, 15 gm Fiber, 750 mL free water  Free Water Flush: 120 mL water before and after each feeding      Calorie Count:  11/16: 124 kcal; 1g protein  11/17: 1164 kcal;   37g protein   -2 day average PO intake, 644 kcal and 19 g protein. PO intake meeting 40% of energy and 25% of protein needs.      Intake:   -Enteral nutrition meeting 93% of energy and 91% of protein needs   -Pt refused one can TF yesterday  -Per RN note, pt with fair appetite. Per RN flowsheet, pt consuming 50% of meals ordered   -BM x 4  (10/16/18)- loose stools      ASSESSED NUTRITION NEEDS:  Dosing Weight (10/11) 62.6 kg  Estimated Energy Needs: 2292-0294 kcals (25-30 Kcal/Kg)  Justification: maintenance  Estimated Protein Needs: 75-95 grams protein (1.2-1.5 g pro/Kg)  Justification: preservation of lean body mass      Previous Goals:   EN will provide >90% of assessed needs  Evaluation: Met    Previous Nutrition Diagnosis:   Inadequate oral intake related to difficulty swallowing with new dx GE junction CA as evidenced by pt with 9% wt loss past month and currently meeting <40% assessed needs orally.  Evaluation: Improving      CURRENT NUTRITION DIAGNOSIS  Inadequate oral intake related to difficulty swallowing and overall medical status as evidenced by reliance of enteral nutrition to meet needs and 2 day average PO intake meeting 40% of energy and 25% of protein needs.     INTERVENTIONS  Recommendations / Nutrition Prescription  Diet per MD and SLP  Continue Isosource 1.5, 4 cans daily     Implementation  None at this time    Goals  Average PO intake to meet at least 60% of energy and protein needs       MONITORING AND EVALUATION:  Progress towards goals will be monitored and evaluated per protocol and Practice Guidelines      Janel Mcintosh RD, LD[AP1.1]     Revision History        User Key Date/Time User Provider Type Action    > AP1.1 10/18/2018 10:33 AM Janel Mcintosh RD, LD Registered Dietitian Sign            Progress Notes by Efrem Sifuentes APRN CNP at 10/18/2018 10:15 AM     Author:  Efrem Sifuentes APRN CNP Service:  Hem/Onc Author Type:  Nurse Practitioner    Filed:  10/18/2018 10:24 AM Date of Service:  10/18/2018 10:15 AM Creation Time:  10/18/2018 10:15 AM    Status:  Signed :  Efrem Sifuentes APRN CNP (Nurse Practitioner)         [facility]ology Progress Note     Primary Oncologist/Hematologist:  Dr. Mcleod          Assessment and Plan:     Mr. Deepak Arndt is an 83 year old man with a  recent diagnosis of adenocarcinoma of the esophagus who was admitted on 10/11/18 with weakness      1. Esophageal cancer  - presented with dysphagia with weight loss  - EGD 9/24/2018 shows distal esophageal mass  - Biopsy positive for poorly differentiated carcinoma with adeno and squamous differentiation. HER2 was not amplified.  - CT chest, abdomen and pelvis 9/28/2018 demonstrated thickening of the distal esophageal wall extending to the GE junction.  Paraesophageal adenopathy along with upper abdominal adenopathy was noted compatible with metastatic disease.  - EUS 10/2/2018 showed complete obstruction of lower third of esophagus with malignant appearing lymph nodes in the subcarinal region and aortopulmonary region.   - Staged as T3N2MX  - G tube and Port placed 10/5/2018  - PET/CT 10/9/2018 showing hypermetabolic lymphadenopathy present in the superior, middle, and posterior mediastinum, the retroperitoneum, and along the gastrohepatic ligament. 7 mm lung lesion is too small to characterize. This might upstage cancer to stage IV disease.  - Radiation Oncology consulted with plans to give weekly paclitaxel/carboplatin with XRT. This was initially planned for neoadjuvant with possible surgery  - he is quite weak. Dr. Mcleod discussed at length with patient and his wife and also reviewed with Dr. Peter  - Initiation of paclitaxel and carboplatin 10/16. This will be weekly treatment, next due 10/23.   - No side effects from chemotherapy  - Consider adding XRT in the future  - Okay for soft foods and liquids as long as not having N/V, pain or cough  - Tolerating tube feedings  - SW looking at discharge to [place] with oncology care through [place] until discharge to home, then he can continue follow up with Dr. Mcleod.       2. Afib/PE  - Has been on dabigatran, but was off for procedures on 10/5  - CT chest with contrast 10/11 shows small PE in the right upper lung anteriorly  - Dabigatran resumed  - Cardiology  "consulted with bradycardia  - Difficultly with swallowing dabigatran whole (capsule that can't be opened)  - Start LMWH 1 mg/kg every 12 hours. Monitor blood counts and renal function.      3. Weakness  - Likely from malignancy, poor intake and other comorbidities  - PT/OT recommending TCU  - G tube in place. Consult dietician.  - Not safe to go home    Efrem MASSEY, CNP  [facility]  [phone]        Interval History:   Deepak is feeling well. No side effects of chemotherapy. TF going well.              Review of Systems:   The 5 point Review of Systems is negative other than noted in the HPI             Medications:   Scheduled Medications    amLODIPine  5 mg Oral Daily     calcium carbonate 500 mg-vitamin D 200 units  1 tablet Oral Daily     diphenhydrAMINE  50 mg Oral Once     enoxaparin  1 mg/kg Subcutaneous Q12H     heparin  5 mL Intracatheter Q28 Days     heparin lock flush  5-10 mL Intracatheter Q24H     pantoprazole  40 mg Per G Tube QAM     simvastatin  5 mg Oral At Bedtime     PRN Medications  acetaminophen, acetaminophen, albuterol, albuterol, IV fluid REPLACEMENT ONLY, diphenhydrAMINE, EPINEPHrine, heparin lock flush, hydrOXYzine, lidocaine 4%, LORazepam, - MEDICATION INSTRUCTIONS -, melatonin, meperidine, methylPREDNISolone, mineral oil-white petrolatum, naloxone, ondansetron **OR** ondansetron, - MEDICATION INSTRUCTIONS -, potassium phosphate (KPHOS) in D5W IV, potassium phosphate (KPHOS) in D5W IV, potassium phosphate (KPHOS) in D5W IV, potassium phosphate (KPHOS) in D5W IV, senna-docusate **OR** senna-docusate, sodium chloride (PF), sodium chloride (PF), sodium chloride               Physical Exam:   Vitals were reviewed  Blood pressure 110/85, pulse 54, temperature 96.3  F (35.7  C), temperature source Oral, resp. rate 16, height 1.727 m (5' 7.99\"), weight 62.7 kg (138 lb 3.2 oz), SpO2 98 %.  Wt Readings from Last 4 Encounters:   10/18/18 62.7 kg (138 lb 3.2 oz)   10/02/18 67.1 kg " (148 lb)   09/24/18 67.1 kg (148 lb)   09/21/18 66.1 kg (145 lb 12.8 oz)       I/O last 3 completed shifts:  In: 1750 [P.O.:720; NG/GT:780]  Out: 925 [Urine:925]      Constitutional: Awake, alert, cooperative, no apparent distress   Lungs: Clear to auscultation bilaterally, no crackles or wheezing   Cardiovascular: Bradycardia. Irregular rate and rhythm, normal S1 and S2, and no murmur noted   Abdomen: Normal bowel sounds, soft, non-distended, non-tender. G tube with scant drainage.   Skin: No rashes, no cyanosis, no edema   Other:               Data:   All laboratory data and imaging studies reviewed.    CMP  Recent Labs  Lab 10/16/18  0623 10/15/18  0915 10/12/18  0615 10/11/18  1040    135 136  --    POTASSIUM 4.0 3.5 3.6  --    CHLORIDE 108 103 101  --    CO2 22 27 25  --    ANIONGAP 7 5 10  --    * 114* 97  --    BUN 16 9 14  --    CR 0.61* 0.63* 0.78  --    GFRESTIMATED >90 >90 >90  --    GFRESTBLACK >90 >90 >90  --    TRIPP 8.6 8.8 8.7  --    MAG  --  2.2  --  2.2   PHOS 3.5 2.4*  --  3.2     CBC  Recent Labs  Lab 10/17/18  0600 10/16/18  0623 10/13/18  0555 10/12/18  0615   WBC 10.6 14.3* 11.3* 12.4*   RBC 4.08* 4.18*  --   --    HGB 13.6 13.9  --   --    HCT 39.7* 40.7  --   --    MCV 97 97  --   --    MCH 33.3* 33.3*  --   --    MCHC 34.3 34.2  --   --    RDW 13.3 13.5  --   --     190  --   --      INRNo lab results found in last 7 days.  Data   Results for orders placed or performed during the hospital encounter of 10/11/18 (from the past 24 hour(s))   Care Transition RN/SW IP Consult    Narrative    Tika Desai RN     10/17/2018  4:30 PM  Care Transition Initial Assessment - RN    Reason For Consult: discharge planning   Met with: Called spouse SHAZIA Brady phone [phone]  DATA   Active Problems:    Symptomatic bradycardia    Cancer of distal third of esophagus (H)       Cognitive Status: did not meet with the patient discussed with   wife        Contact information and PCP  information verified: Yes  Lives With: spouse  Living Arrangements: house  Quality Of Family Relationships: supportive, involved  Description of Support System: Supportive, Involved   Who is your support system?: Wife   Support Assessment: Adequate family and caregiver support   Insurance concerns: Other  ASSESSMENT  Patient currently receives the following services:  n/a        Identified issues/concerns regarding health management: patient   admitted 10/11 with generalized weakness r. Sided PE new   Esophogeal CA requiring Gtube for nutrition and initiation of   chemotherapy and possible Radiation Therapy (Peter following with   [facility]) and Dr. Mcleod with [facility].  Plan is for the   patient to receive weekly chemotherapy taxol/carboplatin and is   requiring a TCU for deconditioning.  Referrals are in the process   of being submitted to TCU's and [facility] has declined due to   chemotherapy.  Called patient's wife Caitlyn phone [phone]   to discuss potential barriers for Deepak being accepted at TCU.   Explained that Deepak is undergoing weekly chemotherapy infusions   and TCU would most likely be responsible for the cost of the   chemotherapy with [facility] therefore we would most likely   continue to get denials for TCU.  Caitlyn was asking about the   cost of the chemotherapy I informed her that I did not have that   information available but it would definitely not be something   that she would want to pay.  Spoke to Caitlyn about a possible   referral for inpatient [facility] to see if they would be   able to offer the patient a chemotherapy protocol as a free   standing clinic and bill separately from the TCU, she is hesitant   about this as she would like Deepak to remain under Dr. Mcleod's   care but she does not want his chemotherapy delayed.  Informed   Caitlyn that a Care Coordinator will also be available to answer   any additional questions regarding discharge planning.  Social    Worker will also reach out for more referrals if needed.    Informed Caitlyn that if [facility] were to take over the   infusion chemotherapy we could ask [facility] to review again.    Writer put sticky note in for provider to get a [facility]   consult inpatient for discharge planning.  Writer called [facility] [phone] and discussed with Haydee in Care   Coordination.  She is aware that they may be getting an inpatient   consult for this patient.  SW did inform writer that [facility] was assessing the patient and had questions about chemo   plan and where the patient was receiving the chemotherapy.    Writer called Tomas at [facility] [phone] and gave   the [facility] information.  Per Tomas they need their business   office to review for billing purposes for chemotherapy and should   get back to the  if they are able to take the   patient with [facility] by tomorrow. Tomas also mentioned that   they have a private pay transport company that could do a w/c   ride to and from chemo if they approve it.     PLAN  Financial costs for the patient include w/c transport for chemo .  Patient given options and choices for discharge yes .  Patient/family is agreeable to the plan?  Yes:   Patient anticipates discharging to tcu .        Patient anticipates needs for home equipment: No  Plan/Disposition: TCU   Appointments:     Pending [facility] infusions weekly to be set up at discharge vs   potential appointment with [facility]  Care  (CTS) will continue to follow as   needed.[BU1.1]                            Revision History        User Key Date/Time User Provider Type Action    > BU1.1 10/18/2018 10:24 AM Efrem Sifuentes APRN CNP Nurse Practitioner Sign            Progress Notes by Junior Hong MD at 10/14/2018  7:50 AM     Author:  Junior Hong MD Service:  Hospitalist Author Type:  Physician     Filed:  10/17/2018 11:10 PM Date of Service:  10/14/2018  7:50 AM Creation Time:  10/14/2018  7:50 AM    Status:  Signed :  Junior Hong MD (Physician)         [facility]  Hospitalist Progress Note   10/14/2018          Assessment and Plan:       This is an 83 year old gentleman with PMH significant for Atrial fibrillation on Pradaxa, CAD,  HTN hyperlipidemia, and recent ly diagnosed pancreatic cancer s/p gastrostomy tube and port-a cath placement  Who was admitted on 10/11/2018 for generalized weakness and failure to thrive     Adult failure to thrive.  Physical deconditioning from acute illness/malignancy/senile fraility.  Mechanical fall at home due to physical deconditioning  Possible mild cognitive impairment/dementia at baseline.  Patient has been having ongoing generalized weakness, fell out of bed on the night of admission and unable to get back up. Patient denies any specific complaints.  CT head no acute intracranial pathology.    Appreciate nutrition team assistance with tube feeding.  Free water through feeding tube.  Dietary supplements with Ensure  Speech therapy has concerns for aspiration and recommend n.p.o. with pleasure foods and thin liquids.   Oncology recommend - Okay for soft foods and liquids as long as not having N/V, pain or cough  Physical therapy, occupational therapy -recommend TCU     Pulmonary embolism in the setting of malignancy/anticoagulation on hold.  Patient does not have shortness of breath  Pt was off Pradaxa 150 mg BID from 10/5-10/10 for G-Tube/Port placement  CT chest done in the emergency room positive for right-sided pulmonary embolus.  Emphysema. Distal esophageal mass consistent with known cancer is again seen. Mediastinal and upper abdominal adenopathy have slightly increased.  Continue PTA Dabigatran [on it for A. Fib]  Heme/On consideration of LMWH for hypercoagulation of malignancy -rounding oncologist  defers to   [place]  Aggressive incentive spirometry.    Reactive leukocytosis likely from malignancy.[AM1.1] Improved[AM1.2]  WBC count of 15.5 >11.3, lactate of 2.1 >1.3  UA -no signs of infection.  Chest x-ray right chest wall port with catheter tip in SVC.  No acute pathology.  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    Had a G-tube and port placed on 10/5/2018.  Was scheduled with radiation oncology on 10/11, for XRT rx with chemorx.  Radiation oncology recommend holding treatment at this time.[AM1.1]   -  [facility] to follow when discharged to TCU[AM1.2]     Atrial fibrillation with slow ventricular response.  He is on chronic therapy with propranolol which is on hold since admission.[AM1.1]  Patient remains bradycardic[AM1.2] .   Cardiology / EP recommend to continue to hold beta-blocker.  Event monitor at discharge.  CHADS-Vasc of 4.  Continue OAC indefinitely  Continue PTA Dabigatran   Telemetry monitoring discontinued [10/13]    Abnormal thyroid function test.  TSH 0.27, T4 1.52.  Patient having bradycardia hence will hold off treatment for hyperthyroidism.  Monitor thyroid function tests in 4-6 weeks.    Coronary artery disease.  History of MI.  Continue PTA amlodipine 5 mg oral daily, simvastatin 5 mg.  Continue PTA Pradaxa.  Discontinue Tele.  Holter monitor on discharge as per EP     Hypertension.  Restart PTA hydrochlorothiazide 25 mg oral daily at time of discharge. Monitor renal function in 1 week.  Continue PTA amlodipine 5 mg daily.    Essential tremor   Hold PTA propranolol given bradycardia    Gastroesophageal reflux disease.  Continue PTA Protonix 40 mg oral daily.     Hyperlipidemia.  Continue PTA simvastatin.     Osteoporosis.  Follow-up as outpatient.  As needed Tylenol     Active Diet Order      Mechanical/Dental  Soft Diet      Advance Diet as Tolerated    DVT Prophylaxis: on pradaxA  Code Status: Full Code  Disposition: Expected discharge likely tomorrow to TCU pending placement.  Discharge orders in place.  Discussed with /patient's RN patient- plan for chemo/radiation therapy with TCU for placement [insurance coverage complicated]    Patient, family (10/11), interdisciplinary team involved in care and agrees with plan.  Total time - Greater than 25 min. More than 50% of time spent in direct patient care, care coordination, patient counseling, and formalizing plan of care.     Junior Hong MD        Interval History:[AM1.1]      patient lying down in bed, unable to conduct to an comprehensive conversation, bedside nurse reported no issues[AM1.2]          Physical Exam:        Physical Exam   Temp:  [97.5  F (36.4  C)-98.9  F (37.2  C)] 98.9  F (37.2  C)  Pulse:  [57] 57  Heart Rate:  [59-64] 59  Resp:  [18-20] 18  BP: (122-133)/(58-73) 133/58  SpO2:  [94 %-99 %] 95 %    Intake/Output Summary (Last 24 hours) at 10/12/18 1506  Last data filed at 10/12/18 1400   Gross per 24 hour   Intake              670 ml   Output              705 ml   Net              -35 ml       Admission Weight: 65.8 kg (145 lb)  Current Weight: 63.2 kg (139 lb 5.3 oz)    PHYSICAL EXAM  General drowsy and falls asleep during encounter.  CVS S1-S2 heard  Lungs bilateral decreased breath sounds no wheezing no crackles.  Abdomen soft minimal distention, bowel sounds heard.  Extremities no pedal edema peripheral pulses 2+.  Skin intact no rash noted.  Psychiatric cooperative.  CNS-moving all extremities.          Medications:          amLODIPine  5 mg Oral Daily     calcium carbonate 500 mg-vitamin D 200 units  1 tablet Oral Daily     dabigatran ANTICOAGULANT (PRADAXA) PO  150 mg Oral BID     pantoprazole  40 mg Oral Daily     simvastatin  5 mg Oral At Bedtime     sodium chloride (PF)  3 mL Intracatheter Q8H     acetaminophen,  acetaminophen, IV fluid REPLACEMENT ONLY, hydrOXYzine, melatonin, mineral oil-white petrolatum, naloxone, ondansetron **OR** ondansetron, - MEDICATION INSTRUCTIONS -, senna-docusate **OR** senna-docusate, sodium chloride (PF)         Data:      All new lab and imaging data was reviewed.[AM1.1]                Revision History        User Key Date/Time User Provider Type Action    > AM1.2 10/17/2018 11:10 PM Junior Hong MD Physician Sign     AM1.1 10/14/2018  7:50 AM Junior Hong MD Physician             Progress Notes by Kwan Benoit MD at 10/17/2018 10:34 AM     Author:  Kwan Benoit MD Service:  Hospitalist Author Type:  Physician    Filed:  10/17/2018  3:42 PM Date of Service:  10/17/2018 10:34 AM Creation Time:  10/17/2018  3:34 PM    Status:  Addendum :  Kwan Benoit MD (Physician)         [facility]  Hospitalist Progress Note   10/17/2018          Assessment and Plan:       Deepak Arndt is a 83 year old male  admitted on 10/11/2018. He has a history of recently diagnosed pancreatic CA s/p gastrostomy tube, CAD, Htn and is admitted for weakness, failure to thrive and small pulmonary embolism.      Adult failure to thrive  Physical deconditioning from acute illness/malignancy/senile fraility  Mechanical fall at home due to physical deconditioning  Possible mild cognitive impairment/dementia at baseline.  Severe malnutrition in the context of chronic illness  Patient has been having ongoing generalized weakness, fell out of bed on the night of admission and unable to get back up. CT head no acute intracranial pathology. G-tube tube placed 10/15.    -Appreciate nutrition team assistance with tube feeding.  Free water through feeding tube.  -Dietary supplements with Ensure  -Speech therapy has concerns for aspiration and recommend n.p.o. with pleasure foods and thin liquids.   -Oncology recommend - Okay for soft foods and liquids as long as  not having N/V, pain or cough  -Pt getting most of his nutrition through the G-tube, takes some pills orally and eats for pleausure  -Physical therapy, occupational therapy -recommend TCU.  Not safe to go home.  Discussed with care coordinator on floor for assistance with transition of care.  Care team has been having challenge with TCU given patient on chemotherapy.  PT/OT reassessment requested.      Pulmonary embolism in the setting of malignancy/anticoagulation on hold for procedure.  Pt was off Pradaxa 150 mg BID from 10/5-10/10 for G-Tube/Port placement.    CT chest done in the emergency room positive for right-sided pulmonary embolus and noted Emphysema. Distal esophageal mass consistent with known cancer is again seen. Mediastinal and upper abdominal adenopathy have slightly increased.  PTA dabigatran switch to low molecular weight heparin (10/16) for hypercoagulation of malignancy/swallowing difficulty.  Aggressive incentive spirometry.      Reactive leukocytosis likely from malignancy. Improved  Lactic acidosis likely from dehydration and poor oral intake.  WBC count stable/improved, lactate of 2.1 >1.3 UA -no signs of infection.   Chest x-ray right chest wall port with catheter tip in SVC.  No acute pathology.   Blood cultures no growth..  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    Had a G-tube and port placed on 10/5/2018.  Weekly paclitaxel and carboplatinum -started 10/16.  Dr. Peter of radiation oncology following to consider radiation therapy in the future  [facility] following-appreciate comanagement      Atrial fibrillation with slow ventricular response.  He is on chronic therapy with propranolol which is on hold since admission.  Cardiology / EP recommend to continue to hold beta-blocker. Holter monitor at discharge.  CHADS-Vasc of 4.   PTA dabigatran  switch to low molecular weight heparin (10/16) for hypercoagulation of malignancy/swallowing difficulty.      Abnormal thyroid function test.  TSH 0.27, T4 1.52. Patient having bradycardia hence will hold off treatment for hyperthyroidism.  -Monitor thyroid function tests in 4-6 weeks.      Coronary artery disease  History of MI.  Hyperlipidemia  Hypertension  -Continue PTA amlodipine 5 mg oral daily, simvastatin 5 mg.  -holding PTA hydrochlorothiazide 25 mg - consider restarting as needed      Essential tremor   -Hold PTA propranolol given bradycardia      Gastroesophageal reflux disease.  Continue PTA Protonix 40 mg oral daily.      Osteoporosis.  -Follow-up as outpatient.     Contact dermatitis: rash noted on back, itchy.   -hydroxyzine prn    Active Diet Order      Advance Diet as Tolerated      Dysphagia Diet Level 1 Pureed Thin Liquids (water, ice chips, juice, milk gelatin, ice cream, etc)    Adult Formula Bolus Feeding: QID Isosource 1.5; Route: Gastrostomy; 4; Can(s); Medication - Tube Feeding Flush Frequency: At least 15-30 mL water before and after medication administration and with tube clogging;  DVT Prophylaxis:  Sequential compression device. Pradaxa  Code Status: Full Code  Disposition: Expected discharge pending placement/oncology clearance for discharge    Patient, family, interdisciplinary team involved in care and agrees with plan.  Total time - Greater than[SN1.1] 2[SN1.2]5 min. More than 50% of time spent in direct patient care, care coordination, patient/caregiver counseling, and formalizing plan of care.     Kwan Benoit MD        Interval History:      Patient sitting up in the chair.  Denies any chest pain or shortness of breath.  Able to answer simple commands, states rash over the back is improved.  Minimal oral intake.       Physical Exam:        Physical Exam   Temp:  [96.3  F (35.7  C)-97.2  F (36.2  C)] 97.2  F (36.2  C)  Heart Rate:  [55-58] 55  Resp:  [16-18] 16  BP:  (120-145)/(59-61) 120/60  SpO2:  [95 %-97 %] 95 %    Intake/Output Summary (Last 24 hours) at 10/17/18 1534  Last data filed at 10/17/18 1300   Gross per 24 hour   Intake             1210 ml   Output             1075 ml   Net              135 ml     Admission Weight: 65.8 kg (145 lb)  Current Weight: 62.8 kg (138 lb 6.4 oz)    PHYSICAL EXAM  GENERAL: Patient is in no distress. Alert and able to answer simple commands.  Appears chronically ill.    HEART: Regular rate and rhythm. S1S2. No murmurs  LUNGS: Bilateral slightly decreased breath sounds, no wheezing no crackles.  ABDOMEN: Soft, no abdominal tenderness, bowel sounds heard   NEURO: Moving all extremities.  EXTREMITIES: No pedal edema. 2+ peripheral pulses.  SKIN: Warm, dry.  Rash over the back improved  PSYCHIATRY Cooperative       Medications:          amLODIPine  5 mg Oral Daily     calcium carbonate 500 mg-vitamin D 200 units  1 tablet Oral Daily     diphenhydrAMINE  50 mg Oral Once     enoxaparin  1 mg/kg Subcutaneous Q12H     heparin  5 mL Intracatheter Q28 Days     heparin lock flush  5-10 mL Intracatheter Q24H     pantoprazole  40 mg Per G Tube QAM     simvastatin  5 mg Oral At Bedtime     acetaminophen, acetaminophen, albuterol, albuterol, IV fluid REPLACEMENT ONLY, diphenhydrAMINE, EPINEPHrine, heparin lock flush, hydrOXYzine, lidocaine 4%, LORazepam, - MEDICATION INSTRUCTIONS -, melatonin, meperidine, methylPREDNISolone, mineral oil-white petrolatum, naloxone, ondansetron **OR** ondansetron, - MEDICATION INSTRUCTIONS -, potassium phosphate (KPHOS) in D5W IV, potassium phosphate (KPHOS) in D5W IV, potassium phosphate (KPHOS) in D5W IV, potassium phosphate (KPHOS) in D5W IV, senna-docusate **OR** senna-docusate, sodium chloride (PF), sodium chloride (PF), sodium chloride         Data:      All new lab and imaging data was reviewed.[SN1.1]                  Revision History        User Key Date/Time User Provider Type Action    > [N/A] 10/17/2018  3:42  PM Kwan Benoit MD Physician Addend     SN1.2 10/17/2018  3:42 PM Kwan Benoit MD Physician Sign     SN1.1 10/17/2018  3:34 PM Kwan Benoit MD Physician             Progress Notes by Yury Van at 10/17/2018 12:01 PM     Author:  Yury Van Service:  Social Work Author Type:      Filed:  10/17/2018 12:05 PM Date of Service:  10/17/2018 12:01 PM Creation Time:  10/17/2018 12:01 PM    Status:  Signed :  Yury Van ()         SW  D) Following for discharge planning.  I) Received a call from Anu at [facility] on [address]. She state they are unable to accept patient while he is on chemotherapy.[RH1.1]      Revision History        User Key Date/Time User Provider Type Action    > RH1.1 10/17/2018 12:05 PM Yury Van  Sign            Progress Notes by Efrem Sifuentes APRN CNP at 10/17/2018 10:43 AM     Author:  Efrem Sifuentes APRN CNP Service:  Hem/Onc Author Type:  Nurse Practitioner    Filed:  10/17/2018 10:45 AM Date of Service:  10/17/2018 10:43 AM Creation Time:  10/17/2018 10:43 AM    Status:  Signed :  Efrem Sifuentes APRN CNP (Nurse Practitioner)         [facility] Progress Note     Primary Oncologist/Hematologist:  Dr. Mcleod          Assessment and Plan:     Mr. Deepak Arndt is an 83 year old man with a recent diagnosis of adenocarcinoma of the esophagus who was admitted on 10/11/18 with weakness      1. Esophageal cancer  - presented with dysphagia with weight loss  - EGD 9/24/2018 shows distal esophageal mass  - Biopsy positive for poorly differentiated carcinoma with adeno and squamous differentiation. HER2 was not amplified.  - CT chest, abdomen and pelvis 9/28/2018 demonstrated thickening of the distal esophageal wall extending to the GE junction.  Paraesophageal adenopathy along with upper abdominal adenopathy was noted compatible with metastatic disease.  - EUS 10/2/2018 showed complete obstruction  of lower third of esophagus with malignant appearing lymph nodes in the subcarinal region and aortopulmonary region.   - Staged as [id number]  - G tube and Port placed 10/5/2018  - PET/CT 10/9/2018 showing hypermetabolic lymphadenopathy present in the superior, middle, and posterior mediastinum, the retroperitoneum, and along the gastrohepatic ligament. 7 mm lung lesion is too small to characterize. This might upstage cancer to stage IV disease.  - [facility] consulted with plans to give weekly paclitaxel/carboplatin with XRT. This was initially planned for neoadjuvant with possible surgery  - he is quite weak. Dr. Mcleod discussed at length with patient and his wife and also reviewed with Dr. Peter  - Initiation of paclitaxel and carboplatin 10/16. This will be weekly treatment. Consider adding XRT in the future.  - Okay for soft foods and liquids as long as not having N/V, pain or cough  - Going to TCU will make this complicated due to insurance coverage. Will have to follow up with SW.      2. Afib/PE  - Has been on dabigatran, but was off for procedures on 10/5  - CT chest with contrast 10/11 shows small PE in the right upper lung anteriorly  - Dabigatran resumed  - Cardiology consulted with bradycardia  - Difficultly with swallowing dabigatran whole (capsule that can't be opened)  - Start LMWH 1 mg/kg every 12 hours. Monitor blood counts and renal function.      3. Weakness  - Likely from malignancy, poor intake and other comorbidities  - WBC up, but no obvious infection on lab, imaging studies  - PT/OT  - G tube in place. Consult dietician.  - Not safe to go home  - TCU recommended    Efrem MASSEY, CNP  [facility]  [phone]        Interval History:   Feeling well after chemotherapy. No diarrhea today.              Review of Systems:   The 5 point Review of Systems is negative other than noted in the HPI             Medications:   Scheduled Medications    amLODIPine  5 mg Oral Daily      "calcium carbonate 500 mg-vitamin D 200 units  1 tablet Oral Daily     diphenhydrAMINE  50 mg Oral Once     enoxaparin  1 mg/kg Subcutaneous Q12H     heparin  5 mL Intracatheter Q28 Days     heparin lock flush  5-10 mL Intracatheter Q24H     pantoprazole  40 mg Per G Tube QAM     simvastatin  5 mg Oral At Bedtime     PRN Medications  acetaminophen, acetaminophen, albuterol, albuterol, IV fluid REPLACEMENT ONLY, diphenhydrAMINE, EPINEPHrine, heparin lock flush, hydrOXYzine, lidocaine 4%, LORazepam, - MEDICATION INSTRUCTIONS -, melatonin, meperidine, methylPREDNISolone, mineral oil-white petrolatum, naloxone, ondansetron **OR** ondansetron, - MEDICATION INSTRUCTIONS -, potassium phosphate (KPHOS) in D5W IV, potassium phosphate (KPHOS) in D5W IV, potassium phosphate (KPHOS) in D5W IV, potassium phosphate (KPHOS) in D5W IV, senna-docusate **OR** senna-docusate, sodium chloride (PF), sodium chloride (PF), sodium chloride               Physical Exam:   Vitals were reviewed  Blood pressure 130/59, pulse 54, temperature 97.2  F (36.2  C), temperature source Oral, resp. rate 18, height 1.727 m (5' 7.99\"), weight 62.8 kg (138 lb 6.4 oz), SpO2 97 %.  Wt Readings from Last 4 Encounters:   10/17/18 62.8 kg (138 lb 6.4 oz)   10/02/18 67.1 kg (148 lb)   09/24/18 67.1 kg (148 lb)   09/21/18 66.1 kg (145 lb 12.8 oz)       I/O last 3 completed shifts:  In: 1265 [P.O.:360; NG/GT:390; IV Piggyback:265]  Out: 1250 [Urine:1250]      Constitutional: Awake, alert, cooperative, no apparent distress   Lungs: Clear to auscultation bilaterally, no crackles or wheezing   Cardiovascular: Irregular rate and rhythm, normal S1 and S2, and no murmur noted   Abdomen: Normal bowel sounds, soft, non-distended, non-tender. G tube.   Skin: No rashes, no cyanosis, no edema   Other: Port in right anterior chest.               Data:   All laboratory data and imaging studies reviewed.    CMP  Recent Labs  Lab 10/16/18  0623 10/15/18  0915 10/12/18  0615 " 10/11/18  1040 10/11/18  0255    135 136  --  135   POTASSIUM 4.0 3.5 3.6  --  3.5   CHLORIDE 108 103 101  --  98   CO2 22 27 25  --  25   ANIONGAP 7 5 10  --  12   * 114* 97  --  104*   BUN 16 9 14  --  27   CR 0.61* 0.63* 0.78  --  1.04   GFRESTIMATED >90 >90 >90  --  68   GFRESTBLACK >90 >90 >90  --  83   DENNIS 8.6 8.8 8.7  --  9.0   MAG  --  2.2  --  2.2  --    PHOS 3.5 2.4*  --  3.2  --    PROTTOTAL  --   --   --   --  7.2   ALBUMIN  --   --   --   --  3.3*   BILITOTAL  --   --   --   --  1.2   ALKPHOS  --   --   --   --  68   AST  --   --   --   --  20   ALT  --   --   --   --  21     CBC  Recent Labs  Lab 10/17/18  0600 10/16/18  0623 10/13/18  0555 10/12/18  0615 10/11/18  0255   WBC 10.6 14.3* 11.3* 12.4* 15.5*   RBC 4.08* 4.18*  --   --  4.37*   HGB 13.6 13.9  --   --  15.1   HCT 39.7* 40.7  --   --  42.2   MCV 97 97  --   --  97   MCH 33.3* 33.3*  --   --  34.6*   MCHC 34.3 34.2  --   --  35.8   RDW 13.3 13.5  --   --  13.1    190  --   --  173[BU1.1]                          Revision History        User Key Date/Time User Provider Type Action    > BU1.1 10/17/2018 10:45 AM Efrem Sifuentes APRN CNP Nurse Practitioner Sign            Progress Notes by Haydee Huggins RD, LEYDA at 10/17/2018  8:41 AM     Author:  Haydee Huggins RD, LEYDA Service:  Nutrition Author Type:  Registered Dietitian    Filed:  10/17/2018  9:23 AM Date of Service:  10/17/2018  8:41 AM Creation Time:  10/17/2018  8:41 AM    Status:  Signed :  Haydee Huggins RD, LD (Registered Dietitian)         CALORIE COUNT      Approximate Oral Intake for:[AM1.1]   10/16[AM1.2]   Calories:[AM1.1] 124 dennis[AM1.2]  Protein:[AM1.1] 1[AM1.3] gram    Pt ordered 3 meals yesterday, and consumed[AM1.2] minimal intake[AM1.3] with the above calories and protein.[AM1.2]   Pt is on a DD1 diet[AM1.1] and receives[AM1.2] feeding assistance per chart review[AM1.1].[AM1.2]      Intake from TF/PN:   10/16    Bolus TF as ordered:  Isosource 1.5, 4 cans per day (9am - 2pm - 6pm - 9pm) = 1500 kcal, 68 gm protein, 15 gm Fiber, 750 mL free water    Per RN note: Pt refused 2100 tube feedings yesterday. Therefore, pt received = 1125 kcal, 51 gm protein, 11 gm Fiber and 560 mL free water from bolus feedings       Estimated Needs:    Calories:[AM1.1] 0126-6708 kcals (25-30 kcal/kg)[AM1.2]  Protein:[AM1.1] 75-95 grams (1.2-1.5 gm/kg)[AM1.2]  Dosing wt: 62.6 kg[AM1.3]      Summary:[AM1.1]   Daily total intake (PO + TF) = 1250 kcal and[AM1.2] 52[AM1.3] gm protein[AM1.2] (70% estimated energy needs and 65% estimated protein needs)  PO only provided <10% estimated needs  TF (below goal yesterday) provided ~65% estimated  needs[AM1.3]       Haydee Hassan RD, LEYDA  Clinical Dietitian[AM1.2]          Revision History        User Key Date/Time User Provider Type Action    > AM1.3 10/17/2018  9:23 AM Haydee Huggins RD, LEYDA Registered Dietitian Sign     AM1.2 10/17/2018  9:04 AM Haydee Huggins RD, LD Registered Dietitian      AM1.1 10/17/2018  8:41 AM Haydee Huggins RD, LEYDA Registered Dietitian                   Procedure Notes     No notes of this type exist for this encounter.      Progress Notes - Therapies (Notes from 10/17/18 through 10/20/18)     No notes of this type exist for this encounter.

## 2018-10-11 NOTE — CONSULTS
Consult Date:  10/11/2018      REASON FOR CONSULTATION:  Evaluation of bradycardia.      HISTORY OF PRESENT ILLNESS:  Mr. Arndt is a pleasant 83-year-old gentleman with a history of hypertension, hyperlipidemia, coronary artery disease (previous MI in 2012) and permanent atrial fibrillation who was recently found to have pancreatic cancer (status post gastrostomy tube and IV port placement this week) and was admitted with failure to thrive and weakness.  He was noticed to have bradycardia on telemetry/EKG and EP was consulted to help with management.      The patient is somewhat confused today.  Apparently he fell off the bed last night.  I talked to him and his wife, and it seems that bradycardia is a chronic problem for him.  Significant bradycardia was also reported during endoscopy procedure, however he was asymptomatic.      He denies any other symptoms such as chest pain, lightheadedness, near-syncope or syncopal episode.      EKG and telemetry were reviewed.  He has AFib with slow ventricular response.  No significant pauses were noticed.  Heart rates have been ranging from 40s to 60s.  Echocardiogram obtained in 10/2017 revealed EF of 55-60% with mild MR, TR and trace AI.  He had an echo done today; however, results are still pending.      ASSESSMENT AND PLAN:  Atrial fibrillation with slow ventricular response.  He is on chronic therapy with propranolol which was held.  At this time, it is difficult to tease out whether his symptoms are related to his slow ventricular rates.  However,I  doubt it as bradycardia seems to be a chronic problem for him.      I recommend evaluating heart rates with activities and if heart rates increase appropriately, no indication for pacemaker at this time.  We wiill continue to hold beta blockers and have him wear a monitor at discharge.      Horacio Gaming MD    Physical Exam:  Vitals: /71 (BP Location: Right arm)  Temp 98.6  F (37  C) (Oral)  Resp 20  Ht 1.727 m  "(5' 8\")  Wt 63.2 kg (139 lb 5.3 oz)  SpO2 96%  BMI 21.19 kg/m2      Intake/Output Summary (Last 24 hours) at 10/12/18 1102  Last data filed at 10/12/18 1057   Gross per 24 hour   Intake              770 ml   Output             1105 ml   Net             -335 ml     Vitals:    10/11/18 0224 10/11/18 0550 10/12/18 0148   Weight: 65.8 kg (145 lb) 62.6 kg (138 lb 0.1 oz) 63.2 kg (139 lb 5.3 oz)       Constitutional:  AAO x3.  Pt is in NAD.  HEAD: Normocephalic.  SKIN: Skin normal color, texture and turgor with no lesions or eruptions.  Eyes: PERRL, EOMI.  ENT:  Supple, normal JVP. No lymphadenopathy or thyroid enlargement.  Chest:  CTAB.  Cardiac:  IIR, variable sound of S1 and Normal S2.  Systolic murmur in LLSB II/VI.  Abdomen:  Normal BS.  Soft, non-tender and non-distended.  No rebound or guarding.    Extremities:  Pedious pulses palpable B/L.  No LE edema noticed.   Neurological: Strength and sensation grossly symmetric and intact throughout.         Review of Systems:  Complete review of system is otherwise negative with the exception of what was described above.     CURRENT MEDICATIONS:    amLODIPine  5 mg Oral Daily     calcium carbonate 500 mg-vitamin D 200 units  1 tablet Oral Daily     dabigatran ANTICOAGULANT (PRADAXA) PO  150 mg Oral BID     pantoprazole  40 mg Oral Daily     simvastatin  5 mg Oral At Bedtime     sodium chloride (PF)  3 mL Intracatheter Q8H     PRN Meds: acetaminophen, acetaminophen, IV fluid REPLACEMENT ONLY, melatonin, naloxone, ondansetron **OR** ondansetron, - MEDICATION INSTRUCTIONS -, senna-docusate **OR** senna-docusate, sodium chloride (PF)    ALLERGIES     Allergies   Allergen Reactions     Versed [Midazolam] Other (See Comments)     Pt has adverse/opposite reaction to versed. Given in small doses for a port and g-tube placement. Patient was aggressive.       PAST MEDICAL HISTORY:  Past Medical History:   Diagnosis Date     Actinic keratosis      Atrial fibrillation (H)      " Cancer (H)     skin     Cataract     both     Coronary artery disease      Detached retina 2007    both     ED (erectile dysfunction)      Gastroesophageal reflux disease      Hyperlipidemia      Hypertension      Lung nodules      Mild aortic stenosis     6/2012 Echo - trivial AS, mild-mod AR     Multiple thyroid nodules      Murmur     6/2012 Echo - trivial AS, mild-mod AR, mild-mod MR, mild-mod TR, mod IN     Myocardial infarction (H) 6/2012    inferior - noted on 2007 stress test     Osteoporosis        PAST SURGICAL HISTORY:  Past Surgical History:   Procedure Laterality Date     CATARACT IOL, RT/LT       ESOPHAGOSCOPY, GASTROSCOPY, DUODENOSCOPY (EGD), COMBINED N/A 9/24/2018    Procedure: COMBINED ESOPHAGOSCOPY, GASTROSCOPY, DUODENOSCOPY (EGD), BIOPSY SINGLE OR MULTIPLE;  ESOPHAGOGASTRODUODENOSCOPY ;  Surgeon: Aicha Graves MD;  Location:  GI     ESOPHAGOSCOPY, GASTROSCOPY, DUODENOSCOPY (EGD), COMBINED N/A 10/2/2018    Procedure: COMBINED ENDOSCOPIC ULTRASOUND, ESOPHAGOSCOPY, GASTROSCOPY, DUODENOSCOPY (EGD), FINE NEEDLE ASPIRATE/BIOPSY;  ENDOSCOPIC ULTRASOUND, ESOPHAGOSCOPY, GASTROSCOPY, DUODENOSCOPY (EGD), FINE NEEDLE ASPIRATE (mac sedation);  Surgeon: Sanju Cruz MD;  Location:  GI     HC OR OCULAR DEVICE INTRAOP DETACHED RETINA         FAMILY HISTORY:  Noncontributory.    SOCIAL HISTORY:  Social History     Social History     Marital status:      Spouse name: N/A     Number of children: N/A     Years of education: N/A     Social History Main Topics     Smoking status: Former Smoker     Packs/day: 1.00     Years: 20.00     Types: Cigarettes     Start date: 1954     Quit date: 1/1/1974     Smokeless tobacco: Never Used     Alcohol use Yes      Comment: 1 glass of wine per day     Drug use: No     Sexual activity: Yes     Partners: Female     Other Topics Concern     Caffeine Concern No     Exercise No     golf     Social History Narrative         Recent Lab Results:    Recent  Labs  Lab 10/12/18  0615 10/11/18  1408 10/11/18  1040 10/11/18  0255 10/08/18  1209   WBC 12.4*  --   --  15.5* 12.4*   HGB  --   --   --  15.1 15.2   MCV  --   --   --  97 98   PLT  --   --   --  173 180   INR  --   --   --   --  1.18*     --   --  135  --    POTASSIUM 3.6  --   --  3.5  --    CHLORIDE 101  --   --  98  --    CO2 25  --   --  25  --    BUN 14  --   --  27  --    CR 0.78  --   --  1.04  --    ANIONGAP 10  --   --  12  --    TRIPP 8.7  --   --  9.0  --    GLC 97  --   --  104*  --    ALBUMIN  --   --   --  3.3*  --    PROTTOTAL  --   --   --  7.2  --    BILITOTAL  --   --   --  1.2  --    ALKPHOS  --   --   --  68  --    ALT  --   --   --  21  --    AST  --   --   --  20  --    LIPASE  --   --   --  154  --    TROPI  --  0.047* 0.057* 0.057*  --            MARK MCKEON MD             D: 10/11/2018   T: 10/11/2018   MT: NTS      Name:     LA LOCKE   MRN:      [id number]        Account:       [id number]   :      1935           Consult Date:  10/11/2018      Document: [id number]

## 2018-10-11 NOTE — H&P
[facility]    History and Physical  Hospitalist       Date of Admission:  10/11/2018    Assessment & Plan   Deepak Arndt is a 83 year old male with a history of atrial fibrillation currently on Pradaxa, CAD w/MI in 2012, HTN, HLP, and who approximately a month ago was diagnosed with pancreatic carcinoma who recently had a gastrostomy tube and port placed who presented with generalized weakness     Generalized weakness  Possible failure to thrive  Patient reports gradually worsening generalized weakness for the past year to the point that this evening he fell out of bed and was then unable to get back up.  At this poin the etiology for his weakness is unclear.  It could be related to symptomatic bradycardia as below or possibly deconditioning from his pancreatic carcinoma.  At this time there is no evidence of infection with a normal UA and no evidence of infiltrate on CXR/CT to suggest pneumonia   - Admission to medical bed  - IVF with LR at 100 mL/hr for 1 L total  - Social work/PT/OT consulted     Bradycardia- Atrial fibrillation w/slow ventricular response  Slight troponin elevation   On evaluation patient had an irregular rhythm with rates in the 40s.  It appears as if a year ago the patient had rate controlled a fib with heart rates in the 60s.  Then an EKG from 3 weeks ago showed a junctional rhythm with a rate of 48 BPM.  EKG today showed atrial fibrillation with slow ventricular response and a rate of 47 BPM.  The patient had been on Propranolol in the past but states he had stopped taking it.  On exam the patient also is noted to have a systolic murmur present and cardiomegaly on CXR.  Of note, patient did have an echocardiogram a year ago with mild concentric LVH but a normal EF of 55-60%, mild MR, mild TR, and no regional WMA.  Troponin today was slightly elevated today at 0.057 but I don't think this is related right heart strain from below and more likely related to increased demand     - Repeat echocardiogram   - Serial troponins   - Cardiology consulted and appreciate their recommendations    Pancreatic carcinoma   PE   Patient underwent EGD with biopsy on 9/24/18.  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  He has since had a gastrostomy tube and port placed by IR.  He has just started seeing Dr. Mcleod with [facility] and was actually scheduled to meet with radiation oncology today as he states the current plan is for chemotherapy with radiation treatments.   Upon presentation here the patient was found to be tachypneic and given his malignancy a CT chest PE study was done.  This showed a small right sided PE in the right upper lung.  Of note, the patient had been having his Pradaxa held until yesterday for his IR procedures.    - Will consult [facility]  - Resume Pradaxa.  Given malignancy history patient may benefit from Lovenox therapy  - O2 as needed     Reported H/o CAD   HTN   HLP   Patient had a stress MPI with lexiscan in 2016 that showed a fixed basal inferior defect which could represent prior MI but patient denied any history of MI today.  No complaints of chest pain or SOB currently.  Blood pressure well controlled   - PTA Simvastatin 10 mg, hydrochlorothiazide 25 mg and Norvasc 5 mg          DVT Prophylaxis: Pradaxa  Code Status: Full Code    Disposition: Expected discharge TBD.  Needs improvement in strength and further evaluation     Nicolás Mercado DO    Primary Care Physician   [place]    Chief Complaint   Generalized weakness    History is obtained from the patient    History of Present Illness   Deepak Arndt is a 83 year old male with a history of atrial fibrillation currently on Pradaxa, CAD w/MI in 2012, HTN, HLP, and who approximately a month ago was diagnosed with pancreatic carcinoma who recently had a gastrostomy tube and port placed who presented with generalized weakness.  Patient reports that for the  past year he has been having gradually worsening generalized weakness to the point that last night he fell out of bed and was unable to get back up.  He laid on the ground for approximately 2 hours until EMS was called to help the patient up and bring him in for further evaluation.  Patient otherwise has no significant complaints.  He does report some tenderness at his recently placed chest port and gastrostomy tube.  Denies any fevers, chills, chest pain, SOB, light headedness, palpitations, headache, abdominal pain, N/V/D, problems with urination including dysuria, hematuria, increased urinary urgency or leg pain or swelling.      In the ED lab work was fairly unremarkable other than a slightly elevated troponin of 0.057 and WBC count of 15.5.  A UA was performed and this did not show evidence of infection.  CT head was obtained with no acute intracranial abnormality.  CT chest did show a right sided PE.     Past Medical History    I have reviewed this patient's medical history and updated it with pertinent information if needed.   Past Medical History:   Diagnosis Date     Actinic keratosis      Atrial fibrillation (H)      Cancer (H)     skin     Cataract     both     Coronary artery disease      Detached retina 2007    both     ED (erectile dysfunction)      Gastroesophageal reflux disease      Hyperlipidemia      Hypertension      Lung nodules      Mild aortic stenosis     6/2012 Echo - trivial AS, mild-mod AR     Multiple thyroid nodules      Murmur     6/2012 Echo - trivial AS, mild-mod AR, mild-mod MR, mild-mod TR, mod AL     Myocardial infarction (H) 6/2012    inferior - noted on 2007 stress test     Osteoporosis        Past Surgical History   I have reviewed this patient's surgical history and updated it with pertinent information if needed.  Past Surgical History:   Procedure Laterality Date     CATARACT IOL, RT/LT       ESOPHAGOSCOPY, GASTROSCOPY, DUODENOSCOPY (EGD), COMBINED N/A 9/24/2018    Procedure:  COMBINED ESOPHAGOSCOPY, GASTROSCOPY, DUODENOSCOPY (EGD), BIOPSY SINGLE OR MULTIPLE;  ESOPHAGOGASTRODUODENOSCOPY ;  Surgeon: Aicha Graves MD;  Location:  GI     ESOPHAGOSCOPY, GASTROSCOPY, DUODENOSCOPY (EGD), COMBINED N/A 10/2/2018    Procedure: COMBINED ENDOSCOPIC ULTRASOUND, ESOPHAGOSCOPY, GASTROSCOPY, DUODENOSCOPY (EGD), FINE NEEDLE ASPIRATE/BIOPSY;  ENDOSCOPIC ULTRASOUND, ESOPHAGOSCOPY, GASTROSCOPY, DUODENOSCOPY (EGD), FINE NEEDLE ASPIRATE (mac sedation);  Surgeon: Sanju Cruz MD;  Location:  GI     HC OR OCULAR DEVICE INTRAOP DETACHED RETINA         Prior to Admission Medications   Prior to Admission Medications   Prescriptions Last Dose Informant Patient Reported? Taking?   Calcium carb-Vitamin D 500 mg [place]-200 units (OSCAL WITH D;OYSTER SHELL CALCIUM) 500-200 MG-UNIT per tablet   Yes No   Sig: Take 1 tablet by mouth daily   Dabigatran Etexilate Mesylate (PRADAXA PO)  Self Yes No   Sig: Take 150 mg by mouth 2 times daily   Multiple Vitamins-Minerals (DAILY MULTI PO)   Yes No   Sig: Take by mouth daily   NONFORMULARY   Yes No   Sig: See Admin Instructions Activa probiotic yoguart 4 x per week   acetaminophen (TYLENOL) 325 MG tablet   No No   Sig: Take 2 tablets (650 mg) by mouth every 4 hours as needed for mild pain or fever   amLODIPine (NORVASC) 5 MG tablet   No No   Sig: Take 1 tablet (5 mg) by mouth daily   fish oil-omega-3 fatty acids (OMEGA-3 FISH OIL) 1000 MG capsule  Self Yes No   Sig: Take 1 capsule by mouth daily.   hydrochlorothiazide (HYDRODIURIL) 25 MG tablet   No No   Sig: TAKE 1 TABLET BY MOUTH ONCE DAILY   pantoprazole (PROTONIX) 40 MG EC tablet   No No   Sig: Take 1 tablet (40 mg) by mouth daily for 30 doses   propranolol (INDERAL) 40 MG tablet   No No   Sig: TAKE 1 TABLET BY MOUTH TWICE DAILY FOR ESSENTIAL TREMOR   simvastatin (ZOCOR) 10 MG tablet  Self Yes No   Sig: Take 5 mg by mouth At Bedtime (Takes half of a 10mg tablet for a total of 5mg daily)       Facility-Administered Medications: None     Allergies   Allergies   Allergen Reactions     Versed [Midazolam] Other (See Comments)     Pt has adverse/opposite reaction to versed. Given in small doses for a port and g-tube placement. Patient was aggressive.       Social History   I have reviewed this patient's social history and updated it with pertinent information if needed. Deepak Arndt  reports that he quit smoking about 44 years ago. His smoking use included Cigarettes. He started smoking about 64 years ago. He has a 20.00 pack-year smoking history. He has never used smokeless tobacco. He reports that he drinks alcohol. He reports that he does not use illicit drugs.    Family History   I have reviewed this patient's family history and updated it with pertinent information if needed.   Heart disease- father     Review of Systems   The 10 point Review of Systems is negative other than noted in the HPI    Physical Exam   Temp: 97.7  F (36.5  C) Temp src: Oral BP: 131/63   Heart Rate: (!) 47 Resp: 26 SpO2: 98 % O2 Device: None (Room air)    Vital Signs with Ranges  Temp:  [97.7  F (36.5  C)] 97.7  F (36.5  C)  Heart Rate:  [41-53] 47  Resp:  [12-32] 26  BP: (111-177)/() 131/63  SpO2:  [98 %-100 %] 98 %  145 lbs 0 oz    Constitutional: Frail appearing.  Awake, alert, cooperative, no apparent distress.  Eyes: Conjunctiva and pupils examined and normal.  HEENT: Moist mucous membranes, normal dentition.  Respiratory: Clear to auscultation bilaterally, no crackles or wheezing.  Cardiovascular: Regular rate and rhythm, normal S1 and S2, and systolic murmur noted.  GI: Soft, non-distended, non-tender, normal bowel sounds.  Lymph/Hematologic: No abnormal bruising  Skin: No rashes, no cyanosis, no edema.  Musculoskeletal: No joint swelling, erythema or tenderness.  Neurologic: 3+/5 strength to all 4 extremities.  No facial droop .  Psychiatric: Alert, no obvious anxiety or depression.    Data   Data reviewed  today:  I personally reviewed   EKG: Atrial fibrillation with slow ventricular response.  Rate 47 BPM.  No ST elevations.  When compared with EKG from 9/17/18, Junctional rhythm no longer present  CXR:  Cardiomegaly.  No obvious infiltrates or pleural effusion.  Port in place.     Recent Labs  Lab 10/11/18  0255 10/08/18  1209   WBC 15.5* 12.4*   HGB 15.1 15.2   MCV 97 98    180   INR  --  1.18*     --    POTASSIUM 3.5  --    CHLORIDE 98  --    CO2 25  --    BUN 27  --    CR 1.04  --    ANIONGAP 12  --    TRIPP 9.0  --    *  --    ALBUMIN 3.3*  --    PROTTOTAL 7.2  --    BILITOTAL 1.2  --    ALKPHOS 68  --    ALT 21  --    AST 20  --    LIPASE 154  --    TROPI 0.057*  --        Imaging:  Recent Results (from the past 24 hour(s))   XR Chest 2 Views    Narrative    XR CHEST 2 VW  10/11/2018 3:07 AM     INDICATION: Weakness, shortness of breath.    COMPARISON: CT 9/28/2018. Chest x-ray 10/23/2017.      Impression    IMPRESSION: Borderline prominent heart size. No focal air-space  disease or other acute findings. Right chest wall port with catheter  tip in the SVC. Old right rib fractures.    LEBRON LANZA MD   CT Head w/o Contrast    Narrative    CT HEAD W/O CONTRAST  10/11/2018 3:21 AM     HISTORY: Fall, weakness.     TECHNIQUE: Axial images of the head and coronal reformations without  IV contrast material. Radiation dose for this scan was reduced using  automated exposure control, adjustment of the mA and/or kV according  to patient size, or iterative reconstruction technique.    COMPARISON: 10/23/2017.    FINDINGS: No intracranial hemorrhage, mass or mass effect. Low  attenuation areas are present in white matter of the cerebral  hemispheres that are nonspecific but consistent with chronic small  vessel ischemic changes in this age patient. No acute infarct  identified. No shift of midline structures. No skull fractures.      Impression    IMPRESSION: No acute intracranial  abnormality.    LEBRON LANZA MD   CT Chest Pulmonary Embolism w Contrast   Result Value    Radiologist flags Pulmonary embolism (AA)    Narrative    CT CHEST PULMONARY EMBOLISM W CONTRAST  10/11/2018 4:13 AM     HISTORY: Dyspnea.    TECHNIQUE: Volumetric acquisition of the chest after the  administration of 59 mL Isovue-370 IV contrast. Radiation dose for  this scan was reduced using automated exposure control, adjustment of  the mA and/or kV according to patient size, or iterative  reconstruction technique.     COMPARISON: CT 9/28/2018.    FINDINGS: Study is positive for a small pulmonary embolus in the right  upper lung anteriorly (series 5, image 75). No acute infiltrates or  pleural effusions. Emphysema. 2.5 cm right thyroid nodule as seen  previously. Right chest wall port with catheter tip in the SVC.  Coronary artery calcifications. Mediastinal adenopathy, slightly more  prominent than on the previous study. Distal esophageal mass is again  seen. A prominent left lower mediastinal lymph node in the  paraesophageal region has increased in size measuring 2.3 x 2.4 cm  (previously 1.5 x 2.1 cm). A right retrocrural lymph node is more  prominent. Enlarged lymph nodes in the gastrohepatic ligament region  are slightly more prominent. Renal and liver cysts.      Impression    IMPRESSION:  1. Study is positive for right-sided pulmonary embolus.  2. Distal esophageal mass consistent with known cancer is again seen.  Mediastinal and upper abdominal adenopathy have slightly increased.  3. Emphysema.    [Critical Result: Pulmonary embolism]    Finding was identified on 10/11/2018 4:21 AM.     Dr. Walker was contacted by me on 10/11/2018 4:29 AM and  verbalized understanding of the critical result.    LEBRON LANZA MD

## 2018-10-11 NOTE — ED NOTES
"[facility]  ED Nurse Handoff Report    ED Chief complaint: Generalized Weakness (recently had procedure recently for possible esophageal cancer. patient found on floor by EMS unable to get up.) and Fall (patient fell out of bed tonight onto hardwood floor. Did not hit head , no LOC. Pt taking blood thinners )      ED Diagnosis:   Final diagnoses:   Generalized muscle weakness   Malignant neoplasm of esophagus, unspecified location (H)   Other acute pulmonary embolism without acute cor pulmonale (H)   Elevated troponin       Code Status:  Full Code- Hospitalist to determine    Allergies:   Allergies   Allergen Reactions     Versed [Midazolam] Other (See Comments)     Pt has adverse/opposite reaction to versed. Given in small doses for a port and g-tube placement. Patient was aggressive.       Activity level - Baseline/Home:  Stand with Assist and cane    Activity Level - Current:   Stand with Assist of 2     Needed?: No    Isolation: No  Infection: Not Applicable  Bariatric?: No    Vital Signs:   Vitals:    10/11/18 0224 10/11/18 0245 10/11/18 0325 10/11/18 0336   BP: (!) 177/116 111/86 121/61 132/87   Resp: 16 12 (!) 32 22   Temp: 97.7  F (36.5  C)      TempSrc: Oral      SpO2: 100% 98% 100% 98%   Weight: 65.8 kg (145 lb)      Height: 1.727 m (5' 8\")          Cardiac Rhythm: ,   Cardiac  Cardiac Rhythm: Sinus bradycardia    Pain level:      Is this patient confused?: No   [place] - Suicide Severity Rating Scale Completed?  Yes  If yes, what color did the patient score?  White    Patient Report: Patient presents tonight with general weakness. Patient was sleeping when he fell out of bed onto the hardwood floors. He states he did not hit his head or lose consciousness, but he was unable to get off the floor by himself. Spouse woke up when he fell and placed a pillow under his head. Since he was unable to get off the floor, she called EMS to transport him to the ED for evaluation. He " reports that he is feeling slightly short of breath. He denies having any headache, neck pain, numbness or tingling or any other complaints or injuries. Patient recently diagnosed with esophageal cancer and has history of Afib. Patient also recently had port placed in his right chest wall, the patient was taken off his Pradaxa, but then started back on the medication yesterday.  Focused Assessment: Cards: Afib, slow ventricular rate.  Resp: SOB with increased respirations  [place]: A&o but forgetful  GI: recent Gtube placed.  Skin: New port placed in right chest wall, bruises noted.  Musk/Skel: Weakness  Tests Performed: Labs, UA, EKG, CXR, CT head and CT chest  Abnormal Results:   Results for orders placed or performed during the hospital encounter of 10/11/18   XR Chest 2 Views    Narrative    XR CHEST 2 VW  10/11/2018 3:07 AM     INDICATION: Weakness, shortness of breath.    COMPARISON: CT 9/28/2018. Chest x-ray 10/23/2017.      Impression    IMPRESSION: Borderline prominent heart size. No focal air-space  disease or other acute findings. Right chest wall port with catheter  tip in the SVC. Old right rib fractures.    LEBRON LANZA MD   CT Head w/o Contrast    Narrative    CT HEAD W/O CONTRAST  10/11/2018 3:21 AM     HISTORY: Fall, weakness.     TECHNIQUE: Axial images of the head and coronal reformations without  IV contrast material. Radiation dose for this scan was reduced using  automated exposure control, adjustment of the mA and/or kV according  to patient size, or iterative reconstruction technique.    COMPARISON: 10/23/2017.    FINDINGS: No intracranial hemorrhage, mass or mass effect. Low  attenuation areas are present in white matter of the cerebral  hemispheres that are nonspecific but consistent with chronic small  vessel ischemic changes in this age patient. No acute infarct  identified. No shift of midline structures. No skull fractures.      Impression    IMPRESSION: No acute intracranial  abnormality.    LEBRON LANZA MD   CT Chest Pulmonary Embolism w Contrast   Result Value Ref Range    Radiologist flags Pulmonary embolism (AA)     Narrative    CT CHEST PULMONARY EMBOLISM W CONTRAST  10/11/2018 4:13 AM     HISTORY: Dyspnea.    TECHNIQUE: Volumetric acquisition of the chest after the  administration of 59 mL Isovue-370 IV contrast. Radiation dose for  this scan was reduced using automated exposure control, adjustment of  the mA and/or kV according to patient size, or iterative  reconstruction technique.     COMPARISON: CT 9/28/2018.    FINDINGS: Study is positive for a small pulmonary embolus in the right  upper lung anteriorly (series 5, image 75). No acute infiltrates or  pleural effusions. Emphysema. 2.5 cm right thyroid nodule as seen  previously. Right chest wall port with catheter tip in the SVC.  Coronary artery calcifications. Mediastinal adenopathy, slightly more  prominent than on the previous study. Distal esophageal mass is again  seen. A prominent left lower mediastinal lymph node in the  paraesophageal region has increased in size measuring 2.3 x 2.4 cm  (previously 1.5 x 2.1 cm). A right retrocrural lymph node is more  prominent. Enlarged lymph nodes in the gastrohepatic ligament region  are slightly more prominent. Renal and liver cysts.      Impression    IMPRESSION:  1. Study is positive for right-sided pulmonary embolus.  2. Distal esophageal mass consistent with known cancer is again seen.  Mediastinal and upper abdominal adenopathy have slightly increased.  3. Emphysema.    [Critical Result: Pulmonary embolism]    Finding was identified on 10/11/2018 4:21 AM.     Dr. Walker was contacted by me on 10/11/2018 4:29 AM and  verbalized understanding of the critical result.   CBC with platelets differential   Result Value Ref Range    WBC 15.5 (H) 4.0 - 11.0 10e9/L    RBC Count 4.37 (L) 4.4 - 5.9 10e12/L    Hemoglobin 15.1 13.3 - 17.7 g/dL    Hematocrit 42.2 40.0 - 53.0 %     MCV 97 78 - 100 fl    MCH 34.6 (H) 26.5 - 33.0 pg    MCHC 35.8 31.5 - 36.5 g/dL    RDW 13.1 10.0 - 15.0 %    Platelet Count 173 150 - 450 10e9/L    Diff Method Automated Method     % Neutrophils 75.0 %    % Lymphocytes 13.4 %    % Monocytes 8.9 %    % Eosinophils 2.1 %    % Basophils 0.1 %    % Immature Granulocytes 0.5 %    Nucleated RBCs 0 0 /100    Absolute Neutrophil 11.6 (H) 1.6 - 8.3 10e9/L    Absolute Lymphocytes 2.1 0.8 - 5.3 10e9/L    Absolute Monocytes 1.4 (H) 0.0 - 1.3 10e9/L    Absolute Eosinophils 0.3 0.0 - 0.7 10e9/L    Absolute Basophils 0.0 0.0 - 0.2 10e9/L    Abs Immature Granulocytes 0.1 0 - 0.4 10e9/L    Absolute Nucleated RBC 0.0    Comprehensive metabolic panel   Result Value Ref Range    Sodium 135 133 - 144 mmol/L    Potassium 3.5 3.4 - 5.3 mmol/L    Chloride 98 94 - 109 mmol/L    Carbon Dioxide 25 20 - 32 mmol/L    Anion Gap 12 3 - 14 mmol/L    Glucose 104 (H) 70 - 99 mg/dL    Urea Nitrogen 27 7 - 30 mg/dL    Creatinine 1.04 0.66 - 1.25 mg/dL    GFR Estimate 68 >60 mL/min/1.7m2    GFR Estimate If Black 83 >60 mL/min/1.7m2    Calcium 9.0 8.5 - 10.1 mg/dL    Bilirubin Total 1.2 0.2 - 1.3 mg/dL    Albumin 3.3 (L) 3.4 - 5.0 g/dL    Protein Total 7.2 6.8 - 8.8 g/dL    Alkaline Phosphatase 68 40 - 150 U/L    ALT 21 0 - 70 U/L    AST 20 0 - 45 U/L   Lipase   Result Value Ref Range    Lipase 154 73 - 393 U/L   Lactic acid whole blood   Result Value Ref Range    Lactic Acid 1.5 0.7 - 2.0 mmol/L   Troponin I   Result Value Ref Range    Troponin I ES 0.057 (H) 0.000 - 0.045 ug/L   UA with Microscopic   Result Value Ref Range    Color Urine Yellow     Appearance Urine Clear     Glucose Urine Negative NEG^Negative mg/dL    Bilirubin Urine Negative NEG^Negative    Ketones Urine 40 (A) NEG^Negative mg/dL    Specific Gravity Urine 1.028 1.003 - 1.035    Blood Urine Negative NEG^Negative    pH Urine 6.5 5.0 - 7.0 pH    Protein Albumin Urine 10 (A) NEG^Negative mg/dL    Urobilinogen mg/dL 8.0 (H) 0.0 - 2.0  mg/dL    Nitrite Urine Negative NEG^Negative    Leukocyte Esterase Urine Negative NEG^Negative    Source Midstream Urine     WBC Urine 1 0 - 5 /HPF    RBC Urine <1 0 - 2 /HPF    Mucous Urine Present (A) NEG^Negative /LPF   Nt probnp inpatient (BNP)   Result Value Ref Range    N-Terminal Pro BNP Inpatient 486 0 - 1800 pg/mL   CK total   Result Value Ref Range    CK Total 40 30 - 300 U/L   EKG 12-lead, tracing only   Result Value Ref Range    Interpretation ECG Click View Image link to view waveform and result        Family Comments: spouse present    OBS brochure/video discussed/provided to patient/family: N/A              Name of person given brochure if not patient: na              Relationship to patient: na    ED Medications:   Medications   iopamidol (ISOVUE-370) solution 59 mL (59 mLs Intravenous Given 10/11/18 0414)   Saline flush (85 mLs Intravenous Given 10/11/18 0414)       Drips infusing?:  No    For the majority of the shift this patient was Green.   Interventions performed were na.    Severe Sepsis OR Septic Shock Diagnosis Present: No    To be done/followed up on inpatient unit:  na    ED NURSE PHONE NUMBER: *[phone]

## 2018-10-11 NOTE — PROGRESS NOTES
[facility] [place]  Hospitalist Progress Note   10/11/2018     Non billing encounter.    Deepak Arndt is a 83 year old male with a history of atrial fibrillation currently on Pradaxa, CAD w/MI in 2012, hypertension, hyperlipidemia, diagnosed with pancreatic carcinoma, recently had a gastrostomy tube and port placed [a month ago] admitted on 10/11/2018 with minimal oral intake and generalized weakness.    On exam vitals stable.  General drowsy and falls asleep during encounter.  CVS S1-S2 heard bradycardia.  Lungs bilateral decreased breath sounds no wheezing no crackles.  Abdomen soft minimal distention, bowel sounds heard.  Extremities no pedal edema peripheral pulses 2+.  Skin intact no rash noted.  Psychiatric cooperative.  CNS-no focal neurological deficit.    Impression    Adult failure to thrive.  Physical deconditioning from acute illness/malignancy/senile fraility.  Mechanical fall at home due to physical deconditioning  Possible mild cognitive impairment/dementia at baseline.  Patient has been having ongoing generalized weakness, fell out of bed on the night of admission and unable to get back up.  Patient denies any specific complaints.  WBC 15.5.  Glucose 104.  Albumin 3.3.  Creatinine 1.04.  ProBNP 486, troponin 0 0.057.  Lipase 154, lactic acid 1.5.  CPK 40.  CT head no acute intracranial pathology.    Hydration with LR at 100 mL/h.  Dietary supplements with Ensure, soft diet as tolerated.  Physical therapy, occupational therapy assessment.  Nutrition consult requested -G-tube feeds  We will check TSH, hemoglobin A1c, pro calcitonin levels.    Pulmonary embolism in the setting of malignancy.  CT chest done in the emergency room positive for right-sided pulmonary embolus.  Emphysema. Distal esophageal mass consistent with known cancer is again seen. Mediastinal and upper abdominal adenopathy have slightly increased.  Patient does not have any acute onset of shortness of breath  Continue PTA  Dabigatran [on it for A. Fib]  Will discuss with oncology if we could switch to low molecular weight heparin    Reactive leukocytosis likely from malignancy.  Lactic acidosis likely from dehydration and poor oral intake.  WBC count of 15.5, lactate of 2.1.  UA -no signs of infection.  Chest x-ray right chest wall port with catheter tip in SVC.  No acute pathology.  Send out blood cultures.  Hydration with NS bolus, LR at 100 mL/h.  We will hold off antibiotics at this time.    Esophageal cancer  Diagnosed in September 2018 after patient had issues with dysphagia and weight loss.  Had a G-tube and port placed on 10/5/2018.  Patient's wife reports is due for radiation oncology treatment this afternoon, will hold off treatment.  [place] oncology team consult requested.    Atrial fibrillation with slow ventricular response.  Patient barely able to answer if he has any symptoms.  EKG atrial fibrillation with slow ventricular response.  ST-T wave abnormality, .  Heart rate 47.  He is on chronic therapy with propranolol which is on hold since admission.  At rest heart rate has been around 50s.    Will check ambulating heart rate.  Check TSH, magnesium, phosphorus levels and proBNP level.  Echocardiogram.  Cardiology consulted.  Telemetry monitoring to be continued.  Continue PTA Dabigatran     Gastroesophageal reflux disease.  Continue PTA Protonix 40 mg oral daily.    Coronary artery disease.  History of MI.  Continue PTA amlodipine 5 mg oral daily, simvastatin 5 mg.  Continue PTA Pradaxa.  Continue telemetry monitoring.    Hypertension.  Hold PTA hydrochlorothiazide given dehydration.  Hold PTA propranolol given bradycardia  Continue PTA amlodipine.    Hyperlipidemia.  Continue PTA simvastatin.    Osteoporosis.  Follow-up as outpatient.  As needed Tylenol as needed.    Patient, his wife and interdisciplinary team involved in care and agree with plan.

## 2018-10-11 NOTE — PHARMACY-ADMISSION MEDICATION HISTORY
Admission medication history interview status for the 10/11/2018  admission is complete. See EPIC admission navigator for prior to admission medications     Medication history source reliability:Good    Actions taken by pharmacist (provider contacted, etc):Interviewed pt and also reviewed refill information in sure scripts     Additional medication history information not noted on PTA med list :None    Medication reconciliation/reorder completed by provider prior to medication history? Yes    Time spent in this activity: 15 minutes    Prior to Admission medications    Medication Sig Last Dose Taking? Auth Provider   acetaminophen (TYLENOL) 325 MG tablet Take 2 tablets (650 mg) by mouth every 4 hours as needed for mild pain or fever PRN Yes Dennis Tan PA-C   amLODIPine (NORVASC) 5 MG tablet Take 1 tablet (5 mg) by mouth daily 10/10/2018 at Unknown time Yes Justin Arroyo MD   Calcium carb-Vitamin D 500 mg [place]-200 units (OSCAL WITH D;OYSTER SHELL CALCIUM) 500-200 MG-UNIT per tablet Take 1 tablet by mouth daily 10/10/2018 at Unknown time Yes Reported, Patient   Dabigatran Etexilate Mesylate (PRADAXA PO) Take 150 mg by mouth 2 times daily 10/10/2018 at Unknown time Yes Reported, Patient   fish oil-omega-3 fatty acids (OMEGA-3 FISH OIL) 1000 MG capsule Take 1 capsule by mouth daily. 10/10/2018 at Unknown time Yes Reported, Patient   hydrochlorothiazide (HYDRODIURIL) 25 MG tablet TAKE 1 TABLET BY MOUTH ONCE DAILY 10/10/2018 at Unknown time Yes Clinton Mills MD   Multiple Vitamins-Minerals (DAILY MULTI PO) Take by mouth daily 10/10/2018 at Unknown time Yes Reported, Patient   pantoprazole (PROTONIX) 40 MG EC tablet Take 1 tablet (40 mg) by mouth daily for 30 doses 10/10/2018 at Unknown time Yes Yaron Miranda MD   propranolol (INDERAL) 40 MG tablet TAKE 1 TABLET BY MOUTH TWICE DAILY FOR ESSENTIAL TREMOR 10/10/2018 at Unknown time Yes Clinton Mills MD   simvastatin (ZOCOR) 10 MG tablet Take 5 mg by  mouth At Bedtime (Takes half of a 10mg tablet for a total of 5mg daily) 10/10/2018 at Unknown time Yes Reported, Patient

## 2018-10-11 NOTE — IP AVS SNAPSHOT
` [place] [address] ONCOLOGY: [phone]            Medication Administration Report for Deepak Arndt as of 10/20/18 1038   Legend:    Given Hold Not Given Due Canceled Entry Other Actions    Time Time (Time) Time  Time-Action       Inactive    Active    Linked        Medications 10/14/18 10/15/18 10/16/18 10/17/18 10/18/18 10/19/18 10/20/18    acetaminophen (TYLENOL) Suppository 650 mg  Dose: 650 mg  Freq: EVERY 4 HOURS PRN Route: RE  PRN Reason: mild pain  Start: 10/11/18 0556   Admin Instructions: Alternate ibuprofen (if ordered) with acetaminophen.  Maximum acetaminophen dose from all sources = 75 mg/kg/day not to exceed 4 grams/day.    Admin. Amount: 1 suppository (1 × 650 mg suppository)  Dispense Loc: [facility]               acetaminophen (TYLENOL) tablet 650 mg  Dose: 650 mg  Freq: EVERY 4 HOURS PRN Route: PO  PRN Reason: mild pain  Start: 10/11/18 0556   Admin Instructions: Alternate ibuprofen (if ordered) with acetaminophen.  Maximum acetaminophen dose from all sources = 75 mg/kg/day not to exceed 4 grams/day.    Admin. Amount: 2 tablet (2 × 325 mg tablet)  Last Admin: 10/19/18 0504  Dispense Loc: [facility]          0504 (650 mg)-Given            albuterol (PROAIR HFA/PROVENTIL HFA/VENTOLIN HFA) 108 (90 Base) MCG/ACT inhaler 1-2 puff  Dose: 1-2 puff  Freq: ONCE PRN Route: IN  PRN Comment: refractory bronchospasm associated with hypersensitivity  Start: 10/16/18 0946   Admin. Amount: 1-2 puff  Dispense Loc:  Main Pharmacy               albuterol neb solution 2.5 mg  Dose: 2.5 mg  Freq: ONCE PRN Route: NEBULIZATION  PRN Comment: refractory bronchospasm associated with hypersensitivity  Start: 10/16/18 0946   Admin. Amount: 2.5 mg = 3 mL Conc: 2.5 mg/3 mL  Dispense Loc: [facility]  Volume: 3 mL               alteplase (CATHFLO ACTIVASE) injection 2 mg  Dose: 2 mg  Freq: ONCE Route: IV  Start: 10/18/18 1445   Admin Instructions: Use 10 mL syringe to draw up 2 ML into clotted catheter &  allow to dwell for 30 mins, then DRAW BACK and discard contents to assess catheter function. If still occluded, allow mixture to dwell for an additional 90 mins, then DRAW BACK and discard contents to reassess catheter function.  May repeat dose if occlusion persists.  Contact MD if 2nd dose is unsuccessful. Only use a 10 ml syringe to administer the diluted solution into each lumen. For catheters with lumen volumes greater than the volume dispensed, request additional syringe(s) from pharmacy. To prevent inadvertent embolization of thrombus from the catheter, ALWAYS WITHDRAW tPA at the end of the dwell time before administering any solution through the catheter.    Admin. Amount: 2 mg  Dispense Loc:  Main Pharmacy  Administrations Remainin   Current Line: Port A Cath Single 10/08/18 Right Chest wall                     amLODIPine (NORVASC) tablet 5 mg  Dose: 5 mg  Freq: DAILY Route: PO  Indications of Use: HYPERTENSION  Start: 10/11/18 0900   Admin. Amount: 1 tablet (1 × 5 mg tablet)  Last Admin: 10/20/18 0929  Dispense Loc:  [facility]     0915 (5 mg)-Given        0932 (5 mg)-Given        0914 (5 mg)-Given        0915 (5 mg)-Given        0949 (5 mg)-Given        0959 (5 mg)-Given        0929 (5 mg)-Given           calcium carbonate 500 mg-vitamin D 200 units (OSCAL with D;OYSTER SHELL CALCIUM) per tablet 1 tablet  Dose: 1 tablet  Freq: DAILY Route: PO  Start: 10/11/18 0900   Admin. Amount: 1 tablet  Last Admin: 10/20/18 0929  Dispense Loc:  [facility]     0915 (1 tablet)-Given        0932 (1 tablet)-Given        (0845)-Not Given [C]        0915 (1 tablet)-Given        0949 (1 tablet)-Given        0959 (1 tablet)-Given        0929 (1 tablet)-Given           dextrose 10 % 1,000 mL infusion  Freq: CONTINUOUS PRN Route: IV  PRN Comment: Hypoglycemia prevention  Start: 10/11/18 1147   Admin Instructions: For Hypoglycemia Prevention for patients on long-acting subcutaneous basal insulin (Glargine, Detemir, NPH) or  continuous insulin infusion. Whenever nutrition support is held or interrupted:   1) Infuse IV D10W at nutrition support rate  2) Notify provider for further instructions    Dispense Loc: [facility] Stock  Volume: 1,000 mL   Mixture Administration Information:   Medication Type Amount   dextrose 10 % SOLN Base 1,000 mL                       diphenhydrAMINE (BENADRYL) capsule 50 mg  Dose: 50 mg  Freq: ONCE Route: PO  Start: 10/16/18 1130   Admin Instructions: Give 30 minutes prior to chemo. Give IV or PO. Do not give both routes.    Admin. Amount: 1 capsule (1 × 50 mg capsule)  Dispense Loc: [facility]  Administrations Remainin       (4761)-Not Given [C]               diphenhydrAMINE (BENADRYL) injection 50 mg  Dose: 50 mg  Freq: ONCE PRN Route: IV  PRN Comment: rash, hives, itching, facial flushing associated with hypersensitivity  Start: 10/16/18 0946   Admin Instructions: For ordered IV doses 1-50 mg, give IV Push undiluted. Give each 25mg over a minimum of 1 minute. Extend in non-emergency    Admin. Amount: 50 mg = 1 mL Conc: 50 mg/mL  Dispense Loc:  [facility]  Administrations Remainin  Volume: 1 mL               enoxaparin (LOVENOX) injection 60 mg  Dose: 1 mg/kg  Weight Dosing Info: 62.5 kg  Freq: EVERY 12 HOURS Route: SC  Start: 10/16/18 2100   Admin. Amount: 60 mg = 0.6 mL Conc: 60 mg/0.6 mL  Last Admin: 10/20/18 09  Dispense Loc:  [facility]  Volume: 0.6 mL       2049 (60 mg)-Given        0914 (60 mg)-Given       2038 (60 mg)-Given        0949 (60 mg)-Given       2124 (60 mg)-Given        0959 (60 mg)-Given       2105 (60 mg)-Given        0929 (60 mg)-Given       [ ] 2100           EPINEPHrine PF (ADRENALIN) injection 0.3 mg  Dose: 0.3 mg  Freq: EVERY 5 MIN PRN Route: IM  PRN Comment: swollen lips / tongue, airway obstruction, or hypotension associated with hypersensitivity  Start: 10/16/18 0946   Admin Instructions: May repeat x1 dose. Administer in the mid outer thigh.  Not for direct undiluted  intravenous injection. (1 mg/mL = 1:1,000 concentration). Protect from light.    Admin. Amount: 0.3 mg = 0.3 mL Conc: 1 mg/mL  Dispense Loc:  Main Pharmacy  Volume: 1 mL               heparin 100 UNIT/ML injection 5 mL  Dose: 5 mL  Freq: EVERY 28 DAYS Route: IK  Start: 10/15/18 1415   Admin Instructions: ONLY to de-access each port in dual implanted port.  Flush with 10 mL NS followed by 5 mL heparin (100 units/mL) at discharge and at least every 28 days.  MAX: 5 mL per port    Admin. Amount: 5 mL  Last Admin: 10/20/18 0915  Dispense Loc:  [facility]  Volume: 5 mL   Current Line: Port A Cath Single 10/08/18 Right Chest wall     (1451)-Not Given            0915 (5 mL)-Given [C]           heparin lock flush 10 UNIT/ML injection 5-10 mL  Dose: 5-10 mL  Freq: EVERY 1 HOUR PRN Route: IK  PRN Reason: other  PRN Comment: to lock each port in dual implanted port.  Start: 10/15/18 1401   Admin Instructions: MAX: 5 mL per port.    Admin. Amount: 5-10 mL  Last Admin: 10/18/18 1407  Dispense Loc:  [facility]  Volume: 10 mL   Current Line: Port A Cath Single 10/08/18 Right Chest wall      0619 (5 mL)-Given       0912 (5 mL)-Given       1606 (5 mL)-Given         1407 (5 mL)-Given             heparin lock flush 10 UNIT/ML injection 5-10 mL  Dose: 5-10 mL  Freq: EVERY 24 HOURS Route: IK  Start: 10/15/18 1415   Admin Instructions: To lock each dormant port in dual implanted port.  Check PRN heparin flush order to see when last dose of PRN heparin was given before administering.   MAX: 5 mL per port.    Admin. Amount: 5-10 mL  Last Admin: 10/19/18 0504  Dispense Loc:  [facility]  Volume: 10 mL   Current Line: Port A Cath Single 10/08/18 Right Chest wall     (1451)-Not Given         0619 (5 mL)-Given        0547 (5 mL)-Given        0504 (5 mL)-Given        (0559)-Not Given           hydrOXYzine (ATARAX) tablet 10 mg  Dose: 10 mg  Freq: 3 TIMES DAILY PRN Route: PO  PRN Reason: itching  Start: 10/13/18 0111   Admin. Amount: 1 tablet (1 ×  "10 mg tablet)  Last Admin: 10/16/18 0351  Dispense Loc: [place] [id number]       0351 (10 mg)-Given               lidocaine (LMX4) cream  Freq: EVERY 1 HOUR PRN Route: Top  PRN Reason: moderate pain  PRN Comment: with VAD insertion or accessing implanted port  Start: 10/15/18 1400   Admin Instructions: Do NOT give if patient has a history of allergy to any local anesthetic or any \"tremaine\" product.   Apply 30 minutes prior to VAD insertion or port access.  MAX Dose:  2.5 g (  of 5 g tube)    Last Admin: 10/15/18 1422  Dispense Loc: [place] [id number]      1422 (2.5 g)-Given                LORazepam (ATIVAN) injection 0.5 mg  Dose: 0.5 mg  Freq: EVERY 4 HOURS PRN Route: IV  PRN Comment: Anxiety or Nausea/Vomiting  Start: 10/16/18 0946   Admin Instructions: IV Push over 2-5 minutes. May repeat x1.  For IV PUSH: Dilute with equal volume of NS. For ordered IV doses 0.1-4 mg give IV Push. Administer each 2mg over 1-5 minutes.    Admin. Amount: 0.5 mg = 0.25 mL Conc: 2 mg/mL  Dispense Loc: [facility]  Volume: 1 mL               MEDICATION INSTRUCTION  Freq: CONTINUOUS PRN Route: XX  PRN Comment: hypersensitivity reaction  Start: 10/16/18 0946   Admin Instructions: Hypersensitivity Reaction: Slow rate or stop medication infusion if hypersensitivity reaction occurs. Obtain Hypersensitivity Kit located on patient care unit. Administer only the hypersensitivity medications required for appropriate symptom relief.    Dispense Loc: [facility] Stock               melatonin tablet 1 mg  Dose: 1 mg  Freq: AT BEDTIME PRN Route: PO  PRN Reason: sleep  Start: 10/11/18 0556   Admin Instructions: Do not give unless at least 6 hours of uninterrupted sleep is expected.    Admin. Amount: 1 tablet (1 × 1 mg tablet)  Last Admin: 10/15/18 2337  Dispense Loc: [facility]      2337 (1 mg)-Given                meperidine (DEMEROL) injection 25 mg  Dose: 25 mg  Freq: EVERY 30 MIN PRN Route: IV  PRN Comment: rigors associated with hypersensitivity.  Start: 10/16/18 " 0946   Admin Instructions: Give IV Push undiluted. 10-40 mg over 2-3 minutes, up to 125 mg over 3-15 minutes    Admin. Amount: 25 mg = 1 mL Conc: 25 mg/mL  Dispense Loc:  [facility]  Administrations Remainin  Volume: 1 mL               methylPREDNISolone sodium succinate (solu-MEDROL) injection 125 mg  Dose: 125 mg  Freq: ONCE PRN Route: IV  PRN Comment: swollen lips / tongue, refractory hypotension, or bronchospasm associated with hypersensitivity  Start: 10/16/18 0946   Admin Instructions: Give IV Doses 125mg and less IV Push over 2-3 minutes - reconstitute with 2 mL of bacteriostatic water if no diluent is provided. Doses greater than 125 mg need to be in at least 50 mL IVPB.    Admin. Amount: 125 mg = 2 mL Conc: 125 mg/2 mL  Dispense Loc:  [facility]  Volume: 2 mL               mineral oil-white petrolatum (MINERIN/EUCERIN) cream CREA  Freq: EVERY 4 HOURS PRN Route: Top  PRN Reason: dry skin  Start: 10/13/18 0112   Dispense Loc: Non Rx dispense               naloxone (NARCAN) injection 0.1-0.4 mg  Dose: 0.1-0.4 mg  Freq: EVERY 2 MIN PRN Route: IV  PRN Reason: opioid reversal  Start: 10/11/18 0556   Admin Instructions: For respiratory rate LESS than or EQUAL to 8.  Partial reversal dose:  0.1 mg titrated q 2 minutes for Analgesia Side Effects Monitoring Sedation Level of 3 (frequently drowsy, arousable, drifts to sleep during conversation).Full reversal dose:  0.4 mg bolus for Analgesia Side Effects Monitoring Sedation Level of 4 (somnolent, minimal or no response to stimulation).  For ordered IV doses 0.1-2mg give IVP. Give each 0.4mg over 15 seconds in emergency situations. For non-emergent situations further dilute in 9mL of NS to facilitate titration of response.    Admin. Amount: 0.1-0.4 mg = 0.25-1 mL Conc: 0.4 mg/mL  Dispense Loc: [facility]  Volume: 1 mL               ondansetron (ZOFRAN-ODT) ODT tab 4 mg  Dose: 4 mg  Freq: EVERY 6 HOURS PRN Route: PO  PRN Reasons: nausea,vomiting  Start: 10/11/18 0508    Admin Instructions: This is Step 1 of nausea and vomiting management.  If nausea not resolved in 15 minutes, go to Step 2 prochlorperazine (COMPAZINE). Do not push through foil backing. Peel back foil and gently remove. Place on tongue immediately. Administration with liquid unnecessary  With dry hands, peel back foil backing and gently remove tablet; do not push oral disintegrating tablet through foil backing; administer immediately on tongue and oral disintegrating tablet dissolves in seconds; then swallow with saliva; liquid not required.    Admin. Amount: 1 tablet (1 × 4 mg tablet)  Last Admin: 10/18/18 1822  Dispense Loc:  [facility]         1822 (4 mg)-Given            Or  ondansetron (ZOFRAN) injection 4 mg  Dose: 4 mg  Freq: EVERY 6 HOURS PRN Route: IV  PRN Reasons: nausea,vomiting  Start: 10/11/18 0556   Admin Instructions: This is Step 1 of nausea and vomiting management.  If nausea not resolved in 15 minutes, go to Step 2 prochlorperazine (COMPAZINE).  Irritant. For ordered IV doses 0.1-4 mg, give IV Push undiluted over 2-5 minutes.    Admin. Amount: 4 mg = 2 mL Conc: 4 mg/2 mL  Last Admin: 10/11/18 1926  Dispense Loc:  [facility]  Infused Over: 2-5 Minutes  Volume: 2 mL                      pantoprazole (PROTONIX) 2 mg/mL suspension 40 mg  Dose: 40 mg  Freq: EVERY MORNING Route: PER G TUBE  Start: 10/16/18 0900   Admin. Amount: 40 mg = 20 mL Conc: 40 mg/20 mL  Last Admin: 10/20/18 0945  Dispense Loc:  Main Pharmacy  Volume: 20 mL       0914 (40 mg)-Given        0915 (40 mg)-Given        1029 (40 mg)-Given        0959 (40 mg)-Given        0945 (40 mg)-Given           Patient is already receiving anticoagulation with heparin, enoxaparin (LOVENOX), warfarin (COUMADIN)  or other anticoagulant medication  Freq: CONTINUOUS PRN Route: XX  Start: 10/11/18 0556   Dispense Loc: [facility] Floor Stock               potassium phosphate 15 mmol in D5W 250 mL intermittent infusion  Dose: 15 mmol  Freq: DAILY PRN Route:  IV  PRN Reason: phosphorous supplementation  Start: 10/15/18 1017   Admin Instructions: For serum phosphorus level 2-2.4  Do not infuse Phosphorus in the same line as TPN.   Give 15 mmol and recheck phosphorus level next AM.  Each mmol of phosphate provides 1.47 mEq of Potassium. Multiply the patient's phosphate dose by 1.47 to determine the amount of potassium in this dose.    Admin. Amount: 15 mmol  Last Admin: 10/15/18 1439  Dispense Loc:  Main Pharmacy  Infused Over: 4 Hours  Volume: 250 mL   Mixture Administration Information:   Medication Type Amount   potassium phosphate 3 MMOLE/ML SOLN Medications 15 mmol   D5W 5 % SOLN Base 250 mL           Current Line: Port A Cath Single 10/08/18 Right Chest wall     1439 (15 mmol)-New Bag                potassium phosphate 20 mmol in D5W 250 mL intermittent infusion  Dose: 20 mmol  Freq: EVERY 6 HOURS PRN Route: IV  PRN Reason: phosphorous supplementation  Start: 10/15/18 1017   Admin Instructions: For serum phosphorus level 1.1-1.9  For CENTRAL Line ONLY  Do not infuse Phosphorus in the same line as TPN.   Give 20 mmol and recheck phosphorus level 2 hours after last dose and next AM.  Each mmol of phosphate provides 1.47 mEq of Potassium. Multiply the patient's phosphate dose by 1.47 to determine the amount of potassium in this dose.    Admin. Amount: 20 mmol  Dispense Loc:  Main Pharmacy  Infused Over: 4 Hours  Volume: 250 mL   Mixture Administration Information:   Medication Type Amount   potassium phosphate 3 MMOLE/ML SOLN Medications 20 mmol   D5W 5 % SOLN Base 250 mL                       potassium phosphate 20 mmol in D5W 500 mL intermittent infusion  Dose: 20 mmol  Freq: EVERY 6 HOURS PRN Route: IV  PRN Reason: phosphorous supplementation  Start: 10/15/18 1017   Admin Instructions: For serum phosphorus level 1.1-1.9  For Peripheral Line  Do not infuse Phosphorus in the same line as TPN.   Give 20 mmol and recheck phosphorus level 2 hours after last dose and  next AM.  Each mmol of phosphate provides 1.47 mEq of Potassium. Multiply the patient's phosphate dose by 1.47 to determine the amount of potassium in this dose.    Admin. Amount: 20 mmol  Dispense Loc:  Main Pharmacy  Infused Over: 4 Hours  Volume: 500 mL   Mixture Administration Information:   Medication Type Amount   potassium phosphate 3 MMOLE/ML SOLN Medications 20 mmol   D5W 5 % SOLN Base 500 mL                       potassium phosphate 25 mmol in D5W 500 mL intermittent infusion  Dose: 25 mmol  Freq: EVERY 8 HOURS PRN Route: IV  PRN Reason: phosphorous supplementation  Start: 10/15/18 1017   Admin Instructions: For serum phosphorus level less than 1.1  Do not infuse Phosphorus in the same line as TPN.   Give 25 mmol and recheck phosphorus level 2 hours after last dose and next AM.  Each mmol of phosphate provides 1.47 mEq of Potassium. Multiply the patient's phosphate dose by 1.47 to determine the amount of potassium in this dose.    Admin. Amount: 25 mmol  Dispense Loc:  Main Pharmacy  Infused Over: 6 Hours  Volume: 500 mL   Mixture Administration Information:   Medication Type Amount   potassium phosphate 3 MMOLE/ML SOLN Medications 25 mmol   D5W 5 % SOLN Base 500 mL                       senna-docusate (SENOKOT-S;PERICOLACE) 8.6-50 MG per tablet 1 tablet  Dose: 1 tablet  Freq: 2 TIMES DAILY PRN Route: PO  PRN Reason: constipation  Start: 10/11/18 0556   Admin Instructions: If no bowel movement in 24 hours, increase to 2 tablets PO.  Hold for loose stools.  This is the first step of a three step constipation treatment.    Admin. Amount: 1 tablet  Dispense Loc:  [facility]              Or  senna-docusate (SENOKOT-S;PERICOLACE) 8.6-50 MG per tablet 2 tablet  Dose: 2 tablet  Freq: 2 TIMES DAILY PRN Route: PO  PRN Reason: constipation  Start: 10/11/18 0556   Admin Instructions: Hold for loose stools.  This is the first step of a three step constipation treatment.    Admin. Amount: 2 tablet  Dispense Loc:   [facility]               simvastatin (ZOCOR) tablet 5 mg  Dose: 5 mg  Freq: AT BEDTIME Route: PO  Indications Comment: cuts 10 mg in half  Start: 10/11/18 0600   Admin. Amount: 1 tablet (1 × 5 mg tablet)  Last Admin: 10/19/18 2105  Dispense Loc:  [facility]     2006 (5 mg)-Given               2136 (5 mg)-Given        2049 (5 mg)-Given               2038 (5 mg)-Given        2124 (5 mg)-Given        2105 (5 mg)-Given        [ ] 2100           sodium chloride (PF) 0.9% PF flush 10-20 mL  Dose: 10-20 mL  Freq: EVERY 1 HOUR PRN Route: IK  PRN Reasons: line flush,post meds or blood draw  Start: 10/15/18 1401   Admin Instructions: And Daily PRN, to de-access each port in dual implanted port.  Flush with 10 mL NS followed by 5 mL heparin (100 units/mL) at discharge and at least every 28 days.    Admin. Amount: 10-20 mL  Last Admin: 10/16/18 0912  Dispense Loc: [facility] Floor Stock  Volume: 20 mL   Current Line: Port A Cath Single 10/08/18 Right Chest wall      0912 (10 mL)-Given               sodium chloride (PF) 0.9% PF flush 10-20 mL  Dose: 10-20 mL  Freq: EVERY 1 HOUR PRN Route: IK  PRN Reasons: line flush,post meds or blood draw  PRN Comment: To flush each CVC Implanted port.  Start: 10/15/18 1401   Admin Instructions: 10 mL post IV meds;   20 mL post blood draw.    Admin. Amount: 10-20 mL  Dispense Loc: [facility] Floor Stock  Volume: 20 mL               sodium chloride 0.9% infusion  Rate: 200 mL/hr Dose: 1000 mL  Freq: CONTINUOUS PRN Route: IV  PRN Comment: refractory hypotension associated with hypersensitivity  Start: 10/16/18 0946   Admin. Amount: 1,000 mL  Dispense Loc: [facility] Floor Stock  Volume: 1,000 mL            Medications 10/14/18 10/15/18 10/16/18 10/17/18 10/18/18 10/19/18 10/20/18

## 2018-10-11 NOTE — PLAN OF CARE
Problem: Patient Care Overview  Goal: Plan of Care/Patient Progress Review  PT: Pt was seen by cardiology and wanted to see pt's HR with activity.  Discharge Planner PT   Patient plan for discharge: Pt did not state  Current status: Pt lethargic and sleepy. Pt HR at rest 45. Pt sat up EOB with modA and cues. HR increased to 58 and quickly returns to mid-upper 40s. Pt stood from bed with modA and required modA of one and Yamile of one to take some steps with walker. Pt got fatigued and took step back to sit on bed impulsively without warning. Pt's HR with a couple of steps 61 with one reading of 65 but HR often not giving reading during activity. Pt repeated sit to stand and pivot to wheelchair and again increased to low 60s with return to 40s after seated. Pt remained in chair to try and eat. Alarm on.HR mid 40s.  Barriers to return to prior living situation: pt was independent with mobility  Recommendations for discharge: TCU  Rationale for recommendations: Pt is significantly below baseline for activity and is an increased fall risk with recent history of falls. Pt needs 24hr care and supervision and cannot be managed at home.       Entered by: Stephanie Rossi 10/11/2018 12:30 PM

## 2018-10-12 NOTE — PROGRESS NOTES
[facility]  Hospitalist Progress Note   10/12/2018          Assessment and Plan:       Deepak Arndt is a 83 year old male with a history of atrial fibrillation currently on Pradaxa, CAD w/MI in 2012, hypertension, hyperlipidemia, diagnosed with pancreatic carcinoma, recently had a gastrostomy tube and port placed [a month ago] admitted on 10/11/2018 with minimal oral intake and generalized weakness.    Adult failure to thrive.  Physical deconditioning from acute illness/malignancy/senile fraility.  Mechanical fall at home due to physical deconditioning  Possible mild cognitive impairment/dementia at baseline.  Patient has been having ongoing generalized weakness, fell out of bed on the night of admission and unable to get back up.  Patient denies any specific complaints.  WBC 15.5.  Glucose 104.  Albumin 3.3.  Creatinine 1.04.  ProBNP 486, troponin 0 0.057.  Lipase 154, lactic acid 1.5.  CPK 40.  CT head no acute intracranial pathology.     Hydration with NS at 100 mL/h.  [Minimal urine output in last 24 hours]  Appreciate nutrition team assistance with tube feeding.  Free water through feeding tube.  Dietary supplements with Ensure, soft diet as tolerated -if no concerns for aspiration.  Speech therapy eval requested.  Physical therapy, occupational therapy -recommend TCU     Pulmonary embolism in the setting of malignancy/anticoagulation on hold.  Patient does not have any acute onset of shortness of breath  Pt was off Pradaxa 150 mg BID from 10/5-10/10 for G-Tube/Port placement  CT chest done in the emergency room positive for right-sided pulmonary embolus.  Emphysema. Distal esophageal mass consistent with known cancer is again seen. Mediastinal and upper abdominal adenopathy have slightly increased.  Continue PTA Dabigatran [on it for A. Fib]  Heme/On consideration of LMWH for hypercoagulation of malignancy -rounding oncologist  defers to Dr Mcleod     Reactive leukocytosis  likely from malignancy.  Lactic acidosis likely from dehydration and poor oral intake.  WBC count of 15.5 >12.2, lactate of 2.1 >1.3  UA -no signs of infection.  Chest x-ray right chest wall port with catheter tip in SVC.  No acute pathology.  Blood cultures no growth..  We will hold off antibiotics at this time.     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  Had a G-tube and port placed on 10/5/2018.  Was scheduled with radiation oncology on 10/11, for XRT rx with chemorx.  Radiation oncology recommend holding treatment at this time.   [facility] following along - Needs to start treatment. Going to TCU will make this complicated due to insurance coverage.  Discussed with social work on the floor.     Atrial fibrillation with slow ventricular response.  Patient barely able to answer if he has any symptoms.  EKG atrial fibrillation with slow ventricular response.  ST-T wave abnormality, .  Heart rate 47.  TSH 0.27, T4 1.52.  Magnesium 2.2, phosphorus 3.2  EF 60-65% without significant WMA  He is on chronic therapy with propranolol which is on hold since admission.  At rest heart rate has been around 50s.  Heart rate improving to 60s with minimal ambulation.   Cardiology/ EP recommend to continue to hold beta-blocker.  Event monitor at discharge.  CHADS-Vasc of 4.  Continue OAC indefinitely  Continue PTA Dabigatran   Telemetry monitoring to be continued.    Abnormal thyroid function test.  TSH 0.27, T4 1.52.  Patient having bradycardia hence will hold off treatment for hyperthyroidism.  Monitor thyroid function tests in 4-6 weeks.    Gastroesophageal reflux disease.  Continue PTA Protonix 40 mg oral daily.     Coronary artery disease.  History of MI.  Continue PTA amlodipine 5 mg oral daily, simvastatin 5 mg.  Continue PTA Pradaxa.  Continue telemetry monitoring.     Hypertension.  Hold PTA  hydrochlorothiazide given dehydration.  Hold PTA propranolol given bradycardia  Continue PTA amlodipine.     Hyperlipidemia.  Continue PTA simvastatin.     Osteoporosis.  Follow-up as outpatient.  As needed Tylenol as needed.    Active Diet Order      Mechanical/Dental Soft Diet    DVT Prophylaxis: on pradaxA  Code Status: Full Code  Disposition: Expected discharge likely tomorrow to TCU if no acute events overnight    Patient, family (10/11), interdisciplinary team involved in care and agrees with plan.  Total time - Greater than 25 min. More than 50% of time spent in direct patient care, care coordination, patient counseling, and formalizing plan of care.     Kwan Benoit MD        Interval History:      Patient is sitting up in the chair with breakfast in front of him.  He seems to pocket scramble eggs in his mouth.  On awakening denies any chest pain or shortness of breath.  States he is tired.         Physical Exam:        Physical Exam   Temp:  [97.2  F (36.2  C)-98.6  F (37  C)] 98.6  F (37  C)  Pulse:  [64-68] 68  Heart Rate:  [49-67] 50  Resp:  [16-22] 16  BP: (114-149)/(60-77) 114/65  SpO2:  [96 %-98 %] 96 %    Intake/Output Summary (Last 24 hours) at 10/12/18 1506  Last data filed at 10/12/18 1400   Gross per 24 hour   Intake              670 ml   Output              705 ml   Net              -35 ml       Admission Weight: 65.8 kg (145 lb)  Current Weight: 63.2 kg (139 lb 5.3 oz)    PHYSICAL EXAM  General drowsy and falls asleep during encounter.  CVS S1-S2 heard. bradycardia.  Lungs bilateral decreased breath sounds no wheezing no crackles.  Abdomen soft minimal distention, bowel sounds heard.  Extremities no pedal edema peripheral pulses 2+.  Skin intact no rash noted.  Psychiatric cooperative.  CNS-moving all extremities.          Medications:          amLODIPine  5 mg Oral Daily     calcium carbonate 500 mg-vitamin D 200 units  1 tablet Oral Daily     dabigatran ANTICOAGULANT (PRADAXA) PO  150 mg  Oral BID     pantoprazole  40 mg Oral Daily     simvastatin  5 mg Oral At Bedtime     sodium chloride (PF)  3 mL Intracatheter Q8H     acetaminophen, acetaminophen, IV fluid REPLACEMENT ONLY, melatonin, naloxone, ondansetron **OR** ondansetron, - MEDICATION INSTRUCTIONS -, senna-docusate **OR** senna-docusate, sodium chloride (PF)         Data:      All new lab and imaging data was reviewed.

## 2018-10-12 NOTE — PROGRESS NOTES
SW:  D:  Call placed and message left for patient's wife.  Awaiting a return call.  P:  Will continue to follow.

## 2018-10-12 NOTE — PLAN OF CARE
Problem: Patient Care Overview  Goal: Plan of Care/Patient Progress Review  Outcome: No Change  Pt denied pain. Vitals stable on RA. Up w/ 2 and walker. Tele shows Kyaw a fib. Purred diet w/tube feedings. Monitor HR w/activity per Cards. Continue to monitor.

## 2018-10-12 NOTE — PLAN OF CARE
Problem: Patient Care Overview  Goal: Plan of Care/Patient Progress Review  Discharge Planner PT   Patient plan for discharge: Pt did not state  Current status: Pt reluctantly agreeable to participate, agrees with encouragement. HR 47-50 at rest. Pt transfers supine>sit with Yamile of 1. Pt transfers sit<>stand and bed>chair with FWW and modA of 1, impulsive with mobility, needs max VC for safe use of FWW. HRmax 65 during activity. Pt refused further activity, pt up in chair with breakfast tray and call light within reach, chair alarm on.   Barriers to return to prior living situation: Current level of assist, decreased safety awareness, fall risk, decreased activity tolerance.  Recommendations for discharge: TCU  Rationale for recommendations: Pt is significantly below baseline for activity and is an increased fall risk with recent history of falls. Pt needs 24hr care and supervision and cannot be managed at home. Pt would benefit from continued skilled PT services to progress safety and IND with functional mobility.       Entered by: Enedelia Purdy 10/12/2018 8:25 AM

## 2018-10-12 NOTE — PROGRESS NOTES
[facility] Progress Note     Primary Oncologist/Hematologist:  Dr. Mcleod          Assessment and Plan:     Deepak is an 83 year old man admitted 10/11 with weakness     1. Esophageal cancer  - Having issues with dysphagia with weight loss  - EGD 9/24/2018 shows distal esophageal mass  - Biopsy positive for poorly differentiated carcinoma with adeno and squamous differentiation. HER2 was not amplified.  - CT chest, abdomen and pelvis 9/28/2018 demonstrated thickening of the distal esophageal wall extending to the GE junction.  Paraesophageal adenopathy along with upper abdominal adenopathy was noted compatible with metastatic disease.  - EUS 10/2/2018 showed complete obstruction of lower third of esophagus with malignant appearing lymph nodes in the subcarinal region and aortopulmonary region.   - Staged as T3N2MX  - G tube and Port placed 10/5/2018  - PET/CT 10/9/2018 showing hypermetabolic lymphadenopathy present in the superior, middle, and posterior mediastinum, the retroperitoneum, and along the gastrohepatic ligament. 7 mm lung lesion is too small to characterize. This might upstage cancer to stage IV disease.  - Radiation Oncology consulted with plans to give weekly paclitaxel/carboplatin with XRT. This was initially planned for neoadjuvant with possible surgery, but with the degree of adenopathy surgery might not be an option.  - Okay for soft foods and liquids as long as not having N/V, pain or cough  - Needs to start treatment. Going to TCU will make this complicated due to insurance coverage. Will have to follow up with SW.     2. Afib/PE  - Has been on dabigatran, but was off for procedures on 10/5  - CT chest with contrast 10/11 shows small PE in the right upper lung anteriorly  - Dabigatran resumed  - Cardiology consulted with bradycardia     3. Weakness  - Likely from malignancy, poor intake and other comorbidities  - WBC up, but no obvious infection on lab, imaging studies  -  "PT/OT  - G tube in place. Consult dietician.  - Not safe to go home  - TCU recommended    Efrem MASSEY CNP  [facility]  [phone]        Interval History:   Feeling okay.               Review of Systems:   The 5 point Review of Systems is negative other than noted in the HPI             Medications:   Scheduled Medications    amLODIPine  5 mg Oral Daily     calcium carbonate 500 mg-vitamin D 200 units  1 tablet Oral Daily     dabigatran ANTICOAGULANT (PRADAXA) PO  150 mg Oral BID     pantoprazole  40 mg Oral Daily     simvastatin  5 mg Oral At Bedtime     sodium chloride (PF)  3 mL Intracatheter Q8H     PRN Medications  acetaminophen, acetaminophen, IV fluid REPLACEMENT ONLY, melatonin, naloxone, ondansetron **OR** ondansetron, - MEDICATION INSTRUCTIONS -, senna-docusate **OR** senna-docusate, sodium chloride (PF)               Physical Exam:   Vitals were reviewed  Blood pressure 114/65, pulse 64, temperature 98.6  F (37  C), temperature source Oral, resp. rate 16, height 1.727 m (5' 8\"), weight 63.2 kg (139 lb 5.3 oz), SpO2 96 %.  Wt Readings from Last 4 Encounters:   10/12/18 63.2 kg (139 lb 5.3 oz)   10/02/18 67.1 kg (148 lb)   09/24/18 67.1 kg (148 lb)   09/21/18 66.1 kg (145 lb 12.8 oz)       I/O last 3 completed shifts:  In: 350 [P.O.:350]  Out: 1505 [Urine:1505]      Constitutional: Awake, alert, cooperative, no apparent distress   Lungs: Clear to auscultation bilaterally, no crackles or wheezing   Cardiovascular: Regular rate and rhythm, normal S1 and S2, and no murmur noted   Abdomen: Normal bowel sounds, soft, non-distended, non-tender. G tube.   Skin: No rashes, no cyanosis, no edema   Other: Port in right anterior chest.              Data:   All laboratory data and imaging studies reviewed.    CMP  Recent Labs  Lab 10/12/18  0615 10/11/18  1040 10/11/18  0255     --  135   POTASSIUM 3.6  --  3.5   CHLORIDE 101  --  98   CO2 25  --  25   ANIONGAP 10  --  12   GLC 97  --  104* "   BUN 14  --  27   CR 0.78  --  1.04   GFRESTIMATED >90  --  68   GFRESTBLACK >90  --  83   TRIPP 8.7  --  9.0   MAG  --  2.2  --    PHOS  --  3.2  --    PROTTOTAL  --   --  7.2   ALBUMIN  --   --  3.3*   BILITOTAL  --   --  1.2   ALKPHOS  --   --  68   AST  --   --  20   ALT  --   --  21     CBC  Recent Labs  Lab 10/12/18  0615 10/11/18  0255 10/08/18  1209   WBC 12.4* 15.5* 12.4*   RBC  --  4.37* 4.53   HGB  --  15.1 15.2   HCT  --  42.2 44.5   MCV  --  97 98   MCH  --  34.6* 33.6*   MCHC  --  35.8 34.2   RDW  --  13.1 13.3   PLT  --  173 180     INR  Recent Labs  Lab 10/08/18  1209   INR 1.18*

## 2018-10-12 NOTE — PLAN OF CARE
Problem: Patient Care Overview  Goal: Plan of Care/Patient Progress Review  Discharge Planner SLP   Patient plan for discharge: Not stated.   Current status: Clinical swallow evaluation completed.  Patient presents with severe esophageal dysphagia due to tumor obstruction of lower 1/3 of esophagus per EUS on 10/2, however, patient has been able to eat some puree textures without significant regurgitation per nursing team. Wife reports she had pureed everything at home for the past 2 weeks and he was able to keep it down, denies coughing/choking.  With tumor progression possible, patient would be at increased aspiration risk if esophageal food stasis were to reflux to pharynx and larynx.  No PO trialed today per patient refusal.  Recommend NPO due to esophageal obstruction, however, patient and family may consider pleasure PO intake with full liquid texture intake with strategies of upright positioning for 1-2 hours after ANY intake, small total amounts.  Will follow x1 when/if patient choosing pleasure PO intake to train safe swallowing strategies.    Barriers to return to prior living situation: Weakness.   Recommendations for discharge: SLP follow up at next level of care.  Rationale for recommendations: SLP services indicated intermittently to assess for swallowing improvements, diet modification and compensatory strategy training if patient will undergo cancer treatment for esophageal mass and goals of care remain restorative.        Entered by: Nanda Akins 10/12/2018 11:57 AM

## 2018-10-12 NOTE — PROGRESS NOTES
[facility]    EP Progress Note    Date of Service (when I saw the patient): 10/12/2018     Assessment & Plan   Deepak Arndt is a 83 year old male with h/o chronic AFib on Pradaxa, CAD s/p MI 2012, HTN, HLP and recently dx'd pancreatic carcinoma who presented 10/11 with generalized weakness. EP consulted (Dr. Gaming, 10/11) for bradycardia. Noted to have new small PE on CT.    1. Permanent AFib with slow V response -   * PTA on propranolol 40 mg BID (essential tremor), which has been held since admit  * HR 40-60s on telemetry. He was sleeping in the chair when I came in with HRs 50s. When we started conversing, HR up to mid60s.    * Echo 10/11/2018 showed normal EF 60-65% without significant WMA.  * Per Dr. Gaming, fatigue difficult to tease out given underlying medical condition/fraility.    * Remains on Pradaxa 150 mg BID. Normal Hgb and renal fxn     PLAN:   * Ambulate with pt (unlikely to be able to complete walking stress test to evaluate for chronotropic response)   * HR came up nicely even just with conversation.   * Will place 24 hour Holter on discharge with plans to follow up (ordered).   * Remains off of propranolol   * I will see him 10/25 @ 12:30 to review Holter      2. Fatigue/weakness -   * As above, difficult to tease out given underlying medical condition/fraility  * Hgb and CBC wnl  * G tube/Port placed for pt's dysphagia and weight loss.    * Hospitalist, Nutritionist and Heme/Onc following     PLAN:   * As above, will assess HR with ambulation and Holter with follow up       3. Small PE -   * RUL anteriorally  * Pt was off Pradaxa 150 mg BID from 10/5-10/10 for G-Tube/Port placement; has now been resumed  * Note Heme/On consideration of LMWH for hypercoagulation of malignancy.     * Echo showed normal RV fxn with mild dilation and mild pHTN (37 mmHg + RAP)  * O2 sats 96% on RA     PLAN:   * Continue Pradaxa 150 mg BID per Dr. Good's Consult Attestation      Stephanie  Rachael Stern PA-C    Interval History   Sitting in chair asleep but easily arousable  No c/o dizziness, lightheadedness    Physical Exam   Temp: 98.6  F (37  C) Temp src: Oral BP: 140/71   Heart Rate: 50 Resp: 20 SpO2: 96 % O2 Device: None (Room air)    Vitals:    10/11/18 0224 10/11/18 0550 10/12/18 0148   Weight: 65.8 kg (145 lb) 62.6 kg (138 lb 0.1 oz) 63.2 kg (139 lb 5.3 oz)     Vital Signs with Ranges  Temp:  [97.2  F (36.2  C)-98.6  F (37  C)] 98.6  F (37  C)  Heart Rate:  [39-67] 50  Resp:  [20-24] 20  BP: (120-149)/(60-96) 140/71  SpO2:  [96 %-98 %] 96 %  I/O last 3 completed shifts:  In: 350 [P.O.:350]  Out: 1505 [Urine:1505]    Telemetry: AFib 40-60s    Constitutional: Sleepy, frail  Respiratory: CTA B  Cardiovascular: Irregular. HR 60s on exam. No MRG  Musculoskeletal: No edema    Medications     IV fluid REPLACEMENT ONLY       lactated ringers 100 mL/hr at 10/11/18 0632     - MEDICATION INSTRUCTIONS -         amLODIPine  5 mg Oral Daily     calcium carbonate 500 mg-vitamin D 200 units  1 tablet Oral Daily     dabigatran ANTICOAGULANT (PRADAXA) PO  150 mg Oral BID     pantoprazole  40 mg Oral Daily     simvastatin  5 mg Oral At Bedtime     sodium chloride (PF)  3 mL Intracatheter Q8H       Data   I personally reviewed no images or EKG's today.  Results for orders placed or performed during the hospital encounter of 10/11/18 (from the past 24 hour(s))   Nt probnp inpatient   Result Value Ref Range    N-Terminal Pro BNP Inpatient 467 0 - 1800 pg/mL   CK total   Result Value Ref Range    CK Total 37 30 - 300 U/L   Procalcitonin   Result Value Ref Range    Procalcitonin 0.12 ng/ml   Magnesium   Result Value Ref Range    Magnesium 2.2 1.6 - 2.3 mg/dL   Phosphorus   Result Value Ref Range    Phosphorus 3.2 2.5 - 4.5 mg/dL   TSH with free T4 reflex   Result Value Ref Range    TSH 0.27 (L) 0.40 - 4.00 mU/L   Hemoglobin A1c   Result Value Ref Range    Hemoglobin A1C 5.7 (H) 0 - 5.6 %   Troponin I   Result Value  Ref Range    Troponin I ES 0.057 (H) 0.000 - 0.045 ug/L   T4 free   Result Value Ref Range    T4 Free 1.52 (H) 0.76 - 1.46 ng/dL   Lactic acid level STAT for sepsis protocol   Result Value Ref Range    Lactate for Sepsis Protocol 2.1 (H) 0.7 - 2.0 mmol/L   Troponin I   Result Value Ref Range    Troponin I ES 0.047 (H) 0.000 - 0.045 ug/L   Blood culture   Result Value Ref Range    Specimen Description Blood Left Arm     Special Requests Received in aerobic bottle only     Culture Micro No growth after 10 hours    Blood culture   Result Value Ref Range    Specimen Description Blood Right Arm     Special Requests Aerobic and anaerobic bottles received     Culture Micro No growth after 10 hours    Lactic acid whole blood   Result Value Ref Range    Lactic Acid 1.3 0.7 - 2.0 mmol/L   Basic metabolic panel   Result Value Ref Range    Sodium 136 133 - 144 mmol/L    Potassium 3.6 3.4 - 5.3 mmol/L    Chloride 101 94 - 109 mmol/L    Carbon Dioxide 25 20 - 32 mmol/L    Anion Gap 10 3 - 14 mmol/L    Glucose 97 70 - 99 mg/dL    Urea Nitrogen 14 7 - 30 mg/dL    Creatinine 0.78 0.66 - 1.25 mg/dL    GFR Estimate >90 >60 mL/min/1.7m2    GFR Estimate If Black >90 >60 mL/min/1.7m2    Calcium 8.7 8.5 - 10.1 mg/dL   WBC count   Result Value Ref Range    WBC 12.4 (H) 4.0 - 11.0 10e9/L

## 2018-10-12 NOTE — PLAN OF CARE
Problem: Patient Care Overview  Goal: Plan of Care/Patient Progress Review  Outcome: No Change  Patient A&O but slight confusion with situation and forgetful, Denies chest pain/tightness/pressure and  on tele slow A-fib and VSS,  Lungs are diminished and on RA, up with assist of 2 , RW and GB , on purred  Diet ( zofran IV given for nausea and TF started half dose (125 ml with water flushes) - pt tolerated well, skin is   Bruised and fragile, voiding well  And 500 ml bolus given for dehydration . IV is in  L arm and SL (R upper chest not accessed port. Plan to continue to monitor HR.

## 2018-10-12 NOTE — PROGRESS NOTES
Contacted Efrem Sifuentes NP with [facility] re: plans for chemo/radiation post discharge.  Efrem stated that the patient would require chemo treatment once per week and that if patient went to [place] there is a possibility that the patient could go to [facility] and then come weekly to [facility] for chemo.  Radiation treatment would be on hold until patient discharges from [facility].  If no bed available at [facility] then all chemo and radiation would be on hold til after TCU stay.

## 2018-10-12 NOTE — CONSULTS
Care Transition Initial Assessment -   Reason For Consult: discharge planning  Met with: Family  (wife)  Active Problems:    Symptomatic bradycardia       DATA  Lives With: spouse  Living Arrangements: house  Description of Support System: Supportive, Involved  Who is your support system?: Wife  Support Assessment: Adequate family and caregiver support.   Identified issues/concerns regarding health management:       Quality Of Family Relationships: supportive, involved  Transportation Available: car, family or friend will provide    Per social work consult for discharge planning.  Patient was admitted on 10-11-18 with symptomatic bradycardia.  The tentative date of discharge is yet to be determined.  Reviewed chart and spoke with patient's wife regarding discharge plans.  Per patient's wife report. They live in a house with 2 steps to enter.  Patient furniture walks within the house and a straight cane when he leaves the house.  Reviewed the therapy discharge recommendations of tcu placement on discharge and patient's wife is in agreement.  Patient's wife states that she would like to have patient close to home therefore she is asking for referrals to be sent to [place] and [facility].  Referrals sent, via discharge on the double, to check bed availability.  Calls placed and messages left at both [place] and [facility] to follow up on the referrals.  Awaiting return calls.    ASSESSMENT  Cognitive Status:  Did not meet patient as he was sleeping.  Call placed to patient's wife  Concerns to be addressed: discharge planning, tcu placement.     PLAN  Financial costs for the patient includes N/A.  Patient given options and choices for discharge TCU choices.  Patient/family is agreeable to the plan?  Yes  Patient Goals and Preferences: TCU on discharge.  Patient anticipates discharging to:  TCU.    Will confirm a bed, continue to follow, and assist with a safe discharge plan.    VIRGIE Peterson,  [facility]  [phone]

## 2018-10-12 NOTE — PROGRESS NOTES
1400: Pt ambulated to the bathroom with walker and SBA, heart rate increased to 68 with walk/activity.  Continue to monitor closely.

## 2018-10-12 NOTE — PROVIDER NOTIFICATION
MD Notification    Notified Person: MD    Notified Person Name:  Kaycee    Notification Date/Time: 1430, 10/12    Notification Interaction: text paged    Purpose of Notification: urine output for day shift is only 200cc.      Orders Received: awaiting call back or orders    Comments:

## 2018-10-12 NOTE — PROGRESS NOTES
MD Notification    Notified Person: MD    Notified Person Name: Soledad Benoit    Notification Date/Time:  10/12, 1156    Notification Interaction:  Paged directly.    Purpose of Notification:  Per oncology note, pt has tumor in esophogus that is completely occlusive, pt is on PO diet currently and is a full code at this time.  Do you want to continue PO diet d/t aspiration risk?  G tube in place.     Orders Received:  Awaiting call back     Comments: No call back from Kaycee, despite page x2 check with heme/onc, Maria MASSEY, whom said ok to eat as long as asymptomatic of aspiration.

## 2018-10-12 NOTE — PLAN OF CARE
Problem: Patient Care Overview  Goal: Plan of Care/Patient Progress Review  Outcome: Declining  VSS although remains afib with slow ventricular response, pradaxa for anticoagulant, HR increased with activity to upper 60s, no c/o pain, lethargic most of day, irritable and labile mood, uncooperative at times, forgetful and disoriented to situation intermittently, low urine output this shift, MD aware, G tube in place, feedings scheduled TID, tolerating well, little PO intake, pureed diet---heme/onc OK'd patient to eat as long as he is tolerating it, plan is to go to TCU, needs to start Ca treatment,  and  following, wife at bedside, continue to monitor closely.

## 2018-10-12 NOTE — PROGRESS NOTES
SW:  D:  Call placed and message left for patient's wife to discuss discharge plans.  Awaiting a return call.  P:  Will continue to follow.

## 2018-10-13 NOTE — PROGRESS NOTES
Oncology/Hematology Chart Check:    82 yo M with newly diagnosed locally advanced esophageal cancer awaiting discharge to a TCU.    - No new recommendations  - Agree with plan for outpatient chemoradiation (weekly Carbo/Taxol along with radiation therapy) or radiation therapy alone.    Please call with any questions.    Hilton Luis M.D.  [facility]  [phone]

## 2018-10-13 NOTE — PLAN OF CARE
Problem: Patient Care Overview  Goal: Plan of Care/Patient Progress Review  Discharge Planner SLP   Patient plan for discharge: not stated  Current status: Pt seen for dysphagia treatment.  He was sitting in chair and able to take small sips at slow rate without overt s/s aspiration.  Educated patient on safe swallow strategies of pleasure feeding in very small amounts while sitting fully upright with small sips.  He should remain upright for 2 hours after oral intake due to presence of esophageal tumor blocking passage. Pt with G tube for nutrition and hydration.  Barriers to return to prior living situation: Weakness  Recommendations for discharge: SLP follow up at next level of care  Rationale for recommendations: as tumor progresses and pt at increased risk if esophageal food stasis were to reflux to larynx/pharynx.         Entered by: Ramone Pan 10/13/2018 8:42 AM   Speech Language Therapy Discharge Summary    Reason for therapy discharge:    All goals and outcomes met, no further needs identified.    Progress towards therapy goal(s). See goals on Care Plan in [facility] electronic health record for goal details.  Goals met    Therapy recommendation(s):    Continued therapy is recommended.  Rationale/Recommendations:  SLP skilled service as tumor in esophagus progresses and pt at increased risk of reflux aspiration, safe tolerance of pleasure feeding..

## 2018-10-13 NOTE — PROGRESS NOTES
SW:  D:  Received a call back from Yaneth from [place] stating she spoke with Jennifer and they need to wait and do a further assessment on Monday as they need to get their business office involved regarding the chemo and financials.  Unsure if they can accept patient until Monday.  P:  Will continue to follow.

## 2018-10-13 NOTE — PLAN OF CARE
"Problem: Patient Care Overview  Goal: Plan of Care/Patient Progress Review  PT: Attempted to see pt for PT. Pt appears to be in agreement stating \"we'll try it\", but spouse requesting therapy return at a later time if able as pt was \"spitting up\" at the moment. RN present and aware.      "

## 2018-10-13 NOTE — PROGRESS NOTES
1845, pt very agitated, uncooperative, spitting and peeing on floor, very impulsive, resistant to redirection and directions in general.  Pt escorted to bathroom, very large fall risk, toileted and cleaned up, aid walked pt back to bed and pt is now sleeping. Continue to monitor.

## 2018-10-13 NOTE — PLAN OF CARE
Problem: Patient Care Overview  Goal: Plan of Care/Patient Progress Review  Outcome: No Change  Pt VSS on RA. Tele SR. A&Ox4, but forgetful with short term information. Up with A of 1 and walker, using urinal at bedside, occasionally walking to bathroom. Denies pain or SOB. Given atarax for itching. Slept. Plan for potential discharge to TCU pending placement as well as treatment plan. Continue to monitor.

## 2018-10-13 NOTE — PROVIDER NOTIFICATION
MD Notification    Notified Person: MD    Notified Person Name: hSeba     Notification Date/Time: 10/13/18 1:10 AM    Notification Interaction: Talked with MD    Purpose of Notification: Pt skin itching/irritation, benadryl either po or topical?    Orders Received: Atarax and eucerin    Comments:

## 2018-10-13 NOTE — PROGRESS NOTES
[facility]  Hospitalist Progress Note   10/13/2018          Assessment and Plan:       Deepak Arndt is a 83 year old male with a history of atrial fibrillation currently on Pradaxa, CAD w/MI in 2012, hypertension, hyperlipidemia, diagnosed with pancreatic carcinoma, recently had a gastrostomy tube and port placed [a month ago] admitted on 10/11/2018 with minimal oral intake and generalized weakness.    Adult failure to thrive.  Physical deconditioning from acute illness/malignancy/senile fraility.  Mechanical fall at home due to physical deconditioning  Possible mild cognitive impairment/dementia at baseline.  Patient has been having ongoing generalized weakness, fell out of bed on the night of admission and unable to get back up. Patient denies any specific complaints.  On admission WBC 15.5.  Glucose 104.  Albumin 3.3.  Creatinine 1.04.  ProBNP 486, troponin 0 0.057.  Lipase 154, lactic acid 1.5.  CPK 40.  CT head no acute intracranial pathology.    Has received  fluid resuscitation.  IV fluids discontinued 10/13.  Appreciate nutrition team assistance with tube feeding.  Free water through feeding tube.  Dietary supplements with Ensure  Speech therapy has concerns for aspiration and recommend n.p.o. with pleasure foods and thin liquids.   Oncology recommend - Okay for soft foods and liquids as long as not having N/V, pain or cough  Physical therapy, occupational therapy -recommend TCU     Pulmonary embolism in the setting of malignancy/anticoagulation on hold.  Patient does not have shortness of breath  Pt was off Pradaxa 150 mg BID from 10/5-10/10 for G-Tube/Port placement  CT chest done in the emergency room positive for right-sided pulmonary embolus.  Emphysema. Distal esophageal mass consistent with known cancer is again seen. Mediastinal and upper abdominal adenopathy have slightly increased.  Continue PTA Dabigatran [on it for A. Fib]  Heme/On consideration of LMWH for hypercoagulation  of malignancy -rounding oncologist  defers to Dr Mcleod  Aggressive incentive spirometry.    Reactive leukocytosis likely from malignancy.  Lactic acidosis likely from dehydration and poor oral intake.  WBC count of 15.5 >11.3, lactate of 2.1 >1.3  UA -no signs of infection.  Chest x-ray right chest wall port with catheter tip in SVC.  No acute pathology.  Blood cultures no growth..  We will hold off antibiotics at this time.     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    Had a G-tube and port placed on 10/5/2018.  Was scheduled with radiation oncology on 10/11, for XRT rx with chemorx.  Radiation oncology recommend holding treatment at this time.   [facility] following along - Needs to start treatment. Going to TCU will make this complicated due to insurance coverage.  Discussed with social work on the floor.     Atrial fibrillation with slow ventricular response.  Patient barely able to answer if he has any symptoms.  EKG atrial fibrillation with slow ventricular response.  ST-T wave abnormality, .  Heart rate 47.  TSH 0.27, T4 1.52.  Magnesium 2.2, phosphorus 3.2  EF 60-65% without significant WMA  He is on chronic therapy with propranolol which is on hold since admission.  At rest heart rate has been around 50s.  Heart rate improving to 60s with minimal ambulation.   Cardiology / EP recommend to continue to hold beta-blocker.  Event monitor at discharge.  CHADS-Vasc of 4.  Continue OAC indefinitely  Continue PTA Dabigatran   Telemetry monitoring discontinued [10/13]    Abnormal thyroid function test.  TSH 0.27, T4 1.52.  Patient having bradycardia hence will hold off treatment for hyperthyroidism.  Monitor thyroid function tests in 4-6 weeks.    Coronary artery disease.  History of MI.  Continue PTA amlodipine 5 mg oral daily, simvastatin 5 mg.  Continue PTA Pradaxa.  Discontinue Tele.   Holter monitor on discharge as per EP     Hypertension.  Restart PTA hydrochlorothiazide 25 mg oral daily at time of discharge. Monitor renal function in 1 week.  Continue PTA amlodipine 5 mg daily.    Essential tremor   Hold PTA propranolol given bradycardia    Gastroesophageal reflux disease.  Continue PTA Protonix 40 mg oral daily.     Hyperlipidemia.  Continue PTA simvastatin.     Osteoporosis.  Follow-up as outpatient.  As needed Tylenol     Active Diet Order      Mechanical/Dental Soft Diet      Advance Diet as Tolerated    DVT Prophylaxis: on pradaxA  Code Status: Full Code  Disposition: Expected discharge likely tomorrow to TCU pending placement.  Discharge orders in place.  Discussed with /patient's RN patient- plan for chemo/radiation therapy with TCU for placement [insurance coverage complicated]    Patient, family (10/11), interdisciplinary team involved in care and agrees with plan.  Total time - Greater than 25 min. More than 50% of time spent in direct patient care, care coordination, patient counseling, and formalizing plan of care.     Kwan Benoit MD        Interval History:      Patient is sitting up in the chair with breakfast in front of him.   On awakening denies any chest pain or shortness of breath.  Minimal communication during encounter          Physical Exam:        Physical Exam   Temp:  [97.4  F (36.3  C)-98.2  F (36.8  C)] 97.4  F (36.3  C)  Pulse:  [64-68] 68  Heart Rate:  [48-63] 55  Resp:  [16-24] 16  BP: (114-136)/(65-77) 136/69  SpO2:  [95 %-96 %] 96 %    Intake/Output Summary (Last 24 hours) at 10/12/18 1506  Last data filed at 10/12/18 1400   Gross per 24 hour   Intake              670 ml   Output              705 ml   Net              -35 ml       Admission Weight: 65.8 kg (145 lb)  Current Weight: 63.2 kg (139 lb 5.3 oz)    PHYSICAL EXAM  General drowsy and falls asleep during encounter.  CVS S1-S2 heard  Lungs bilateral decreased breath sounds no wheezing no  crackles.  Abdomen soft minimal distention, bowel sounds heard.  Extremities no pedal edema peripheral pulses 2+.  Skin intact no rash noted.  Psychiatric cooperative.  CNS-moving all extremities.          Medications:          amLODIPine  5 mg Oral Daily     calcium carbonate 500 mg-vitamin D 200 units  1 tablet Oral Daily     dabigatran ANTICOAGULANT (PRADAXA) PO  150 mg Oral BID     pantoprazole  40 mg Oral Daily     simvastatin  5 mg Oral At Bedtime     sodium chloride (PF)  3 mL Intracatheter Q8H     acetaminophen, acetaminophen, IV fluid REPLACEMENT ONLY, hydrOXYzine, melatonin, mineral oil-white petrolatum, naloxone, ondansetron **OR** ondansetron, - MEDICATION INSTRUCTIONS -, senna-docusate **OR** senna-docusate, sodium chloride (PF)         Data:      All new lab and imaging data was reviewed.

## 2018-10-14 NOTE — PLAN OF CARE
Problem: Patient Care Overview  Goal: Plan of Care/Patient Progress Review  Pt is A&O x 3, disoriented to situation. Confused at times. Agitated, and frequently setting up bed alarm. VSS on RA, denied pain and SOB. Lung sound diminished, spitting up sputum frequently on wast basket, at times on the floor. Received bolus of Isosource. Mechanical soft diet with fair appetite. Up x 1 assist to bathroom, incontinent at times. Discharge pending in progress. Continue to monitor.

## 2018-10-14 NOTE — PROGRESS NOTES
[facility]  Hospitalist Progress Note   10/14/2018          Assessment and Plan:       This is an 83 year old gentleman with PMH significant for Atrial fibrillation on Pradaxa, CAD,  HTN hyperlipidemia, and recent ly diagnosed pancreatic cancer s/p gastrostomy tube and port-a cath placement  Who was admitted on 10/11/2018 for generalized weakness and failure to thrive     Adult failure to thrive.  Physical deconditioning from acute illness/malignancy/senile fraility.  Mechanical fall at home due to physical deconditioning  Possible mild cognitive impairment/dementia at baseline.  Patient has been having ongoing generalized weakness, fell out of bed on the night of admission and unable to get back up. Patient denies any specific complaints.  CT head no acute intracranial pathology.    Appreciate nutrition team assistance with tube feeding.  Free water through feeding tube.  Dietary supplements with Ensure  Speech therapy has concerns for aspiration and recommend n.p.o. with pleasure foods and thin liquids.   Oncology recommend - Okay for soft foods and liquids as long as not having N/V, pain or cough  Physical therapy, occupational therapy -recommend TCU     Pulmonary embolism in the setting of malignancy/anticoagulation on hold.  Patient does not have shortness of breath  Pt was off Pradaxa 150 mg BID from 10/5-10/10 for G-Tube/Port placement  CT chest done in the emergency room positive for right-sided pulmonary embolus.  Emphysema. Distal esophageal mass consistent with known cancer is again seen. Mediastinal and upper abdominal adenopathy have slightly increased.  Continue PTA Dabigatran [on it for A. Fib]  Heme/On consideration of LMWH for hypercoagulation of malignancy -rounding oncologist  defers to Dr Mcleod  Aggressive incentive spirometry.    Reactive leukocytosis likely from malignancy. Improved  WBC count of 15.5 >11.3, lactate of 2.1 >1.3  UA -no signs of infection.  Chest  x-ray right chest wall port with catheter tip in SVC.  No acute pathology.  Blood cultures no growth..  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    Had a G-tube and port placed on 10/5/2018.  Was scheduled with radiation oncology on 10/11, for XRT rx with chemorx.  Radiation oncology recommend holding treatment at this time.   -  [facility] to follow when discharged to TCU     Atrial fibrillation with slow ventricular response.  He is on chronic therapy with propranolol which is on hold since admission.  Patient remains bradycardic .   Cardiology / EP recommend to continue to hold beta-blocker.  Event monitor at discharge.  CHADS-Vasc of 4.  Continue OAC indefinitely  Continue PTA Dabigatran   Telemetry monitoring discontinued [10/13]    Abnormal thyroid function test.  TSH 0.27, T4 1.52.  Patient having bradycardia hence will hold off treatment for hyperthyroidism.  Monitor thyroid function tests in 4-6 weeks.    Coronary artery disease.  History of MI.  Continue PTA amlodipine 5 mg oral daily, simvastatin 5 mg.  Continue PTA Pradaxa.  Discontinue Tele.  Holter monitor on discharge as per EP     Hypertension.  Restart PTA hydrochlorothiazide 25 mg oral daily at time of discharge. Monitor renal function in 1 week.  Continue PTA amlodipine 5 mg daily.    Essential tremor   Hold PTA propranolol given bradycardia    Gastroesophageal reflux disease.  Continue PTA Protonix 40 mg oral daily.     Hyperlipidemia.  Continue PTA simvastatin.     Osteoporosis.  Follow-up as outpatient.  As needed Tylenol     Active Diet Order      Mechanical/Dental Soft Diet      Advance Diet as Tolerated    DVT Prophylaxis: on pradaxA  Code Status: Full Code  Disposition: Expected discharge likely tomorrow to TCU pending placement.  Discharge orders in place.  Discussed with /patient's  RN patient- plan for chemo/radiation therapy with TCU for placement [insurance coverage complicated]    Patient, family (10/11), interdisciplinary team involved in care and agrees with plan.  Total time - Greater than 25 min. More than 50% of time spent in direct patient care, care coordination, patient counseling, and formalizing plan of care.     Junior Hong MD        Interval History:      patient lying down in bed, unable to conduct to an comprehensive conversation, bedside nurse reported no issues          Physical Exam:        Physical Exam   Temp:  [97.5  F (36.4  C)-98.9  F (37.2  C)] 98.9  F (37.2  C)  Pulse:  [57] 57  Heart Rate:  [59-64] 59  Resp:  [18-20] 18  BP: (122-133)/(58-73) 133/58  SpO2:  [94 %-99 %] 95 %    Intake/Output Summary (Last 24 hours) at 10/12/18 1506  Last data filed at 10/12/18 1400   Gross per 24 hour   Intake              670 ml   Output              705 ml   Net              -35 ml       Admission Weight: 65.8 kg (145 lb)  Current Weight: 63.2 kg (139 lb 5.3 oz)    PHYSICAL EXAM  General drowsy and falls asleep during encounter.  CVS S1-S2 heard  Lungs bilateral decreased breath sounds no wheezing no crackles.  Abdomen soft minimal distention, bowel sounds heard.  Extremities no pedal edema peripheral pulses 2+.  Skin intact no rash noted.  Psychiatric cooperative.  CNS-moving all extremities.          Medications:          amLODIPine  5 mg Oral Daily     calcium carbonate 500 mg-vitamin D 200 units  1 tablet Oral Daily     dabigatran ANTICOAGULANT (PRADAXA) PO  150 mg Oral BID     pantoprazole  40 mg Oral Daily     simvastatin  5 mg Oral At Bedtime     sodium chloride (PF)  3 mL Intracatheter Q8H     acetaminophen, acetaminophen, IV fluid REPLACEMENT ONLY, hydrOXYzine, melatonin, mineral oil-white petrolatum, naloxone, ondansetron **OR** ondansetron, - MEDICATION INSTRUCTIONS -, senna-docusate **OR** senna-docusate, sodium chloride (PF)         Data:      All new lab  and imaging data was reviewed.

## 2018-10-14 NOTE — PROGRESS NOTES
Oncology/Hematology Chart Check:     84 yo M with newly diagnosed locally advanced esophageal cancer awaiting discharge to a TCU.     - No new recommendations  - Agree with plan to consider outpatient chemoradiation (weekly Carbo/Taxol along with radiation therapy) or radiation therapy alone.  If discharge is delayed, could consult radiation oncology in the hospital and start as an inpatient.  - Continue dabigatran for A fib and PE     Please call with any questions.     Hilton Luis M.D.  [facility]  [phone]

## 2018-10-14 NOTE — PROGRESS NOTES
SW:  D: Phone call to [facility] to follow up on referral. Admissions rep will call SW back once referral has been reviewed.   P: Continue to follow.     MARGARITO Childers    ADDENDUM  D: PC with Henrique at [facility], nurse will need to do an onsite Monday as to weather they can clinically accept him or not. [facility] will also need a new feeding tube to ensure pt's needs are met. SW to follow up in the AM.  P: Continue to follow for placement.     MARGARITO Childers

## 2018-10-14 NOTE — PLAN OF CARE
Problem: Patient Care Overview  Goal: Plan of Care/Patient Progress Review  Outcome: No Change  Pt A/O x 3, disoriented to situation. VSS on RA. Pt denies pain,N/V and SOB. Impulsive, refuses to use call light, and spits on floor. LS diminished. +BS in all four quadrants, passing flatus. Incontinent of urine. G-tube feeds administered at 0900 and 1400, otherwise clamped. Free water flush of 240 ml throughout shift, no residual. G-tube dressing CDI. Pills take PO with apple juice. A1 w/ gaitbelt and walker. Mechanical soft diet, refused breakfast. Plan to discharge to [facility] on Monday, 10/15 pending placement. Will continue to monitor.

## 2018-10-15 PROBLEM — C15.5 CANCER OF DISTAL THIRD OF ESOPHAGUS (H): Status: ACTIVE | Noted: 2018-01-01

## 2018-10-15 NOTE — PROGRESS NOTES
CLINICAL NUTRITION SERVICES - REASSESSMENT NOTE      Recommendations Ordered by Registered Dietitian (RD):   Will increase bolus TF to 1 can QID, Isosource 1.5 = 1500 cals, 68 gm pro, 15 gm fiber, 750 mL free water  Flush G-tube with 120 mL water before and after each feeding - MD order 10/12    Calorie count 10/16-10/18   Malnutrition: (10/11)  % Weight Loss:  > 5% in 1 month (severe malnutrition)  % Intake:  <75% for >/= 1 month (non-severe malnutrition)  Subcutaneous Fat Loss:  None observed  Muscle Loss:  Anterior thigh region  - moderate depletion and Posterior calf region  - moderate depletion  Fluid Retention:  None noted     Malnutrition Diagnosis: Severe malnutrition  In Context of:  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 Per flow sheets, eating mostly 25-50% of meals. Intake is inconsistent.  Daily average of meals ordered the past 3 days = 1237 calories, 49 gm protein.  Considering intake of 50% or less, pt is meeting, at best, only 39% and 33% of minimal energy and protein needs respectively.    Nutrition Support:  Isosource 1.5 - 3 cans per day = 1125 cals, 51 gm pro, 11 gm fiber, 570 mL free water  Flush G-tube with 120 mL water before and after each feeding - MD order 10/12.    Tolerance: Pt is tolerating bolus feeds well. BM x 1 past 24 hours.      ASSESSED NUTRITION NEEDS:  Dosing Weight (10/11) 62.6 kg  Estimated Energy Needs: 3156-6044 kcals (25-30 Kcal/Kg)  Justification: maintenance  Estimated Protein Needs: 75-95 grams protein (1.2-1.5 g pro/Kg)  Justification: preservation of lean body mass    NEW FINDINGS:    BS <.= 120  Phos 2.4 (L)    Vitals:    10/11/18 0224 10/11/18 0550 10/12/18 0148 10/13/18 0500   Weight: 65.8 kg (145 lb) 62.6 kg (138 lb 0.1 oz) 63.2 kg (139 lb 5.3 oz) 62.6 kg (138 lb)    10/13/18 1512 10/14/18 0500 10/15/18 0546   Weight: 62.2 kg (137 lb 2 oz) 64.3 kg (141 lb 12.1 oz) 64.2 kg (141 lb 8.6 oz)        Previous Goals:   EN to meet 60% est needs while pt still taking po  Evaluation:  Met    Previous Nutrition Diagnosis:   Inadequate oral intake related to difficulty swallowing with new dx GE junction CA as evidenced by pt with 9% wt loss past month  Evaluation: No change, updated below      CURRENT NUTRITION DIAGNOSIS  Inadequate oral intake related to difficulty swallowing with new dx GE junction CA as evidenced by pt with 9% wt loss past month and currently meeting <40% assessed needs orally.      INTERVENTIONS  Recommendations / Nutrition Prescription  Continue current diet    Will increase bolus TF to 1 can QID, Isosource 1.5 = 1500 cals, 68 gm pro, 15 gm fiber, 750 mL free water  Flush G-tube with 120 mL water before and after each feeding - MD order 10/12     Give 1 can at 9am - 2pm - 6pm - 9 pm      Implementation  EN Schedule - ordered the above TF increase  Collaboration and Referral of Nutrition care - discussed bolus feedings and increase with bedside RN  Calorie count 10/16-10/18    Goals  EN will provide >90% of assessed needs      MONITORING AND EVALUATION:  Progress towards goals will be monitored and evaluated per protocol and Practice Guidelines

## 2018-10-15 NOTE — PROGRESS NOTES
"Pt is A/Ox3, forgetful and disoriented to situation. VSS on RA. Reports feeling weak and tired. Impulsive at times, does not use call light. SBA with the walker. LS clear. Reports SOB. No chest pain reported. Gtube (G tube dressing c/d/i), tube feeding, one can over 30 min with 120 ml of water flush before and after the feeding, per Nutrition \"ok to bolus feedings thru syringe\". No PIV access, R port for chemo. Phosphorus 2.4, needs to be replaced. Morning pills PO with water, but pt had difficulty swallowing it, took time. Mechanical soft diet, poor intake. Transferred to  around 11:30 am. Report given to RN there. Nursing continue to monitor.   "

## 2018-10-15 NOTE — PROGRESS NOTES
[facility] Progress Note  [facility]    Attempted to visit [facility], referral from staff, each time, pt was sleeping. pt was on the 6th floor and moved into the 8th floor today.     I and SH will visit him again.     Jonna Perez  Chaplain Resident

## 2018-10-15 NOTE — PROGRESS NOTES
[facility] Progress Note          Assessment and Plan:   Mr. Deepak Arndt is an 83 year old man with a recent diagnosis of adenocarcinoma of the esophagus who was admitted on 10/11/18 with weakness    1. Esophageal cancer  - presented with dysphagia with weight loss  - EGD 9/24/2018 shows distal esophageal mass  - Biopsy positive for poorly differentiated carcinoma with adeno and squamous differentiation. HER2 was not amplified.  - CT chest, abdomen and pelvis 9/28/2018 demonstrated thickening of the distal esophageal wall extending to the GE junction.  Paraesophageal adenopathy along with upper abdominal adenopathy was noted compatible with metastatic disease.  - EUS 10/2/2018 showed complete obstruction of lower third of esophagus with malignant appearing lymph nodes in the subcarinal region and aortopulmonary region.   - Staged as T3N2MX  - G tube and Port placed 10/5/2018  - PET/CT 10/9/2018 showing hypermetabolic lymphadenopathy present in the superior, middle, and posterior mediastinum, the retroperitoneum, and along the gastrohepatic ligament. 7 mm lung lesion is too small to characterize. This might upstage cancer to stage IV disease.  - Radiation Oncology consulted with plans to give weekly paclitaxel/carboplatin with XRT. This was initially planned for neoadjuvant with possible surgery  - he is quite weak. Discussed at length with patient and his wife and also reviewed with Dr. Peter  - I offered him the plan of beginning weekly paclitaxel and carboplatinum with addition of radiation therapy as he would become stronger  - Okay for soft foods and liquids as long as not having N/V, pain or cough  - Going to TCU will make this complicated due to insurance coverage. Will have to follow up with SW.  - discussed transfer to 8th floor to begin chemotherapy      2. Afib/PE  - Has been on dabigatran, but was off for procedures on 10/5  - CT chest with contrast 10/11 shows small PE in the right  "upper lung anteriorly  - Dabigatran resumed  - Cardiology consulted with bradycardia      3. Weakness  - Likely from malignancy, poor intake and other comorbidities  - WBC up, but no obvious infection on lab, imaging studies  - PT/OT  - G tube in place. Consult dietician.  - Not safe to go home  - TCU recommended             Interval History:   Patient reports no pain or nausea. He relates generalized weakness              Medications:       amLODIPine  5 mg Oral Daily     calcium carbonate 500 mg-vitamin D 200 units  1 tablet Oral Daily     dabigatran ANTICOAGULANT (PRADAXA) PO  150 mg Oral BID     pantoprazole  40 mg Oral Daily     simvastatin  5 mg Oral At Bedtime     sodium chloride (PF)  3 mL Intracatheter Q8H     acetaminophen, acetaminophen, IV fluid REPLACEMENT ONLY, hydrOXYzine, melatonin, mineral oil-white petrolatum, naloxone, ondansetron **OR** ondansetron, - MEDICATION INSTRUCTIONS -, senna-docusate **OR** senna-docusate, sodium chloride (PF)               Physical Exam:   Blood pressure 144/61, pulse 54, temperature 98.9  F (37.2  C), temperature source Oral, resp. rate 16, height 1.727 m (5' 8\"), weight 64.2 kg (141 lb 8.6 oz), SpO2 96 %.  Wt Readings from Last 4 Encounters:   10/15/18 64.2 kg (141 lb 8.6 oz)   10/02/18 67.1 kg (148 lb)   18 67.1 kg (148 lb)   18 66.1 kg (145 lb 12.8 oz)         Vital Sign Ranges  Temperature Temp  Av  F (36.7  C)  Min: 97.6  F (36.4  C)  Max: 98.9  F (37.2  C)   Blood pressure Systolic (24hrs), Av , Min:125 , Max:158        Diastolic (24hrs), Av, Min:61, Max:77      Pulse Pulse  Av  Min: 54  Max: 58   Respirations Resp  Av.3  Min: 16  Max: 18   Pulse oximetry SpO2  Av.3 %  Min: 96 %  Max: 100 %         Intake/Output Summary (Last 24 hours) at 10/15/18 0959  Last data filed at 10/14/18 1900   Gross per 24 hour   Intake              420 ml   Output                0 ml   Net              420 ml       Constitutional: Awake, alert, " cooperative, no apparent distress but appears very weak   Lungs: Clear to auscultation bilaterally, no crackles or wheezing   Cardiovascular: Regular rate and rhythm, normal S1 and S2, and no murmur noted   Abdomen: Normal bowel sounds, soft, non-distended, non-tender   Skin: No rashes, no cyanosis, no upper or lower extremity edema   Other: Port non tender. Feeding tube non tender          Data:   Laboratory:  CMP  Recent Labs  Lab 10/12/18  0615 10/11/18  1040 10/11/18  0255     --  135   POTASSIUM 3.6  --  3.5   CHLORIDE 101  --  98   CO2 25  --  25   ANIONGAP 10  --  12   GLC 97  --  104*   BUN 14  --  27   CR 0.78  --  1.04   GFRESTIMATED >90  --  68   GFRESTBLACK >90  --  83   TRIPP 8.7  --  9.0   MAG  --  2.2  --    PHOS  --  3.2  --    PROTTOTAL  --   --  7.2   ALBUMIN  --   --  3.3*   BILITOTAL  --   --  1.2   ALKPHOS  --   --  68   AST  --   --  20   ALT  --   --  21     CBC  Recent Labs  Lab 10/13/18  0555 10/12/18  0615 10/11/18  0255 10/08/18  1209   WBC 11.3* 12.4* 15.5* 12.4*   RBC  --   --  4.37* 4.53   HGB  --   --  15.1 15.2   HCT  --   --  42.2 44.5   MCV  --   --  97 98   MCH  --   --  34.6* 33.6*   MCHC  --   --  35.8 34.2   RDW  --   --  13.1 13.3   PLT  --   --  173 180     INR  Recent Labs  Lab 10/08/18  1209   INR 1.18*       Imaging data:  Results for orders placed or performed during the hospital encounter of 10/11/18   XR Chest 2 Views    Narrative    XR CHEST 2 VW  10/11/2018 3:07 AM     INDICATION: Weakness, shortness of breath.    COMPARISON: CT 9/28/2018. Chest x-ray 10/23/2017.      Impression    IMPRESSION: Borderline prominent heart size. No focal air-space  disease or other acute findings. Right chest wall port with catheter  tip in the SVC. Old right rib fractures.    LEBRON LANZA MD   CT Head w/o Contrast    Narrative    CT HEAD W/O CONTRAST  10/11/2018 3:21 AM     HISTORY: Fall, weakness.     TECHNIQUE: Axial images of the head and coronal reformations without  IV  contrast material. Radiation dose for this scan was reduced using  automated exposure control, adjustment of the mA and/or kV according  to patient size, or iterative reconstruction technique.    COMPARISON: 10/23/2017.    FINDINGS: No intracranial hemorrhage, mass or mass effect. Low  attenuation areas are present in white matter of the cerebral  hemispheres that are nonspecific but consistent with chronic small  vessel ischemic changes in this age patient. No acute infarct  identified. No shift of midline structures. No skull fractures.      Impression    IMPRESSION: No acute intracranial abnormality.    LEBRON LANZA MD   CT Chest Pulmonary Embolism w Contrast     Value    Radiologist flags Pulmonary embolism (AA)    Narrative    CT CHEST PULMONARY EMBOLISM W CONTRAST  10/11/2018 4:13 AM     HISTORY: Dyspnea.    TECHNIQUE: Volumetric acquisition of the chest after the  administration of 59 mL Isovue-370 IV contrast. Radiation dose for  this scan was reduced using automated exposure control, adjustment of  the mA and/or kV according to patient size, or iterative  reconstruction technique.     COMPARISON: CT 9/28/2018.    FINDINGS: Study is positive for a small pulmonary embolus in the right  upper lung anteriorly (series 5, image 75). No acute infiltrates or  pleural effusions. Emphysema. 2.5 cm right thyroid nodule as seen  previously. Right chest wall port with catheter tip in the SVC.  Coronary artery calcifications. Mediastinal adenopathy, slightly more  prominent than on the previous study. Distal esophageal mass is again  seen. A prominent left lower mediastinal lymph node in the  paraesophageal region has increased in size measuring 2.3 x 2.4 cm  (previously 1.5 x 2.1 cm). A right retrocrural lymph node is more  prominent. Enlarged lymph nodes in the gastrohepatic ligament region  are slightly more prominent. Renal and liver cysts.      Impression    IMPRESSION:  1. Study is positive for right-sided  pulmonary embolus.  2. Distal esophageal mass consistent with known cancer is again seen.  Mediastinal and upper abdominal adenopathy have slightly increased.  3. Emphysema.    [Critical Result: Pulmonary embolism]    Finding was identified on 10/11/2018 4:21 AM.     Dr. Walker was contacted by me on 10/11/2018 4:29 AM and  verbalized understanding of the critical result.    MD Donnell PAN MD

## 2018-10-15 NOTE — PROGRESS NOTES
[facility]    Medicine Progress Note - Hospitalist Service       Date of Admission:  10/11/2018    Assessment & Plan   Deepak Arndt is a 83 year old male  admitted on 10/11/2018. He has a history of recently diagnosed pancreatic CA s/p gastrostomy tube, CAD, Htn and is admitted for weakness, failure to thrive and small pulmonary embolism.     Adult failure to thrive  Physical deconditioning from acute illness/malignancy/senile fraility  Mechanical fall at home due to physical deconditioning  Possible mild cognitive impairment/dementia at baseline.  Severe malnutrition in the context of chronic illness  Patient has been having ongoing generalized weakness, fell out of bed on the night of admission and unable to get back up. Patient denies any specific complaints. CT head no acute intracranial pathology.  -Appreciate nutrition team assistance with tube feeding.  Free water through feeding tube.  -Dietary supplements with Ensure  -Speech therapy has concerns for aspiration and recommend n.p.o. with pleasure foods and thin liquids.   -Oncology recommend - Okay for soft foods and liquids as long as not having N/V, pain or cough  -Physical therapy, occupational therapy -recommend TCU      Pulmonary embolism in the setting of malignancy/anticoagulation on hold for procedure.  Pt was off Pradaxa 150 mg BID from 10/5-10/10 for G-Tube/Port placement.  CT chest done in the emergency room positive for right-sided pulmonary embolus.  Emphysema. Distal esophageal mass consistent with known cancer is again seen. Mediastinal and upper abdominal adenopathy have slightly increased.  -Continue PTA Dabigatran [on it for A. Fib]  -Heme/On consideration of LMWH for hypercoagulation of malignancy -rounding oncologist  defers to Dr Mcleod  -Aggressive incentive spirometry.     Reactive leukocytosis likely from malignancy. Improved  WBC count of 15.5 >11.3, lactate of 2.1 >1.3 UA -no signs of infection.  Chest x-ray right chest wall port with catheter tip in SVC.  No acute pathology. Blood cultures no growth..  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  Had a G-tube and port placed on 10/5/2018.  - transferred to station 88 on 10/15 to begin weekly paclitaxel and carboplatinum with addition of radiation therapy    - [facility] following     Atrial fibrillation with slow ventricular response.  He is on chronic therapy with propranolol which is on hold since admission.  -Cardiology / EP recommend to continue to hold beta-blocker. Holter monitor at discharge.  -CHADS-Vasc of 4. Continue PTA Dabigatran      Abnormal thyroid function test.  TSH 0.27, T4 1.52. Patient having bradycardia hence will hold off treatment for hyperthyroidism.  -Monitor thyroid function tests in 4-6 weeks.     Coronary artery disease.  History of MI.  -Continue PTA amlodipine 5 mg oral daily, simvastatin 5 mg.  -Continue PTA Pradaxa.      Hypertension.  -holding PTA hydrochlorothiazide 25 mg - consider restarting at discharge or as needed  -Continue PTA amlodipine 5 mg daily.     Essential tremor   -Hold PTA propranolol given bradycardia     Gastroesophageal reflux disease.  -Continue PTA Protonix 40 mg oral daily.      Hyperlipidemia.  -Continue PTA simvastatin.      Osteoporosis.  -Follow-up as outpatient.    Diet: Mechanical/Dental Soft Diet  Room Service  Adult Formula Bolus Feeding: TID Isosource 1.5; Route: Gastrostomy; 250; mL(s); Medication - Tube Feeding Flush Frequency: At least 15-30 mL water before and after medication administration and with tube clogging; Amount to Send (Nutrition use): s...  Advance Diet as Tolerated  Fluids: none  Poe Catheter: not present  DVT Prophylaxis: Pradaxa  Code Status: Full Code    Disposition Plan   Expected discharge: 2 - 3 days, recommended to  transitional care unit once initial chemo therapy complete and medically stable following, XRT started and dispo plan in place.  Entered: Juarez Hager DO 10/15/2018, 9:20 AM   Information in the above section will display in the discharge planner report.      The patient's care was discussed with the Bedside Nurse.    Juarez Hager DO  Hospitalist Service  [facility]    ______________________________________________________________________    Interval History   Sitting in the chair, feels ok today.  Has some slight shortness of breath at times but none currently.  No chest pain.  Tolerating some po and takes meds po but gets nutrition through his g-tube.  Waiting on placement at this point.     Data reviewed today: I reviewed all medications, new labs and imaging results over the last 24 hours. I personally reviewed no images or EKG's today.    Physical Exam   Vital Signs: Temp: 98.9  F (37.2  C) Temp src: Oral BP: 144/61 Pulse: 54 Heart Rate: 54 Resp: 16 SpO2: 96 % O2 Device: None (Room air)    Weight: 141 lbs 8.57 oz  General Appearance: Sitting in the chair, appears comfortable  Respiratory: CTAB, no wheeze  Cardiovascular: RRR no m/r/g  GI: +BS, soft, G-tube in place  Skin: slight contact rash on upper back  Other:  no edema     Data     Recent Labs  Lab 10/13/18  0555 10/12/18  0615 10/11/18  1408 10/11/18  1040 10/11/18  0255 10/08/18  1209   WBC 11.3* 12.4*  --   --  15.5* 12.4*   HGB  --   --   --   --  15.1 15.2   MCV  --   --   --   --  97 98   PLT  --   --   --   --  173 180   INR  --   --   --   --   --  1.18*   NA  --  136  --   --  135  --    POTASSIUM  --  3.6  --   --  3.5  --    CHLORIDE  --  101  --   --  98  --    CO2  --  25  --   --  25  --    BUN  --  14  --   --  27  --    CR  --  0.78  --   --  1.04  --    ANIONGAP  --  10  --   --  12  --    TRIPP  --  8.7  --   --  9.0  --    GLC  --  97  --   --  104*  --    ALBUMIN  --   --   --   --  3.3*  --    PROTTOTAL   --   --   --   --  7.2  --    BILITOTAL  --   --   --   --  1.2  --    ALKPHOS  --   --   --   --  68  --    ALT  --   --   --   --  21  --    AST  --   --   --   --  20  --    LIPASE  --   --   --   --  154  --    TROPI  --   --  0.047* 0.057* 0.057*  --

## 2018-10-15 NOTE — PLAN OF CARE
Problem: Patient Care Overview  Goal: Plan of Care/Patient Progress Review  Discharge Planner PT   Patient plan for discharge: Pt did not state  Current status: Pt recently moved floors. Pt is able to sit EOB using bedrail with SBA. Pt stands with min/modA and cues. Pt ambulates 12 ft with walker and Yamile and cues. Pt fatigues easily. Pt completes exercises with cues and is able to do 10 reps before fatiguing.   Barriers to return to prior living situation: Pt is not independent.  Recommendations for discharge: TCU  Rationale for recommendations: Pt has multiple medical issues and will be starting CA treatment. Pt will need continued care to increase strength to maximize mobility.       Entered by: Stephanie Rossi 10/15/2018 12:40 PM

## 2018-10-15 NOTE — PLAN OF CARE
Problem: Patient Care Overview  Goal: Plan of Care/Patient Progress Review  Outcome: Improving  A/Ox3; forgetful. VSS. RA. Assist x1; walker. Dental soft diet. Tube feeding TID. Dressing CDI. Oral meds. Using blue emesis bag or Kleenexes to spit in. Uses call button occasionally but gets up to go to the bathroom regardless.Denies chest pain, pain, SOB at this time, N/V/D. Plan to Discharge to TCU today 10/15 for chemotherapy/radiation.

## 2018-10-15 NOTE — PROGRESS NOTES
SW:  D:  [facility] clinically accepted patient today.  Writer informed Tomas at [facility] of patient's transfer to [facility].

## 2018-10-15 NOTE — PLAN OF CARE
Problem: Patient Care Overview  Goal: Plan of Care/Patient Progress Review  Outcome: No Change  VSS. A/O but excitable. Flat affect. Up with SBA and walker. Port accessed. Phosphorus being replaced, recheck in AM. Denies pain or nausea. Bolus feed given via g-tube. Plan to start chemo tomorrow. Rad Onc also following for future radiation plan if pt tolerates chemo. Diet changed to pureed per pt and spouse's preference.

## 2018-10-15 NOTE — PLAN OF CARE
Problem: Patient Care Overview  Goal: Plan of Care/Patient Progress Review  Pt is A&O x 3, disoriented to situation. VSS on RA. denies pain,N/V and SOB. Impulsive, sets up call lights frequenly. Uncooperative at times. LS diminished throughout. Up x 1 assist to bathroom with walker/GB. Uses bathroom, Incontinent of urine at time. Bolus feeding given at 1900. otherwise clamped. Free water flush of 240 ml. no residual. G-tube dressing CDI. Mechanical soft diet and ate 75% of dinner. Anticipated discharge pending in progress. Possible to [place]. Continue to monitor.

## 2018-10-15 NOTE — PROGRESS NOTES
Pt improving.  Cardiac status stabilizing.  Discussed pt's status with Dr. Mcleod.  Chemorx is being initiated with weekly paclitaxel & carboplatinum.  XRT rx will be considered after initial chemorx depending on pt's strength & tolerance.  Dr. Mcleod will contact us if XRT rx to be initiated.  Will follow.  CHRISTIAN Peter MD.

## 2018-10-16 NOTE — PLAN OF CARE
Problem: Fall Risk (Adult)  Goal: Absence of Falls  Patient will demonstrate the desired outcomes by discharge/transition of care.  Outcome: No Change  VSS. Disorientated to situation, forgetful and impulsive. Up w/ 1-2 walker and GB, very weak. Pureed diet, little PO intake. G tube clamped, bolus feeds. Diarrhea this evening (may be related to tube feed). Port hep locked, good blood return. Plan to start chemo tomorrow.

## 2018-10-16 NOTE — PROGRESS NOTES
[facility] Progress Note     Primary Oncologist/Hematologist:  Dr. Mcleod          Assessment and Plan:     Mr. Deepak Arndt is an 83 year old man with a recent diagnosis of adenocarcinoma of the esophagus who was admitted on 10/11/18 with weakness     1. Esophageal cancer  - presented with dysphagia with weight loss  - EGD 9/24/2018 shows distal esophageal mass  - Biopsy positive for poorly differentiated carcinoma with adeno and squamous differentiation. HER2 was not amplified.  - CT chest, abdomen and pelvis 9/28/2018 demonstrated thickening of the distal esophageal wall extending to the GE junction.  Paraesophageal adenopathy along with upper abdominal adenopathy was noted compatible with metastatic disease.  - EUS 10/2/2018 showed complete obstruction of lower third of esophagus with malignant appearing lymph nodes in the subcarinal region and aortopulmonary region.   - Staged as T3N2MX  - G tube and Port placed 10/5/2018  - PET/CT 10/9/2018 showing hypermetabolic lymphadenopathy present in the superior, middle, and posterior mediastinum, the retroperitoneum, and along the gastrohepatic ligament. 7 mm lung lesion is too small to characterize. This might upstage cancer to stage IV disease.  - Radiation Oncology consulted with plans to give weekly paclitaxel/carboplatin with XRT. This was initially planned for neoadjuvant with possible surgery  - he is quite weak. Dr. Mcleod discussed at length with patient and his wife and also reviewed with Dr. Peter  - Initiation of paclitaxel and carboplatin for today. This will be weekly treatment. Consider adding XRT in the future.  - Okay for soft foods and liquids as long as not having N/V, pain or cough  - Going to TCU will make this complicated due to insurance coverage. Will have to follow up with SW.      2. Afib/PE  - Has been on dabigatran, but was off for procedures on 10/5  - CT chest with contrast 10/11 shows small PE in the right upper lung  "anteriorly  - Dabigatran resumed  - Cardiology consulted with bradycardia  - If difficultly with swallowing dabigatran whole (capsule that can't be opened), may need to transition to LMWH.   - Discussed with nursing and Hospitalist. Will stop dabigatran and start LMWH 1 mg/kg every 12 hours. Monitor blood counts and renal function.      3. Weakness  - Likely from malignancy, poor intake and other comorbidities  - WBC up, but no obvious infection on lab, imaging studies  - PT/OT  - G tube in place. Consult dietician.  - Not safe to go home  - TCU recommended    Efrem MASSEY, CNP  [facility]  [phone]        Interval History:   Deepak is sleeping this morning. Has been having some diarrhea with TF. Some difficulty swallowing pills whole.              Review of Systems:   The 5 point Review of Systems is negative other than noted in the HPI             Medications:   Scheduled Medications    amLODIPine  5 mg Oral Daily     calcium carbonate 500 mg-vitamin D 200 units  1 tablet Oral Daily     dabigatran ANTICOAGULANT (PRADAXA) PO  150 mg Oral BID     heparin  5 mL Intracatheter Q28 Days     heparin lock flush  5-10 mL Intracatheter Q24H     pantoprazole  40 mg Per G Tube QAM     simvastatin  5 mg Oral At Bedtime     PRN Medications  acetaminophen, acetaminophen, IV fluid REPLACEMENT ONLY, heparin lock flush, hydrOXYzine, lidocaine 4%, melatonin, mineral oil-white petrolatum, naloxone, ondansetron **OR** ondansetron, - MEDICATION INSTRUCTIONS -, potassium phosphate (KPHOS) in D5W IV, potassium phosphate (KPHOS) in D5W IV, potassium phosphate (KPHOS) in D5W IV, potassium phosphate (KPHOS) in D5W IV, senna-docusate **OR** senna-docusate, sodium chloride (PF), sodium chloride (PF), sodium chloride (PF)               Physical Exam:   Vitals were reviewed  Blood pressure 151/55, pulse 54, temperature 97.4  F (36.3  C), temperature source Oral, resp. rate 16, height 1.727 m (5' 8\"), weight 62.5 kg (137 lb 12.8 " oz), SpO2 97 %.  Wt Readings from Last 4 Encounters:   10/16/18 62.5 kg (137 lb 12.8 oz)   10/02/18 67.1 kg (148 lb)   09/24/18 67.1 kg (148 lb)   09/21/18 66.1 kg (145 lb 12.8 oz)       I/O last 3 completed shifts:  In: 2360 [P.O.:150; NG/GT:1210]  Out: 350 [Urine:350]      Constitutional: Awake, alert, cooperative, no apparent distress   Lungs: Clear to auscultation bilaterally, no crackles or wheezing   Cardiovascular: Irregular rate and rhythm, normal S1 and S2, and no murmur noted   Abdomen: Hyperactive bowel sounds, soft, non-distended, non-tender. G tube.   Skin: No rashes, no cyanosis, no edema   Other: Port in right anterior chest.               Data:   All laboratory data and imaging studies reviewed.    CMP  Recent Labs  Lab 10/16/18  0623 10/15/18  0915 10/12/18  0615 10/11/18  1040 10/11/18  0255    135 136  --  135   POTASSIUM 4.0 3.5 3.6  --  3.5   CHLORIDE 108 103 101  --  98   CO2 22 27 25  --  25   ANIONGAP 7 5 10  --  12   * 114* 97  --  104*   BUN 16 9 14  --  27   CR 0.61* 0.63* 0.78  --  1.04   GFRESTIMATED >90 >90 >90  --  68   GFRESTBLACK >90 >90 >90  --  83   TRIPP 8.6 8.8 8.7  --  9.0   MAG  --  2.2  --  2.2  --    PHOS 3.5 2.4*  --  3.2  --    PROTTOTAL  --   --   --   --  7.2   ALBUMIN  --   --   --   --  3.3*   BILITOTAL  --   --   --   --  1.2   ALKPHOS  --   --   --   --  68   AST  --   --   --   --  20   ALT  --   --   --   --  21     CBC  Recent Labs  Lab 10/16/18  0623 10/13/18  0555 10/12/18  0615 10/11/18  0255   WBC 14.3* 11.3* 12.4* 15.5*   RBC 4.18*  --   --  4.37*   HGB 13.9  --   --  15.1   HCT 40.7  --   --  42.2   MCV 97  --   --  97   MCH 33.3*  --   --  34.6*   MCHC 34.2  --   --  35.8   RDW 13.5  --   --  13.1     --   --  173

## 2018-10-16 NOTE — PLAN OF CARE
Problem: Patient Care Overview  Goal: Plan of Care/Patient Progress Review  Outcome: No Change  VSS. AOx3 Disorientated to situation, forgetful and impulsive. Up w/ 1-2 walker and GB to bedside commode, very weak. DDI Pureed diet, little PO intake. G tube clamped, bolus feeds throughout day. Diarrhea overnight possibly due to TF, Port hep locked, generalized rash started on upper back and spread throughout shift down back, to chest and trunk and upper thighs, PRN hydroxyzine given for itchiness, Plan to start chemo tomorrow. Discharge pending

## 2018-10-16 NOTE — PROGRESS NOTES
SPIRITUAL HEALTH SERVICES Progress Note  [facility]    Visited per referral.     Pt was awake, but very groggy or disoriented. Pt was not able to answer questions.    SH provided a kind and caring presence.     SH will continue to follow and visit if needs arise.     Steve Qureshi  Chaplain Resident

## 2018-10-16 NOTE — PLAN OF CARE
Problem: Patient Care Overview  Goal: Plan of Care/Patient Progress Review  Outcome: No Change  Disoriented to situation and time.  Lethargic, impulsive.  Vitals stable, bradycardic at times.  Port heparin locked, good blood return.  Rash noted to back, pt c/o pruritus.  G-tube clamped, new dressing, receiving bolus feedings.  Very poor PO intake, not able to swallow pills- MD aware and switched from Pradaxa to Lovenox.  Up with A1/SBA to bathroom. Up in chair for lunch.  Voiding in urine otherwise incontinent.  Port infusing with Taxol chemo -tolerating well thus far.  Start Chemo precautions.

## 2018-10-16 NOTE — PLAN OF CARE
Problem: Patient Care Overview  Goal: Plan of Care/Patient Progress Review  Discharge Planner PT   Patient plan for discharge: TCU  Current status: Pt needs Yamile for bed mobility and transfers and gait with walker. Pt ambulates 15ft x2  And was fatigued by gait and use of bathroom. PT is somewhat impulsive with mobility and needs safety cues. Limited by fatigue.  Barriers to return to prior living situation: Independent baseline  Recommendations for discharge: TCU  Rationale for recommendations: Pt is below baseline and has multiple medical issues and will need continued rehab to increase independence for mobility at home.       Entered by: Stephanie Rossi 10/16/2018 12:33 PM

## 2018-10-16 NOTE — PROGRESS NOTES
RITESH  I: RITESH called [facility] to follow up if they were able to accept patient with chemo. SW left message and requested a c/b. SW also resent referral again. RITESH received call from [facility] and admissions stated that they do not have a male bed available but would confirm this on Wednesday with RITESH. Still confirming if they can accept with chemo. Referral was resent to [facility].    P: RITESH will continue to follow and assist as needed.    Cyndi Miranda, MARGARITO   *[phone]

## 2018-10-16 NOTE — PROGRESS NOTES
[facility]    Medicine Progress Note - Hospitalist Service       Date of Admission:  10/11/2018    Assessment & Plan   Deepak Arndt is a 83 year old male  admitted on 10/11/2018. He has a history of recently diagnosed pancreatic CA s/p gastrostomy tube, CAD, Htn and is admitted for weakness, failure to thrive and small pulmonary embolism.     Adult failure to thrive  Physical deconditioning from acute illness/malignancy/senile fraility  Mechanical fall at home due to physical deconditioning  Possible mild cognitive impairment/dementia at baseline.  Severe malnutrition in the context of chronic illness  Patient has been having ongoing generalized weakness, fell out of bed on the night of admission and unable to get back up. CT head no acute intracranial pathology. G-tube tube placed 10/15.    -Appreciate nutrition team assistance with tube feeding.  Free water through feeding tube.  -Dietary supplements with Ensure  -Speech therapy has concerns for aspiration and recommend n.p.o. with pleasure foods and thin liquids.   -Oncology recommend - Okay for soft foods and liquids as long as not having N/V, pain or cough  -Pt getting most of his nutrition through the G-tube, takes some pills orally and eats for pleausure  -Physical therapy, occupational therapy -recommend TCU      Pulmonary embolism in the setting of malignancy/anticoagulation on hold for procedure.  Pt was off Pradaxa 150 mg BID from 10/5-10/10 for G-Tube/Port placement.  CT chest done in the emergency room positive for right-sided pulmonary embolus and noted Emphysema. Distal esophageal mass consistent with known cancer is again seen. Mediastinal and upper abdominal adenopathy have slightly increased.  -Continue PTA Dabigatran [on it for A. Fib]  -Heme/On consideration of LMWH for hypercoagulation of malignancy but at this point continue with dabigatran as it wasn't truly a failure as he was off anticoagulation for a  procedure  -Aggressive incentive spirometry.     Reactive leukocytosis likely from malignancy. Improved  WBC count stable/improved, lactate of 2.1 >1.3 UA -no signs of infection. Chest x-ray right chest wall port with catheter tip in SVC.  No acute pathology. Blood cultures no growth..  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  Had a G-tube and port placed on 10/5/2018.  - transferred to station  on 10/15 to begin weekly paclitaxel and carboplatinum  - Dr. Peter of radiation oncology following to consider radiation therapy  - [facility] following     Atrial fibrillation with slow ventricular response.  He is on chronic therapy with propranolol which is on hold since admission.  -Cardiology / EP recommend to continue to hold beta-blocker. Holter monitor at discharge.  -CHADS-Vasc of 4. Continue PTA Dabigatran      Abnormal thyroid function test.  TSH 0.27, T4 1.52. Patient having bradycardia hence will hold off treatment for hyperthyroidism.  -Monitor thyroid function tests in 4-6 weeks.     Coronary artery disease  History of MI.  Hyperlipidemia  Hypertension  -Continue PTA amlodipine 5 mg oral daily, simvastatin 5 mg.  -Continue PTA Pradaxa.  -holding PTA hydrochlorothiazide 25 mg - consider restarting as needed     Essential tremor   -Hold PTA propranolol given bradycardia     Gastroesophageal reflux disease.  -Continue PTA Protonix 40 mg oral daily.      Osteoporosis.  -Follow-up as outpatient.    Contact dermatitis: rash noted on back, itchy.   -hydroxyzine prn - ? If this caused a little confusion but seems close to baseline currently  -consider changing to cream instead of confusion continues    Diet: Room Service  Advance Diet as Tolerated  Adult Formula Bolus Feeding: QID Isosource 1.5; Route: Gastrostomy; 4; Can(s); Medication - Tube Feeding Flush Frequency:  At least 15-30 mL water before and after medication administration and with tube clogging; Amount to Send (Nutrition use): RD...  Calorie Counts  Dysphagia Diet Level 1 Pureed Thin Liquids (water, ice chips, juice, milk gelatin, ice cream, etc)  Fluids: none  Poe Catheter: not present  DVT Prophylaxis: Pradaxa  Code Status: Full Code    Disposition Plan   Expected discharge: 2 - 3 days, recommended to transitional care unit once initial chemo therapy complete and medically stable following, XRT started and dispo plan in place.  Entered: Juarez Hager DO 10/16/2018, 8:13 AM   Information in the above section will display in the discharge planner report.      The patient's care was discussed with the Bedside Nurse.    Juarez Hager DO  Hospitalist Service  [facility]    ______________________________________________________________________    Interval History   Had worsening itching last night got hydroxyzine this am.  Seems a bit confused per nursing, unclear if related.  Not on any narcs.  Also having diarrhea but getting tube feeds.  Plan to start chemo today.  When I saw him he seemed to be mentaing fairly normally  Denies any pains.      Data reviewed today: I reviewed all medications, new labs and imaging results over the last 24 hours. I personally reviewed no images or EKG's today.    Physical Exam   Vital Signs: Temp: 97.4  F (36.3  C) Temp src: Oral BP: 151/55   Heart Rate: 60 Resp: 16 SpO2: 97 % O2 Device: None (Room air)    Weight: 137 lbs 12.8 oz  General Appearance: Sitting in the chair, appears comfortable  Respiratory: CTAB, no wheeze  Cardiovascular: RRR no m/r/g  GI: +BS, soft, G-tube in place  Skin: slight contact rash on upper back  Other:  no edema     Data     Recent Labs  Lab 10/16/18  0623 10/15/18  0915 10/13/18  0555 10/12/18  0615 10/11/18  1408 10/11/18  1040 10/11/18  0255   WBC 14.3*  --  11.3* 12.4*  --   --  15.5*   HGB 13.9  --   --   --   --   --  15.1    MCV 97  --   --   --   --   --  97     --   --   --   --   --  173    135  --  136  --   --  135   POTASSIUM 4.0 3.5  --  3.6  --   --  3.5   CHLORIDE 108 103  --  101  --   --  98   CO2 22 27  --  25  --   --  25   BUN 16 9  --  14  --   --  27   CR 0.61* 0.63*  --  0.78  --   --  1.04   ANIONGAP 7 5  --  10  --   --  12   TRIPP 8.6 8.8  --  8.7  --   --  9.0   * 114*  --  97  --   --  104*   ALBUMIN  --   --   --   --   --   --  3.3*   PROTTOTAL  --   --   --   --   --   --  7.2   BILITOTAL  --   --   --   --   --   --  1.2   ALKPHOS  --   --   --   --   --   --  68   ALT  --   --   --   --   --   --  21   AST  --   --   --   --   --   --  20   LIPASE  --   --   --   --   --   --  154   TROPI  --   --   --   --  0.047* 0.057* 0.057*

## 2018-10-17 NOTE — PROGRESS NOTES
SW  D) Following for discharge planning.  I) Received a call from Anu at [facility] [facility] on [address]. She state they are unable to accept patient while he is on chemotherapy.

## 2018-10-17 NOTE — PROGRESS NOTES
[facility] Progress Note     Primary Oncologist/Hematologist:  Dr. Mcleod          Assessment and Plan:     Mr. Deepak Arndt is an 83 year old man with a recent diagnosis of adenocarcinoma of the esophagus who was admitted on 10/11/18 with weakness      1. Esophageal cancer  - presented with dysphagia with weight loss  - EGD 9/24/2018 shows distal esophageal mass  - Biopsy positive for poorly differentiated carcinoma with adeno and squamous differentiation. HER2 was not amplified.  - CT chest, abdomen and pelvis 9/28/2018 demonstrated thickening of the distal esophageal wall extending to the GE junction.  Paraesophageal adenopathy along with upper abdominal adenopathy was noted compatible with metastatic disease.  - EUS 10/2/2018 showed complete obstruction of lower third of esophagus with malignant appearing lymph nodes in the subcarinal region and aortopulmonary region.   - Staged as [id number]  - G tube and Port placed 10/5/2018  - PET/CT 10/9/2018 showing hypermetabolic lymphadenopathy present in the superior, middle, and posterior mediastinum, the retroperitoneum, and along the gastrohepatic ligament. 7 mm lung lesion is too small to characterize. This might upstage cancer to stage IV disease.  - Radiation Oncology consulted with plans to give weekly paclitaxel/carboplatin with XRT. This was initially planned for neoadjuvant with possible surgery  - he is quite weak. Dr. Mcleod discussed at length with patient and his wife and also reviewed with Dr. Peter  - Initiation of paclitaxel and carboplatin 10/16. This will be weekly treatment. Consider adding XRT in the future.  - Okay for soft foods and liquids as long as not having N/V, pain or cough  - Going to TCU will make this complicated due to insurance coverage. Will have to follow up with SW.      2. Afib/PE  - Has been on dabigatran, but was off for procedures on 10/5  - CT chest with contrast 10/11 shows small PE in the right upper lung  "anteriorly  - Dabigatran resumed  - Cardiology consulted with bradycardia  - Difficultly with swallowing dabigatran whole (capsule that can't be opened)  - Start LMWH 1 mg/kg every 12 hours. Monitor blood counts and renal function.      3. Weakness  - Likely from malignancy, poor intake and other comorbidities  - WBC up, but no obvious infection on lab, imaging studies  - PT/OT  - G tube in place. Consult dietician.  - Not safe to go home  - TCU recommended    Efrem MASSEY CNP  [facility]  [phone]        Interval History:   Feeling well after chemotherapy. No diarrhea today.              Review of Systems:   The 5 point Review of Systems is negative other than noted in the HPI             Medications:   Scheduled Medications    amLODIPine  5 mg Oral Daily     calcium carbonate 500 mg-vitamin D 200 units  1 tablet Oral Daily     diphenhydrAMINE  50 mg Oral Once     enoxaparin  1 mg/kg Subcutaneous Q12H     heparin  5 mL Intracatheter Q28 Days     heparin lock flush  5-10 mL Intracatheter Q24H     pantoprazole  40 mg Per G Tube QAM     simvastatin  5 mg Oral At Bedtime     PRN Medications  acetaminophen, acetaminophen, albuterol, albuterol, IV fluid REPLACEMENT ONLY, diphenhydrAMINE, EPINEPHrine, heparin lock flush, hydrOXYzine, lidocaine 4%, LORazepam, - MEDICATION INSTRUCTIONS -, melatonin, meperidine, methylPREDNISolone, mineral oil-white petrolatum, naloxone, ondansetron **OR** ondansetron, - MEDICATION INSTRUCTIONS -, potassium phosphate (KPHOS) in D5W IV, potassium phosphate (KPHOS) in D5W IV, potassium phosphate (KPHOS) in D5W IV, potassium phosphate (KPHOS) in D5W IV, senna-docusate **OR** senna-docusate, sodium chloride (PF), sodium chloride (PF), sodium chloride               Physical Exam:   Vitals were reviewed  Blood pressure 130/59, pulse 54, temperature 97.2  F (36.2  C), temperature source Oral, resp. rate 18, height 1.727 m (5' 7.99\"), weight 62.8 kg (138 lb 6.4 oz), SpO2 97 %.  Wt " Readings from Last 4 Encounters:   10/17/18 62.8 kg (138 lb 6.4 oz)   10/02/18 67.1 kg (148 lb)   09/24/18 67.1 kg (148 lb)   09/21/18 66.1 kg (145 lb 12.8 oz)       I/O last 3 completed shifts:  In: 1265 [P.O.:360; NG/GT:390; IV Piggyback:265]  Out: 1250 [Urine:1250]      Constitutional: Awake, alert, cooperative, no apparent distress   Lungs: Clear to auscultation bilaterally, no crackles or wheezing   Cardiovascular: Irregular rate and rhythm, normal S1 and S2, and no murmur noted   Abdomen: Normal bowel sounds, soft, non-distended, non-tender. G tube.   Skin: No rashes, no cyanosis, no edema   Other: Port in right anterior chest.               Data:   All laboratory data and imaging studies reviewed.    CMP  Recent Labs  Lab 10/16/18  0623 10/15/18  0915 10/12/18  0615 10/11/18  1040 10/11/18  0255    135 136  --  135   POTASSIUM 4.0 3.5 3.6  --  3.5   CHLORIDE 108 103 101  --  98   CO2 22 27 25  --  25   ANIONGAP 7 5 10  --  12   * 114* 97  --  104*   BUN 16 9 14  --  27   CR 0.61* 0.63* 0.78  --  1.04   GFRESTIMATED >90 >90 >90  --  68   GFRESTBLACK >90 >90 >90  --  83   TRIPP 8.6 8.8 8.7  --  9.0   MAG  --  2.2  --  2.2  --    PHOS 3.5 2.4*  --  3.2  --    PROTTOTAL  --   --   --   --  7.2   ALBUMIN  --   --   --   --  3.3*   BILITOTAL  --   --   --   --  1.2   ALKPHOS  --   --   --   --  68   AST  --   --   --   --  20   ALT  --   --   --   --  21     CBC  Recent Labs  Lab 10/17/18  0600 10/16/18  0623 10/13/18  0555 10/12/18  0615 10/11/18  0255   WBC 10.6 14.3* 11.3* 12.4* 15.5*   RBC 4.08* 4.18*  --   --  4.37*   HGB 13.6 13.9  --   --  15.1   HCT 39.7* 40.7  --   --  42.2   MCV 97 97  --   --  97   MCH 33.3* 33.3*  --   --  34.6*   MCHC 34.3 34.2  --   --  35.8   RDW 13.3 13.5  --   --  13.1    190  --   --  173

## 2018-10-17 NOTE — PLAN OF CARE
Problem: Patient Care Overview  Goal: Plan of Care/Patient Progress Review  Outcome: No Change    Pt d/t situation and forgetful. Up with one and walker. Denies pain. Purred diet, fair appetite. g tube with bolus feedings. Calorie count. Incontinent of urine at times. Chemotherapy precautions. Plan is for discharge to TCU once placement is found. Will continue to monitor.

## 2018-10-17 NOTE — PLAN OF CARE
Problem: Fall Risk (Adult)  Goal: Identify Related Risk Factors and Signs and Symptoms  Related risk factors and signs and symptoms are identified upon initiation of Human Response Clinical Practice Guideline (CPG).   Outcome: No Change  Patient is impulsive, disoriented to situation and forgetful. VSS. Denies pain. Rash on back. Gtube clamped with bolus feeds, refused 2100 tube feedings. Up with assist of 1 to commode, but put on condom catheter, patient did not tolerate well, applied [place] male external catheter. On chemo precautions. Does not call appropriately.

## 2018-10-17 NOTE — PLAN OF CARE
Problem: Patient Care Overview  Goal: Plan of Care/Patient Progress Review  Outcome: No Change  Oriented to self but not situation.  Chemotherapy today.  Incontinent of urine, and also using urinal occasionally.  Marilou-care performed.  Dribbling with urinal so condom catheter placed.  Received feeding assistance from his wife, good appetite.  1 can isosource 1.5 given via G tube.  Free water flushes performed.  No BM from [id number].  Sleeping between cares,  Nursing will continue to monitor.

## 2018-10-17 NOTE — CONSULTS
Care Transition Initial Assessment - RN    Reason For Consult: discharge planning   Met with: Called spouse SHAZIA Brady phone [phone]  DATA   Active Problems:    Symptomatic bradycardia    Cancer of distal third of esophagus (H)       Cognitive Status: did not meet with the patient discussed with wife        Contact information and PCP information verified: Yes  Lives With: spouse  Living Arrangements: house  Quality Of Family Relationships: supportive, involved  Description of Support System: Supportive, Involved   Who is your support system?: Wife   Support Assessment: Adequate family and caregiver support   Insurance concerns: Other  ASSESSMENT  Patient currently receives the following services:  n/a        Identified issues/concerns regarding health management: patient admitted 10/11 with generalized weakness r. Sided PE new Esophogeal CA requiring Gtube for nutrition and initiation of chemotherapy and possible Radiation Therapy ([place] following with [facility]) and Dr. Mcleod with [facility].  Plan is for the patient to receive weekly chemotherapy taxol/carboplatin and is requiring a TCU for deconditioning.  Referrals are in the process of being submitted to TCU's and [facility] has declined due to chemotherapy.  Called patient's wife Caitlyn phone [phone] to discuss potential barriers for Deepak being accepted at TCU. Explained that Deepak is undergoing weekly chemotherapy infusions and TCU would most likely be responsible for the cost of the chemotherapy with [facility] therefore we would most likely continue to get denials for TCU.  Caitlyn was asking about the cost of the chemotherapy I informed her that I did not have that information available but it would definitely not be something that she would want to pay.  Spoke to Caitlyn about a possible referral for inpatient [facility] to see if they would be able to offer the patient a chemotherapy protocol as a free standing clinic and bill  separately from the TCU, she is hesitant about this as she would like Deepak to remain under Dr. Mcleod's care but she does not want his chemotherapy delayed.  Informed Caitlyn that a Care Coordinator will also be available to answer any additional questions regarding discharge planning.   will also reach out for more referrals if needed.  Informed Caitlyn that if [facility] were to take over the infusion chemotherapy we could ask [facility] to review again.  Writer put sticky note in for provider to get a [facility] consult inpatient for discharge planning.  Writer called [facility] [phone] and discussed with Haydee in Care Coordination.  She is aware that they may be getting an inpatient consult for this patient.  SW did inform writer that [facility] was assessing the patient and had questions about chemo plan and where the patient was receiving the chemotherapy.  Writer called Tomas at [facility] [phone] and gave the [facility] information.  Per Tomas they need their business office to review for billing purposes for chemotherapy and should get back to the  if they are able to take the patient with [facility] by tomorrow. Tomas also mentioned that they have a private pay transport company that could do a w/c ride to and from chemo if they approve it.     PLAN  Financial costs for the patient include w/c transport for chemo .  Patient given options and choices for discharge yes .  Patient/family is agreeable to the plan?  Yes:   Patient anticipates discharging to tcu .        Patient anticipates needs for home equipment: No  Plan/Disposition: TCU   Appointments:     Pending [facility] infusions weekly to be set up at discharge vs potential appointment with [facility]  Care  (CTS) will continue to follow as needed.

## 2018-10-17 NOTE — PROGRESS NOTES
CALORIE COUNT      Approximate Oral Intake for:   10/16   Calories: 124 dennis  Protein: 1 gram    Pt ordered 3 meals yesterday, and consumed minimal intake with the above calories and protein.   Pt is on a DD1 diet and receives feeding assistance per chart review.      Intake from TF/PN:   10/16    Bolus TF as ordered: Isosource 1.5, 4 cans per day (9am - 2pm - 6pm - 9pm) = 1500 kcal, 68 gm protein, 15 gm Fiber, 750 mL free water    Per RN note: Pt refused 2100 tube feedings yesterday. Therefore, pt received = 1125 kcal, 51 gm protein, 11 gm Fiber and 560 mL free water from bolus feedings       Estimated Needs:    Calories: 8023-2773 kcals (25-30 kcal/kg)  Protein: 75-95 grams (1.2-1.5 gm/kg)  Dosing wt: 62.6 kg      Summary:   Daily total intake (PO + TF) = 1250 kcal and 52 gm protein (70% estimated energy needs and 65% estimated protein needs)  PO only provided <10% estimated needs  TF (below goal yesterday) provided ~65% estimated  needs       Haydee Hassan RD, LD  Clinical Dietitian

## 2018-10-18 NOTE — PLAN OF CARE
Problem: Patient Care Overview  Goal: Plan of Care/Patient Progress Review  Outcome: Improving  VSS on RA. Disorientated to situation, forgetful, impulsive, irritable. Long term memory intact. Up with one and walker. Denies pain. g tube with bolus feedings. DDL1+thin liquid diet, fair appetite. Calorie count. Plan is for discharge to TCU when bed becomes available. Urinary frequency.

## 2018-10-18 NOTE — PROGRESS NOTES
[facility]ology Progress Note     Primary Oncologist/Hematologist:  Dr. Mcleod          Assessment and Plan:     Mr. Deepak Arndt is an 83 year old man with a recent diagnosis of adenocarcinoma of the esophagus who was admitted on 10/11/18 with weakness      1. Esophageal cancer  - presented with dysphagia with weight loss  - EGD 9/24/2018 shows distal esophageal mass  - Biopsy positive for poorly differentiated carcinoma with adeno and squamous differentiation. HER2 was not amplified.  - CT chest, abdomen and pelvis 9/28/2018 demonstrated thickening of the distal esophageal wall extending to the GE junction.  Paraesophageal adenopathy along with upper abdominal adenopathy was noted compatible with metastatic disease.  - EUS 10/2/2018 showed complete obstruction of lower third of esophagus with malignant appearing lymph nodes in the subcarinal region and aortopulmonary region.   - Staged as [id number]  - G tube and Port placed 10/5/2018  - PET/CT 10/9/2018 showing hypermetabolic lymphadenopathy present in the superior, middle, and posterior mediastinum, the retroperitoneum, and along the gastrohepatic ligament. 7 mm lung lesion is too small to characterize. This might upstage cancer to stage IV disease.  - Radiation Oncology consulted with plans to give weekly paclitaxel/carboplatin with XRT. This was initially planned for neoadjuvant with possible surgery  - he is quite weak. Dr. Mcleod discussed at length with patient and his wife and also reviewed with Dr. Peter  - Initiation of paclitaxel and carboplatin 10/16. This will be weekly treatment, next due 10/23.   - No side effects from chemotherapy  - Consider adding XRT in the future  - Okay for soft foods and liquids as long as not having N/V, pain or cough  - Tolerating tube feedings  - SW looking at discharge to [place] with oncology care through [place] until discharge to home, then he can continue follow up with Dr. Mcleod.       2. Afib/PE  - Has  been on dabigatran, but was off for procedures on 10/5  - CT chest with contrast 10/11 shows small PE in the right upper lung anteriorly  - Dabigatran resumed  - Cardiology consulted with bradycardia  - Difficultly with swallowing dabigatran whole (capsule that can't be opened)  - Start LMWH 1 mg/kg every 12 hours. Monitor blood counts and renal function.      3. Weakness  - Likely from malignancy, poor intake and other comorbidities  - PT/OT recommending TCU  - G tube in place. Consult dietician.  - Not safe to go home    Efrem MASSEY, CNP  [facility]  [phone]        Interval History:   Deepak is feeling well. No side effects of chemotherapy. TF going well.              Review of Systems:   The 5 point Review of Systems is negative other than noted in the HPI             Medications:   Scheduled Medications    amLODIPine  5 mg Oral Daily     calcium carbonate 500 mg-vitamin D 200 units  1 tablet Oral Daily     diphenhydrAMINE  50 mg Oral Once     enoxaparin  1 mg/kg Subcutaneous Q12H     heparin  5 mL Intracatheter Q28 Days     heparin lock flush  5-10 mL Intracatheter Q24H     pantoprazole  40 mg Per G Tube QAM     simvastatin  5 mg Oral At Bedtime     PRN Medications  acetaminophen, acetaminophen, albuterol, albuterol, IV fluid REPLACEMENT ONLY, diphenhydrAMINE, EPINEPHrine, heparin lock flush, hydrOXYzine, lidocaine 4%, LORazepam, - MEDICATION INSTRUCTIONS -, melatonin, meperidine, methylPREDNISolone, mineral oil-white petrolatum, naloxone, ondansetron **OR** ondansetron, - MEDICATION INSTRUCTIONS -, potassium phosphate (KPHOS) in D5W IV, potassium phosphate (KPHOS) in D5W IV, potassium phosphate (KPHOS) in D5W IV, potassium phosphate (KPHOS) in D5W IV, senna-docusate **OR** senna-docusate, sodium chloride (PF), sodium chloride (PF), sodium chloride               Physical Exam:   Vitals were reviewed  Blood pressure 110/85, pulse 54, temperature 96.3  F (35.7  C), temperature source Oral, resp.  "rate 16, height 1.727 m (5' 7.99\"), weight 62.7 kg (138 lb 3.2 oz), SpO2 98 %.  Wt Readings from Last 4 Encounters:   10/18/18 62.7 kg (138 lb 3.2 oz)   10/02/18 67.1 kg (148 lb)   09/24/18 67.1 kg (148 lb)   09/21/18 66.1 kg (145 lb 12.8 oz)       I/O last 3 completed shifts:  In: 1750 [P.O.:720; NG/GT:780]  Out: 925 [Urine:925]      Constitutional: Awake, alert, cooperative, no apparent distress   Lungs: Clear to auscultation bilaterally, no crackles or wheezing   Cardiovascular: Bradycardia. Irregular rate and rhythm, normal S1 and S2, and no murmur noted   Abdomen: Normal bowel sounds, soft, non-distended, non-tender. G tube with scant drainage.   Skin: No rashes, no cyanosis, no edema   Other:               Data:   All laboratory data and imaging studies reviewed.    CMP  Recent Labs  Lab 10/16/18  0623 10/15/18  0915 10/12/18  0615 10/11/18  1040    135 136  --    POTASSIUM 4.0 3.5 3.6  --    CHLORIDE 108 103 101  --    CO2 22 27 25  --    ANIONGAP 7 5 10  --    * 114* 97  --    BUN 16 9 14  --    CR 0.61* 0.63* 0.78  --    GFRESTIMATED >90 >90 >90  --    GFRESTBLACK >90 >90 >90  --    TRIPP 8.6 8.8 8.7  --    MAG  --  2.2  --  2.2   PHOS 3.5 2.4*  --  3.2     CBC  Recent Labs  Lab 10/17/18  0600 10/16/18  0623 10/13/18  0555 10/12/18  0615   WBC 10.6 14.3* 11.3* 12.4*   RBC 4.08* 4.18*  --   --    HGB 13.6 13.9  --   --    HCT 39.7* 40.7  --   --    MCV 97 97  --   --    MCH 33.3* 33.3*  --   --    MCHC 34.3 34.2  --   --    RDW 13.3 13.5  --   --     190  --   --      INRNo lab results found in last 7 days.  Data   Results for orders placed or performed during the hospital encounter of 10/11/18 (from the past 24 hour(s))   Care Transition RN/SW IP Consult    Narrative    Tika Desai RN     10/17/2018  4:30 PM  Care Transition Initial Assessment - RN    Reason For Consult: discharge planning   Met with: Called spouse SHAZIA Brady phone [phone]  DATA   Active Problems:    Symptomatic " bradycardia    Cancer of distal third of esophagus (H)       Cognitive Status: did not meet with the patient discussed with   wife        Contact information and PCP information verified: Yes  Lives With: spouse  Living Arrangements: house  Quality Of Family Relationships: supportive, involved  Description of Support System: Supportive, Involved   Who is your support system?: Wife   Support Assessment: Adequate family and caregiver support   Insurance concerns: Other  ASSESSMENT  Patient currently receives the following services:  n/a        Identified issues/concerns regarding health management: patient   admitted 10/11 with generalized weakness r. Sided PE new   Esophogeal CA requiring Gtube for nutrition and initiation of   chemotherapy and possible Radiation Therapy ([place] following with   [facility]) and Dr. Mcleod with [facility].  Plan is for the   patient to receive weekly chemotherapy taxol/carboplatin and is   requiring a TCU for deconditioning.  Referrals are in the process   of being submitted to TCU's and [facility] has declined due to   chemotherapy.  Called patient's wife Caitlyn phone [phone]   to discuss potential barriers for Deepak being accepted at TCU.   Explained that Deepak is undergoing weekly chemotherapy infusions   and TCU would most likely be responsible for the cost of the   chemotherapy with [facility] therefore we would most likely   continue to get denials for TCU.  Caitlyn was asking about the   cost of the chemotherapy I informed her that I did not have that   information available but it would definitely not be something   that she would want to pay.  Spoke to Caitlyn about a possible   referral for inpatient [facility] to see if they would be   able to offer the patient a chemotherapy protocol as a free   standing clinic and bill separately from the TCU, she is hesitant   about this as she would like Deepak to remain under Dr. Mcleod's   care but she does not want his  chemotherapy delayed.  Informed   Caitlyn that a Care Coordinator will also be available to answer   any additional questions regarding discharge planning.  Social   Worker will also reach out for more referrals if needed.    Informed Caitlyn that if [facility] were to take over the   infusion chemotherapy we could ask [facility] to review again.    Writer put sticky note in for provider to get a [facility]   consult inpatient for discharge planning.  Writer called [facility] [phone] and discussed with Haydee in Care   Coordination.  She is aware that they may be getting an inpatient   consult for this patient.  RITESH did inform writecandelaria that [facility] was assessing the patient and had questions about chemo   plan and where the patient was receiving the chemotherapy.    Writer called Tomas at [facility] [phone] and gave   the [facility] information.  Per Tomas they need their business   office to review for billing purposes for chemotherapy and should   get back to the  if they are able to take the   patient with [facility] by tomorrow. Tomas also mentioned that   they have a private pay transport company that could do a w/c   ride to and from chemo if they approve it.     PLAN  Financial costs for the patient include w/c transport for chemo .  Patient given options and choices for discharge yes .  Patient/family is agreeable to the plan?  Yes:   Patient anticipates discharging to tcu .        Patient anticipates needs for home equipment: No  Plan/Disposition: TCU   Appointments:     Pending [facility] infusions weekly to be set up at discharge vs   potential appointment with [facility]  Care  (CTS) will continue to follow as   needed.

## 2018-10-18 NOTE — PROGRESS NOTES
I met and spoke wt pt and Farida (pt's wife) regarding d/c plan.  Caitlyn and pt are agreeable for pt to d/ [place] and to be followed at  Oncology while pt needs weekly IV chemo.  An appt has been arranged for pt's chemo treatment on 10/23 at 7:30AM then Dr Bolden to see him at 8:30 AM; pt and wife are aware.  Pt is medically stable to d/c but per SW, [facility] has no beds today.  D/C pending  placement.

## 2018-10-18 NOTE — PLAN OF CARE
Problem: Patient Care Overview  Goal: Plan of Care/Patient Progress Review  Discharge Planner PT   Patient plan for discharge: TCU  Current status: Pt needs Yamile for bed mobility and transfers and gait with walker. Pt ambulates 10ft x2 with FWW and CGA/Yamile for safe walker management and balance, fatigued by this distance. Refused further activity, assisted pt up to chair as breakfast tray present.  Barriers to return to prior living situation: level of assist, impulsive/decreased safety awareness, fall risk  Recommendations for discharge: TCU  Rationale for recommendations: Pt would benefit from continued skilled PT services to progress safety and IND with mobility prior to returning home with spouse. If pt were to return home, pt would require 24/7 supervision/assist with all mobility, Home PT/OT. Discussed with CC.       Entered by: Enedelia Purdy 10/18/2018 8:35 AM

## 2018-10-18 NOTE — PROGRESS NOTES
CLINICAL NUTRITION SERVICES - REASSESSMENT NOTE      Future/Additional Recommendations:   Once PO intake improves, consider decreasing enteral nutrition amount    Malnutrition: (10/11)  % Weight Loss:  > 5% in 1 month (severe malnutrition)  % Intake:  <75% for >/= 1 month (non-severe malnutrition)  Subcutaneous Fat Loss:  None observed  Muscle Loss:  Anterior thigh region  - moderate depletion and Posterior calf region  - moderate depletion  Fluid Retention:  None noted      Malnutrition Diagnosis: Severe malnutrition  In Context of:  Chronic illness or disease (GE junction CA)       EVALUATION OF PROGRESS TOWARD GOALS   Diet:  Dysphagia diet level 1 + Thin Liquids (10/15)     Nutrition Support:   10/8/18: G-tube placed  10/11/18: Bolus enteral nutrition initiated, 3 cans Isosource 1.5 daily  10/15/18: Increased bolus Isosource 1.5 to 4 can daily      Nutrition Support Enteral:  Type of Feeding Tube: G-tube  Enteral Frequency:  Continuous  Enteral Regimen: Isosource 1.5 4 cans per day (9am - 2pm - 6pm - 9pm)  Total Enteral Provisions: 1500 kcal, 68 gm protein, 15 gm Fiber, 750 mL free water  Free Water Flush: 120 mL water before and after each feeding      Calorie Count:  11/16: 124 kcal; 1g protein  11/17: 1164 kcal;   37g protein   -2 day average PO intake, 644 kcal and 19 g protein. PO intake meeting 40% of energy and 25% of protein needs.      Intake:   -Enteral nutrition meeting 93% of energy and 91% of protein needs   -Pt refused one can TF yesterday  -Per RN note, pt with fair appetite. Per RN flowsheet, pt consuming 50% of meals ordered   -BM x 4 (10/16/18)- loose stools      ASSESSED NUTRITION NEEDS:  Dosing Weight (10/11) 62.6 kg  Estimated Energy Needs: 8681-9331 kcals (25-30 Kcal/Kg)  Justification: maintenance  Estimated Protein Needs: 75-95 grams protein (1.2-1.5 g pro/Kg)  Justification: preservation of lean body mass      Previous Goals:   EN will provide >90% of assessed needs  Evaluation:  Met    Previous Nutrition Diagnosis:   Inadequate oral intake related to difficulty swallowing with new dx GE junction CA as evidenced by pt with 9% wt loss past month and currently meeting <40% assessed needs orally.  Evaluation: Improving      CURRENT NUTRITION DIAGNOSIS  Inadequate oral intake related to difficulty swallowing and overall medical status as evidenced by reliance of enteral nutrition to meet needs and 2 day average PO intake meeting 40% of energy and 25% of protein needs.     INTERVENTIONS  Recommendations / Nutrition Prescription  Diet per MD and SLP  Continue Isosource 1.5, 4 cans daily     Implementation  None at this time    Goals  Average PO intake to meet at least 60% of energy and protein needs       MONITORING AND EVALUATION:  Progress towards goals will be monitored and evaluated per protocol and Practice Guidelines      Janel Mcintosh RD, LD

## 2018-10-18 NOTE — PLAN OF CARE
Problem: Patient Care Overview  Goal: Plan of Care/Patient Progress Review  Outcome: No Change   pt d/t time. Forgetful. Up with one and walker. Denies pain. g tube with bolus feedings. DDL1+thin liquid diet, fair appetite. Calorie count. Plan is for discharge to TCU when placement is found and to continue weekly chemotherapy. Will continue to monitor.

## 2018-10-18 NOTE — PROGRESS NOTES
[facility]  Hospitalist Progress Note   10/18/2018          Assessment and Plan:       Deepak Arndt is a 83 year old male  admitted on 10/11/2018. He has a history of recently diagnosed pancreatic CA s/p gastrostomy tube, CAD, HTN and is admitted for weakness, failure to thrive and small pulmonary embolism.      Adult failure to thrive  Physical deconditioning from acute illness/malignancy/senile fraility  Mechanical fall at home due to physical deconditioning  Possible mild cognitive impairment/dementia at baseline.  Severe malnutrition in the context of chronic illness  Patient has been having ongoing generalized weakness, fell out of bed on the night of admission and unable to get back up.   CT head no acute intracranial pathology.   G-tube tube placed 10/15.   Appreciate nutrition team assistance with tube feeding.  Free water through feeding tube.  Dietary supplements with Ensure  Speech therapy has concerns for aspiration and recommend n.p.o. with pleasure foods and thin liquids.   [facility] recommend - Okay for soft foods and liquids as long as not having N/V, pain or cough.   Pt getting most of his nutrition through the G-tube, takes some pills orally and eats for pleausure  Physical therapy, occupational therapy -recommend TCU.  Not safe to go home.    Discussed with care coordinator on floor for assistance with transition of care -accepted at [facility] pending bed availability  Care team has been having challenge with TCU given patient on chemotherapy.        Pulmonary embolism in the setting of malignancy/anticoagulation on hold for procedure.  Pt was off Pradaxa 150 mg BID from 10/5-10/10 for G-Tube/Port placement.    CT chest done in the emergency room positive for right-sided pulmonary embolus and noted Emphysema. Distal esophageal mass consistent with known cancer is again seen. Mediastinal and upper abdominal adenopathy have slightly increased.  PTA dabigatran switched to low  molecular weight heparin (10/16) for hypercoagulation of malignancy/swallowing difficulty.  Aggressive incentive spirometry.      Reactive leukocytosis likely from malignancy. Improved  Lactic acidosis likely from dehydration and poor oral intake.  WBC count stable/improved, lactate of 2.1 >1.3 UA -no signs of infection.   Chest x-ray right chest wall port with catheter tip in SVC.  No acute pathology.   Blood cultures no growth..  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    Had a G-tube and port placed on 10/5/2018.  Weekly paclitaxel and carboplatinum -started 10/16.  Dr. Peter of radiation oncology following to consider radiation therapy in the future  [facility] following, discussed with oncology team today.  [facility] (free standing clinic) consulted, as barriers to weekly chemotherapy infusion with [facility]-appreciate comanagement      Atrial fibrillation with slow ventricular response.  He is on chronic therapy with propranolol which is on hold since admission.  Cardiology / EP recommend to continue to hold beta-blocker. Holter monitor at discharge.  CHADS-Vasc of 4.   PTA dabigatran switch to low molecular weight heparin (10/16) for hypercoagulation of malignancy/swallowing difficulty.      Abnormal thyroid function test.  TSH 0.27, T4 1.52. Patient having bradycardia hence will hold off treatment for hyperthyroidism.  Monitor thyroid function tests in 4-6 weeks.      Coronary artery disease  History of MI.  Hyperlipidemia  Hypertension  Continue PTA amlodipine 5 mg oral daily, simvastatin 5 mg.  Holding PTA hydrochlorothiazide 25 mg - consider restarting as needed      Essential tremor   Hold PTA propranolol given bradycardia      Gastroesophageal reflux disease.  Continue PTA Protonix 40 mg oral daily.      Osteoporosis.  Follow-up as  outpatient.     Contact dermatitis: on back improved   hydroxyzine prn    Active Diet Order      Advance Diet as Tolerated      Dysphagia Diet Level 1 Pureed Thin Liquids (water, ice chips, juice, milk gelatin, ice cream, etc)    Adult Formula Bolus Feeding: QID Isosource 1.5; Route: Gastrostomy; 4; Can(s); Medication - Tube Feeding Flush Frequency: At least 15-30 mL water before and after medication administration and with tube clogging;  DVT Prophylaxis:  Sequential compression device. Pradaxa  Code Status: Full Code  Disposition: Expected discharge pending bed availability at [facility].    Patient, interdisciplinary team involved in care and agrees with plan.  Total time - 25 min. More than 50% of time spent in direct patient care, care coordination, patient counseling, and formalizing plan of care.     Kwan Benoit MD        Interval History:      Patient sitting up in the chair.  Denies any chest pain or shortness of breath.  Able to answer simple commands  Minimal oral intake.       Physical Exam:        Physical Exam   Temp:  [96.3  F (35.7  C)-97.1  F (36.2  C)] 96.3  F (35.7  C)  Heart Rate:  [50-56] 56  Resp:  [16] 16  BP: (110-136)/(60-85) 110/85  SpO2:  [95 %-98 %] 98 %    Intake/Output Summary (Last 24 hours) at 10/17/18 1534  Last data filed at 10/17/18 1300   Gross per 24 hour   Intake             1210 ml   Output             1075 ml   Net              135 ml     Admission Weight: 65.8 kg (145 lb)  Current Weight: 62.8 kg (138 lb 6.4 oz)    PHYSICAL EXAM  GENERAL: Patient is in no distress. Alert and able to answer simple commands.  Appears chronically ill.    HEART: Regular rate and rhythm. S1S2. No murmurs  LUNGS: Bilateral slightly decreased breath sounds, no wheezing no crackles.  ABDOMEN: Soft, no abdominal tenderness, bowel sounds heard   NEURO: Moving all extremities.  EXTREMITIES: No pedal edema. 2+ peripheral pulses.  SKIN: Warm, dry.  Rash over the back improved  PSYCHIATRY  Cooperative       Medications:          amLODIPine  5 mg Oral Daily     calcium carbonate 500 mg-vitamin D 200 units  1 tablet Oral Daily     diphenhydrAMINE  50 mg Oral Once     enoxaparin  1 mg/kg Subcutaneous Q12H     heparin  5 mL Intracatheter Q28 Days     heparin lock flush  5-10 mL Intracatheter Q24H     pantoprazole  40 mg Per G Tube QAM     simvastatin  5 mg Oral At Bedtime     acetaminophen, acetaminophen, albuterol, albuterol, IV fluid REPLACEMENT ONLY, diphenhydrAMINE, EPINEPHrine, heparin lock flush, hydrOXYzine, lidocaine 4%, LORazepam, - MEDICATION INSTRUCTIONS -, melatonin, meperidine, methylPREDNISolone, mineral oil-white petrolatum, naloxone, ondansetron **OR** ondansetron, - MEDICATION INSTRUCTIONS -, potassium phosphate (KPHOS) in D5W IV, potassium phosphate (KPHOS) in D5W IV, potassium phosphate (KPHOS) in D5W IV, potassium phosphate (KPHOS) in D5W IV, senna-docusate **OR** senna-docusate, sodium chloride (PF), sodium chloride (PF), sodium chloride         Data:      All new lab and imaging data was reviewed.

## 2018-10-18 NOTE — PROGRESS NOTES
D: RITESH following for discharge planning. Per discussion with care transition RN, Edie, pt moved to  Oncology for chemo.   I: RITESH spoke with Anu at [place]. She can accept pt. Anticipate next open bed Friday or Sat.   P: RITESH will follow.     VIRGIE Manzanares, LGSW  [id number]

## 2018-10-18 NOTE — CONSULTS
Consult received for arranging chemotherapy in [facility].  No formal consult done.    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  Patient being followed by Dr. Mcleod from [facility].  Patient is currently on radiation with weekly carboplatin and Taxol.  Patient will be going to [facility].  Because of insurance reason, he cannot get chemotherapy at Dr. Mcleod's office while he is in the [facility].    Our office will arrange for continuation of chemotherapy at [facility].  I will see him in clinic when he comes for chemotherapy.

## 2018-10-18 NOTE — PLAN OF CARE
Problem: Fall Risk (Adult)  Goal: Identify Related Risk Factors and Signs and Symptoms  Related risk factors and signs and symptoms are identified upon initiation of [employer] Clinical Practice Guideline (CPG).   Outcome: No Change  Patient was calm and cooperative. Patient disoriented to situation and forgetful. Patient is up with assist of 1 with walker and gait belt to bathroom. Denies pain. On DD1 with thin liquid diet and bolus tube feeds. Patient using urinal but occasionally incontinent and on chemo precautions. Plan to discharge to TCU once placement found. Rash on back noted.

## 2018-10-18 NOTE — PROGRESS NOTES
Per Efrem (NP at [facility]), pt can discharge to [place] and receive her weekly IV chemo (treatment (next treatment is 10/23) at [facility] and continue to follow with Dr Mcleod once pt is out of TCU.  I spoke wt Xiomara at [facility]([phone]) regarding the plan, and she will call me back with pt's schedule.

## 2018-10-18 NOTE — PLAN OF CARE
Problem: Patient Care Overview  Goal: Plan of Care/Patient Progress Review  Discharge Planner OT   Patient plan for discharge: TCU, per chart   Current status: OT orders received and chart reviewed. PMH includes mild cognitive impairment/dementia. Per chart, pt requires [place] for bed mobility and transfers. Per chart, pt will discharge to TCU. Appropriate to defer OT to next level of care and cancel Ip OT orders/evaluation. Physical therapy will address functional mobility/safe transfers while in IP.     Barriers to return to prior living situation: current level of assist   Recommendations for discharge: Will defer OT to next level of care and cancel Ip OT orders/evaluation.     Rationale for recommendations: Pt will benefit from OT in next level of care, TCU, to address independence and safety in ADLs, as well as cognition.        Entered by: Leonid Elias 10/18/2018 10:58 AM

## 2018-10-19 NOTE — PLAN OF CARE
Problem: Patient Care Overview  Goal: Plan of Care/Patient Progress Review  Disoriented to situation. Forgetful and impulsive, gets up throughout the night to urinate. C/o back pain 8 out of 10 this morning gave prn tylenol-effective. G tube with bolus feedings, DDL1 and thin liquid diets. Pt is on a calorie count. Plan is to discharge to a TCU when bed becomes available.

## 2018-10-19 NOTE — PLAN OF CARE
"Problem: Patient Care Overview  Goal: Plan of Care/Patient Progress Review  PT: 2 attempts this AM, pt in bed and declining mobility both times. Somewhat agitating when pt awakens to name. States he would \"Accept a back massage, but will pass on any walking.\" Will re-schedule to check-in tomorrow, pt noted to have planned discharge to [place] tomorrow.      "

## 2018-10-19 NOTE — PLAN OF CARE
Problem: Patient Care Overview  Goal: Discharge Needs Assessment  Outcome: Adequate for Discharge Date Met: 10/19/18  Shift Update: VSS, afebrile, denies pain, calls but is also impulsive at times usually about bathroom. Continent today, noc reported incontinence, denied pain for me, poor PO, tolerated TF boluses. Will discontinue tomorrow to [place].

## 2018-10-19 NOTE — PROGRESS NOTES
Mr. Arndt is okay to discharge to TCU from Oncology perspective. He will follow up with Dr. Bolden for chemotherapy next week.    Efrem MASSEY, CNP

## 2018-10-19 NOTE — PROGRESS NOTES
RITESH Note:    D/I:  Patient has been accepted at [facility].  Plan is for patient to discharge on 10/20 at 11:00 AM.      P:  SW will continue to assist with discharge.    VIRGIE Steel, LGSW

## 2018-10-19 NOTE — PROGRESS NOTES
"CALORIE COUNT      Approximate Oral Intake for:     10/18/18  Calories: 250 Kcal   Protein: 7 g     Diet: Dysphagia diet level 1     Intake from TF/PN:       Isosource 1.5 - 4 cans per day, providing 1500 kcals, 68 gm pro, 15 gm fiber, 750 mL free water  Flush G-tube with 120 mL water before and after each feeding    Estimated Needs:    Calories: 4799-1201 (25-30)  Protein: 75-95 (1.2-1.5)  Dosing wt: 62.6 kg       Summary:   Calorie Counts:  10/16 - 124 kcal, 1 g protein  10/17 - 1164 kcal;   37 g protein  10/18 - 250 kcal; 7 g protein   - 3 day average PO intake, 513 kcal and 15 g protein, meeting 32% of energy and 20% of protein needs     New Findings:  10/19: Per SW: \" Plan is for patient to discharge on 10/20 at 11:00 AM\"    Janel Mcintosh, CAMELIA, LD  "

## 2018-10-19 NOTE — PROGRESS NOTES
Per Oncology, pt is cleared for discharge.  Pt is discharging to [facility] tomorrow at 11AM, as they do not have a bed available today.  Pt's wife will come at 10:30 to  pt tomorrow.  Pt aware.  SW,bedside nurse, and charge nurse notified.

## 2018-10-19 NOTE — PROGRESS NOTES
[facility] Progress Note          Assessment and Plan:   Mr. Deepak Arndt is an 83 year old man with a recent diagnosis of adenocarcinoma of the esophagus who was admitted on 10/11/18 with weakness      1. Esophageal cancer  - presented with dysphagia with weight loss  - EGD 9/24/2018 shows distal esophageal mass  - Biopsy positive for poorly differentiated carcinoma with adeno and squamous differentiation. HER2 was not amplified.  - CT chest, abdomen and pelvis 9/28/2018 demonstrated thickening of the distal esophageal wall extending to the GE junction.  Paraesophageal adenopathy along with upper abdominal adenopathy was noted compatible with metastatic disease.  - EUS 10/2/2018 showed complete obstruction of lower third of esophagus with malignant appearing lymph nodes in the subcarinal region and aortopulmonary region.   - Staged as [id number]  - G tube and Port placed 10/5/2018  - PET/CT 10/9/2018 showing hypermetabolic lymphadenopathy present in the superior, middle, and posterior mediastinum, the retroperitoneum, and along the gastrohepatic ligament. 7 mm lung lesion is too small to characterize. This might upstage cancer to stage IV disease.  - Radiation Oncology consulted with plans to give weekly paclitaxel/carboplatin with XRT. This was initially planned for neoadjuvant with possible surgery  - treatment options discussed at length with patient and his wife and also reviewed with Dr. Peter  - Initiation of paclitaxel and carboplatin 10/16. This will be weekly treatment, next due 10/23.   - No side effects from chemotherapy  - Consider adding XRT in the future  - Okay for soft foods and liquids as long as not having N/V, pain or cough  - Tolerating tube feedings  - SW looking at discharge to [place] with oncology care through [facility] until discharge to home.       2. Afib/PE  - Has been on dabigatran, but was off for procedures on 10/5  - CT chest with contrast 10/11 shows small PE in the  "right upper lung anteriorly  - Dabigatran resumed  - Cardiology consulted with bradycardia  - Difficultly with swallowing dabigatran whole (capsule that can't be opened)  - Start LMWH 1 mg/kg every 12 hours. Monitor blood counts and renal function.      3. Weakness  - Likely from malignancy, poor intake and other comorbidities  - PT/OT recommending TCU  - He will continue enteral feeding per recommendations from nutrition services               Interval History:   Patient reports no new symptoms over night. No complaints of nausea or vomiting              Medications:       alteplase  2 mg Intravenous Once     amLODIPine  5 mg Oral Daily     calcium carbonate 500 mg-vitamin D 200 units  1 tablet Oral Daily     diphenhydrAMINE  50 mg Oral Once     enoxaparin  1 mg/kg Subcutaneous Q12H     heparin  5 mL Intracatheter Q28 Days     heparin lock flush  5-10 mL Intracatheter Q24H     pantoprazole  40 mg Per G Tube QAM     simvastatin  5 mg Oral At Bedtime     acetaminophen, acetaminophen, albuterol, albuterol, IV fluid REPLACEMENT ONLY, diphenhydrAMINE, EPINEPHrine, heparin lock flush, hydrOXYzine, lidocaine 4%, LORazepam, - MEDICATION INSTRUCTIONS -, melatonin, meperidine, methylPREDNISolone, mineral oil-white petrolatum, naloxone, ondansetron **OR** ondansetron, - MEDICATION INSTRUCTIONS -, potassium phosphate (KPHOS) in D5W IV, potassium phosphate (KPHOS) in D5W IV, potassium phosphate (KPHOS) in D5W IV, potassium phosphate (KPHOS) in D5W IV, senna-docusate **OR** senna-docusate, sodium chloride (PF), sodium chloride (PF), sodium chloride               Physical Exam:   Blood pressure 150/54, pulse 54, temperature 98  F (36.7  C), temperature source Oral, resp. rate 16, height 1.727 m (5' 7.99\"), weight 61.2 kg (135 lb), SpO2 95 %.  Wt Readings from Last 4 Encounters:   10/19/18 61.2 kg (135 lb)   10/02/18 67.1 kg (148 lb)   09/24/18 67.1 kg (148 lb)   09/21/18 66.1 kg (145 lb 12.8 oz)         Vital Sign " Ranges  Temperature Temp  Av.3  F (36.3  C)  Min: 96.3  F (35.7  C)  Max: 98  F (36.7  C)   Blood pressure Systolic (24hrs), Av , Min:110 , Max:150        Diastolic (24hrs), Av, Min:54, Max:85      Pulse No Data Recorded   Respirations Resp  Av.5  Min: 16  Max: 18   Pulse oximetry SpO2  Av %  Min: 95 %  Max: 98 %         Intake/Output Summary (Last 24 hours) at 10/19/18 0820  Last data filed at 10/19/18 0500   Gross per 24 hour   Intake             1480 ml   Output              400 ml   Net             1080 ml       Constitutional: Awake, alert, cooperative, no apparent distress   Lungs: Clear to auscultation bilaterally, no crackles or wheezing   Cardiovascular: Regular rate and rhythm, normal S1 and S2, and no murmur noted   Abdomen: Normal bowel sounds, soft, non-distended, non-tender. Liver edge and spleen tip not palpable   Skin: No rashes, no cyanosis, no upper or lower extremity edema   Other: Port non tender. Feeding tube non tender          Data:   Laboratory:  CMP  Recent Labs  Lab 10/16/18  0623 10/15/18  0915    135   POTASSIUM 4.0 3.5   CHLORIDE 108 103   CO2 22 27   ANIONGAP 7 5   * 114*   BUN 16 9   CR 0.61* 0.63*   GFRESTIMATED >90 >90   GFRESTBLACK >90 >90   TRIPP 8.6 8.8   MAG  --  2.2   PHOS 3.5 2.4*     CBC  Recent Labs  Lab 10/18/18  1445 10/17/18  0600 10/16/18  0623 10/13/18  0555   WBC  --  10.6 14.3* 11.3*   RBC  --  4.08* 4.18*  --    HGB  --  13.6 13.9  --    HCT  --  39.7* 40.7  --    MCV  --  97 97  --    MCH  --  33.3* 33.3*  --    MCHC  --  34.3 34.2  --    RDW  --  13.3 13.5  --     207 190  --      INRNo lab results found in last 7 days.    Imaging data:  Results for orders placed or performed during the hospital encounter of 10/11/18   XR Chest 2 Views    Narrative    XR CHEST 2 VW  10/11/2018 3:07 AM     INDICATION: Weakness, shortness of breath.    COMPARISON: CT 2018. Chest x-ray 10/23/2017.      Impression    IMPRESSION: Borderline  prominent heart size. No focal air-space  disease or other acute findings. Right chest wall port with catheter  tip in the SVC. Old right rib fractures.    LEBRON LANZA MD   CT Head w/o Contrast    Narrative    CT HEAD W/O CONTRAST  10/11/2018 3:21 AM     HISTORY: Fall, weakness.     TECHNIQUE: Axial images of the head and coronal reformations without  IV contrast material. Radiation dose for this scan was reduced using  automated exposure control, adjustment of the mA and/or kV according  to patient size, or iterative reconstruction technique.    COMPARISON: 10/23/2017.    FINDINGS: No intracranial hemorrhage, mass or mass effect. Low  attenuation areas are present in white matter of the cerebral  hemispheres that are nonspecific but consistent with chronic small  vessel ischemic changes in this age patient. No acute infarct  identified. No shift of midline structures. No skull fractures.      Impression    IMPRESSION: No acute intracranial abnormality.    LEBRON LANZA MD   CT Chest Pulmonary Embolism w Contrast     Value    Radiologist flags Pulmonary embolism (AA)    Narrative    CT CHEST PULMONARY EMBOLISM W CONTRAST  10/11/2018 4:13 AM     HISTORY: Dyspnea.    TECHNIQUE: Volumetric acquisition of the chest after the  administration of 59 mL Isovue-370 IV contrast. Radiation dose for  this scan was reduced using automated exposure control, adjustment of  the mA and/or kV according to patient size, or iterative  reconstruction technique.     COMPARISON: CT 9/28/2018.    FINDINGS: Study is positive for a small pulmonary embolus in the right  upper lung anteriorly (series 5, image 75). No acute infiltrates or  pleural effusions. Emphysema. 2.5 cm right thyroid nodule as seen  previously. Right chest wall port with catheter tip in the SVC.  Coronary artery calcifications. Mediastinal adenopathy, slightly more  prominent than on the previous study. Distal esophageal mass is again  seen. A prominent left  lower mediastinal lymph node in the  paraesophageal region has increased in size measuring 2.3 x 2.4 cm  (previously 1.5 x 2.1 cm). A right retrocrural lymph node is more  prominent. Enlarged lymph nodes in the gastrohepatic ligament region  are slightly more prominent. Renal and liver cysts.      Impression    IMPRESSION:  1. Study is positive for right-sided pulmonary embolus.  2. Distal esophageal mass consistent with known cancer is again seen.  Mediastinal and upper abdominal adenopathy have slightly increased.  3. Emphysema.    [Critical Result: Pulmonary embolism]    Finding was identified on 10/11/2018 4:21 AM.     Dr. Walker was contacted by me on 10/11/2018 4:29 AM and  verbalized understanding of the critical result.    MD Donnell PAN MD

## 2018-10-19 NOTE — PROGRESS NOTES
[facility]  Hospitalist Progress Note   10/19/2018          Assessment and Plan:       Deepak Arndt is a 83 year old male  admitted on 10/11/2018. He has a history of recently diagnosed pancreatic CA s/p gastrostomy tube, CAD, HTN and is admitted for weakness, failure to thrive and small pulmonary embolism.      Adult failure to thrive  Physical deconditioning from acute illness/malignancy/senile fraility  Mechanical fall at home due to physical deconditioning  Possible mild cognitive impairment/dementia at baseline.  Severe malnutrition in the context of chronic illness  Patient has been having ongoing generalized weakness, fell out of bed on the night of admission and unable to get back up.   CT head no acute intracranial pathology.   G-tube tube placed 10/15.   Appreciate nutrition team assistance with tube feeding.  Free water through feeding tube.  Dietary supplements with Ensure  Speech therapy has concerns for aspiration and recommend n.p.o. with pleasure foods and thin liquids.   [facility] recommend - Okay for soft foods and liquids as long as not having N/V, pain or cough.   Pt getting most of his nutrition through the G-tube, takes some pills orally and eats for pleausure  Physical therapy, occupational therapy -recommend TCU.  Not safe to go home.    Discussed with care coordinator on floor for assistance with transition of care -accepted at [facility] pending bed availability      Pulmonary embolism in the setting of malignancy/anticoagulation on hold for procedure.  Pt was off Pradaxa 150 mg BID from 10/5-10/10 for G-Tube/Port placement.    CT chest done in the emergency room positive for right-sided pulmonary embolus and noted emphysema. Distal esophageal mass consistent with known cancer is again seen. Mediastinal and upper abdominal adenopathy have slightly increased.  PTA dabigatran switched to low molecular weight heparin (10/16) for hypercoagulation of malignancy/swallowing  difficulty.  Aggressive incentive spirometry.      Reactive leukocytosis likely from malignancy. Improved  Lactic acidosis likely from dehydration and poor oral intake.  WBC count stable/improved, lactate of 2.1 >1.3 UA -no signs of infection.   Chest x-ray right chest wall port with catheter tip in SVC.  No acute pathology.   Blood cultures no growth..  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    Had a G-tube and port placed on 10/5/2018.  Weekly paclitaxel and carboplatinum -started 10/16.  Dr. Peter of radiation oncology - will consider radiation therapy in the future  [facility] following - Patient will be going to TCU.  Because of insurance reason, he cannot get chemotherapy at Dr. Mcleod's office ([facility]) while he is in the TCU.  office will arrange for continuation of chemotherapy at [facility].   Appreciate [facility]/[place] [facility] comanagement.    Atrial fibrillation with slow ventricular response.  He is on chronic therapy with propranolol which is on hold since admission.  Cardiology / EP recommend to continue to hold beta-blocker. Holter monitor at discharge.  CHADS-Vasc of 4.   PTA dabigatran switch to low molecular weight heparin (10/16) for hypercoagulation of malignancy/swallowing difficulty.      Abnormal thyroid function test.  TSH 0.27, T4 1.52. Patient having bradycardia hence will hold off treatment for hyperthyroidism.  Monitor thyroid function tests in 4-6 weeks.      Coronary artery disease  History of MI.  Hyperlipidemia  Hypertension  Continue PTA amlodipine 5 mg oral daily, simvastatin 5 mg.  Holding PTA hydrochlorothiazide 25 mg - consider restarting as needed      Essential tremor   Hold PTA propranolol given bradycardia      Gastroesophageal reflux disease.  Continue PTA Protonix 40 mg oral  daily.      Osteoporosis.  Follow-up as outpatient.     Contact dermatitis: on back improved   hydroxyzine prn    Active Diet Order      Advance Diet as Tolerated      Dysphagia Diet Level 1 Pureed Thin Liquids (water, ice chips, juice, milk gelatin, ice cream, etc)    Adult Formula Bolus Feeding: QID Isosource 1.5; Route: Gastrostomy; 4; Can(s); Medication - Tube Feeding Flush Frequency: At least 15-30 mL water before and after medication administration and with tube clogging;  DVT Prophylaxis:  Sequential compression device. Pradaxa  Code Status: Full Code  Disposition: Expected discharge pending bed availability at [facility].  Discharge orders completed.    Patient, interdisciplinary team involved in care and agrees with plan.  Total time - 15 min. More than 50% of time spent in direct patient care, care coordination, patient counseling, and formalizing plan of care.     Kwan Benoit MD        Interval History:      Patient sitting up in the chair.  Denies any chest pain or shortness of breath.  Able to answer simple commands  Minimal oral intake.       Physical Exam:        Physical Exam   Temp:  [96.7  F (35.9  C)-98  F (36.7  C)] 98  F (36.7  C)  Heart Rate:  [70-71] 71  Resp:  [16-18] 16  BP: (144-150)/(54-69) 150/54  SpO2:  [95 %-98 %] 95 %    Intake/Output Summary (Last 24 hours) at 10/17/18 1534  Last data filed at 10/17/18 1300   Gross per 24 hour   Intake             1210 ml   Output             1075 ml   Net              135 ml     Admission Weight: 65.8 kg (145 lb)  Current Weight: 62.8 kg (138 lb 6.4 oz)    PHYSICAL EXAM  GENERAL: Patient is in no distress. Alert and able to answer simple commands.  Appears chronically ill.    HEART: Regular rate and rhythm. S1S2. No murmurs  LUNGS: Bilateral slightly decreased breath sounds, no wheezing no crackles.  ABDOMEN: Soft, no abdominal tenderness, bowel sounds heard   NEURO: Moving all extremities.  EXTREMITIES: No pedal edema. 2+ peripheral  pulses.  SKIN: Warm, dry.  Rash over the back improved  PSYCHIATRY Cooperative       Medications:          alteplase  2 mg Intravenous Once     amLODIPine  5 mg Oral Daily     calcium carbonate 500 mg-vitamin D 200 units  1 tablet Oral Daily     diphenhydrAMINE  50 mg Oral Once     enoxaparin  1 mg/kg Subcutaneous Q12H     heparin  5 mL Intracatheter Q28 Days     heparin lock flush  5-10 mL Intracatheter Q24H     pantoprazole  40 mg Per G Tube QAM     simvastatin  5 mg Oral At Bedtime     acetaminophen, acetaminophen, albuterol, albuterol, IV fluid REPLACEMENT ONLY, diphenhydrAMINE, EPINEPHrine, heparin lock flush, hydrOXYzine, lidocaine 4%, LORazepam, - MEDICATION INSTRUCTIONS -, melatonin, meperidine, methylPREDNISolone, mineral oil-white petrolatum, naloxone, ondansetron **OR** ondansetron, - MEDICATION INSTRUCTIONS -, potassium phosphate (KPHOS) in D5W IV, potassium phosphate (KPHOS) in D5W IV, potassium phosphate (KPHOS) in D5W IV, potassium phosphate (KPHOS) in D5W IV, senna-docusate **OR** senna-docusate, sodium chloride (PF), sodium chloride (PF), sodium chloride         Data:      All new lab and imaging data was reviewed.

## 2018-10-20 NOTE — DISCHARGE SUMMARY
[facility]    Discharge Summary  Hospitalist    Date of Admission:  10/11/2018  Date of Discharge:  10/20/2018  Discharging Provider: Donnell Larsen MD    Discharge Diagnoses   Acute pulmonary embolism  Adult failure to thrive  Physical deconditioning from acute illness/malignancy/senile fraility  Mechanical fall at home due to physical deconditioning  Possible mild cognitive impairment/dementia at baseline.  Severe malnutrition in the context of chronic illness  Afib with slow ventricular response    History of Present Illness   Deepak Arndt is an 84yo M with PMH of recently diagnosed esophageal and pancreatic cancer s/p gastrostomy tube, CAD w/ MI 2012, HTN, who was admitted on 10/11/2018 with weakness, failure to thrive and small pulmonary embolism. Patient had been having ongoing generalized weakness, fell out of bed on the night of admission and unable to get back up. Speech therapy hadconcerns for aspiration and recommend n.p.o. with pleasure foods and thin liquids. [facility] recommendations were for soft foods and liquids as long as not having N/V, pain or cough. Patient is receiving most of his nutrition through the G-tube, takes some pills orally and eats for pleasure. He is being discharged to TCU for ongoing rehabilitation before returning home.      Acute pulmonary embolism: Pt was off Pradaxa 150 mg BID from 10/5-10/10/2018 for G-Tube/Port placement. Unfortunately on presentation 10/11 CT chest was notable for right-sided pulmonary embolus. CT also noted distal esophageal mass consistent with known cancer; mediastinal and upper abdominal adenopathy have slightly increased. Patient was switched to Lovenox at discharge.    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 Had a G-tube and port placed on 10/5/2018. Weekly paclitaxel and carboplatinum -started 10/16.  - Dr. Peter of  radiation oncology - will consider radiation therapy in the future  - [facility] following - Patient will be going to TCU.  Because of insurance reason, he cannot get chemotherapy at Dr. Mcleod's office ([facility]) while he is in the TCU.  office will arrange for continuation of chemotherapy at [facility].     Atrial fibrillation with slow ventricular response: He is on chronic therapy with propranolol which is on hold since admission. CHADS-Vasc of 4.  - Cardiology / EP recommend to continue to hold beta-blocker.  - Holter monitor at discharge.  - Lovenox as above      Abnormal thyroid function test: TSH mildly low at 0.27.  - Monitor thyroid function tests in 4-6 weeks.      Essential tremor: Stopped PTA propranolol given bradycardia    Hospital Course   Deepak Arndt was admitted on 10/11/2018.  The following problems were addressed during his hospitalization:    Active Problems:    Symptomatic bradycardia    Cancer of distal third of esophagus (H)      Donnell Larsen MD    Significant Results and Procedures   CT C/A/P 10/11/2018    Pending Results   These results will be followed up by PCP, [facility]  Unresulted Labs Ordered in the Past 30 Days of this Admission     Date and Time Order Name Status Description    10/11/2018 1040 T4 free In process     10/2/2018 1656 Fine needle aspiration In process           Code Status   Full Code       Primary Care Physician   [place]    Physical Exam   Temp: 96.5  F (35.8  C) Temp src: Oral BP: 153/58   Heart Rate: 64 Resp: 16 SpO2: 91 % O2 Device: None (Room air)    Vitals:    10/18/18 0332 10/19/18 0544 10/20/18 0645   Weight: 62.7 kg (138 lb 3.2 oz) 61.2 kg (135 lb) 58.7 kg (129 lb 4.8 oz)     Vital Signs with Ranges  Temp:  [96.5  F (35.8  C)-97.5  F (36.4  C)] 96.5  F (35.8  C)  Heart Rate:  [] 64  Resp:  [16] 16  BP: (147-153)/(58-68) 153/58  SpO2:  [91 %-98 %] 91 %  I/O last 3 completed shifts:  In: 370 [NG/GT:370]  Out: 175  [Urine:175]    Constitutional: Elderly male, tremulous, in no acute distress  Eyes: PERRL, nonicteriv  HEENT: Normocephalic, atraumatic, oral mucosa moist  Respiratory: CTAB, no wheezing or crackles  Cardiovascular: Irregular, normal S1/2, no m/r/g  GI: G tube noted dressings c/d/i  Skin: No rashes, noted 2cm cystic lesion over upper back, port noted  Musculoskeletal: Moves all extremities, tremulous overall  Neurologic: A&Ox3  Psychiatric: Appropriate affect and mood    Discharge Disposition   Discharged to short-term care facility  Condition at discharge: Stable    Consultations This Hospital Stay   CARDIOLOGY IP CONSULT  SOCIAL WORK IP CONSULT  PHYSICAL THERAPY ADULT IP CONSULT  OCCUPATIONAL THERAPY ADULT IP CONSULT  HEMATOLOGY & ONCOLOGY IP CONSULT  NUTRITION SERVICES ADULT IP CONSULT  PHARMACY IP CONSULT  PHYSICAL THERAPY ADULT IP CONSULT  OCCUPATIONAL THERAPY ADULT IP CONSULT  SPEECH LANGUAGE PATH ADULT IP CONSULT  NUTRITION SERVICES ADULT IP CONSULT  PHYSICAL THERAPY ADULT IP CONSULT  OCCUPATIONAL THERAPY ADULT IP CONSULT  SPEECH LANGUAGE PATH ADULT IP CONSULT  PHYSICAL THERAPY ADULT IP CONSULT  OCCUPATIONAL THERAPY ADULT IP CONSULT  CARE TRANSITION RN/SW IP CONSULT  HEMATOLOGY & ONCOLOGY IP CONSULT  HEMATOLOGY & ONCOLOGY IP CONSULT    Time Spent on this Encounter   I, Donnell Larsen, personally saw the patient today and spent less than or equal to 30 minutes discharging this patient.    Discharge Orders     Follow-Up with Cardiac Advanced Practice Provider     General info for SNF   Length of Stay Estimate: Short Term Care: Estimated # of Days <30  Condition at Discharge: Stable  Level of care:skilled   Rehabilitation Potential: Fair  Admission H&P remains valid and up-to-date: Yes  Recent Chemotherapy: plan to start soon                Use Nursing Home Standing Orders: Yes     Mantoux instructions   Give two-step Mantoux (PPD) Per Facility Policy Yes     Reason for your hospital stay   Were admitted to  the hospital with falls/physical deconditioning.     Additional Discharge Instructions   Speech therapy recommend n.p.o. due to esophageal obstruction.  Patient and her family can consider pleasure p.o. intake with full liquid texture, upright positioning for 1-2 hours after any intake [as per oncology]  Dietary supplements with Ensure.  Tube feeding as per nutrition recommendation.     Activity - Up with nursing assistance     Follow Up and recommended labs and tests   Schedule an outpatient consult with [facility] for ongoing weekly chemotherapy with Taxol/Carboplatin while at [place]. Chemotherapy is due 10/23/2018.     Follow Up and recommended labs and tests   Follow up with assisted physician.  The following labs/tests are recommended: BMP in 1 week .    Follow-up with oncology as per schedule.  Monitor thyroid function tests in 4-6 weeks.  Event monitor at discharge, follow-up with cardiology as outpatient.  Monitor daily blood pressure/heart rate and review on provider visit.  Review volume status and consider optimizing dose of diuretic.     Encourage PO fluids   Dysphagia Diet Level 1 Pureed Thin Liquids (water, ice chips, juice, milk gelatin, ice cream, etc)     Nutrition Services Adult IP Consult   Reason: Tube feeding     Physical Therapy Adult Consult   Evaluate and treat as clinically indicated.    Reason:  deconditioning     Occupational Therapy Adult Consult   Evaluate and treat as clinically indicated.    Reason: Physical deconditioning/cognitive decline     Speech Language Path Adult Consult   Evaluate and treat as clinically indicated.    Reason: Risk for aspiration.     Fall precautions     Advance Diet as Tolerated   Adult Formula Bolus Feeding: QID Isosource 1.5; Route: Gastrostomy; 4; Can(s); Medication - Tube Feeding Flush Frequency: At least 15-30 mL water before and after medication administration and with tube clogging;       Discharge Medications   Current Discharge Medication  List      START taking these medications    Details   enoxaparin (LOVENOX) 60 MG/0.6ML injection Inject 0.6 mLs (60 mg) Subcutaneous every 12 hours    Associated Diagnoses: Other acute pulmonary embolism without acute cor pulmonale (H)      pantoprazole (PROTONIX) 2 mg/mL SUSP suspension 20 mLs (40 mg) by Per G Tube route every morning  Refills: 0    Associated Diagnoses: Cancer of distal third of esophagus (H)         CONTINUE these medications which have NOT CHANGED    Details   acetaminophen (TYLENOL) 325 MG tablet Take 2 tablets (650 mg) by mouth every 4 hours as needed for mild pain or fever  Qty: 100 tablet    Associated Diagnoses: Generalized muscle weakness      amLODIPine (NORVASC) 5 MG tablet Take 1 tablet (5 mg) by mouth daily  Qty: 90 tablet, Refills: 0    Associated Diagnoses: Essential hypertension, benign      Calcium carb-Vitamin D 500 mg [place]-200 units (OSCAL WITH D;OYSTER SHELL CALCIUM) 500-200 MG-UNIT per tablet Take 1 tablet by mouth daily      fish oil-omega-3 fatty acids (OMEGA-3 FISH OIL) 1000 MG capsule Take 1 capsule by mouth daily.      Multiple Vitamins-Minerals (DAILY MULTI PO) Take by mouth daily      simvastatin (ZOCOR) 10 MG tablet Take 5 mg by mouth At Bedtime (Takes half of a 10mg tablet for a total of 5mg daily)         STOP taking these medications       Dabigatran Etexilate Mesylate (PRADAXA PO) Comments:   Reason for Stopping:         hydrochlorothiazide (HYDRODIURIL) 25 MG tablet Comments:   Reason for Stopping:         pantoprazole (PROTONIX) 40 MG EC tablet Comments:   Reason for Stopping:             Allergies   Allergies   Allergen Reactions     Versed [Midazolam] Other (See Comments)     Pt has adverse/opposite reaction to versed. Given in small doses for a port and g-tube placement. Patient was aggressive.     Data   Most Recent 3 CBC's:  Recent Labs   Lab Test  10/18/18   1445  10/17/18   0600  10/16/18   0623  10/13/18   0555   10/11/18   0255   WBC   --   10.6  14.3*   11.3*   < >  15.5*   HGB   --   13.6  13.9   --    --   15.1   MCV   --   97  97   --    --   97   PLT  213  207  190   --    --   173    < > = values in this interval not displayed.      Most Recent 3 BMP's:  Recent Labs   Lab Test  10/20/18   0635  10/16/18   0623  10/15/18   0915  10/12/18   0615   NA   --   137  135  136   POTASSIUM   --   4.0  3.5  3.6   CHLORIDE   --   108  103  101   CO2   --   22  27  25   BUN   --   16  9  14   CR  0.73  0.61*  0.63*  0.78   ANIONGAP   --   7  5  10   TRIPP   --   8.6  8.8  8.7   GLC   --   109*  114*  97     Most Recent 2 LFT's:  Recent Labs   Lab Test  10/11/18   0255  09/19/18   1412   AST  20  19   ALT  21  27   ALKPHOS  68  79   BILITOTAL  1.2  0.8     Most Recent INR's and Anticoagulation Dosing History:  Anticoagulation Dose History     Recent Dosing and Labs Latest Ref Rng & Units 10/8/2018    INR 0.86 - 1.14 1.18(H)        Most Recent 3 Troponin's:  Recent Labs   Lab Test  10/11/18   1408  10/11/18   1040  10/11/18   0255   TROPI  0.047*  0.057*  0.057*     Most Recent Cholesterol Panel:  Recent Labs   Lab Test 05/25/18   CHOL  114   LDL  47   HDL  49   TRIG  91     Most Recent 6 Bacteria Isolates From Any Culture (See EPIC Reports for Culture Details):  Recent Labs   Lab Test  10/11/18   1625  10/11/18   1620  01/08/14   1341  01/08/14   1339   CULT  No growth  No growth  Culture negative for acid fast bacilli Assayed at [facility] [address]  Culture negative after 4 weeks  Normal smiley  Culture negative for acid fast bacilli Assayed at [facility] [address]  Penicillium species isolated No additional fungi cultured after 4 weeks incubation*  Normal respiratory smiley     Most Recent TSH, T4 and A1c Labs:  Recent Labs   Lab Test  10/11/18   1040   TSH  0.27*   T4  1.52*   A1C  5.7*     Results for orders placed or performed during the hospital encounter of 10/11/18   XR Chest 2 Views    Narrative    XR CHEST 2 VW   10/11/2018 3:07 AM     INDICATION: Weakness, shortness of breath.    COMPARISON: CT 9/28/2018. Chest x-ray 10/23/2017.      Impression    IMPRESSION: Borderline prominent heart size. No focal air-space  disease or other acute findings. Right chest wall port with catheter  tip in the SVC. Old right rib fractures.    LEBRON LANZA MD   CT Head w/o Contrast    Narrative    CT HEAD W/O CONTRAST  10/11/2018 3:21 AM     HISTORY: Fall, weakness.     TECHNIQUE: Axial images of the head and coronal reformations without  IV contrast material. Radiation dose for this scan was reduced using  automated exposure control, adjustment of the mA and/or kV according  to patient size, or iterative reconstruction technique.    COMPARISON: 10/23/2017.    FINDINGS: No intracranial hemorrhage, mass or mass effect. Low  attenuation areas are present in white matter of the cerebral  hemispheres that are nonspecific but consistent with chronic small  vessel ischemic changes in this age patient. No acute infarct  identified. No shift of midline structures. No skull fractures.      Impression    IMPRESSION: No acute intracranial abnormality.    LEBRON LANZA MD   CT Chest Pulmonary Embolism w Contrast     Value    Radiologist flags Pulmonary embolism (AA)    Narrative    CT CHEST PULMONARY EMBOLISM W CONTRAST  10/11/2018 4:13 AM     HISTORY: Dyspnea.    TECHNIQUE: Volumetric acquisition of the chest after the  administration of 59 mL Isovue-370 IV contrast. Radiation dose for  this scan was reduced using automated exposure control, adjustment of  the mA and/or kV according to patient size, or iterative  reconstruction technique.     COMPARISON: CT 9/28/2018.    FINDINGS: Study is positive for a small pulmonary embolus in the right  upper lung anteriorly (series 5, image 75). No acute infiltrates or  pleural effusions. Emphysema. 2.5 cm right thyroid nodule as seen  previously. Right chest wall port with catheter tip in the  SVC.  Coronary artery calcifications. Mediastinal adenopathy, slightly more  prominent than on the previous study. Distal esophageal mass is again  seen. A prominent left lower mediastinal lymph node in the  paraesophageal region has increased in size measuring 2.3 x 2.4 cm  (previously 1.5 x 2.1 cm). A right retrocrural lymph node is more  prominent. Enlarged lymph nodes in the gastrohepatic ligament region  are slightly more prominent. Renal and liver cysts.      Impression    IMPRESSION:  1. Study is positive for right-sided pulmonary embolus.  2. Distal esophageal mass consistent with known cancer is again seen.  Mediastinal and upper abdominal adenopathy have slightly increased.  3. Emphysema.    [Critical Result: Pulmonary embolism]    Finding was identified on 10/11/2018 4:21 AM.     Dr. Walker was contacted by me on 10/11/2018 4:29 AM and  verbalized understanding of the critical result.    LEBRON LANZA MD

## 2018-10-20 NOTE — PLAN OF CARE
Problem: Patient Care Overview  Goal: Plan of Care/Patient Progress Review  Pt is disoriented to time and situation overnight, frequently sets off bed alarm. VSS on RA, denies pain. BELLO. Small cyst on back. Poor PO intake and appetite. Pureed thin liquid diet with intermittent g-tube feedings scheduled during the day. G-tube dressing CDI. Voiding adequately in urinal/up to bathroom with A/1 and walker. Plan to discharge to [place] this morning. Will continue to monitor.

## 2018-10-20 NOTE — PROGRESS NOTES
Social Work Services Discharge Note      Patient Name:  Deepak Arndt     Anticipated Discharge Date: October 20, 2018        Discharge Disposition:    Nursing Home Placement  -SNF:   [facility]  -Following MD:   House     Pre-Admission Screening (PAS) online form has been completed.  The Level of Care (LOC) is: Determined  Confirmation Code is:  [id number]   Patient/caregiver informed of referral to [facility] for Pre-Admission Screening for skilled nursing facility (SNF) placement and to expect a phone call post discharge from SNF.     Additional Services/Equipment Arranged: None     Patient / Family response to discharge plan:  Family agrees with [place]     Persons notified of above discharge plan: Pt, Spouse, Lynette    Pt is stable for discharge to [facility] today, pt's wife plans to transport the pt via hospital skyway at 13:00. All orders have been faxed for admission.      VIRGIE Dutta, [facility]  Phone [phone]

## 2018-10-20 NOTE — PROGRESS NOTES
Chart reviewed.  Patient anticipated to discharge to [place]. To follow-up with Dr. Bolden ( Oncology) for weekly paclitaxel and carboplatin at [facility].  No new recommendations.

## 2018-10-20 NOTE — PLAN OF CARE
Problem: Patient Care Overview  Goal: Plan of Care/Patient Progress Review  Outcome: No Change  A&Ox4 this evening, forgetful at times. VSS on RA, denies pain. Port HL, g-tube clamped. Poor PO intake, puree and thin liquid diet. Pt up with assist of 1 with a walker and GB, alarms active and audible. Plan to discharge tomorrow morning to [place]. Nursing to continue to monitor.

## 2018-10-20 NOTE — PROGRESS NOTES
Patient discharged at 1:00 PM to TCU ([place]). Port was de-accessed. Pain at time of discharge was 0. Belongings returned to patient. Discharge instructions and medications reviewed with patient.  Patient verbalized understanding and all questions were answered. At time of discharge, patient condition was stable and left the unit via wheelchair escorted by nursing assistant.

## 2018-10-20 NOTE — PLAN OF CARE
Problem: Patient Care Overview  Goal: Plan of Care/Patient Progress Review  Physical Therapy Discharge Summary    Reason for therapy discharge:    Discharged to transitional care facility.    Progress towards therapy goal(s). See goals on Care Plan in [facility] electronic health record for goal details.  Goals not met.  Barriers to achieving goals:   limited tolerance for therapy and discharge from facility.    Therapy recommendation(s):    Continued therapy is recommended.  Rationale/Recommendations:  Continued PT at next level of care to progress gait and mobilty.

## 2018-10-20 NOTE — PROVIDER NOTIFICATION
MD Notification    Person notified: Dr. Larsen    Person Name: Danyell May RN    Date/Time: 10/20/18@0955    Interaction: Paged    Purpose of Notification: Requested for MD to enter discharge order for patient.    Orders Received: Discharge order completed.

## 2018-10-21 NOTE — TELEPHONE ENCOUNTER
Patient has a G-tube in and is supposed to have feeding boluses QID, he is getting feeding on a pump QID over 2 hours because the tube feed that TCU has is in bag.   Nursing reports today patient is slightly SOB, lung sounds have rhales bilaterally, and resp sound slightly moist.   Patient HOB is up during the night 30 degrees  Vitals are stable  ORder: check residual before every TF and hold TF for residual > 60 cc, CXR stat

## 2018-10-21 NOTE — TELEPHONE ENCOUNTER
CXR results: chronic lung findings with peribronchial thickening.   Patient is still coughing, no SOB this afteroon 97% SaO2 on room air  Vitals 115/81,73,  98.5  Orders; continue to monitor and update nP in AM

## 2018-10-22 PROBLEM — R63.4 LOSS OF WEIGHT: Status: ACTIVE | Noted: 2018-01-01

## 2018-10-22 PROBLEM — I26.92 ACUTE SADDLE PULMONARY EMBOLISM WITHOUT ACUTE COR PULMONALE (H): Status: ACTIVE | Noted: 2018-01-01

## 2018-10-22 PROBLEM — R79.89 LOW TSH LEVEL: Status: ACTIVE | Noted: 2018-01-01

## 2018-10-22 PROBLEM — R53.81 PHYSICAL DECONDITIONING: Status: ACTIVE | Noted: 2018-01-01

## 2018-10-22 PROBLEM — L20.89 OTHER ATOPIC DERMATITIS: Status: ACTIVE | Noted: 2018-01-01

## 2018-10-22 NOTE — LETTER
[facility]   To:   Lynette Brar          Please give to facility    From:  Mari Culp  [facility]  Care Coordinator   Phone: [phone]   Email: [email or web address]     Patient Name:  Deepak Arndt Date of Birth: [birth year]   Admit date: 10/20/18      *Information Needed:  Please contact me when the patient will discharge (or if they will move to long term care)- include the discharge date, disposition, and main diagnosis   - If the patient is discharged with home care services, please provide the name of the agency    Also- Please inform me if a care conference is being held.   Phone, Fax or Email with information  Thank you!

## 2018-10-22 NOTE — LETTER
10/22/2018        RE: Deepak Arndt  [address] [place] [address]        [facility]  PRIMARY CARE PROVIDER AND CLINIC:  [place] [address] / [place] [address]  Chief Complaint   Patient presents with     Hospital F/U     [place] Medical Record Number:  [id number]  Place of Service where encounter took place:  [facility] ([facility]) [[id number]]    HPI:    Deepak Arndt is a 83 year old  (1935),admitted to the above facility from  [facility].  Hospital stay 10/11/2018 through 10/20/2018.  Admitted to this facility for  rehab, medical management and nursing care.  HPI information obtained from: facility chart records, facility staff, patient report and [facility] chart review.  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 He was recently diagnosed with esophageal cancer in 9/24/18. PET/CT on 10/9 shows extensive lymphadenopathy.  He is followed by Dr. Mcleod, who recommends starting weekly paclitaxel and carboplatim with possibility of radiation in future. He will have chemo at [facility] oncology due to status in TCU.   He had been on pradax, which was on hold due to placement of G tube and port on 10/5/18.   He is eating some pureed food by mouth and continues on G tube feed.     Acute  pulmonary embolism without acute cor pulmonale (H)  Found to have small PULMONARY EMBOLISM in right upper lung. He was transferred to TCU on lovenox.   No shortness of breath O2 sats >90%.   Symptomatic bradycardia  Cardiology was consulted due to HRs 40-60s. Patient has been asymptomatic so will monitor for now. Although propranolol, which he was taking for essential tremor, was stopped.     Chronic atrial fibrillation (H)  CAHDS-Vasc 4. He was to be set up with holter monitor at discharge.   10/11/18 echo EF 60-65%/    Loss of weight  PEG tube was placed on 10/5 for supplemental feeding. He had lost about 14# in past month.     Essential hypertension  He continues on PTA amlodipine. Hydrochlorothiazide was stopped.   BP's: 158/81, 158/70, 143/72      Abnormal thyroid function test: TSH mildly low at 0.27.  - Monitor thyroid function tests in 4-6 weeks.  Other atopic dermatitis  Pruritic rash on back. He was given benedryl in hospital.     Physical deconditioning  Lives with spouse in house. He has been mostly indpendent prior to September. He is quite weak now.          CODE STATUS/ADVANCE DIRECTIVES DISCUSSION:   CPR/Full code   Patient's living condition: lives with spouse    ALLERGIES:Versed [midazolam]  PAST MEDICAL HISTORY:  has a past medical history of Actinic keratosis; Atrial fibrillation (H); Cancer (H); Cataract; Coronary artery disease; Detached retina (2007); ED (erectile dysfunction); Gastroesophageal reflux disease; Hyperlipidemia; Hypertension; Lung nodules; Mild aortic stenosis; Multiple thyroid nodules; Murmur; Myocardial infarction (H) (6/2012); and Osteoporosis.  PAST SURGICAL HISTORY:  has a past surgical history that includes hc or ocular device intraop detached retina; cataract iol, rt/lt; Esophagoscopy, gastroscopy, duodenoscopy (EGD), combined (N/A, 9/24/2018); and Esophagoscopy, gastroscopy, duodenoscopy (EGD), combined (N/A, 10/2/2018).  FAMILY HISTORY: family history is not on file.  SOCIAL HISTORY:  reports that he quit smoking about 44 years ago. His smoking use included Cigarettes. He started smoking about 64 years ago. He has a 20.00 pack-year smoking history. He has never used smokeless tobacco. He reports that he drinks alcohol. He reports that he does not use illicit drugs.    Post Discharge Medication Reconciliation Status: discharge medications reconciled, continue medications without change.  Current Outpatient Prescriptions   Medication Sig Dispense Refill     acetaminophen (TYLENOL) 325 MG  "tablet Take 2 tablets (650 mg) by mouth every 4 hours as needed for mild pain or fever 100 tablet      amLODIPine (NORVASC) 5 MG tablet Take 1 tablet (5 mg) by mouth daily 90 tablet 0     Calcium carb-Vitamin D 500 mg [place]-200 units (OSCAL WITH D;OYSTER SHELL CALCIUM) 500-200 MG-UNIT per tablet Take 1 tablet by mouth daily       enoxaparin (LOVENOX) 60 MG/0.6ML injection Inject 0.6 mLs (60 mg) Subcutaneous every 12 hours       fish oil-omega-3 fatty acids (OMEGA-3 FISH OIL) 1000 MG capsule Take 1 capsule by mouth daily.       Multiple Vitamins-Minerals (DAILY MULTI PO) Take by mouth daily       pantoprazole (PROTONIX) 2 mg/mL SUSP suspension 20 mLs (40 mg) by Per G Tube route every morning  0     simvastatin (ZOCOR) 10 MG tablet Take 5 mg by mouth At Bedtime (Takes half of a 10mg tablet for a total of 5mg daily)         ROS:  4 point ROS including Respiratory, CV, GI and , other than that noted in the HPI,  is negative    Exam:  /81  Pulse 73  Temp 98.5  F (36.9  C)  Resp 30  Ht 5' 7\" (1.702 m)  Wt 131 lb 3.2 oz (59.5 kg)  SpO2 100%  BMI 20.55 kg/m2  GENERAL APPEARANCE:  Alert, in no distress  ENT:  Mouth and posterior oropharynx normal, moist mucous membranes, hearing acuity adequate   EYES:  EOM, conjunctivae, lids, pupils and irises normal    RESP:  respiratory effort and palpation of chest normal, no respiratory distress, Lung sounds clear  CV:  Palpation and auscultation of heart done , rate and rhythm irreg, no murmur, no rub or gallop, Edema non  ABDOMEN:  normal bowel sounds, soft, nontender, no hepatosplenomegaly or other masses  M/S:   Gait and station not observed. Sitting up in chair, Digits and nails normal   SKIN:  Inspection/Palpation of skin and subcutaneous tissue papular rash on back  NEURO: 2-12 in normal limits and at patient's baseline  PSYCH:  insight and judgement, memory intact , affect and mood normal      Lab/Diagnostic data:  CBC RESULTS:   Recent Labs   Lab Test  10/18/18   " 1445  10/17/18   0600  10/16/18   0623   WBC   --   10.6  14.3*   RBC   --   4.08*  4.18*   HGB   --   13.6  13.9   HCT   --   39.7*  40.7   MCV   --   97  97   MCH   --   33.3*  33.3*   MCHC   --   34.3  34.2   RDW   --   13.3  13.5   PLT  213  207  190       Last Basic Metabolic Panel:  Recent Labs   Lab Test  10/20/18   0635  10/16/18   0623  10/15/18   0915   NA   --   137  135   POTASSIUM   --   4.0  3.5   CHLORIDE   --   108  103   TRIPP   --   8.6  8.8   CO2   --   22  27   BUN   --   16  9   CR  0.73  0.61*  0.63*   GLC   --   109*  114*       Liver Function Studies -   Recent Labs   Lab Test  10/11/18   0255  09/19/18   1412   PROTTOTAL  7.2  8.0   ALBUMIN  3.3*  3.5   BILITOTAL  1.2  0.8   ALKPHOS  68  79   AST  20  19   ALT  21  27       TSH   Date Value Ref Range Status   10/11/2018 0.27 (L) 0.40 - 4.00 mU/L Final   10/23/2017 0.50 0.40 - 4.00 mU/L Final     Lab Results   Component Value Date    A1C 5.7 10/11/2018    A1C 5.4 05/25/2018       ASSESSMENT/PLAN:  (C15.5) 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 Advanced stage with lymph involvement. Limited ability to take food by mouth. Now with PEG tube.   Plan: follow up with DR Bolden tomorrow to set up outpatient chemotherapy.   Continue Gtube feedings with assist from dietician.       (I26.92) Acute  pulmonary embolism without acute cor pulmonale (H)  Comment: had been on pradaxa, which was on hold due placement of G tube and port.   Plan: continue with lovenox for now. Will ask oncology to clarify when to restart pradaxa    (R00.1) Symptomatic bradycardia  Comment: evaluated by cardiology. No change in plan  Plan: monitor    (I48.2) Chronic atrial fibrillation (H)  Comment: on lovenox.   Plan: no hcange    (R63.4) Loss of weight  Comment: due to esoph cancer.   Plan: continue supplemental feedings.     (I10) Essential hypertension  Comment: adequate control for now  Plan: monitor     (L20.89)  Other atopic dermatitis  Comment: on back.   Plan: HC 2.5% cream to back BID    (R79.89) Low TSH level    Plan: repeat TSH in 4-6 weeks.     (R53.81) Physical deconditioning  Comment: due to esoph cancer.   Plan: physical therapy and OCCUPATIONAL THERAPY     Orders:  HC 2.5% cream to back BID for 10d  Monitor thyroid function tests in 4-6 weeks.      Electronically signed by:  SHILPA Marino CNP                    Sincerely,        SHILPA Marino CNP

## 2018-10-22 NOTE — PROGRESS NOTES
[facility]  PRIMARY CARE PROVIDER AND CLINIC:  [place] [address] / [facility] [address]  Chief Complaint   Patient presents with     Hospital F/U     [place] Medical Record Number:  [id number]  Place of Service where encounter took place:  [facility] [[id number]]    HPI:    Deepak Arndt is a 83 year old  (1935),admitted to the above facility from  [facility].  Hospital stay 10/11/2018 through 10/20/2018.  Admitted to this facility for  rehab, medical management and nursing care.  HPI information obtained from: facility chart records, facility staff, patient report and [facility] chart review.  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 He was recently diagnosed with esophageal cancer in 9/24/18. PET/CT on 10/9 shows extensive lymphadenopathy.  He is followed by Dr. Mcleod, who recommends starting weekly paclitaxel and carboplatim with possibility of radiation in future. He will have chemo at [facility] oncology due to status in TCU.   He had been on pradax, which was on hold due to placement of G tube and port on 10/5/18.   He is eating some pureed food by mouth and continues on G tube feed.     Acute  pulmonary embolism without acute cor pulmonale (H)  Found to have small PULMONARY EMBOLISM in right upper lung. He was transferred to TCU on lovenox.   No shortness of breath O2 sats >90%.   Symptomatic bradycardia  Cardiology was consulted due to HRs 40-60s. Patient has been asymptomatic so will monitor for now. Although propranolol, which he was taking for essential tremor, was stopped.     Chronic atrial fibrillation (H)  CAHDS-Vasc 4. He was to be set up with holter monitor at discharge.   10/11/18 echo EF 60-65%/   Loss of weight  PEG tube was placed on 10/5 for supplemental feeding. He had lost about 14#  in past month.     Essential hypertension  He continues on PTA amlodipine. Hydrochlorothiazide was stopped.   BP's: 158/81, 158/70, 143/72      Abnormal thyroid function test: TSH mildly low at 0.27.  - Monitor thyroid function tests in 4-6 weeks.  Other atopic dermatitis  Pruritic rash on back. He was given benedryl in hospital.     Physical deconditioning  Lives with spouse in house. He has been mostly indpendent prior to September. He is quite weak now.          CODE STATUS/ADVANCE DIRECTIVES DISCUSSION:   CPR/Full code   Patient's living condition: lives with spouse    ALLERGIES:Versed [midazolam]  PAST MEDICAL HISTORY:  has a past medical history of Actinic keratosis; Atrial fibrillation (H); Cancer (H); Cataract; Coronary artery disease; Detached retina (2007); ED (erectile dysfunction); Gastroesophageal reflux disease; Hyperlipidemia; Hypertension; Lung nodules; Mild aortic stenosis; Multiple thyroid nodules; Murmur; Myocardial infarction (H) (6/2012); and Osteoporosis.  PAST SURGICAL HISTORY:  has a past surgical history that includes hc or ocular device intraop detached retina; cataract iol, rt/lt; Esophagoscopy, gastroscopy, duodenoscopy (EGD), combined (N/A, 9/24/2018); and Esophagoscopy, gastroscopy, duodenoscopy (EGD), combined (N/A, 10/2/2018).  FAMILY HISTORY: family history is not on file.  SOCIAL HISTORY:  reports that he quit smoking about 44 years ago. His smoking use included Cigarettes. He started smoking about 64 years ago. He has a 20.00 pack-year smoking history. He has never used smokeless tobacco. He reports that he drinks alcohol. He reports that he does not use illicit drugs.    Post Discharge Medication Reconciliation Status: discharge medications reconciled, continue medications without change.  Current Outpatient Prescriptions   Medication Sig Dispense Refill     acetaminophen (TYLENOL) 325 MG tablet Take 2 tablets (650 mg) by mouth every 4 hours as needed for mild pain or fever 100  "tablet      amLODIPine (NORVASC) 5 MG tablet Take 1 tablet (5 mg) by mouth daily 90 tablet 0     Calcium carb-Vitamin D 500 mg [place]-200 units (OSCAL WITH D;OYSTER SHELL CALCIUM) 500-200 MG-UNIT per tablet Take 1 tablet by mouth daily       enoxaparin (LOVENOX) 60 MG/0.6ML injection Inject 0.6 mLs (60 mg) Subcutaneous every 12 hours       fish oil-omega-3 fatty acids (OMEGA-3 FISH OIL) 1000 MG capsule Take 1 capsule by mouth daily.       Multiple Vitamins-Minerals (DAILY MULTI PO) Take by mouth daily       pantoprazole (PROTONIX) 2 mg/mL SUSP suspension 20 mLs (40 mg) by Per G Tube route every morning  0     simvastatin (ZOCOR) 10 MG tablet Take 5 mg by mouth At Bedtime (Takes half of a 10mg tablet for a total of 5mg daily)         ROS:  4 point ROS including Respiratory, CV, GI and , other than that noted in the HPI,  is negative    Exam:  /81  Pulse 73  Temp 98.5  F (36.9  C)  Resp 30  Ht 5' 7\" (1.702 m)  Wt 131 lb 3.2 oz (59.5 kg)  SpO2 100%  BMI 20.55 kg/m2  GENERAL APPEARANCE:  Alert, in no distress  ENT:  Mouth and posterior oropharynx normal, moist mucous membranes, hearing acuity adequate   EYES:  EOM, conjunctivae, lids, pupils and irises normal    RESP:  respiratory effort and palpation of chest normal, no respiratory distress, Lung sounds clear  CV:  Palpation and auscultation of heart done , rate and rhythm irreg, no murmur, no rub or gallop, Edema non  ABDOMEN:  normal bowel sounds, soft, nontender, no hepatosplenomegaly or other masses  M/S:   Gait and station not observed. Sitting up in chair, Digits and nails normal   SKIN:  Inspection/Palpation of skin and subcutaneous tissue papular rash on back  NEURO: 2-12 in normal limits and at patient's baseline  PSYCH:  insight and judgement, memory intact , affect and mood normal      Lab/Diagnostic data:  CBC RESULTS:   Recent Labs   Lab Test  10/18/18   1445  10/17/18   0600  10/16/18   0623   WBC   --   10.6  14.3*   RBC   --   4.08*  4.18* "   HGB   --   13.6  13.9   HCT   --   39.7*  40.7   MCV   --   97  97   MCH   --   33.3*  33.3*   MCHC   --   34.3  34.2   RDW   --   13.3  13.5   PLT  213  207  190       Last Basic Metabolic Panel:  Recent Labs   Lab Test  10/20/18   0635  10/16/18   0623  10/15/18   0915   NA   --   137  135   POTASSIUM   --   4.0  3.5   CHLORIDE   --   108  103   TRIPP   --   8.6  8.8   CO2   --   22  27   BUN   --   16  9   CR  0.73  0.61*  0.63*   GLC   --   109*  114*       Liver Function Studies -   Recent Labs   Lab Test  10/11/18   0255  09/19/18   1412   PROTTOTAL  7.2  8.0   ALBUMIN  3.3*  3.5   BILITOTAL  1.2  0.8   ALKPHOS  68  79   AST  20  19   ALT  21  27       TSH   Date Value Ref Range Status   10/11/2018 0.27 (L) 0.40 - 4.00 mU/L Final   10/23/2017 0.50 0.40 - 4.00 mU/L Final     Lab Results   Component Value Date    A1C 5.7 10/11/2018    A1C 5.4 05/25/2018       ASSESSMENT/PLAN:  (C15.5) 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 Advanced stage with lymph involvement. Limited ability to take food by mouth. Now with PEG tube.   Plan: follow up with DR Bolden tomorrow to set up outpatient chemotherapy.   Continue Gtube feedings with assist from dietician.       (I26.92) Acute  pulmonary embolism without acute cor pulmonale (H)  Comment: had been on pradaxa, which was on hold due placement of G tube and port.   Plan: continue with lovenox for now. Will ask oncology to clarify when to restart pradaxa    (R00.1) Symptomatic bradycardia  Comment: evaluated by cardiology. No change in plan  Plan: monitor    (I48.2) Chronic atrial fibrillation (H)  Comment: on lovenox.   Plan: no hcange    (R63.4) Loss of weight  Comment: due to esoph cancer.   Plan: continue supplemental feedings.     (I10) Essential hypertension  Comment: adequate control for now  Plan: monitor     (L20.89) Other atopic dermatitis  Comment: on back.   Plan: HC 2.5% cream to back BID    (R79.89) Low  TSH level    Plan: repeat TSH in 4-6 weeks.     (R53.81) Physical deconditioning  Comment: due to esoph cancer.   Plan: physical therapy and OCCUPATIONAL THERAPY     Orders:  HC 2.5% cream to back BID for 10d  Monitor thyroid function tests in 4-6 weeks.      Electronically signed by:  SHILPA Marino CNP

## 2018-10-22 NOTE — PROGRESS NOTES
Clinic Care Coordination Contact  Care Coordination Transition Communication    Referral Source: PCP    Clinical Data: Patient was hospitalized at  from 10/11/18-10/20/18 at [facility]. Patient has a diagnosis of cancer of the esophagus (third distal).     Full list of discharge diagnosis include:      Acute pulmonary embolism  Adult failure to thrive  Physical deconditioning from acute illness/malignancy/senile fraility  Mechanical fall at home due to physical deconditioning  Possible mild cognitive impairment/dementia at baseline.  Severe malnutrition in the context of chronic illness  Afib with slow ventricular response      Transition to Facility: [facility]  [address] [address]  Phone: [phone]        Plan: RN/SW Care Coordinator will await notification from facility staff informing RN/SW Care Coordinator of patient's discharge plans/needs. RN/SW Care Coordinator will review chart and outreach to facility staff every 4 weeks and as needed.     Mari Otoole MSW, [facility]  Clinic Care Coordinator  [email or web address]  [phone]

## 2018-10-22 NOTE — TELEPHONE ENCOUNTER
Patient was evaluated by cardiology while inpatient for bradycardia. Recently dx'd pancreatic carcinoma. EP consulted (Dr. Gaming, 10/11) for bradycardia and Propranolol held. 24 hr Holter Monitor also ordered.  Noted to have new small anterior RUL PE on CT. Pt was off Pradaxa 150 mg BID from 10/5-10/10 for G-Tube/Port placement; has now been resumed. Being bridged with Lovenox at time of discharge. RN called [facility] to confirm the follow up plan for patient with Care Coordinator. RN confirmed with Care Coordinator that patient needs to schedule for Holter Monitor placement and HANDY f/u OV. Orders are in place, and scheduling phone number provided. Reminded to send last progress note, MAR, active orders, and provider order sheet to aylin. SREEDHAR [place] RN.

## 2018-10-23 NOTE — MR AVS SNAPSHOT
After Visit Summary   10/23/2018    Deepak Arndt    MRN: [id number]           Patient Information     Date Of Birth          1935        Visit Information        Provider Department      10/23/2018 8:20 AM El Bolden MD [facility]        Care Instructions    Continue chemotherapy.  See Thaddeus or me next week.  Have Dr. Peter from radiation call me.- Dr. Bolden's cell left for Dr. Peter to call him- LW    The patient is in [place]- LW          Follow-ups after your visit        Your next 10 appointments already scheduled     Oct 30, 2018 10:30 AM CDT   Level 4 with  INFUSION CHAIR 9   [facility] ([facility])    [place] [id number]  [place] [address]   [phone]            Oct 30, 2018 11:30 AM CDT   Return Visit with SHILPA Holcomb CNP   [facility] ([facility])    [facility] [place] [id number]  [place] [address]   [phone]            Nov 06, 2018 11:30 AM CST   Level 4 with  INFUSION CHAIR 15   [facility] ([facility])    [facility]  [address]  [place] [address]   [phone]            Nov 06, 2018 12:30 PM CST   Return Visit with SHILPA Holcomb CNP   [facility] ([facility])    [facility] [place] [id number]  [place] [address]   [phone]              Future tests that were ordered for you today     Open Future Orders        Priority Expected Expires Ordered    Holter Monitor 24 hour - Adult Routine  12/6/2018 10/22/2018            Who to contact     If you have questions or need follow up information about today's clinic visit or your schedule please contact [facility] directly at [phone].  Normal or non-critical lab and imaging results will be  communicated to you by Insynchart, letter or phone within 4 business days after the clinic has received the results. If you do not hear from us within 7 days, please contact the clinic through [employer] or phone. If you have a critical or abnormal lab result, we will notify you by phone as soon as possible.  Submit refill requests through [employer] or call your pharmacy and they will forward the refill request to us. Please allow 3 business days for your refill to be completed.          Additional Information About Your Visit        [employer] Information     [employer] gives you secure access to your electronic health record. If you see a primary care provider, you can also send messages to your care team and make appointments. If you have questions, please call your primary care clinic.  If you do not have a primary care provider, please call [phone] and they will assist you.        Care EveryWhere ID     This is your Care EveryWhere ID. This could be used by other organizations to access your [place] medical records  [id number]        Your Vitals Were     Pulse Temperature Respirations Pulse Oximetry          75 97.8  F (36.6  C) (Oral) 16 97%         Blood Pressure from Last 3 Encounters:   10/23/18 126/78   10/22/18 158/81   10/20/18 153/58    Weight from Last 3 Encounters:   10/22/18 59.5 kg (131 lb 3.2 oz)   10/20/18 58.7 kg (129 lb 4.8 oz)   10/02/18 67.1 kg (148 lb)              Today, you had the following     No orders found for display         Today's Medication Changes          These changes are accurate as of 10/23/18 10:17 AM.  If you have any questions, ask your nurse or doctor.               Start taking these medicines.        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)        Apply topically as needed for other (prior to port access) Apply quarter size amount to port prior to it being accessed   Quantity:  30 g   Refills:   3            Where to get your medicines      These medications were sent to [facility] [place] - [address]  [address], [place] [address]     Phone:  [phone]     lidocaine-prilocaine cream                Primary Care Provider Office Phone # Fax #    [place] [phone] [address] [facility] [address]        Equal Access to Services     EWA LOONEY : Hadii aad ku hadasho Soomaali, waaxda luqadaha, qaybta kaalmada adeegyada, waxay idiin hayaan adeeg kharash lasina . So [facility] [phone].    ATENCIÓN: Si habla español, tiene a esqueda disposición servicios gratuitos de asistencia lingüística. [facility] [phone].    We comply with applicable federal civil rights laws and [place] laws. We do not discriminate on the basis of race, color, national origin, age, disability, sex, sexual orientation, or gender identity.            Thank you!     Thank you for choosing [facility]  for your care. Our goal is always to provide you with excellent care. Hearing back from our patients is one way we can continue to improve our services. Please take a few minutes to complete the written survey that you may receive in the mail after your visit with us. Thank you!             Your Updated Medication List - Protect others around you: Learn how to safely use, store and throw away your medicines at www.disposemymeds.org.          This list is accurate as of 10/23/18 10:17 AM.  Always use your most recent med list.                   Brand Name Dispense Instructions for use Diagnosis    acetaminophen 325 MG tablet    TYLENOL    100 tablet    Take 2 tablets (650 mg) by mouth every 4 hours as needed for mild pain or fever    Generalized muscle weakness       amLODIPine 5 MG tablet    NORVASC    90 tablet    Take 1 tablet (5 mg) by mouth daily    Essential hypertension, benign       calcium carbonate 500 mg-vitamin D 200 units 500-200  MG-UNIT per tablet    OSCAL with D;OYSTER SHELL CALCIUM     Take 1 tablet by mouth daily        DAILY MULTI PO      Take by mouth daily        enoxaparin 60 MG/0.6ML injection    LOVENOX     Inject 0.6 mLs (60 mg) Subcutaneous every 12 hours    Other acute pulmonary embolism without acute cor pulmonale (H)       fish oil-omega-3 fatty acids 1000 MG capsule      Take 1 capsule by mouth daily.        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

## 2018-10-23 NOTE — PROGRESS NOTES
"Oncology Rooming Note    October 23, 2018 8:17 AM   Deepak Arndt is a 83 year old male who presents for:    Chief Complaint   Patient presents with     Oncology Clinic Visit     Cancer of distal third of esophagus      Initial Vitals: /78 (BP Location: Right arm, Patient Position: Sitting, Cuff Size: Adult Regular)  Pulse 75  Temp 97.8  F (36.6  C) (Oral)  Resp 16  SpO2 97% Estimated body mass index is 20.55 kg/(m^2) as calculated from the following:    Height as of 10/22/18: 1.702 m (5' 7\").    Weight as of 10/22/18: 59.5 kg (131 lb 3.2 oz). There is no height or weight on file to calculate BSA.  No Pain (0) Comment: Data Unavailable   No LMP for male patient.  Allergies reviewed: Yes  Medications reviewed: Yes    Medications: Medication refills not needed today.  Pharmacy name entered into [employer]:    [facility] [address] [place] [facility] [place] - [address] [id number] - [place] - [address] [facility]    Clinical concerns: None                 4 minutes for nursing intake (face to face time)     Wilda Alvarado MA            "

## 2018-10-23 NOTE — PROGRESS NOTES
Visit Date:   10/23/2018      This consult has been requested by Dr. Mcleod for esophageal cancer.      Mr. 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 Patient follows up with Dr. Mcleod from [facility]. Patient is currently in [place].  Because of insurance reason, he has to get treatment at our [facility] center.  Because of that, he has been referred to [facility]. After he is discharged from [place], he will follow up with Dr. Mcleod. History summarized from Dr. Mcleod's note.   1.  Patient had dysphagia and weight loss. EGD on 09/24/2018 revealed distal esophageal mass.  Biopsy revealed poorly differentiated carcinoma with adeno and squamous differentiation.  HER-2/blayne is negative.   2.  CT chest, abdomen and pelvis on 09/28/2018 revealed thickening of distal esophagus extending to GE junction.  There was paraesophageal adenopathy and upper abdominal adenopathy.   3.  Upper EUS on 10/02/2018 revealed completely obstructing malignant neoplasm in the lower third of the esophagus.  Based on the EUS, it was T3 N2 disease.   - FNA of the lymph nodes is positive for malignancy.   - G-tube was placed on 10/08/2014.   4.  PET scan was done on 10/09/2018.  It revealed hypermetabolic lymphadenopathy in the mediastinum, retroperitoneum and along the gastrohepatic ligament.  There was a 7 mm lung nodule, too small to characterize.     5.  Paclitaxel and carboplatin weekly was started on 10/16/2016.   6.  Dr. Peter evaluated the patient and recommended radiation. Because of insurance reason, radiation has not yet been started.      REVIEW OF SYSTEMS:    Patient was brought to the clinic in a wheelchair.  Wife was present. Patient feels weak.  Because of weakness, he is not walking.  He has been left wheelchair bound.      No headache.  No dizziness.  No neck pain.  No chest pain.  No shortness of breath at rest.  Some nausea.  No vomiting.  No urinary or bowel complaints.  No  bleeding.  No fever, chills or night sweats.      All other review of systems is negative.      ALLERGIES:  REVIEWED.      MEDICATIONS:  Reviewed.      PAST MEDICAL HISTORY:   1.  Atrial fibrillation.   2.  Coronary artery disease.   3.  GERD.   4.  Hypertension.   5.  Hyperlipidemia.   6.  Lung nodule.   7.  Aortic stenosis.   8.  Thyroid nodules.   9.  Osteoporosis.   10.  Cataract surgery.    11.  Retinal surgery.      SOCIAL HISTORY:   -He is .   -He smoked for about 20 years. Quit almost 45 years ago.   -He has a glass of wine a day.      FAMILY HISTORY:   -Father  at age of 50 from MI.   -Mother  at age of 74 from diabetes complication.   -He has 1 sister who had lung cancer.      PHYSICAL EXAMINATION:   GENERAL:  He is alert and oriented x 3.   VITAL SIGNS:  Reviewed.  ECOG PS of 3.   EYES:  No icterus.   THROAT:  No ulcer or thrush.   NECK:  Supple. No lymphadenopathy or thyromegaly.   AXILLAE:  No lymphadenopathy.   LUNGS:  Good air entry bilaterally.  No wheezing.   HEART:  Irregular.   ABDOMEN:  Soft. Nontender.  No mass felt.  G-tube in place.   EXTREMITIES:  No edema.  No calf swelling or tenderness.   SKIN:  No petechiae.      LABORATORY DATA:  Reviewed.      ASSESSMENT:   1.  An 83-year-old gentleman with distal esophageal adenocarcinoma. T3 N2 M0 disease.   2.  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     3.  Right lung pulmonary embolism.   4.  Multiple other medical problems including coronary artery disease, atrial fibrillation and hypertension.      PLAN:   1. Patient has distal esophageal adenocarcinoma. Patient was started on weekly carboplatin and Taxol on 10/16/2018.  Plan is to also start radiation.      Discussed regarding chemotherapy.  Regimen of carboplatin and Taxol was discussed.  Side effects reviewed.  He tolerated first  treatments well.  He will be continued on weekly treatment.  Hopefully, he will continue to tolerate it well.      Patient has a poor performance  status.  If at any time there is deterioration in his performance status, we may have to rethink chemotherapy.      2.  Patient has been seen by Dr. Peter from [facility].  I called and spoke to Dr. Peter.  Because of insurance reason, patient will not be able to get radiation treatment while he is in [place].      One option might be for the patient to get his radiation at [facility] if insurance allows.  We will explore that.      3.  Labs were reviewed with the patient.  Overall, they are good for treatment.      4.  Patient and wife had a few questions, which were all answered.  He will be seen next week by a nurse practitioner when he comes for treatment.  I will see him in 3-4 weeks.      5.  Patient advised to call us if he has any fever, chills, infection, worsening weakness or any other concerns.         DIMAS BAEZ MD             D: 10/23/2018   T: 10/23/2018   MT: ALISSA      Name:     LA LOCKE   MRN:      [id number]        Account:      [id number]   :      1935           Visit Date:   10/23/2018      Document: [id number]

## 2018-10-23 NOTE — MR AVS SNAPSHOT
After Visit Summary   10/23/2018    Deepak Arndt    MRN: [id number]           Patient Information     Date Of Birth          1935        Visit Information        Provider Department      10/23/2018 7:30 AM  INFUSION CHAIR 2 [facility]        Today's Diagnoses     Cancer of distal third of esophagus (H)    -  1       Follow-ups after your visit        Your next 10 appointments already scheduled     Oct 30, 2018 10:30 AM CDT   Level 4 with  INFUSION CHAIR 9   [facility] ([facility])    [facility]  [address]  [place] [address]   [phone]            Oct 30, 2018 11:30 AM CDT   Return Visit with SHILPA Holcomb CNP   [facility] ([facility])    [facility]  [address]  [place] [address]   [phone]            Nov 06, 2018 11:30 AM CST   Level 4 with  INFUSION CHAIR 15   [facility] ([facility])    [facility]  [address]  [place] [address]   [phone]            Nov 06, 2018 12:30 PM CST   Return Visit with SHILPA Holcomb CNP   [facility] ([facility])    [facility]  [address]  [place] [address] [phone]              Future tests that were ordered for you today     Open Future Orders        Priority Expected Expires Ordered    Holter Monitor 24 hour - Adult Routine  12/6/2018 10/22/2018            Who to contact     If you have questions or need follow up information about today's clinic visit or your schedule please contact [facility] [place] directly at [phone].  Normal or non-critical lab and imaging results will be communicated to you by MyChart, letter or phone within 4 business days after the clinic has  received the results. If you do not hear from us within 7 days, please contact the clinic through [employer] or phone. If you have a critical or abnormal lab result, we will notify you by phone as soon as possible.  Submit refill requests through [employer] or call your pharmacy and they will forward the refill request to us. Please allow 3 business days for your refill to be completed.          Additional Information About Your Visit        [employer] Information     [employer] gives you secure access to your electronic health record. If you see a primary care provider, you can also send messages to your care team and make appointments. If you have questions, please call your primary care clinic.  If you do not have a primary care provider, please call [phone] and they will assist you.        Care EveryWhere ID     This is your Care EveryWhere ID. This could be used by other organizations to access your [place] medical records  [id number]        Your Vitals Were     Pulse Temperature                89 98.1  F (36.7  C) (Oral)           Blood Pressure from Last 3 Encounters:   10/23/18 126/78   10/23/18 133/74   10/22/18 158/81    Weight from Last 3 Encounters:   10/22/18 59.5 kg (131 lb 3.2 oz)   10/20/18 58.7 kg (129 lb 4.8 oz)   10/02/18 67.1 kg (148 lb)              We Performed the Following     CBC with platelets differential     Creatinine          Today's Medication Changes          These changes are accurate as of 10/23/18 11:44 AM.  If you have any questions, ask your nurse or doctor.               Start taking these medicines.        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)        Apply topically as needed for other (prior to port access) Apply quarter size amount to port prior to it being accessed   Quantity:  30 g   Refills:  3            Where to get your medicines      These medications were sent to [facility]  [place] - [address]  [address], [place] [address]     Phone:  [phone]     lidocaine-prilocaine cream                Primary Care Provider Office Phone # Fax #    [place] [phone] [address]  [facility] [address]        Equal Access to Services     EWA LOONEY : Hadii aad ku hadasho Soomaali, waaxda luqadaha, qaybta kaalmada adeegyada, waxay idiin hayaan adeeg khlisa laLupejorge . So [facility] [phone].    ATENCIÓN: Si zuhair pacheco, tiene a esqueda disposición servicios gratuitos de asistencia lingüística. Antonio al [phone].    We comply with applicable federal civil rights laws and [place] laws. We do not discriminate on the basis of race, color, national origin, age, disability, sex, sexual orientation, or gender identity.            Thank you!     Thank you for choosing [facility]  for your care. Our goal is always to provide you with excellent care. Hearing back from our patients is one way we can continue to improve our services. Please take a few minutes to complete the written survey that you may receive in the mail after your visit with us. Thank you!             Your Updated Medication List - Protect others around you: Learn how to safely use, store and throw away your medicines at www.disposemymeds.org.          This list is accurate as of 10/23/18 11:44 AM.  Always use your most recent med list.                   Brand Name Dispense Instructions for use Diagnosis    acetaminophen 325 MG tablet    TYLENOL    100 tablet    Take 2 tablets (650 mg) by mouth every 4 hours as needed for mild pain or fever    Generalized muscle weakness       amLODIPine 5 MG tablet    NORVASC    90 tablet    Take 1 tablet (5 mg) by mouth daily    Essential hypertension, benign       calcium carbonate 500 mg-vitamin D 200 units 500-200 MG-UNIT per tablet    OSCAL with D;OYSTER SHELL CALCIUM     Take 1 tablet by  mouth daily        DAILY MULTI PO      Take by mouth daily        enoxaparin 60 MG/0.6ML injection    LOVENOX     Inject 0.6 mLs (60 mg) Subcutaneous every 12 hours    Other acute pulmonary embolism without acute cor pulmonale (H)       fish oil-omega-3 fatty acids 1000 MG capsule      Take 1 capsule by mouth daily.        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

## 2018-10-23 NOTE — TELEPHONE ENCOUNTER
"I spoke with Caitlyn for 20 minutes answering multiple questions and concerns reinforcing that the patient is safe and is getting the care he needs at the [facility].     I called to see if Caitlyn would like me to set up a consult for radiation at the [facility] or wait to see if /when patient will be discharged. Caitlyn verbalized needing to understand  How urgent the need for radiation is and that the patient can \"barely walk\", how can he ever be discharged.  She also states \" I am 83 years old I can not be trasnporting patient back and forth to the [facility].     I ended the conversation stating that she can call me any time this week but I will follow up with her on Monday to see how patient is progressing with therapy and discharge needs being meant.     Dunia Escoto    "

## 2018-10-23 NOTE — PATIENT INSTRUCTIONS
Continue chemotherapy.  See Thaddeus or me next week.  Have Dr. Peter from radiation call me.- Dr. Bolden's cell left for Dr. Peter to call him- LW    The patient is in [facility]- [place]

## 2018-10-23 NOTE — LETTER
"    10/23/2018         RE: Deepak Arndt  [address] [place] [address]        Dear Colleague,    Thank you for referring your patient, Deepak Arndt, to the [facility]. Please see a copy of my visit note below.    Oncology Rooming Note    October 23, 2018 8:17 AM   Deepak Arndt is a 83 year old male who presents for:    Chief Complaint   Patient presents with     Oncology Clinic Visit     Cancer of distal third of esophagus      Initial Vitals: /78 (BP Location: Right arm, Patient Position: Sitting, Cuff Size: Adult Regular)  Pulse 75  Temp 97.8  F (36.6  C) (Oral)  Resp 16  SpO2 97% Estimated body mass index is 20.55 kg/(m^2) as calculated from the following:    Height as of 10/22/18: 1.702 m (5' 7\").    Weight as of 10/22/18: 59.5 kg (131 lb 3.2 oz). There is no height or weight on file to calculate BSA.  No Pain (0) Comment: Data Unavailable   No LMP for male patient.  Allergies reviewed: Yes  Medications reviewed: Yes    Medications: Medication refills not needed today.  Pharmacy name entered into [employer]:    [facility] [address] [place], [place] - [address] [id number] [place], [place] - [address] [facility]    Clinical concerns: None                 4 minutes for nursing intake (face to face time)     Wilda Alvarado MA              Visit Date:   10/23/2018      This consult has been requested by Dr. Mcleod for esophageal cancer.      Mr. 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 Patient follows up with Dr. Mcleod from [facility]. Patient is currently in [place].  Because of insurance reason, he has to get treatment at our infusion center.  Because of that, he has been referred to [facility]. After he is discharged from [place], he will follow up with Dr. Mcleod. History summarized from Dr. Mcleod's note.   1.  Patient had dysphagia and weight loss. EGD on 09/24/2018 revealed distal esophageal mass.  Biopsy revealed poorly " differentiated carcinoma with adeno and squamous differentiation.  HER-2/blayne is negative.   2.  CT chest, abdomen and pelvis on 2018 revealed thickening of distal esophagus extending to GE junction.  There was paraesophageal adenopathy and upper abdominal adenopathy.   3.  Upper EUS on 10/02/2018 revealed completely obstructing malignant neoplasm in the lower third of the esophagus.  Based on the EUS, it was T3 N2 disease.   - FNA of the lymph nodes is positive for malignancy.   - G-tube was placed on 10/08/2014.   4.  PET scan was done on 10/09/2018.  It revealed hypermetabolic lymphadenopathy in the mediastinum, retroperitoneum and along the gastrohepatic ligament.  There was a 7 mm lung nodule, too small to characterize.     5.  Paclitaxel and carboplatin weekly was started on 10/16/2016.   6.  Dr. Peter evaluated the patient and recommended radiation. Because of insurance reason, radiation has not yet been started.      REVIEW OF SYSTEMS:    Patient was brought to the clinic in a wheelchair.  Wife was present. Patient feels weak.  Because of weakness, he is not walking.  He has been left wheelchair bound.      No headache.  No dizziness.  No neck pain.  No chest pain.  No shortness of breath at rest.  Some nausea.  No vomiting.  No urinary or bowel complaints.  No bleeding.  No fever, chills or night sweats.      All other review of systems is negative.      ALLERGIES:  REVIEWED.      MEDICATIONS:  Reviewed.      PAST MEDICAL HISTORY:   1.  Atrial fibrillation.   2.  Coronary artery disease.   3.  GERD.   4.  Hypertension.   5.  Hyperlipidemia.   6.  Lung nodule.   7.  Aortic stenosis.   8.  Thyroid nodules.   9.  Osteoporosis.   10.  Cataract surgery.    11.  Retinal surgery.      SOCIAL HISTORY:   -He is .   -He smoked for about 20 years. Quit almost 45 years ago.   -He has a glass of wine a day.      FAMILY HISTORY:   -Father  at age of 50 from MI.   -Mother  at age of 74 from diabetes  complication.   -He has 1 sister who had lung cancer.      PHYSICAL EXAMINATION:   GENERAL:  He is alert and oriented x 3.   VITAL SIGNS:  Reviewed.  ECOG PS of 3.   EYES:  No icterus.   THROAT:  No ulcer or thrush.   NECK:  Supple. No lymphadenopathy or thyromegaly.   AXILLAE:  No lymphadenopathy.   LUNGS:  Good air entry bilaterally.  No wheezing.   HEART:  Irregular.   ABDOMEN:  Soft. Nontender.  No mass felt.  G-tube in place.   EXTREMITIES:  No edema.  No calf swelling or tenderness.   SKIN:  No petechiae.      LABORATORY DATA:  Reviewed.      ASSESSMENT:   1.  An 83-year-old gentleman with distal esophageal adenocarcinoma. T3 N2 M0 disease.   2.  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     3.  Right lung pulmonary embolism.   4.  Multiple other medical problems including coronary artery disease, atrial fibrillation and hypertension.      PLAN:   1. Patient has distal esophageal adenocarcinoma. Patient was started on weekly carboplatin and Taxol on 10/16/2018.  Plan is to also start radiation.      Discussed regarding chemotherapy.  Regimen of carboplatin and Taxol was discussed.  Side effects reviewed.  He tolerated first  treatments well.  He will be continued on weekly treatment.  Hopefully, he will continue to tolerate it well.      Patient has a poor performance status.  If at any time there is deterioration in his performance status, we may have to rethink chemotherapy.      2.  Patient has been seen by Dr. Peter from [facility].  I called and spoke to Dr. Peter.  Because of insurance reason, patient will not be able to get radiation treatment while he is in [place].      One option might be for the patient to get his radiation at [facility] if insurance allows.  We will explore that.      3.  Labs were reviewed with the patient.  Overall, they are good for treatment.      4.  Patient and wife had a few questions, which were all answered.  He will be seen next week by a nurse practitioner  when he comes for treatment.  I will see him in 3-4 weeks.      5.  Patient advised to call us if he has any fever, chills, infection, worsening weakness or any other concerns.         DIMAS BAEZ MD             D: 10/23/2018   T: 10/23/2018   MT: ALISSA      Name:     LA LOCKE   MRN:      [id number]        Account:      [id number]   :      1935           Visit Date:   10/23/2018      Document: [id number]        Again, thank you for allowing me to participate in the care of your patient.        Sincerely,        Dimas Baez MD

## 2018-10-26 NOTE — LETTER
10/26/2018        RE: Deepak Arndt  [address] [place] [address]        PRIMARY CARE PROVIDER AND CLINIC RESPONSIBLE:  Gene Clinton Rebel, [address] / [place] [address]        ADMISSION HISTORY AND PHYSICAL EXAMINATION     Chief Complaint   Patient presents with     Fatigue         HISTORY OF PRESENT ILLNESS:  83 year old male, (1935), [facility] medical care and rehab.  HPI information obtained from: facility chart records, facility staff, patient report and [facility] chart review.    Deepak Arndt is a 83 year old male with history of recently diagnosed esophageal cancer s/p gastrostomy tube, CAD w/ MI 2012, HTN, depression, GERD, hyperlipidemia, mild aortic stenosis, multiple thyroid nodules, detached retina who was admitted at [facility] from 10/11 to 10/22/18 due to generalized weakness, failure to thrive and shortness of breath.  Patient has atrial fibrillation and was on Pradaxa which was hold for 5 days from 10/5 to 10/10/2018 for G-tube and Port-A-Cath placement.  Patient was found to have sided pulmonary embolism by CT protocol.  CT also showed distal esophageal mass consistent with known social cancer the patient was started with Lovenox upon discharge.  He was diagnosed distal esophageal cancer with mediastinal and upper abdominal adenopathy on September 2018 after patient presented with dysphagia and weight loss.  Patient underwent G-tube and Port-A-Cath placement on 10/5/2018.  He was started with weekly chemotherapy with paclitaxel and carboplatinum started on 10/16 2018.  Patient also scheduled to have radiation therapy in future.  Patient had noted atrial fibrillation and had bradycardia or slow atrial fibrillation, cardiology and EP team recommended to hold his beta-blockers.  He feels weak and tired. Slept well, appetite poor and he receives tube feeding via J-tube and had BM. Patient denies chest  pain, dyspnea, abdominal pain, n&v, fever, chills, dysuria, leg pain or swelling. The rest of ROS is unremarkable. Patient is seen and examined by Chacha Jamison NP. Please see FELICITA Jamison's admit noted dated 10/22 for details of admission, past medical history, family history, allergies, medication list, social history and other details pertinent with this admission. Hospital admission and dc summary reviewed.      Past Medical History:   Diagnosis Date     Actinic keratosis      Atrial fibrillation (H)      Cancer (H)     skin     Cataract     both     Coronary artery disease      Detached retina 2007    both     ED (erectile dysfunction)      Gastroesophageal reflux disease      Hyperlipidemia      Hypertension      Lung nodules      Mild aortic stenosis     6/2012 Echo - trivial AS, mild-mod AR     Multiple thyroid nodules      Murmur     6/2012 Echo - trivial AS, mild-mod AR, mild-mod MR, mild-mod TR, mod MI     Myocardial infarction (H) 6/2012    inferior - noted on 2007 stress test     Osteoporosis        Past Surgical History:   Procedure Laterality Date     CATARACT IOL, RT/LT       ESOPHAGOSCOPY, GASTROSCOPY, DUODENOSCOPY (EGD), COMBINED N/A 9/24/2018    Procedure: COMBINED ESOPHAGOSCOPY, GASTROSCOPY, DUODENOSCOPY (EGD), BIOPSY SINGLE OR MULTIPLE;  ESOPHAGOGASTRODUODENOSCOPY ;  Surgeon: Aicha Graves MD;  Location:  GI     ESOPHAGOSCOPY, GASTROSCOPY, DUODENOSCOPY (EGD), COMBINED N/A 10/2/2018    Procedure: COMBINED ENDOSCOPIC ULTRASOUND, ESOPHAGOSCOPY, GASTROSCOPY, DUODENOSCOPY (EGD), FINE NEEDLE ASPIRATE/BIOPSY;  ENDOSCOPIC ULTRASOUND, ESOPHAGOSCOPY, GASTROSCOPY, DUODENOSCOPY (EGD), FINE NEEDLE ASPIRATE (mac sedation);  Surgeon: Sanju Cruz MD;  Location:  GI     HC OR OCULAR DEVICE INTRAOP DETACHED RETINA         Current Outpatient Prescriptions   Medication Sig     acetaminophen (TYLENOL) 325 MG tablet Take 2 tablets (650 mg) by mouth every 4 hours as needed for mild pain or fever      amLODIPine (NORVASC) 5 MG tablet Take 1 tablet (5 mg) by mouth daily     Calcium carb-Vitamin D 500 mg [place]-200 units (OSCAL WITH D;OYSTER SHELL CALCIUM) 500-200 MG-UNIT per tablet Take 1 tablet by mouth daily     enoxaparin (LOVENOX) 60 MG/0.6ML injection Inject 0.6 mLs (60 mg) Subcutaneous every 12 hours     fish oil-omega-3 fatty acids (OMEGA-3 FISH OIL) 1000 MG capsule Take 1 capsule by mouth daily.     hydrocortisone 2.5 % cream Apply topically 2 times daily     Multiple Vitamins-Minerals (DAILY MULTI PO) Take by mouth daily     pantoprazole (PROTONIX) 2 mg/mL SUSP suspension 20 mLs (40 mg) by Per G Tube route every morning     simvastatin (ZOCOR) 10 MG tablet Take 5 mg by mouth At Bedtime (Takes half of a 10mg tablet for a total of 5mg daily)     No current facility-administered medications for this visit.        Allergies   Allergen Reactions     Versed [Midazolam] Other (See Comments)     Pt has adverse/opposite reaction to versed. Given in small doses for a port and g-tube placement. Patient was aggressive.       Social History     Social History     Marital status:      Spouse name: N/A     Number of children: N/A     Years of education: N/A     Occupational History     Not on file.     Social History Main Topics     Smoking status: Former Smoker     Packs/day: 1.00     Years: 20.00     Types: Cigarettes     Start date: 1954     Quit date: 1/1/1974     Smokeless tobacco: Never Used     Alcohol use Yes      Comment: 1 glass of wine per day     Drug use: No     Sexual activity: Yes     Partners: Female     Other Topics Concern     Caffeine Concern No     Exercise No     golf     Social History Narrative          Information reviewed:  Medications, vital signs, orders, nursing notes, problem list, hospital information.     ROS: All 10 point review of system completed, those pertinent positive, please see H&P, the remaining ROS is negative.    /67  Pulse 76  Temp 98.7  F (37.1  C)  Resp 18  Ht  "5' 7\" (1.702 m)  Wt 136 lb (61.7 kg)  SpO2 92%  BMI 21.3 kg/m2    PHYSICAL EXAMINATION:   GENERAL:  No acute distress.  SKIN:  Dry and warm.  There is no rash at area of skin examined.  HEENT:  Head without trauma.  Pupils round, reactive. Exam of conjunctiva and lids are normal. Sclera without icterus. There is no oral thrush.  NECK:  Supple. No jugular venous distension.  CHEST: No reproducible chest tenderness.   LUNGS:  Normal respiratory effort. Lungs reveal decreased breath sounds at bases. No rales or wheezes.  HEART:  Irregular rate and rhythm.  No murmur, gallops or rubs auscultated.  ABDOMEN:  Soft, bowel sounds positive.  There is no tenderness or guarding. G tube in place.  EXTREMITIES: No edema.   NEUROLOGIC:  Alert and oriented x3. Moving all ext. Gait not tested.  PSYCH: Recent and remote memory is normal. Mood and affect are normal.    Lab/Diagnostic data:  Reviewed    CBC Lab Results (Last 5 results in 365 days)       WBC RBC Hgb Hct MCV MCH MCHC RDW Plt Neut # Lymph # Eos # Baso # Immat. Gran #   10/23/18 0748 11.6 4.27 14.2 42.6 100 33.3 33.3 13.5 268 8.9 0.8 0.6 0.0 0.0   10/18/18 1445 -- -- -- -- -- -- -- -- 213 -- -- -- -- --   10/17/18 0600 10.6 4.08 13.6 39.7 97 33.3 34.3 13.3 207 -- -- -- -- --   10/16/18 0623 14.3 4.18 13.9 40.7 97 33.3 34.2 13.5 190 -- -- -- -- --   10/16/18 0623 -- -- -- -- -- -- -- -- -- 11.0 0.8 0.5 0.0 0.1   CMP Labs (Last 5 results in 365 days)       Na+ K+ Cl CO2 ANION GAP Glc BUN Creat GFR GFR-Black Calcium Mg ALT AST A1C TSH LDL HIV   10/23/18 0748 -- -- -- -- -- -- -- 0.64 >90 >90 -- -- -- -- -- -- -- --   10/20/18 0635 -- -- -- -- -- -- -- 0.73 >90 >90 -- -- -- -- -- -- -- --   10/16/18 0623 137 4.0 108 22 7 109 16 0.61 >90 >90 8.6 -- -- -- -- -- -- --   10/15/18 0915 135 3.5 103 27 5 114 9 0.63 >90 >90 8.8 -- -- -- -- -- -- --   10/15/18 0915 -- -- -- -- -- -- -- -- -- -- -- 2.2 -- -- -- -- --      Results for orders placed or performed during the hospital " encounter of 10/11/18   XR Chest 2 Views    Narrative    XR CHEST 2 VW  10/11/2018 3:07 AM     INDICATION: Weakness, shortness of breath.    COMPARISON: CT 9/28/2018. Chest x-ray 10/23/2017.      Impression    IMPRESSION: Borderline prominent heart size. No focal air-space  disease or other acute findings. Right chest wall port with catheter  tip in the SVC. Old right rib fractures.    LEBRON LANZA MD   CT Head w/o Contrast    Narrative    CT HEAD W/O CONTRAST  10/11/2018 3:21 AM     HISTORY: Fall, weakness.     TECHNIQUE: Axial images of the head and coronal reformations without  IV contrast material. Radiation dose for this scan was reduced using  automated exposure control, adjustment of the mA and/or kV according  to patient size, or iterative reconstruction technique.    COMPARISON: 10/23/2017.    FINDINGS: No intracranial hemorrhage, mass or mass effect. Low  attenuation areas are present in white matter of the cerebral  hemispheres that are nonspecific but consistent with chronic small  vessel ischemic changes in this age patient. No acute infarct  identified. No shift of midline structures. No skull fractures.      Impression    IMPRESSION: No acute intracranial abnormality.    LEBRON LANZA MD   CT Chest Pulmonary Embolism w Contrast     Value    Radiologist flags Pulmonary embolism (AA)    Narrative    CT CHEST PULMONARY EMBOLISM W CONTRAST  10/11/2018 4:13 AM     HISTORY: Dyspnea.    TECHNIQUE: Volumetric acquisition of the chest after the  administration of 59 mL Isovue-370 IV contrast. Radiation dose for  this scan was reduced using automated exposure control, adjustment of  the mA and/or kV according to patient size, or iterative  reconstruction technique.     COMPARISON: CT 9/28/2018.    FINDINGS: Study is positive for a small pulmonary embolus in the right  upper lung anteriorly (series 5, image 75). No acute infiltrates or  pleural effusions. Emphysema. 2.5 cm right thyroid nodule as  seen  previously. Right chest wall port with catheter tip in the SVC.  Coronary artery calcifications. Mediastinal adenopathy, slightly more  prominent than on the previous study. Distal esophageal mass is again  seen. A prominent left lower mediastinal lymph node in the  paraesophageal region has increased in size measuring 2.3 x 2.4 cm  (previously 1.5 x 2.1 cm). A right retrocrural lymph node is more  prominent. Enlarged lymph nodes in the gastrohepatic ligament region  are slightly more prominent. Renal and liver cysts.      Impression    IMPRESSION:  1. Study is positive for right-sided pulmonary embolus.  2. Distal esophageal mass consistent with known cancer is again seen.  Mediastinal and upper abdominal adenopathy have slightly increased.  3. Emphysema.    [Critical Result: Pulmonary embolism]    Finding was identified on 10/11/2018 4:21 AM.     Dr. Walker was contacted by me on 10/11/2018 4:29 AM and  verbalized understanding of the critical result.    LEBRON LANZA MD         ASSESSMENT / PLAN:     Physical deconditioning  Plan: PT/OT with increase independence, self-care, strength and endurance and other cares that help return to home/facility of origin, fall precautions. Care conference with patient and family for the progress of rehab and disposition issues will be discussed as planned. Rehab evaluation and other evaluations including CPT are at rehab logs, to be reviewed separately.  Fall risk assessment as well as cognitive evaluation will be formed during rehab stay if indicated.    Other acute pulmonary embolism without acute cor pulmonale (H)  Plan: Continue Lovenox therapeutic dose, in future warfarin as per hem/onc and cardiologist.    Chronic atrial fibrillation (H)  Plan: Continue Lovenox therapeutic dose. Follow up cardiologist as scheduled. No BB due to slow atrial fibrillation, HR at 55-76 for now.    Essential hypertension and Coronary artery disease involving native coronary  artery of native heart without angina pectoris  Plan: Continue norvasc with parameters. Blood pressure stable.    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    Hyperlipidemia LDL goal <100  Plan: continue current medication Statins. It is better to stop statins as his nutrition is not good due to cancer, will leave his PCP and cardiologist.    Other problems with same care. Primary care doctor and other specialists to address those chronic problems in next clinic appointment to be scheduled upon discharge from the TCU.      Total time spent with patient visit was 37 min including patient visit, review of past records, patients counseling and coordinating care.      Electronically signed by:  Heriberto Ojeda MD              Sincerely,        Heriberto Ojeda MD

## 2018-10-26 NOTE — PROGRESS NOTES
PRIMARY CARE PROVIDER AND CLINIC RESPONSIBLE:  [place], [address] / [facility] [address]        ADMISSION HISTORY AND PHYSICAL EXAMINATION     Chief Complaint   Patient presents with     Fatigue         HISTORY OF PRESENT ILLNESS:  83 year old male, (1935), [facility] for continuation of medical care and rehab.  HPI information obtained from: facility chart records, facility staff, patient report and [facility] chart review.    Deepak Arndt is a 83 year old male with history of recently diagnosed esophageal cancer s/p gastrostomy tube, CAD w/ MI 2012, HTN, depression, GERD, hyperlipidemia, mild aortic stenosis, multiple thyroid nodules, detached retina who was admitted at [facility] from 10/11 to 10/22/18 due to generalized weakness, failure to thrive and shortness of breath.  Patient has atrial fibrillation and was on Pradaxa which was hold for 5 days from 10/5 to 10/10/2018 for G-tube and Port-A-Cath placement.  Patient was found to have sided pulmonary embolism by CT protocol.  CT also showed distal esophageal mass consistent with known social cancer the patient was started with Lovenox upon discharge.  He was diagnosed distal esophageal cancer with mediastinal and upper abdominal adenopathy on September 2018 after patient presented with dysphagia and weight loss.  Patient underwent G-tube and Port-A-Cath placement on 10/5/2018.  He was started with weekly chemotherapy with paclitaxel and carboplatinum started on 10/16 2018.  Patient also scheduled to have radiation therapy in future.  Patient had noted atrial fibrillation and had bradycardia or slow atrial fibrillation, cardiology and EP team recommended to hold his beta-blockers.  He feels weak and tired. Slept well, appetite poor and he receives tube feeding via J-tube and had BM. Patient denies chest pain, dyspnea, abdominal pain, n&v, fever, chills, dysuria, leg pain or swelling. The rest of  ROS is unremarkable. Patient is seen and examined by Chacha Jamison NP. Please see FELICITA Jamison's admit noted dated 10/22 for details of admission, past medical history, family history, allergies, medication list, social history and other details pertinent with this admission. Hospital admission and dc summary reviewed.      Past Medical History:   Diagnosis Date     Actinic keratosis      Atrial fibrillation (H)      Cancer (H)     skin     Cataract     both     Coronary artery disease      Detached retina 2007    both     ED (erectile dysfunction)      Gastroesophageal reflux disease      Hyperlipidemia      Hypertension      Lung nodules      Mild aortic stenosis     6/2012 Echo - trivial AS, mild-mod AR     Multiple thyroid nodules      Murmur     6/2012 Echo - trivial AS, mild-mod AR, mild-mod MR, mild-mod TR, mod FL     Myocardial infarction (H) 6/2012    inferior - noted on 2007 stress test     Osteoporosis        Past Surgical History:   Procedure Laterality Date     CATARACT IOL, RT/LT       ESOPHAGOSCOPY, GASTROSCOPY, DUODENOSCOPY (EGD), COMBINED N/A 9/24/2018    Procedure: COMBINED ESOPHAGOSCOPY, GASTROSCOPY, DUODENOSCOPY (EGD), BIOPSY SINGLE OR MULTIPLE;  ESOPHAGOGASTRODUODENOSCOPY ;  Surgeon: Aicha Graves MD;  Location: [facility]     ESOPHAGOSCOPY, GASTROSCOPY, DUODENOSCOPY (EGD), COMBINED N/A 10/2/2018    Procedure: COMBINED ENDOSCOPIC ULTRASOUND, ESOPHAGOSCOPY, GASTROSCOPY, DUODENOSCOPY (EGD), FINE NEEDLE ASPIRATE/BIOPSY;  ENDOSCOPIC ULTRASOUND, ESOPHAGOSCOPY, GASTROSCOPY, DUODENOSCOPY (EGD), FINE NEEDLE ASPIRATE (mac sedation);  Surgeon: Sanju Cruz MD;  Location:  GI     HC OR OCULAR DEVICE INTRAOP DETACHED RETINA         Current Outpatient Prescriptions   Medication Sig     acetaminophen (TYLENOL) 325 MG tablet Take 2 tablets (650 mg) by mouth every 4 hours as needed for mild pain or fever     amLODIPine (NORVASC) 5 MG tablet Take 1 tablet (5 mg) by mouth daily     Calcium carb-Vitamin  "D 500 mg Wainwright-200 units (OSCAL WITH D;OYSTER SHELL CALCIUM) 500-200 MG-UNIT per tablet Take 1 tablet by mouth daily     enoxaparin (LOVENOX) 60 MG/0.6ML injection Inject 0.6 mLs (60 mg) Subcutaneous every 12 hours     fish oil-omega-3 fatty acids (OMEGA-3 FISH OIL) 1000 MG capsule Take 1 capsule by mouth daily.     hydrocortisone 2.5 % cream Apply topically 2 times daily     Multiple Vitamins-Minerals (DAILY MULTI PO) Take by mouth daily     pantoprazole (PROTONIX) 2 mg/mL SUSP suspension 20 mLs (40 mg) by Per G Tube route every morning     simvastatin (ZOCOR) 10 MG tablet Take 5 mg by mouth At Bedtime (Takes half of a 10mg tablet for a total of 5mg daily)     No current facility-administered medications for this visit.        Allergies   Allergen Reactions     Versed [Midazolam] Other (See Comments)     Pt has adverse/opposite reaction to versed. Given in small doses for a port and g-tube placement. Patient was aggressive.       Social History     Social History     Marital status:      Spouse name: N/A     Number of children: N/A     Years of education: N/A     Occupational History     Not on file.     Social History Main Topics     Smoking status: Former Smoker     Packs/day: 1.00     Years: 20.00     Types: Cigarettes     Start date: 1954     Quit date: 1/1/1974     Smokeless tobacco: Never Used     Alcohol use Yes      Comment: 1 glass of wine per day     Drug use: No     Sexual activity: Yes     Partners: Female     Other Topics Concern     Caffeine Concern No     Exercise No     golf     Social History Narrative          Information reviewed:  Medications, vital signs, orders, nursing notes, problem list, hospital information.     ROS: All 10 point review of system completed, those pertinent positive, please see H&P, the remaining ROS is negative.    /67  Pulse 76  Temp 98.7  F (37.1  C)  Resp 18  Ht 5' 7\" (1.702 m)  Wt 136 lb (61.7 kg)  SpO2 92%  BMI 21.3 kg/m2    PHYSICAL EXAMINATION: "   GENERAL:  No acute distress.  SKIN:  Dry and warm.  There is no rash at area of skin examined.  HEENT:  Head without trauma.  Pupils round, reactive. Exam of conjunctiva and lids are normal. Sclera without icterus. There is no oral thrush.  NECK:  Supple. No jugular venous distension.  CHEST: No reproducible chest tenderness.   LUNGS:  Normal respiratory effort. Lungs reveal decreased breath sounds at bases. No rales or wheezes.  HEART:  Irregular rate and rhythm.  No murmur, gallops or rubs auscultated.  ABDOMEN:  Soft, bowel sounds positive.  There is no tenderness or guarding. G tube in place.  EXTREMITIES: No edema.   NEUROLOGIC:  Alert and oriented x3. Moving all ext. Gait not tested.  PSYCH: Recent and remote memory is normal. Mood and affect are normal.    Lab/Diagnostic data:  Reviewed    CBC Lab Results (Last 5 results in 365 days)       WBC RBC Hgb Hct MCV MCH MCHC RDW Plt Neut # Lymph # Eos # Baso # Immat. Gran #   10/23/18 0748 11.6 4.27 14.2 42.6 100 33.3 33.3 13.5 268 8.9 0.8 0.6 0.0 0.0   10/18/18 1445 -- -- -- -- -- -- -- -- 213 -- -- -- -- --   10/17/18 0600 10.6 4.08 13.6 39.7 97 33.3 34.3 13.3 207 -- -- -- -- --   10/16/18 0623 14.3 4.18 13.9 40.7 97 33.3 34.2 13.5 190 -- -- -- -- --   10/16/18 0623 -- -- -- -- -- -- -- -- -- 11.0 0.8 0.5 0.0 0.1   CMP Labs (Last 5 results in 365 days)       Na+ K+ Cl CO2 ANION GAP Glc BUN Creat GFR GFR-Black Calcium Mg ALT AST A1C TSH LDL HIV   10/23/18 0748 -- -- -- -- -- -- -- 0.64 >90 >90 -- -- -- -- -- -- -- --   10/20/18 0635 -- -- -- -- -- -- -- 0.73 >90 >90 -- -- -- -- -- -- -- --   10/16/18 0623 137 4.0 108 22 7 109 16 0.61 >90 >90 8.6 -- -- -- -- -- -- --   10/15/18 0915 135 3.5 103 27 5 114 9 0.63 >90 >90 8.8 -- -- -- -- -- -- --   10/15/18 0915 -- -- -- -- -- -- -- -- -- -- -- 2.2 -- -- -- -- --      Results for orders placed or performed during the hospital encounter of 10/11/18   XR Chest 2 Views    Narrative    XR CHEST 2 VW  10/11/2018 3:07 AM      INDICATION: Weakness, shortness of breath.    COMPARISON: CT 9/28/2018. Chest x-ray 10/23/2017.      Impression    IMPRESSION: Borderline prominent heart size. No focal air-space  disease or other acute findings. Right chest wall port with catheter  tip in the SVC. Old right rib fractures.    LEBRON LANZA MD   CT Head w/o Contrast    Narrative    CT HEAD W/O CONTRAST  10/11/2018 3:21 AM     HISTORY: Fall, weakness.     TECHNIQUE: Axial images of the head and coronal reformations without  IV contrast material. Radiation dose for this scan was reduced using  automated exposure control, adjustment of the mA and/or kV according  to patient size, or iterative reconstruction technique.    COMPARISON: 10/23/2017.    FINDINGS: No intracranial hemorrhage, mass or mass effect. Low  attenuation areas are present in white matter of the cerebral  hemispheres that are nonspecific but consistent with chronic small  vessel ischemic changes in this age patient. No acute infarct  identified. No shift of midline structures. No skull fractures.      Impression    IMPRESSION: No acute intracranial abnormality.    LEBRON LANZA MD   CT Chest Pulmonary Embolism w Contrast     Value    Radiologist flags Pulmonary embolism (AA)    Narrative    CT CHEST PULMONARY EMBOLISM W CONTRAST  10/11/2018 4:13 AM     HISTORY: Dyspnea.    TECHNIQUE: Volumetric acquisition of the chest after the  administration of 59 mL Isovue-370 IV contrast. Radiation dose for  this scan was reduced using automated exposure control, adjustment of  the mA and/or kV according to patient size, or iterative  reconstruction technique.     COMPARISON: CT 9/28/2018.    FINDINGS: Study is positive for a small pulmonary embolus in the right  upper lung anteriorly (series 5, image 75). No acute infiltrates or  pleural effusions. Emphysema. 2.5 cm right thyroid nodule as seen  previously. Right chest wall port with catheter tip in the SVC.  Coronary artery  calcifications. Mediastinal adenopathy, slightly more  prominent than on the previous study. Distal esophageal mass is again  seen. A prominent left lower mediastinal lymph node in the  paraesophageal region has increased in size measuring 2.3 x 2.4 cm  (previously 1.5 x 2.1 cm). A right retrocrural lymph node is more  prominent. Enlarged lymph nodes in the gastrohepatic ligament region  are slightly more prominent. Renal and liver cysts.      Impression    IMPRESSION:  1. Study is positive for right-sided pulmonary embolus.  2. Distal esophageal mass consistent with known cancer is again seen.  Mediastinal and upper abdominal adenopathy have slightly increased.  3. Emphysema.    [Critical Result: Pulmonary embolism]    Finding was identified on 10/11/2018 4:21 AM.     Dr. Walker was contacted by me on 10/11/2018 4:29 AM and  verbalized understanding of the critical result.    LEBRON LANZA MD         ASSESSMENT / PLAN:     Physical deconditioning  Plan: PT/OT with increase independence, self-care, strength and endurance and other cares that help return to home/facility of origin, fall precautions. Care conference with patient and family for the progress of rehab and disposition issues will be discussed as planned. Rehab evaluation and other evaluations including CPT are at rehab logs, to be reviewed separately.  Fall risk assessment as well as cognitive evaluation will be formed during rehab stay if indicated.    Other acute pulmonary embolism without acute cor pulmonale (H)  Plan: Continue Lovenox therapeutic dose, in future warfarin as per hem/onc and cardiologist.    Chronic atrial fibrillation (H)  Plan: Continue Lovenox therapeutic dose. Follow up cardiologist as scheduled. No BB due to slow atrial fibrillation, HR at 55-76 for now.    Essential hypertension and Coronary artery disease involving native coronary artery of native heart without angina pectoris  Plan: Continue norvasc with parameters.  Blood pressure stable.    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    Hyperlipidemia LDL goal <100  Plan: continue current medication Statins. It is better to stop statins as his nutrition is not good due to cancer, will leave his PCP and cardiologist.    Other problems with same care. Primary care doctor and other specialists to address those chronic problems in next clinic appointment to be scheduled upon discharge from the TCU.      Total time spent with patient visit was 37 min including patient visit, review of past records, patients counseling and coordinating care.      Electronically signed by:  Heriberto Ojeda MD

## 2018-10-26 NOTE — MR AVS SNAPSHOT
After Visit Summary   10/26/2018    Deepak Arndt    MRN: [id number]           Patient Information     Date Of Birth          1935        Visit Information        Provider Department      10/26/2018 5:30 AM Heriberto Ojeda MD [facility]        Today's Diagnoses     Physical deconditioning    -  1    Other acute pulmonary embolism without acute cor pulmonale (H)        Chronic atrial fibrillation (H)        Essential hypertension        Cancer of distal third of esophagus (H)        Coronary artery disease involving native coronary artery of native heart without angina pectoris        Hyperlipidemia LDL goal <100           Follow-ups after your visit        Your next 10 appointments already scheduled     Oct 30, 2018 10:30 AM CDT   Level 4 with  INFUSION CHAIR 9   [facility] ([facility])    [facility]  [address]  [place] [address]   [phone]            Oct 30, 2018 11:30 AM CDT   Return Visit with SHILPA Holcomb CNP   [facility] ([facility])    [facility] [place]  [address]  [place] [address]   [phone]            Nov 06, 2018 11:30 AM CST   Level 4 with SH INFUSION CHAIR 15   [facility] ([facility])    [facility] [place]  [address]  [place] [address]   [phone]            Nov 06, 2018 12:30 PM CST   Return Visit with SHILPA Holcomb CNP   [facility] ([facility])    [facility]  [address]  [place] [address]   [phone]              Who to contact     If you have questions or need follow up information about today's clinic visit or your schedule please contact [facility] directly at [phone].  Normal or non-critical lab and imaging results will  "be communicated to you by [employer], letter or phone within 4 business days after the clinic has received the results. If you do not hear from us within 7 days, please contact the clinic through [employer] or phone. If you have a critical or abnormal lab result, we will notify you by phone as soon as possible.  Submit refill requests through [employer] or call your pharmacy and they will forward the refill request to us. Please allow 3 business days for your refill to be completed.          Additional Information About Your Visit        [employer] Information     [employer] gives you secure access to your electronic health record. If you see a primary care provider, you can also send messages to your care team and make appointments. If you have questions, please call your primary care clinic.  If you do not have a primary care provider, please call [phone] and they will assist you.        Care EveryWhere ID     This is your Care EveryWhere ID. This could be used by other organizations to access your [facility] medical records  [id number]        Your Vitals Were     Pulse Temperature Respirations Height Pulse Oximetry BMI (Body Mass Index)    76 98.7  F (37.1  C) 18 5' 7\" (1.702 m) 92% 21.3 kg/m2       Blood Pressure from Last 3 Encounters:   10/25/18 123/67   10/23/18 126/78   10/23/18 133/74    Weight from Last 3 Encounters:   10/25/18 136 lb (61.7 kg)   10/22/18 131 lb 3.2 oz (59.5 kg)   10/20/18 129 lb 4.8 oz (58.7 kg)              Today, you had the following     No orders found for display         Today's Medication Changes          These changes are accurate as of 10/26/18  9:58 AM.  If you have any questions, ask your nurse or doctor.               Stop taking these medicines if you haven't already. Please contact your care team if you have questions.     lidocaine-prilocaine cream   Commonly known as:  EMLA                    Primary Care Provider Office Phone # Fax #    [place] Mills [phone] " [phone]       [address]  [facility] [address]        Equal Access to Services     NICK LOONEY : Hadii peggy moseley christiano Giron, watereda luqnighat, qaybta kaalmada yovana, gallito jacquelinein hayaan mariyadimas acevedo laLupejorge barth. So [facility] [phone].    ATENCIÓN: Si habla español, tiene a esqueda disposición servicios gratuitos de asistencia lingüística. Antonio al [phone].    We comply with applicable federal civil rights laws and [place] laws. We do not discriminate on the basis of race, color, national origin, age, disability, sex, sexual orientation, or gender identity.            Thank you!     Thank you for choosing [facility]  for your care. Our goal is always to provide you with excellent care. Hearing back from our patients is one way we can continue to improve our services. Please take a few minutes to complete the written survey that you may receive in the mail after your visit with us. Thank you!             Your Updated Medication List - Protect others around you: Learn how to safely use, store and throw away your medicines at www.disposemymeds.org.          This list is accurate as of 10/26/18  9:58 AM.  Always use your most recent med list.                   Brand Name Dispense Instructions for use Diagnosis    acetaminophen 325 MG tablet    TYLENOL    100 tablet    Take 2 tablets (650 mg) by mouth every 4 hours as needed for mild pain or fever    Generalized muscle weakness       amLODIPine 5 MG tablet    NORVASC    90 tablet    Take 1 tablet (5 mg) by mouth daily    Essential hypertension, benign       calcium carbonate 500 mg-vitamin D 200 units 500-200 MG-UNIT per tablet    OSCAL with D;OYSTER SHELL CALCIUM     Take 1 tablet by mouth daily        DAILY MULTI PO      Take by mouth daily        enoxaparin 60 MG/0.6ML injection    LOVENOX     Inject 0.6 mLs (60 mg) Subcutaneous every 12 hours    Other acute pulmonary embolism without acute cor pulmonale (H)       fish oil-omega-3  fatty acids 1000 MG capsule      Take 1 capsule by mouth daily.        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

## 2018-10-29 NOTE — TELEPHONE ENCOUNTER
I called to follow up with Caitlyn ( wife) on patient 's progress with PT/OT at [facility] requesting call. Patient is currently scheduled for treatment and exam tomorrow. Dunia Escoto

## 2018-10-30 NOTE — LETTER
"    10/30/2018         RE: Deepak Arndt  [address] [place] [address]        Dear Colleague,    Thank you for referring your patient, Deepak Arndt, to the [facility]. Please see a copy of my visit note below.    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    Weight as of an earlier encounter on 10/30/18: 61.7 kg (136 lb). There is no height or weight on file to calculate BSA.  No Pain (0) Comment: Data Unavailable   No LMP for male patient.  Allergies reviewed: Yes  Medications reviewed: Yes    Medications: Medication refills not needed today.  Pharmacy name entered into [employer]:    [id number] - [place], [place] - [address]  [id number], [place] - [address] [facility]    Clinical concerns: no    5 minutes for nursing intake (face to face time)          Korina Marti MA              Oncology/Hematology Visit Note  Oct 30, 2018    Reason for Visit: follow up of esophageal cancer    History of Present Illness:   Per Dr. Bolden's note    1.  Patient had dysphagia and weight loss. EGD on 09/24/2018 revealed distal esophageal mass.  Biopsy revealed poorly differentiated carcinoma with adeno and squamous differentiation.  HER-2/blayne is negative.   2.  CT chest, abdomen and pelvis on 09/28/2018 revealed thickening of distal esophagus extending to GE junction.  There was paraesophageal adenopathy and upper abdominal adenopathy.   3.  Upper EUS on 10/02/2018 revealed completely obstructing malignant neoplasm in the lower third of the esophagus.  Based on the EUS, it was T3 " N2 disease.   - FNA of the lymph nodes is positive for malignancy.   - G-tube was placed on 10/08/2014.   4.  PET scan was done on 10/09/2018.  It revealed hypermetabolic lymphadenopathy in the mediastinum, retroperitoneum and along the gastrohepatic ligament.  There was a 7 mm lung nodule, too small to characterize.     5.  Paclitaxel and carboplatin weekly was started on 10/16/2016.   6.  Dr. Peter evaluated the patient and recommended radiation. Because of insurance reason, radiation has not yet been started.     Interval History:  Patient continues to be weak and he is in a wheelchair.  Per wife fatigue and weakness are actually not getting worse  After the chemo.  She tells me he was too weak like this before the start of chemotherapy.  He denies fever chills sweats.  Denies cough denies shortness of breath.  He denies nausea vomiting diarrhea he is using his G-tube for feedings.  He is on Lovenox for PE he denies bleeding    Review of Systems:  14 point ROS of systems including Constitutional, Eyes, Respiratory, Cardiovascular, Gastroenterology, Genitourinary, Integumentary, Muscularskeletal, Psychiatric were all negative except for pertinent positives noted in my HPI.      Current Outpatient Prescriptions   Medication Sig Dispense Refill     acetaminophen (TYLENOL) 325 MG tablet Take 2 tablets (650 mg) by mouth every 4 hours as needed for mild pain or fever 100 tablet      amLODIPine (NORVASC) 5 MG tablet Take 1 tablet (5 mg) by mouth daily 90 tablet 0     Calcium carb-Vitamin D 500 mg [place]-200 units (OSCAL WITH D;OYSTER SHELL CALCIUM) 500-200 MG-UNIT per tablet Take 1 tablet by mouth daily       enoxaparin (LOVENOX) 60 MG/0.6ML injection Inject 0.6 mLs (60 mg) Subcutaneous every 12 hours       fish oil-omega-3 fatty acids (OMEGA-3 FISH OIL) 1000 MG capsule Take 1 capsule by mouth daily.       hydrocortisone 2.5 % cream Apply topically 2 times daily       Multiple Vitamins-Minerals (DAILY MULTI PO) Take by  mouth daily       pantoprazole (PROTONIX) 2 mg/mL SUSP suspension 20 mLs (40 mg) by Per G Tube route every morning  0     simvastatin (ZOCOR) 10 MG tablet Take 5 mg by mouth At Bedtime (Takes half of a 10mg tablet for a total of 5mg daily)         Physical Examination:  General: The patient is a pleasant male in no acute distress.  /65  Pulse 78  Temp 97.8  F (36.6  C) (Oral)  Resp 18  SpO2 96%  HEENT: EOMI, PERRL. Sclerae are anicteric. Oral mucosa is pink and moist with no lesions or thrush.   Lymph: Neck is supple with no lymphadenopathy in the cervical or supraclavicular areas.   Heart: Regular rate and rhythm.   Lungs: Clear to auscultation bilaterally.   GI: Bowel sounds present, soft, nontender with no palpable hepatosplenomegaly or masses.   Extremities: No lower extremity edema noted bilaterally.   Skin: No rashes, petechiae, or bruising noted on exposed skin.    Laboratory Data:  Results for orders placed or performed in visit on 10/30/18 (from the past 24 hour(s))   CBC with platelets differential   Result Value Ref Range    WBC 7.5 4.0 - 11.0 10e9/L    RBC Count 4.04 (L) 4.4 - 5.9 10e12/L    Hemoglobin 13.5 13.3 - 17.7 g/dL    Hematocrit 39.6 (L) 40.0 - 53.0 %    MCV 98 78 - 100 fl    MCH 33.4 (H) 26.5 - 33.0 pg    MCHC 34.1 31.5 - 36.5 g/dL    RDW 13.5 10.0 - 15.0 %    Platelet Count 285 150 - 450 10e9/L    Diff Method Automated Method     % Neutrophils 65.9 %    % Lymphocytes 10.8 %    % Monocytes 15.9 %    % Eosinophils 4.6 %    % Basophils 0.7 %    % Immature Granulocytes 2.1 %    Nucleated RBCs 0 0 /100    Absolute Neutrophil 5.0 1.6 - 8.3 10e9/L    Absolute Lymphocytes 0.8 0.8 - 5.3 10e9/L    Absolute Monocytes 1.2 0.0 - 1.3 10e9/L    Absolute Eosinophils 0.4 0.0 - 0.7 10e9/L    Absolute Basophils 0.1 0.0 - 0.2 10e9/L    Abs Immature Granulocytes 0.2 0 - 0.4 10e9/L    Absolute Nucleated RBC 0.0    Creatinine   Result Value Ref Range    Creatinine 0.65 (L) 0.66 - 1.25 mg/dL    GFR Estimate  >90 >60 mL/min/1.7m2    GFR Estimate If Black >90 >60 mL/min/1.7m2         Assessment and Plan:    This is a 83-year old male with    Distal esophageal adenocarcinoma  Patient is currently on weekly carboplatin and Taxol started on 10/16.  The plan was to start radiation however due to insurance reason patient unable to start radiation while he is in [place].  Per wife she does not want to drive into [facility] for radiation.  Labs reviewed with patient- stable counts    proceed with chemotherapy today  He will return to clinic next week with me prior next cycle of chemotherapy  -Plan to schedule him with Dr. Bolden in 2 weeks    Fatigue weakness-told patient this is mild multifactorial.  Secondary to treatment disease and anemia.  Overall provide his performance status has not gotten worse.  We will monitor his performance status closely if it deteriorates we may hold chemotherapy  Continue physical therapy at [place]      Nutrition-he is getting feeding tube via G-tube.  For him to see nutritionist to discuss proper calorie intake.  He will think about it.  We talked about high caloric intake    Pulmonary embolism/A. fib  Patient is on Lovenox        Health maintenance-patient had flu shot    Coronary artery disease/HTN  Continue with primary care recommendations        SHILPA Holcomb CNP  Hem/Onc   [facility]               Again, thank you for allowing me to participate in the care of your patient.        Sincerely,        SHILPA Holcomb CNP

## 2018-10-30 NOTE — PROGRESS NOTES
Infusion Nursing Note:  Deepak Arndt presents today for C1 D15 Taxol, Carboplatin.    Patient seen by provider today: Yes: Thaddeus Cade NP   present during visit today: Not Applicable.    Note: Ok to proceed with chemo today per Thaddeus Cade NP.    Intravenous Access:  Labs drawn without difficulty.  Implanted Port.    Treatment Conditions:  Lab Results   Component Value Date    HGB 13.5 10/30/2018     Lab Results   Component Value Date    WBC 7.5 10/30/2018      Lab Results   Component Value Date    ANEU 5.0 10/30/2018     Lab Results   Component Value Date     10/30/2018      Lab Results   Component Value Date    CR 0.65 10/30/2018                   Results reviewed, labs MET treatment parameters, ok to proceed with treatment.      Post Infusion Assessment:  Patient tolerated infusion without incident.  Blood return noted pre and post infusion.  Site patent and intact, free from redness, edema or discomfort.  No evidence of extravasations.  Access discontinued per protocol.    Discharge Plan:   Discharge instructions reviewed with: Patient.  Patient and/or family verbalized understanding of discharge instructions and all questions answered.  AVS to patient via [employer].  Patient will return 11/6/18 for next appointment.   Patient discharged in stable condition accompanied by: wife.  Departure Mode: Wheelchair.  Offered transport back to [place], patient's wife declined.    Carolann Benítez RN

## 2018-10-30 NOTE — MR AVS SNAPSHOT
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)    -  1       Follow-ups after your visit        Your next 10 appointments already scheduled     Nov 06, 2018 11:30 AM CST   Level 4 with  INFUSION CHAIR 15   [facility] ([facility])    [facility]  [address]  [place] [address]   [phone]            Nov 06, 2018 12:30 PM CST   Return Visit with SHILPA Holcomb CNP   [facility] ([facility])    [facility]  [address]  [place] [address]   [phone]              Who to contact     If you have questions or need follow up information about today's clinic visit or your schedule please contact [facility] directly at [phone].  Normal or non-critical lab and imaging results will be communicated to you by [employer], letter or phone within 4 business days after the clinic has received the results. If you do not hear from us within 7 days, please contact the clinic through [employer] or phone. If you have a critical or abnormal lab result, we will notify you by phone as soon as possible.  Submit refill requests through [employer] or call your pharmacy and they will forward the refill request to us. Please allow 3 business days for your refill to be completed.          Additional Information About Your Visit        MyChart Information     [employer] gives you secure access to your electronic health record. If you see a primary care provider, you can also send messages to your care team and make appointments. If you have questions,  "please call your primary care clinic.  If you do not have a primary care provider, please call [phone] and they will assist you.        Care EveryWhere ID     This is your Care EveryWhere ID. This could be used by other organizations to access your [facility] medical records  [id number]        Your Vitals Were     Pulse Temperature Respirations Height Pulse Oximetry BMI (Body Mass Index)    78 97.8  F (36.6  C) (Oral) 18 1.702 m (5' 7.01\") 96% 21.3 kg/m2       Blood Pressure from Last 3 Encounters:   10/30/18 110/65   10/30/18 101/65   10/30/18 117/73    Weight from Last 3 Encounters:   10/30/18 61.7 kg (136 lb 0.4 oz)   10/30/18 61.7 kg (136 lb)   10/25/18 61.7 kg (136 lb)              We Performed the Following     CBC with platelets differential     Creatinine          Today's Medication Changes          These changes are accurate as of 10/30/18  2:38 PM.  If you have any questions, ask your nurse or doctor.               Start taking these medicines.        Dose/Directions    dabigatran ANTICOAGULANT 150 MG capsule   Commonly known as:  PRADAXA   Used for:  Acute saddle pulmonary embolism without acute cor pulmonale (H)   Started by:  Chacha Jamison APRN CNP        Take 1 capsule (150 mg) by mouth twice daily. Store in original 's bottle or blister pack; use within 120 days of opening.   Quantity:  60 capsule   Refills:  5         Stop taking these medicines if you haven't already. Please contact your care team if you have questions.     enoxaparin 60 MG/0.6ML injection   Commonly known as:  LOVENOX   Stopped by:  Chacha Jamison APRN CNP                Where to get your medicines      Some of these will need a paper prescription and others can be bought over the counter.  Ask your nurse if you have questions.     You don't need a prescription for these medications     dabigatran ANTICOAGULANT 150 MG capsule                Primary Care Provider Office Phone # Fax #    [place] " [place] [phone]       [address]  [facility] [address]        Equal Access to Services     NICK LOONEY : Kosta peggy moseley christiano Giron, wakiara ramyanighat, qamichaelta kamorganda yovana, gallito acevedo larussrichy lary. So [facility] [phone].    ATENCIÓN: Si habla español, tiene a esqueda disposición servicios gratuitos de asistencia lingüística. Llame al [phone].    We comply with applicable federal civil rights laws and [place] laws. We do not discriminate on the basis of race, color, national origin, age, disability, sex, sexual orientation, or gender identity.            Thank you!     Thank you for choosing [facility]  for your care. Our goal is always to provide you with excellent care. Hearing back from our patients is one way we can continue to improve our services. Please take a few minutes to complete the written survey that you may receive in the mail after your visit with us. Thank you!             Your Updated Medication List - Protect others around you: Learn how to safely use, store and throw away your medicines at www.disposemymeds.org.          This list is accurate as of 10/30/18  2:38 PM.  Always use your most recent med list.                   Brand Name Dispense Instructions for use Diagnosis    acetaminophen 325 MG tablet    TYLENOL    100 tablet    Take 2 tablets (650 mg) by mouth every 4 hours as needed for mild pain or fever    Generalized muscle weakness       amLODIPine 5 MG tablet    NORVASC    90 tablet    Take 1 tablet (5 mg) by mouth daily    Essential hypertension, benign       calcium carbonate 500 mg-vitamin D 200 units 500-200 MG-UNIT per tablet    OSCAL with D;OYSTER SHELL CALCIUM     Take 1 tablet by mouth daily        dabigatran ANTICOAGULANT 150 MG capsule    PRADAXA    60 capsule    Take 1 capsule (150 mg) by mouth twice daily. Store in original 's bottle or blister pack; use within 120 days  of opening.    Acute saddle pulmonary embolism without acute cor pulmonale (H)       DAILY MULTI PO      Take by mouth daily        fish oil-omega-3 fatty acids 1000 MG capsule      Take 1 capsule by mouth daily.        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

## 2018-10-30 NOTE — LETTER
10/30/2018        RE: Deepak Arndt  [address] [place] [address]        [facility]    Chief Complaint   Patient presents with     RECHECK       [facility] Medical Record Number:  [id number]  Place of Service where encounter took place:  [facility] [[id number]]    HPI:    Deepak Arndt is a 83 year old  (1935), who is being seen today for an episodic care visit.  HPI information obtained from: facility chart records, facility staff, patient report and [facility] chart review.  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 He was recently diagnosed with esophageal cancer in 9/24/18. PET/CT on 10/9 shows extensive lymphadenopathy.  He is followed by Dr. Mcleod, who recommends starting weekly paclitaxel and carboplatim with possibility of radiation in future. He will have chemo at [facility] oncology due to status in TCU.   He had been on pradaxa, which was on hold due to placement of G tube and port on 10/5/18.   He is eating some pureed food by mouth and continues on G tube feed.     Acute  pulmonary embolism without acute cor pulmonale (H)  Found to have small PULMONARY EMBOLISM in right upper lung. He was transferred to TCU on lovenox.   No shortness of breath O2 sats >90%.   Symptomatic bradycardia  Cardiology was consulted due to HRs 40-60s. Patient has been asymptomatic so will monitor for now. Although propranolol, which he was taking for essential tremor, was stopped.     Chronic atrial fibrillation (H)  CAHDS-Vasc 4. He was to be set up with holter monitor at discharge.   10/11/18 echo EF 60-65%  Loss of weight  PEG tube was placed on 10/5 for supplemental feeding. He had lost about 14# in past month.   Wt Readings from Last 5 Encounters:   10/30/18 136 lb 0.4 oz (61.7 kg)   10/25/18 136 lb (61.7 kg)   10/22/18 131 lb 3.2 oz (59.5  kg)   10/20/18 129 lb 4.8 oz (58.7 kg)   Essential hypertension  He continues on PTA amlodipine. Hydrochlorothiazide was stopped.   BP's: 117/73, 139/72, 137/68    Abnormal thyroid function test: TSH mildly low at 0.27.    Other atopic dermatitis  Pruritic rash on back. He was given benedryl in hospital.     Physical deconditioning  Lives with spouse in house. He has been mostly indpendent prior to September. He is quite weak now.        ALLERGIES: Versed [midazolam]  Past Medical, Surgical, Family and Social History reviewed and updated in [facility].    Current Outpatient Prescriptions   Medication Sig Dispense Refill     acetaminophen (TYLENOL) 325 MG tablet Take 2 tablets (650 mg) by mouth every 4 hours as needed for mild pain or fever 100 tablet      amLODIPine (NORVASC) 5 MG tablet Take 1 tablet (5 mg) by mouth daily 90 tablet 0     Calcium carb-Vitamin D 500 mg [place]-200 units (OSCAL WITH D;OYSTER SHELL CALCIUM) 500-200 MG-UNIT per tablet Take 1 tablet by mouth daily       enoxaparin (LOVENOX) 60 MG/0.6ML injection Inject 0.6 mLs (60 mg) Subcutaneous every 12 hours       fish oil-omega-3 fatty acids (OMEGA-3 FISH OIL) 1000 MG capsule Take 1 capsule by mouth daily.       hydrocortisone 2.5 % cream Apply topically 2 times daily       Multiple Vitamins-Minerals (DAILY MULTI PO) Take by mouth daily       pantoprazole (PROTONIX) 2 mg/mL SUSP suspension 20 mLs (40 mg) by Per G Tube route every morning  0     simvastatin (ZOCOR) 10 MG tablet Take 5 mg by mouth At Bedtime (Takes half of a 10mg tablet for a total of 5mg daily)       Medications reviewed:  Medications reconciled to facility chart and changes were made to reflect current medications as identified as above med list. Below are the changes that were made:   Medications stopped since last EPIC medication reconciliation:   There are no discontinued medications.    Medications started since last [facility] medication reconciliation:  No orders of the defined types were  "placed in this encounter.      REVIEW OF SYSTEMS:  4 point ROS including Respiratory, CV, GI and , other than that noted in the HPI,  is negative    Physical Exam:  /73  Pulse 75  Temp 97.6  F (36.4  C)  Resp 18  Ht 5' 7\" (1.702 m)  Wt 136 lb (61.7 kg)  SpO2 96%  BMI 21.3 kg/m2  GENERAL APPEARANCE:  Alert, in no distress  ENT:  Mouth and posterior oropharynx normal, moist mucous membranes, hearing acuity adequate   EYES:  EOM, conjunctivae, lids, pupils and irises normal  RESP:  respiratory effort and palpation of chest normal, no respiratory distress, Lung sounds clear  CV:  Palpation and auscultation of heart done , rate and rhythm irreg, no murmur, no rub or gallop, Edema none  ABDOMEN:  normal bowel sounds, soft, nontender, no hepatosplenomegaly or other masses  M/S:   Gait and station not observed. Sitting up in chair, Digits and nails normal   SKIN:  Inspection/Palpation of skin and subcutaneous tissue papular rash on back  NEURO: 2-12 in normal limits and at patient's baseline  PSYCH:  insight and judgement, memory intact , affect and mood normal    Recent Labs:   CBC RESULTS:   Recent Labs   Lab Test  10/23/18   0748  10/18/18   1445  10/17/18   0600   WBC  11.6*   --   10.6   RBC  4.27*   --   4.08*   HGB  14.2   --   13.6   HCT  42.6   --   39.7*   MCV  100   --   97   MCH  33.3*   --   33.3*   MCHC  33.3   --   34.3   RDW  13.5   --   13.3   PLT  268  213  207       Last Basic Metabolic Panel:  Recent Labs   Lab Test  10/23/18   0748  10/20/18   0635  10/16/18   0623  10/15/18   0915   NA   --    --   137  135   POTASSIUM   --    --   4.0  3.5   CHLORIDE   --    --   108  103   TRIPP   --    --   8.6  8.8   CO2   --    --   22  27   BUN   --    --   16  9   CR  0.64*  0.73  0.61*  0.63*   GLC   --    --   109*  114*       Liver Function Studies -   Recent Labs   Lab Test  10/11/18   0255  09/19/18   1412   PROTTOTAL  7.2  8.0   ALBUMIN  3.3*  3.5   BILITOTAL  1.2  0.8   ALKPHOS  68  79   AST  " 20  19   ALT  21  27       TSH   Date Value Ref Range Status   10/11/2018 0.27 (L) 0.40 - 4.00 mU/L Final   10/23/2017 0.50 0.40 - 4.00 mU/L Final     Lab Results   Component Value Date    A1C 5.7 10/11/2018    A1C 5.4 05/25/2018         Assessment/Plan:  (C15.5) 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 Advanced stage with lymph involvement. Limited ability to take food by mouth. Now with PEG tube. He did start chemotherapy at [facility] oncology. Wife reports he has been quite tired since starting chemotherapy.   Plan: continue outpatient chemotherapy.   Continue Gtube feedings with assist from dietician.       (I26.92) Acute  pulmonary embolism without acute cor pulmonale (H)  Comment: had been on pradaxa, which was on hold due placement of G tube and port.   Plan: continue with lovenox for now. Will ask oncology to clarify when to restart pradaxa    (R00.1) Symptomatic bradycardia  Comment: evaluated by cardiology. No change in plan  Plan: monitor    (I48.2) Chronic atrial fibrillation (H)  Comment: on lovenox.   Plan: no change    (R63.4) Loss of weight  Comment: due to esoph cancer. SPEECH THERAPY is working with patient.   Plan: continue supplemental feedings and pureed diet.     (I10) Essential hypertension  Comment: adequate control for now  Plan: monitor     (L20.89) Other atopic dermatitis  Comment: on back. Somewhat improved per patient.   Plan: HC 2.5% cream to back BID    (R79.89) Low TSH level    Plan: repeat TSH in 4-6 weeks.     (R53.81) Physical deconditioning  Comment: due to esoph cancer. He did walk to therapy gym today. He seems stronger.   Plan: physical therapy and OCCUPATIONAL THERAPY         Electronically signed by  SHILPA Marino CNP                      Sincerely,        SHILPA Marino CNP

## 2018-10-30 NOTE — PROGRESS NOTES
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    Weight as of an earlier encounter on 10/30/18: 61.7 kg (136 lb). There is no height or weight on file to calculate BSA.  No Pain (0) Comment: Data Unavailable   No LMP for male patient.  Allergies reviewed: Yes  Medications reviewed: Yes    Medications: Medication refills not needed today.  Pharmacy name entered into CO-Value:    [id number] - [place], [place] - [address] [id number] - [place], [place] - [address]    Clinical concerns: no    5 minutes for nursing intake (face to face time)          Korina Marti MA            "

## 2018-10-30 NOTE — PROGRESS NOTES
Oncology/Hematology Visit Note  Oct 30, 2018    Reason for Visit: follow up of esophageal cancer    History of Present Illness:   Per Dr. Bolden's note    1.  Patient had dysphagia and weight loss. EGD on 09/24/2018 revealed distal esophageal mass.  Biopsy revealed poorly differentiated carcinoma with adeno and squamous differentiation.  HER-2/blayne is negative.   2.  CT chest, abdomen and pelvis on 09/28/2018 revealed thickening of distal esophagus extending to GE junction.  There was paraesophageal adenopathy and upper abdominal adenopathy.   3.  Upper EUS on 10/02/2018 revealed completely obstructing malignant neoplasm in the lower third of the esophagus.  Based on the EUS, it was T3 N2 disease.   - FNA of the lymph nodes is positive for malignancy.   - G-tube was placed on 10/08/2014.   4.  PET scan was done on 10/09/2018.  It revealed hypermetabolic lymphadenopathy in the mediastinum, retroperitoneum and along the gastrohepatic ligament.  There was a 7 mm lung nodule, too small to characterize.     5.  Paclitaxel and carboplatin weekly was started on 10/16/2016.   6.  Dr. Peter evaluated the patient and recommended radiation. Because of insurance reason, radiation has not yet been started.     Interval History:  Patient continues to be weak and he is in a wheelchair.  Per wife fatigue and weakness are actually not getting worse  After the chemo.  She tells me he was too weak like this before the start of chemotherapy.  He denies fever chills sweats.  Denies cough denies shortness of breath.  He denies nausea vomiting diarrhea he is using his G-tube for feedings.  He is on Lovenox for PE he denies bleeding    Review of Systems:  14 point ROS of systems including Constitutional, Eyes, Respiratory, Cardiovascular, Gastroenterology, Genitourinary, Integumentary, Muscularskeletal, Psychiatric were all negative except for pertinent positives noted in my HPI.      Current Outpatient Prescriptions   Medication Sig Dispense  Refill     acetaminophen (TYLENOL) 325 MG tablet Take 2 tablets (650 mg) by mouth every 4 hours as needed for mild pain or fever 100 tablet      amLODIPine (NORVASC) 5 MG tablet Take 1 tablet (5 mg) by mouth daily 90 tablet 0     Calcium carb-Vitamin D 500 mg [place]-200 units (OSCAL WITH D;OYSTER SHELL CALCIUM) 500-200 MG-UNIT per tablet Take 1 tablet by mouth daily       enoxaparin (LOVENOX) 60 MG/0.6ML injection Inject 0.6 mLs (60 mg) Subcutaneous every 12 hours       fish oil-omega-3 fatty acids (OMEGA-3 FISH OIL) 1000 MG capsule Take 1 capsule by mouth daily.       hydrocortisone 2.5 % cream Apply topically 2 times daily       Multiple Vitamins-Minerals (DAILY MULTI PO) Take by mouth daily       pantoprazole (PROTONIX) 2 mg/mL SUSP suspension 20 mLs (40 mg) by Per G Tube route every morning  0     simvastatin (ZOCOR) 10 MG tablet Take 5 mg by mouth At Bedtime (Takes half of a 10mg tablet for a total of 5mg daily)         Physical Examination:  General: The patient is a pleasant male in no acute distress.  /65  Pulse 78  Temp 97.8  F (36.6  C) (Oral)  Resp 18  SpO2 96%  HEENT: EOMI, PERRL. Sclerae are anicteric. Oral mucosa is pink and moist with no lesions or thrush.   Lymph: Neck is supple with no lymphadenopathy in the cervical or supraclavicular areas.   Heart: Regular rate and rhythm.   Lungs: Clear to auscultation bilaterally.   GI: Bowel sounds present, soft, nontender with no palpable hepatosplenomegaly or masses.   Extremities: No lower extremity edema noted bilaterally.   Skin: No rashes, petechiae, or bruising noted on exposed skin.    Laboratory Data:  Results for orders placed or performed in visit on 10/30/18 (from the past 24 hour(s))   CBC with platelets differential   Result Value Ref Range    WBC 7.5 4.0 - 11.0 10e9/L    RBC Count 4.04 (L) 4.4 - 5.9 10e12/L    Hemoglobin 13.5 13.3 - 17.7 g/dL    Hematocrit 39.6 (L) 40.0 - 53.0 %    MCV 98 78 - 100 fl    MCH 33.4 (H) 26.5 - 33.0 pg     MCHC 34.1 31.5 - 36.5 g/dL    RDW 13.5 10.0 - 15.0 %    Platelet Count 285 150 - 450 10e9/L    Diff Method Automated Method     % Neutrophils 65.9 %    % Lymphocytes 10.8 %    % Monocytes 15.9 %    % Eosinophils 4.6 %    % Basophils 0.7 %    % Immature Granulocytes 2.1 %    Nucleated RBCs 0 0 /100    Absolute Neutrophil 5.0 1.6 - 8.3 10e9/L    Absolute Lymphocytes 0.8 0.8 - 5.3 10e9/L    Absolute Monocytes 1.2 0.0 - 1.3 10e9/L    Absolute Eosinophils 0.4 0.0 - 0.7 10e9/L    Absolute Basophils 0.1 0.0 - 0.2 10e9/L    Abs Immature Granulocytes 0.2 0 - 0.4 10e9/L    Absolute Nucleated RBC 0.0    Creatinine   Result Value Ref Range    Creatinine 0.65 (L) 0.66 - 1.25 mg/dL    GFR Estimate >90 >60 mL/min/1.7m2    GFR Estimate If Black >90 >60 mL/min/1.7m2         Assessment and Plan:    This is a 83-year old male with    Distal esophageal adenocarcinoma  Patient is currently on weekly carboplatin and Taxol started on 10/16.  The plan was to start radiation however due to insurance reason patient unable to start radiation while he is in [place].  Per wife she does not want to drive into [facility] for radiation.  Labs reviewed with patient- stable counts    proceed with chemotherapy today  He will return to clinic next week with me prior next cycle of chemotherapy  -Plan to schedule him with Dr. Bolden in 2 weeks    Fatigue weakness-told patient this is mild multifactorial.  Secondary to treatment disease and anemia.  Overall provide his performance status has not gotten worse.  We will monitor his performance status closely if it deteriorates we may hold chemotherapy  Continue physical therapy at [place]      Nutrition-he is getting feeding tube via G-tube.  For him to see nutritionist to discuss proper calorie intake.  He will think about it.  We talked about high caloric intake    Pulmonary embolism/A. fib  Patient is on Lovenox        Health maintenance-patient had flu shot    Coronary artery  disease/HTN  Continue with primary care recommendations        SHILPA Holcomb CNP  Hem/Onc   [facility]

## 2018-10-30 NOTE — MR AVS SNAPSHOT
After Visit Summary   10/30/2018    Deepak Arndt    MRN: [id number]           Patient Information     Date Of Birth          1935        Visit Information        Provider Department      10/30/2018 11:30 AM Thaddeus Cade APRN CNP [facility]        Today's Diagnoses     Cancer of distal third of esophagus (H)          Care Instructions    Keep future appointments     Ok to give chemo today           Follow-ups after your visit        Your next 10 appointments already scheduled     Nov 06, 2018 11:30 AM CST   Level 4 with  INFUSION CHAIR 15   [facility]    [facility]  [address]  [place] [address]   [phone]            Nov 06, 2018 12:30 PM CST   Return Visit with SHILPA Holcomb CNP   [facility] ([facility])    [facility]  [address]  [place] [address] [phone]              Who to contact     If you have questions or need follow up information about today's clinic visit or your schedule please contact [facility] directly at [phone].  Normal or non-critical lab and imaging results will be communicated to you by MyChart, letter or phone within 4 business days after the clinic has received the results. If you do not hear from us within 7 days, please contact the clinic through MyChart or phone. If you have a critical or abnormal lab result, we will notify you by phone as soon as possible.  Submit refill requests through Can'tWait or call your pharmacy and they will forward the refill request to us. Please allow 3 business days for your refill to be completed.          Additional Information About Your Visit        [employer] [facility] Information     Can'tWait gives you secure access to your electronic health record. If you see a primary care provider, you can also send messages to your care team and make  appointments. If you have questions, please call your primary care clinic.  If you do not have a primary care provider, please call [phone] and they will assist you.        Care EveryWhere ID     This is your Care EveryWhere ID. This could be used by other organizations to access your [place] medical records  [id number]        Your Vitals Were     Pulse Temperature Respirations Pulse Oximetry          78 97.8  F (36.6  C) (Oral) 18 96%         Blood Pressure from Last 3 Encounters:   10/30/18 110/65   10/30/18 101/65   10/30/18 117/73    Weight from Last 3 Encounters:   10/30/18 61.7 kg (136 lb 0.4 oz)   10/30/18 61.7 kg (136 lb)   10/25/18 61.7 kg (136 lb)              Today, you had the following     No orders found for display       Primary Care Provider Office Phone # Fax #    [place] [phone] [address]  [facility] [address]        Equal Access to Services     [facility]: Hadii aad ku hadasho Soomaali, waaxda luqadaha, qaybta kaalmada adeegyada, waxay mayelin hayjorge gill . So [facility] [phone].    ATENCIÓN: Si habla español, tiene a esqueda disposición servicios gratuitos de asistencia lingüística. Llame al [phone].    We comply with applicable federal civil rights laws and [place] laws. We do not discriminate on the basis of race, color, national origin, age, disability, sex, sexual orientation, or gender identity.            Thank you!     Thank you for choosing [facility]  for your care. Our goal is always to provide you with excellent care. Hearing back from our patients is one way we can continue to improve our services. Please take a few minutes to complete the written survey that you may receive in the mail after your visit with us. Thank you!             Your Updated Medication List - Protect others around you: Learn how to safely use, store and throw away your medicines at www.disposemymeds.org.          This list  is accurate as of 10/30/18 12:27 PM.  Always use your most recent med list.                   Brand Name Dispense Instructions for use Diagnosis    acetaminophen 325 MG tablet    TYLENOL    100 tablet    Take 2 tablets (650 mg) by mouth every 4 hours as needed for mild pain or fever    Generalized muscle weakness       amLODIPine 5 MG tablet    NORVASC    90 tablet    Take 1 tablet (5 mg) by mouth daily    Essential hypertension, benign       calcium carbonate 500 mg-vitamin D 200 units 500-200 MG-UNIT per tablet    OSCAL with D;OYSTER SHELL CALCIUM     Take 1 tablet by mouth daily        DAILY MULTI PO      Take by mouth daily        enoxaparin 60 MG/0.6ML injection    LOVENOX     Inject 0.6 mLs (60 mg) Subcutaneous every 12 hours    Other acute pulmonary embolism without acute cor pulmonale (H)       fish oil-omega-3 fatty acids 1000 MG capsule      Take 1 capsule by mouth daily.        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

## 2018-10-30 NOTE — PROGRESS NOTES
[facility]    Chief Complaint   Patient presents with     RECHECK       [facility] Record Number:  [id number]  Place of Service where encounter took place:  [facility] [[id number]]    HPI:    Deepak Arndt is a 83 year old  (1935), who is being seen today for an episodic care visit.  HPI information obtained from: facility chart records, facility staff, patient report and [facility] chart review.  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 He was recently diagnosed with esophageal cancer in 9/24/18. PET/CT on 10/9 shows extensive lymphadenopathy.  He is followed by Dr. Mcleod, who recommends starting weekly paclitaxel and carboplatim with possibility of radiation in future. He will have chemo at [facility] oncology due to status in TCU.   He had been on pradaxa, which was on hold due to placement of G tube and port on 10/5/18.   He is eating some pureed food by mouth and continues on G tube feed.     Acute  pulmonary embolism without acute cor pulmonale (H)  Found to have small PULMONARY EMBOLISM in right upper lung. He was transferred to TCU on lovenox.   No shortness of breath O2 sats >90%.   Symptomatic bradycardia  Cardiology was consulted due to HRs 40-60s. Patient has been asymptomatic so will monitor for now. Although propranolol, which he was taking for essential tremor, was stopped.     Chronic atrial fibrillation (H)  CAHDS-Vasc 4. He was to be set up with holter monitor at discharge.   10/11/18 echo EF 60-65%  Loss of weight  PEG tube was placed on 10/5 for supplemental feeding. He had lost about 14# in past month.   Wt Readings from Last 5 Encounters:   10/30/18 136 lb 0.4 oz (61.7 kg)   10/25/18 136 lb (61.7 kg)   10/22/18 131 lb 3.2 oz (59.5 kg)   10/20/18 129 lb 4.8 oz (58.7 kg)   Essential hypertension  He continues on PTA  amlodipine. Hydrochlorothiazide was stopped.   BP's: 117/73, 139/72, 137/68    Abnormal thyroid function test: TSH mildly low at 0.27.    Other atopic dermatitis  Pruritic rash on back. He was given benedryl in hospital.     Physical deconditioning  Lives with spouse in house. He has been mostly indpendent prior to September. He is quite weak now.        ALLERGIES: Versed [midazolam]  Past Medical, Surgical, Family and Social History reviewed and updated in [facility].    Current Outpatient Prescriptions   Medication Sig Dispense Refill     acetaminophen (TYLENOL) 325 MG tablet Take 2 tablets (650 mg) by mouth every 4 hours as needed for mild pain or fever 100 tablet      amLODIPine (NORVASC) 5 MG tablet Take 1 tablet (5 mg) by mouth daily 90 tablet 0     Calcium carb-Vitamin D 500 mg [place]-200 units (OSCAL WITH D;OYSTER SHELL CALCIUM) 500-200 MG-UNIT per tablet Take 1 tablet by mouth daily       enoxaparin (LOVENOX) 60 MG/0.6ML injection Inject 0.6 mLs (60 mg) Subcutaneous every 12 hours       fish oil-omega-3 fatty acids (OMEGA-3 FISH OIL) 1000 MG capsule Take 1 capsule by mouth daily.       hydrocortisone 2.5 % cream Apply topically 2 times daily       Multiple Vitamins-Minerals (DAILY MULTI PO) Take by mouth daily       pantoprazole (PROTONIX) 2 mg/mL SUSP suspension 20 mLs (40 mg) by Per G Tube route every morning  0     simvastatin (ZOCOR) 10 MG tablet Take 5 mg by mouth At Bedtime (Takes half of a 10mg tablet for a total of 5mg daily)       Medications reviewed:  Medications reconciled to facility chart and changes were made to reflect current medications as identified as above med list. Below are the changes that were made:   Medications stopped since last EPIC medication reconciliation:   There are no discontinued medications.    Medications started since last [facility] medication reconciliation:  No orders of the defined types were placed in this encounter.      REVIEW OF SYSTEMS:  4 point ROS including Respiratory,  "CV, GI and , other than that noted in the HPI,  is negative    Physical Exam:  /73  Pulse 75  Temp 97.6  F (36.4  C)  Resp 18  Ht 5' 7\" (1.702 m)  Wt 136 lb (61.7 kg)  SpO2 96%  BMI 21.3 kg/m2  GENERAL APPEARANCE:  Alert, in no distress  ENT:  Mouth and posterior oropharynx normal, moist mucous membranes, hearing acuity adequate   EYES:  EOM, conjunctivae, lids, pupils and irises normal  RESP:  respiratory effort and palpation of chest normal, no respiratory distress, Lung sounds clear  CV:  Palpation and auscultation of heart done , rate and rhythm irreg, no murmur, no rub or gallop, Edema none  ABDOMEN:  normal bowel sounds, soft, nontender, no hepatosplenomegaly or other masses  M/S:   Gait and station not observed. Sitting up in chair, Digits and nails normal   SKIN:  Inspection/Palpation of skin and subcutaneous tissue papular rash on back  NEURO: 2-12 in normal limits and at patient's baseline  PSYCH:  insight and judgement, memory intact , affect and mood normal    Recent Labs:   CBC RESULTS:   Recent Labs   Lab Test  10/23/18   0748  10/18/18   1445  10/17/18   0600   WBC  11.6*   --   10.6   RBC  4.27*   --   4.08*   HGB  14.2   --   13.6   HCT  42.6   --   39.7*   MCV  100   --   97   MCH  33.3*   --   33.3*   MCHC  33.3   --   34.3   RDW  13.5   --   13.3   PLT  268  213  207       Last Basic Metabolic Panel:  Recent Labs   Lab Test  10/23/18   0748  10/20/18   0635  10/16/18   0623  10/15/18   0915   NA   --    --   137  135   POTASSIUM   --    --   4.0  3.5   CHLORIDE   --    --   108  103   TRIPP   --    --   8.6  8.8   CO2   --    --   22  27   BUN   --    --   16  9   CR  0.64*  0.73  0.61*  0.63*   GLC   --    --   109*  114*       Liver Function Studies -   Recent Labs   Lab Test  10/11/18   0255  09/19/18   1412   PROTTOTAL  7.2  8.0   ALBUMIN  3.3*  3.5   BILITOTAL  1.2  0.8   ALKPHOS  68  79   AST  20  19   ALT  21  27       TSH   Date Value Ref Range Status   10/11/2018 0.27 (L) " 0.40 - 4.00 mU/L Final   10/23/2017 0.50 0.40 - 4.00 mU/L Final     Lab Results   Component Value Date    A1C 5.7 10/11/2018    A1C 5.4 05/25/2018         Assessment/Plan:  (C15.5) 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 Advanced stage with lymph involvement. Limited ability to take food by mouth. Now with PEG tube. He did start chemotherapy at [facility] oncology. Wife reports he has been quite tired since starting chemotherapy.   Plan: continue outpatient chemotherapy.   Continue Gtube feedings with assist from dietician.       (I26.92) Acute  pulmonary embolism without acute cor pulmonale (H)  Comment: had been on pradaxa, which was on hold due placement of G tube and port. Response for oncology- ok to restart pradaxa  Plan: discontinue lovenox . pradaxa   (R00.1) Symptomatic bradycardia  Comment: evaluated by cardiology. No change in plan  Plan: monitor    (I48.2) Chronic atrial fibrillation (H)  Comment: on lovenox.   Plan: no change    (R63.4) Loss of weight  Comment: due to esoph cancer. SPEECH THERAPY is working with patient.   Plan: continue supplemental feedings and pureed diet.     (I10) Essential hypertension  Comment: adequate control for now  Plan: monitor     (L20.89) Other atopic dermatitis  Comment: on back. Somewhat improved per patient.   Plan: HC 2.5% cream to back BID    (R79.89) Low TSH level    Plan: repeat TSH in 4-6 weeks.     (R53.81) Physical deconditioning  Comment: due to esoph cancer. He did walk to therapy gym today. He seems stronger.   Plan: physical therapy and OCCUPATIONAL THERAPY         Electronically signed by  SHILPA Marino CNP

## 2018-11-06 NOTE — LETTER
11/6/2018         RE: Deepak Arndt  [address] [place] [address]        Dear Colleague,    Thank you for referring your patient, Deepak Arndt, to the [facility]. Please see a copy of my visit note below.    Oncology/Hematology Visit Note  Nov 6, 2018    Reason for Visit: follow up of esophageal cancer    History of Present Illness:   Per Dr. Bolden's note    1.  Patient had dysphagia and weight loss. EGD on 09/24/2018 revealed distal esophageal mass.  Biopsy revealed poorly differentiated carcinoma with adeno and squamous differentiation.  HER-2/blayne is negative.   2.  CT chest, abdomen and pelvis on 09/28/2018 revealed thickening of distal esophagus extending to GE junction.  There was paraesophageal adenopathy and upper abdominal adenopathy.   3.  Upper EUS on 10/02/2018 revealed completely obstructing malignant neoplasm in the lower third of the esophagus.  Based on the EUS, it was T3 N2 disease.   - FNA of the lymph nodes is positive for malignancy.   - G-tube was placed on 10/08/2014.   4.  PET scan was done on 10/09/2018.  It revealed hypermetabolic lymphadenopathy in the mediastinum, retroperitoneum and along the gastrohepatic ligament.  There was a 7 mm lung nodule, too small to characterize.     5.  Paclitaxel and carboplatin weekly was started on 10/16/2016.   6.  Dr. Peter evaluated the patient and recommended radiation. Because of insurance reason, radiation has not yet been started.     Interval History:  Patient continues to be weak and he is in a wheelchair.  Per wife fatigue and weakness are actually not getting worse  After the chemo.  She tells me he was too weak like this before the start of chemotherapy.  He denies fever chills sweats.  Denies cough denies shortness of breath.  He denies nausea vomiting diarrhea he is using his G-tube for feedings.  He is on Lovenox for PE he denies bleeding    Review of Systems:  14 point ROS of systems including Constitutional,  Eyes, Respiratory, Cardiovascular, Gastroenterology, Genitourinary, Integumentary, Muscularskeletal, Psychiatric were all negative except for pertinent positives noted in my HPI.      Current Outpatient Prescriptions   Medication Sig Dispense Refill     acetaminophen (TYLENOL) 325 MG tablet Take 2 tablets (650 mg) by mouth every 4 hours as needed for mild pain or fever 100 tablet      amLODIPine (NORVASC) 5 MG tablet Take 1 tablet (5 mg) by mouth daily 90 tablet 0     Calcium carb-Vitamin D 500 mg [place]-200 units (OSCAL WITH D;OYSTER SHELL CALCIUM) 500-200 MG-UNIT per tablet Take 1 tablet by mouth daily       dabigatran ANTICOAGULANT (PRADAXA) 150 MG capsule Take 1 capsule (150 mg) by mouth twice daily. Store in original 's bottle or blister pack; use within 120 days of opening. 60 capsule 5     fish oil-omega-3 fatty acids (OMEGA-3 FISH OIL) 1000 MG capsule Take 1 capsule by mouth daily.       hydrocortisone 2.5 % cream Apply topically 2 times daily       Multiple Vitamins-Minerals (DAILY MULTI PO) Take by mouth daily       pantoprazole (PROTONIX) 2 mg/mL SUSP suspension 20 mLs (40 mg) by Per G Tube route every morning  0     simvastatin (ZOCOR) 10 MG tablet Take 5 mg by mouth At Bedtime (Takes half of a 10mg tablet for a total of 5mg daily)         Physical Examination:  General: The patient is a pleasant male in no acute distress.    HEENT: EOMI, PERRL. Sclerae are anicteric. Oral mucosa is pink and moist with no lesions or thrush.   Lymph: Neck is supple with no lymphadenopathy in the cervical or supraclavicular areas.   Heart: Regular rate and rhythm.   Lungs: Clear to auscultation bilaterally.   GI: Bowel sounds present, soft, nontender with no palpable hepatosplenomegaly or masses.   Extremities: No lower extremity edema noted bilaterally.   Skin: No rashes, petechiae, or bruising noted on exposed skin.    Laboratory Data:  Results for orders placed or performed in visit on 11/06/18 (from the past  24 hour(s))   Creatinine   Result Value Ref Range    Creatinine 0.65 (L) 0.66 - 1.25 mg/dL    GFR Estimate >90 >60 mL/min/1.7m2    GFR Estimate If Black >90 >60 mL/min/1.7m2     11/06/2018  From [place] [facility] today  WBC 8.6  anc 6.6  Hemoglobin 13.3  [id number]    Assessment and Plan:    This is a 83-year old male with    Distal esophageal adenocarcinoma  Patient is currently on weekly carboplatin and Taxol started on 10/16.  The plan was to start radiation however due to insurance reason patient unable to start radiation while he is in [place].  Per wife she does not want to drive into [facility] for radiation.  Labs reviewed with patient- stable counts    proceed with chemotherapy today  He will return to clinic next week with me prior next cycle Dr. Bolden    Fatigue weakness-told patient this is mild multifactorial.  Secondary to treatment disease and anemia.  Overall provide his performance status has not gotten worse.  We will monitor his performance status closely if it deteriorates we may hold chemotherapy  Continue physical therapy at [place]      Nutrition-he is getting feeding tube via G-tube.  For him to see nutritionist to discuss proper calorie intake.  He will think about it.  We talked about high caloric intake    Pulmonary embolism/A. fib  Patient is on pradaxa       Health maintenance-patient had flu shot    Coronary artery disease/HTN  Continue with primary care recommendations        SHILPA Holcomb CNP  Hem/Onc   [facility]               Again, thank you for allowing me to participate in the care of your patient.        Sincerely,        SHILPA Holcomb CNP

## 2018-11-06 NOTE — MR AVS SNAPSHOT
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)    -  1       Follow-ups after your visit        Your next 10 appointments already scheduled     Nov 13, 2018  9:30 AM CST   Level 1 with  INFUSION CHAIR 3   [facility] ([facility])    [facility]  [address]  [place] [address]   [phone]            Nov 13, 2018 10:40 AM CST   Return Visit with El Bolden MD   [facility] ([facility])    [facility]  [address]  [place] [address]   [phone]            Nov 14, 2018  9:30 AM CST   Level 4 with  INFUSION CHAIR 16   [facility] ([facility])    [facility]  [address]  [place] [address]   [phone]            Nov 15, 2018 11:15 AM CST   CT CHEST W CONTRAST with SHCT1   [facility] ([facility])    [address] [facility] [address]   [phone]           How do I prepare for my exam? (Food and drink instructions) **You will have contrast for this exam.** Do not eat or drink for 2 hours before your exam. If you need to take medicine, you may take it with small sips of water. (We may ask you to take liquid medicine as well.)  The day before your exam, drink extra fluids at least six 8-ounce glasses (unless your doctor tells you to restrict your fluids).  How do I prepare for my exam? (Other instructions) Patients over 70 or patients with diabetes or kidney problems: If you haven t had a blood test (creatinine test) within the  last 30 days, the Cardiologist/Radiologist may require you to get this test prior to your exam.  What should I wear: Please wear loose clothing, such as a sweat suit or jogging clothes.  Avoid snaps, zippers and other metal. We may ask you to undress and put on a hospital gown.  How long does the exam take: Most scans take less than 20 minutes.  What should I bring: Please bring any scans or X-rays taken at other hospitals, if similar tests were done. Also bring a list of your medicines, including vitamins, minerals and over-the-counter drugs. It is safest to leave personal items at home.  Do I need a :  No  is needed.  What do I need to tell my doctor? Be sure to tell your doctor: * If you have any allergies. * If there s any chance you are pregnant. * If you are breastfeeding. * If you have diabetes as your medication may need to be adjusted for this exam.  What should I do after the exam: No restrictions, You may resume normal activities.  What is this test: A CT (computed tomography) scan is a series of pictures that allows us to look inside your body. The scanner creates images of the body in cross sections, much like slices of bread. This helps us see any problems more clearly. You may receive contrast (X-ray dye) before or during your scan. Contrast is given through an IV (small needle in your arm).  Who should I call with questions: If you have any questions, please call the Imaging Department where you will have your exam. Directions, parking instructions, and other information is available on our website, [email or web address].              Future tests that were ordered for you today     Open Future Orders        Priority Expected Expires Ordered    CT Chest w Contrast Routine  11/6/2019 11/6/2018            Who to contact     If you have questions or need follow up information about today's clinic visit or your schedule please contact [facility] directly at  "[phone].  Normal or non-critical lab and imaging results will be communicated to you by MyChart, letter or phone within 4 business days after the clinic has received the results. If you do not hear from us within 7 days, please contact the clinic through [employer] or phone. If you have a critical or abnormal lab result, we will notify you by phone as soon as possible.  Submit refill requests through [employer] or call your pharmacy and they will forward the refill request to us. Please allow 3 business days for your refill to be completed.          Additional Information About Your Visit        [employer] Information     [employer] gives you secure access to your electronic health record. If you see a primary care provider, you can also send messages to your care team and make appointments. If you have questions, please call your primary care clinic.  If you do not have a primary care provider, please call [phone] and they will assist you.        Care EveryWhere ID     This is your Care EveryWhere ID. This could be used by other organizations to access your [facility] medical records  [id number]        Your Vitals Were     Pulse Temperature Respirations Height Pulse Oximetry BMI (Body Mass Index)    58 97.4  F (36.3  C) (Oral) 20 1.702 m (5' 7.01\") 98% 20.89 kg/m2       Blood Pressure from Last 3 Encounters:   11/06/18 135/68   10/30/18 110/65   10/30/18 101/65    Weight from Last 3 Encounters:   11/06/18 60.5 kg (133 lb 6.4 oz)   10/30/18 61.7 kg (136 lb 0.4 oz)   10/30/18 61.7 kg (136 lb)              We Performed the Following     Creatinine        Primary Care Provider Office Phone # Fax #    [place] [phone] [address] [facility] [address]        Equal Access to Services     NICK LOONEY : Kosta Giron, watereda luqadaha, qaybta kaalmatha yen, gallito barth. [facility] [phone].    ATENCIÓN: Si tanmay hobson " a esqueda disposición servicios gratuitos de asistencia lingüística. Antonio brown [phone].    We comply with applicable federal civil rights laws and [place] laws. We do not discriminate on the basis of race, color, national origin, age, disability, sex, sexual orientation, or gender identity.            Thank you!     Thank you for choosing [facility]  for your care. Our goal is always to provide you with excellent care. Hearing back from our patients is one way we can continue to improve our services. Please take a few minutes to complete the written survey that you may receive in the mail after your visit with us. Thank you!             Your Updated Medication List - Protect others around you: Learn how to safely use, store and throw away your medicines at www.disposemymeds.org.          This list is accurate as of 11/6/18  4:24 PM.  Always use your most recent med list.                   Brand Name Dispense Instructions for use Diagnosis    acetaminophen 325 MG tablet    TYLENOL    100 tablet    Take 2 tablets (650 mg) by mouth every 4 hours as needed for mild pain or fever    Generalized muscle weakness       amLODIPine 5 MG tablet    NORVASC    90 tablet    Take 1 tablet (5 mg) by mouth daily    Essential hypertension, benign       calcium carbonate 500 mg-vitamin D 200 units 500-200 MG-UNIT per tablet    OSCAL with D;OYSTER SHELL CALCIUM     Take 1 tablet by mouth daily        dabigatran ANTICOAGULANT 150 MG capsule    PRADAXA    60 capsule    Take 1 capsule (150 mg) by mouth twice daily. Store in original 's bottle or blister pack; use within 120 days of opening.    Acute saddle pulmonary embolism without acute cor pulmonale (H)       DAILY MULTI PO      Take by mouth daily        fish oil-omega-3 fatty acids 1000 MG capsule      Take 1 capsule by mouth daily.        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

## 2018-11-06 NOTE — PROGRESS NOTES
Infusion Nursing Note:  Deepak NITHIN Miguel Angelshabnampaulino presents today for Cycle 1 Day 22 Taxol, Carboplatin.    Patient seen by provider today: Yes: Thaddeus Cade NP   present during visit today: Not Applicable.    Note: N/A.    Intravenous Access:  Implanted Port.    Treatment Conditions:  Creat: 0.65  WBC 8.6  HGB 13.3    ANC 6.6.  Labs drawn this morning at [facility].     Post Infusion Assessment:  Patient tolerated infusion without incident.  Blood return noted pre and post infusion.  Site patent and intact, free from redness, edema or discomfort.  No evidence of extravasations.  Access discontinued per protocol.    Discharge Plan:   Patient declined prescription refills.  Discharge instructions reviewed with: Patient and Family.  Patient and family verbalized understanding of discharge instructions and all questions answered.  AVS to patient via [employer].  Patient will return 11/13/18 for next appointment.   Patient discharged in stable condition accompanied by: self and wife.  Departure Mode: Wheelchair.    Radha Busch RN

## 2018-11-06 NOTE — PROGRESS NOTES
Oncology/Hematology Visit Note  Nov 6, 2018    Reason for Visit: follow up of esophageal cancer    History of Present Illness:   Per Dr. Bolden's note    1.  Patient had dysphagia and weight loss. EGD on 09/24/2018 revealed distal esophageal mass.  Biopsy revealed poorly differentiated carcinoma with adeno and squamous differentiation.  HER-2/blayne is negative.   2.  CT chest, abdomen and pelvis on 09/28/2018 revealed thickening of distal esophagus extending to GE junction.  There was paraesophageal adenopathy and upper abdominal adenopathy.   3.  Upper EUS on 10/02/2018 revealed completely obstructing malignant neoplasm in the lower third of the esophagus.  Based on the EUS, it was T3 N2 disease.   - FNA of the lymph nodes is positive for malignancy.   - G-tube was placed on 10/08/2014.   4.  PET scan was done on 10/09/2018.  It revealed hypermetabolic lymphadenopathy in the mediastinum, retroperitoneum and along the gastrohepatic ligament.  There was a 7 mm lung nodule, too small to characterize.     5.  Paclitaxel and carboplatin weekly was started on 10/16/2016.   6.  Dr. Peter evaluated the patient and recommended radiation. Because of insurance reason, radiation has not yet been started.     Interval History:  Patient continues to be weak and he is in a wheelchair.  Per wife fatigue and weakness are actually not getting worse  After the chemo.  She tells me he was too weak like this before the start of chemotherapy.  He denies fever chills sweats.  Denies cough denies shortness of breath.  He denies nausea vomiting diarrhea he is using his G-tube for feedings.  He is on Lovenox for PE he denies bleeding    Review of Systems:  14 point ROS of systems including Constitutional, Eyes, Respiratory, Cardiovascular, Gastroenterology, Genitourinary, Integumentary, Muscularskeletal, Psychiatric were all negative except for pertinent positives noted in my HPI.      Current Outpatient Prescriptions   Medication Sig Dispense  Refill     acetaminophen (TYLENOL) 325 MG tablet Take 2 tablets (650 mg) by mouth every 4 hours as needed for mild pain or fever 100 tablet      amLODIPine (NORVASC) 5 MG tablet Take 1 tablet (5 mg) by mouth daily 90 tablet 0     Calcium carb-Vitamin D 500 mg [place]-200 units (OSCAL WITH D;OYSTER SHELL CALCIUM) 500-200 MG-UNIT per tablet Take 1 tablet by mouth daily       dabigatran ANTICOAGULANT (PRADAXA) 150 MG capsule Take 1 capsule (150 mg) by mouth twice daily. Store in original 's bottle or blister pack; use within 120 days of opening. 60 capsule 5     fish oil-omega-3 fatty acids (OMEGA-3 FISH OIL) 1000 MG capsule Take 1 capsule by mouth daily.       hydrocortisone 2.5 % cream Apply topically 2 times daily       Multiple Vitamins-Minerals (DAILY MULTI PO) Take by mouth daily       pantoprazole (PROTONIX) 2 mg/mL SUSP suspension 20 mLs (40 mg) by Per G Tube route every morning  0     simvastatin (ZOCOR) 10 MG tablet Take 5 mg by mouth At Bedtime (Takes half of a 10mg tablet for a total of 5mg daily)         Physical Examination:  General: The patient is a pleasant male in no acute distress.    HEENT: EOMI, PERRL. Sclerae are anicteric. Oral mucosa is pink and moist with no lesions or thrush.   Lymph: Neck is supple with no lymphadenopathy in the cervical or supraclavicular areas.   Heart: Regular rate and rhythm.   Lungs: Clear to auscultation bilaterally.   GI: Bowel sounds present, soft, nontender with no palpable hepatosplenomegaly or masses.   Extremities: No lower extremity edema noted bilaterally.   Skin: No rashes, petechiae, or bruising noted on exposed skin.    Laboratory Data:  Results for orders placed or performed in visit on 11/06/18 (from the past 24 hour(s))   Creatinine   Result Value Ref Range    Creatinine 0.65 (L) 0.66 - 1.25 mg/dL    GFR Estimate >90 >60 mL/min/1.7m2    GFR Estimate If Black >90 >60 mL/min/1.7m2     11/06/2018  From [place] [facility] today  WBC 8.6  anc  6.6  Hemoglobin 13.3  [id number]    Assessment and Plan:    This is a 83-year old male with    Distal esophageal adenocarcinoma  Patient is currently on weekly carboplatin and Taxol started on 10/16.  The plan was to start radiation however due to insurance reason patient unable to start radiation while he is in [place].  Per wife she does not want to drive into [facility] for radiation.  Labs reviewed with patient- stable counts    proceed with chemotherapy today  He will return to clinic next week with me prior next cycle Dr. Bolden    Fatigue weakness-told patient this is mild multifactorial.  Secondary to treatment disease and anemia.  Overall provide his performance status has not gotten worse.  We will monitor his performance status closely if it deteriorates we may hold chemotherapy  Continue physical therapy at [place]      Nutrition-he is getting feeding tube via G-tube.  For him to see nutritionist to discuss proper calorie intake.  He will think about it.  We talked about high caloric intake    Pulmonary embolism/A. fib  Patient is on pradaxa       Health maintenance-patient had flu shot    Coronary artery disease/HTN  Continue with primary care recommendations        SHILPA Holcomb CNP  Hem/Onc   [facility]

## 2018-11-06 NOTE — PATIENT INSTRUCTIONS
Schedule for labs, chemo and follow up appointment with Dr Bolden on 11/13  Scheduled/amaury MAYBERRY given to patient/amaury

## 2018-11-06 NOTE — MR AVS SNAPSHOT
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)          Care Instructions      Schedule for labs, chemo and follow up appointment with Dr Bolden on 11/13            Follow-ups after your visit        Your next 10 appointments already scheduled     Nov 13, 2018  9:30 AM CST   Level 1 with  INFUSION CHAIR 3   [facility] ([facility])    [facility]  [address]  [place] [address]   [phone]            Nov 13, 2018 10:40 AM CST   Return Visit with El Bolden MD   [facility] ([facility])    [facility]  [address]  [place] [address]   [phone]            Nov 14, 2018  9:30 AM CST   Level 4 with  INFUSION CHAIR 16   [facility] ([facility])    [facility]  [address]  [place] [address]   [phone]            Nov 15, 2018 11:15 AM CST   CT CHEST W CONTRAST with SHCT1   [facility] CT ([facility])    [address] [facility] [address]   [phone]           How do I prepare for my exam? (Food and drink instructions) **You will have contrast for this exam.** Do not eat or drink for 2 hours before your exam. If you need to take medicine, you may take it with small sips of water. (We may ask you to take liquid medicine as well.)  The day before your exam, drink extra fluids at least six 8-ounce glasses (unless your doctor tells you to restrict your fluids).  How do I prepare for my exam? (Other instructions) Patients over 70 or patients with diabetes  or kidney problems: If you haven t had a blood test (creatinine test) within the last 30 days, the Cardiologist/Radiologist may require you to get this test prior to your exam.  What should I wear: Please wear loose clothing, such as a sweat suit or jogging clothes.  Avoid snaps, zippers and other metal. We may ask you to undress and put on a hospital gown.  How long does the exam take: Most scans take less than 20 minutes.  What should I bring: Please bring any scans or X-rays taken at other hospitals, if similar tests were done. Also bring a list of your medicines, including vitamins, minerals and over-the-counter drugs. It is safest to leave personal items at home.  Do I need a :  No  is needed.  What do I need to tell my doctor? Be sure to tell your doctor: * If you have any allergies. * If there s any chance you are pregnant. * If you are breastfeeding. * If you have diabetes as your medication may need to be adjusted for this exam.  What should I do after the exam: No restrictions, You may resume normal activities.  What is this test: A CT (computed tomography) scan is a series of pictures that allows us to look inside your body. The scanner creates images of the body in cross sections, much like slices of bread. This helps us see any problems more clearly. You may receive contrast (X-ray dye) before or during your scan. Contrast is given through an IV (small needle in your arm).  Who should I call with questions: If you have any questions, please call the Imaging Department where you will have your exam. Directions, parking instructions, and other information is available on our website, [email or web address].              Future tests that were ordered for you today     Open Future Orders        Priority Expected Expires Ordered    CT Chest w Contrast Routine  11/6/2019 11/6/2018            Who to contact     If you have questions or need follow up information about today's clinic visit or your  schedule please contact [facility] directly at [phone].  Normal or non-critical lab and imaging results will be communicated to you by MyChart, letter or phone within 4 business days after the clinic has received the results. If you do not hear from us within 7 days, please contact the clinic through MyChart or phone. If you have a critical or abnormal lab result, we will notify you by phone as soon as possible.  Submit refill requests through [employer] or call your pharmacy and they will forward the refill request to us. Please allow 3 business days for your refill to be completed.          Additional Information About Your Visit        [employer] Information     [employer] gives you secure access to your electronic health record. If you see a primary care provider, you can also send messages to your care team and make appointments. If you have questions, please call your primary care clinic.  If you do not have a primary care provider, please call [phone] and they will assist you.        Care EveryWhere ID     This is your Care EveryWhere ID. This could be used by other organizations to access your [facility] medical records  [id number]         Blood Pressure from Last 3 Encounters:   11/06/18 135/68   10/30/18 110/65   10/30/18 101/65    Weight from Last 3 Encounters:   11/06/18 60.5 kg (133 lb 6.4 oz)   10/30/18 61.7 kg (136 lb 0.4 oz)   10/30/18 61.7 kg (136 lb)              Today, you had the following     No orders found for display       Primary Care Provider Office Phone # Fax #    [place] [phone] [address]  [facility] [address]        Equal Access to Services     [facility] Hospital[facility]ON[facility] : Hadii aad ku hadasho Soomaali, waaxda luqadaha, qaybta kaalmada adeegyada, gallito gill . So [facility] [phone].    ATENCIÓN: Si habla español, tiene a esqueda disposición servicios gratuitos de asistencia lingüística. Llame al [phone].    We  comply with applicable federal civil rights laws and [place] laws. We do not discriminate on the basis of race, color, national origin, age, disability, sex, sexual orientation, or gender identity.            Thank you!     Thank you for choosing [facility]  for your care. Our goal is always to provide you with excellent care. Hearing back from our patients is one way we can continue to improve our services. Please take a few minutes to complete the written survey that you may receive in the mail after your visit with us. Thank you!             Your Updated Medication List - Protect others around you: Learn how to safely use, store and throw away your medicines at www.disposemymeds.org.          This list is accurate as of 11/6/18  1:52 PM.  Always use your most recent med list.                   Brand Name Dispense Instructions for use Diagnosis    acetaminophen 325 MG tablet    TYLENOL    100 tablet    Take 2 tablets (650 mg) by mouth every 4 hours as needed for mild pain or fever    Generalized muscle weakness       amLODIPine 5 MG tablet    NORVASC    90 tablet    Take 1 tablet (5 mg) by mouth daily    Essential hypertension, benign       calcium carbonate 500 mg-vitamin D 200 units 500-200 MG-UNIT per tablet    OSCAL with D;OYSTER SHELL CALCIUM     Take 1 tablet by mouth daily        dabigatran ANTICOAGULANT 150 MG capsule    PRADAXA    60 capsule    Take 1 capsule (150 mg) by mouth twice daily. Store in original 's bottle or blister pack; use within 120 days of opening.    Acute saddle pulmonary embolism without acute cor pulmonale (H)       DAILY MULTI PO      Take by mouth daily        fish oil-omega-3 fatty acids 1000 MG capsule      Take 1 capsule by mouth daily.        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

## 2018-11-08 NOTE — LETTER
11/8/2018        RE: Deepak Arndt  [address] [place] [address]        [place] [facility]    Chief Complaint   Patient presents with     RECHECK       [place] Medical Record Number:  [id number]  Place of Service where encounter took place:  [facility] [[id number]]    HPI:    Deepak Arndt is a 83 year old  (1935), who is being seen today for an episodic care visit.  HPI information obtained from: facility chart records, facility staff, patient report and [facility] chart review.  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 He was recently diagnosed with esophageal cancer in 9/24/18. PET/CT on 10/9 shows extensive lymphadenopathy.  He is followed by Dr. Mcleod, who recommends starting weekly paclitaxel and carboplatim with possibility of radiation in future. He is receiving chemo at [facility] oncology due to status in TCU.   He had been on pradaxa, which was on hold due to placement of G tube and port on 10/5/18.   He is eating some pureed food by mouth and continues on G tube feed.     Acute  pulmonary embolism without acute cor pulmonale (H)  Found to have small PULMONARY EMBOLISM in right upper lung. He was transferred to TCU on lovenox.   No shortness of breath O2 sats >90%.   Symptomatic bradycardia  Cardiology was consulted due to HRs 40-60s. Patient has been asymptomatic so will monitor for now. Although propranolol, which he was taking for essential tremor, was stopped.     Chronic atrial fibrillation (H)  CAHDS-Vasc 4. He was to be set up with holter monitor at discharge.   10/11/18 echo EF 60-65%  Loss of weight  PEG tube was placed on 10/5 for supplemental feeding. He had lost about 14# in past month.   Wt Readings from Last 5 Encounters:   11/06/18 133 lb 6.4 oz (60.5 kg)   10/30/18 136 lb 0.4 oz (61.7 kg)   10/30/18 136 lb (61.7  kg)   10/25/18 136 lb (61.7 kg)   10/22/18 131 lb 3.2 oz (59.5 kg)     essential hypertension  He continues on PTA amlodipine. Hydrochlorothiazide was stopped.   BP's: 125/70, 11/72, 121/76    Abnormal thyroid function test: TSH mildly low at 0.27.    Other atopic dermatitis  Pruritic rash on back. He was given benedryl in hospital.     Physical deconditioning  Lives with spouse in house. He has been mostly indpendent prior to September. He is quite weak now. He is able to walk but is quite forgetful.        ALLERGIES: Versed [midazolam]  Past Medical, Surgical, Family and Social History reviewed and updated in [facility].    Current Outpatient Prescriptions   Medication Sig Dispense Refill     acetaminophen (TYLENOL) 325 MG tablet Take 2 tablets (650 mg) by mouth every 4 hours as needed for mild pain or fever 100 tablet      amLODIPine (NORVASC) 5 MG tablet Take 1 tablet (5 mg) by mouth daily 90 tablet 0     Calcium carb-Vitamin D 500 mg [place]-200 units (OSCAL WITH D;OYSTER SHELL CALCIUM) 500-200 MG-UNIT per tablet Take 1 tablet by mouth daily       dabigatran ANTICOAGULANT (PRADAXA) 150 MG capsule Take 1 capsule (150 mg) by mouth twice daily. Store in original 's bottle or blister pack; use within 120 days of opening. 60 capsule 5     fish oil-omega-3 fatty acids (OMEGA-3 FISH OIL) 1000 MG capsule Take 1 capsule by mouth daily.       hydrocortisone 2.5 % cream Apply topically 2 times daily       Multiple Vitamins-Minerals (DAILY MULTI PO) Take by mouth daily       pantoprazole (PROTONIX) 2 mg/mL SUSP suspension 20 mLs (40 mg) by Per G Tube route every morning  0     simvastatin (ZOCOR) 10 MG tablet Take 5 mg by mouth At Bedtime (Takes half of a 10mg tablet for a total of 5mg daily)       Medications reviewed:  Medications reconciled to facility chart and changes were made to reflect current medications as identified as above med list. Below are the changes that were made:   Medications stopped since last EPIC  medication reconciliation:   There are no discontinued medications.    Medications started since last [facility] medication reconciliation:  No orders of the defined types were placed in this encounter.      REVIEW OF SYSTEMS:  4 point ROS including Respiratory, CV, GI and , other than that noted in the HPI,  is negative    Physical Exam:  /70  Pulse 65  Temp 98.4  F (36.9  C)  Resp 18  SpO2 95%  GENERAL APPEARANCE:  Alert, in no distress  ENT:  Mouth and posterior oropharynx normal, moist mucous membranes, hearing acuity adequate   EYES:  EOM, conjunctivae, lids, pupils and irises normal  RESP:  respiratory effort and palpation of chest normal, no respiratory distress,   CV: , Edema none    M/S:   Gait and station unsteady. , Digits and nails normal   SKIN:  Inspection/Palpation of skin and subcutaneous tissue papular rash on back, skin is dry  NEURO: 2-12 in normal limits and at patient's baseline  PSYCH:  insight and judgement, memory intact , affect and mood normal    Recent Labs:   CBC RESULTS:   Recent Labs   Lab Test  10/30/18   1035  10/23/18   0748   WBC  7.5  11.6*   RBC  4.04*  4.27*   HGB  13.5  14.2   HCT  39.6*  42.6   MCV  98  100   MCH  33.4*  33.3*   MCHC  34.1  33.3   RDW  13.5  13.5   PLT  285  268       Last Basic Metabolic Panel:  Recent Labs   Lab Test  11/06/18   1239  10/30/18   1035   10/16/18   0623  10/15/18   0915   NA   --    --    --   137  135   POTASSIUM   --    --    --   4.0  3.5   CHLORIDE   --    --    --   108  103   TRIPP   --    --    --   8.6  8.8   CO2   --    --    --   22  27   BUN   --    --    --   16  9   CR  0.65*  0.65*   < >  0.61*  0.63*   GLC   --    --    --   109*  114*    < > = values in this interval not displayed.       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 Advanced stage with lymph involvement. Limited ability to take food by mouth. Now with PEG tube. [facility]. Wife reports he has been quite tired since starting chemotherapy.   Plan: continue outpatient chemotherapy.   Continue Gtube feedings with assist from dietician.       (I26.92) Acute  pulmonary embolism without acute cor pulmonale (H)  Comment: had been on pradaxa, which was on hold due placement of G tube and port. Response for oncology- ok to restart pradaxa  Plan: discontinue lovenox . pradaxa   (R00.1) Symptomatic bradycardia  Comment: evaluated by cardiology. No change in plan  Plan: monitor    (I48.2) Chronic atrial fibrillation (H)  Comment: on lovenox.   Plan: no change    (R63.4) Loss of weight  Comment: due to esoph cancer. SPEECH THERAPY is working with patient. Wt is slightly increasing  Plan: continue supplemental feedings and pureed diet.     (I10) Essential hypertension  Comment: adequate control for now  Plan: monitor     (L20.89) Other atopic dermatitis  Comment: on back. Somewhat improved per patient.   Plan: HC 2.5% cream to back BID    (R79.89) Low TSH level    Plan: repeat TSH in 4 weeks.     (R53.81) Physical deconditioning  Comment: due to esoph cancer. He did walk to therapy gym today. He seems stronger. He is forgetful and will start walking while still connected to feeding tube  Plan: physical therapy and OCCUPATIONAL THERAPY         Electronically signed by  SHILPA Marino CNP                      Sincerely,        SHILPA Marino CNP

## 2018-11-08 NOTE — PROGRESS NOTES
[place] [facility]    Chief Complaint   Patient presents with     BRYANECK       [place] Medical Record Number:  [id number]  Place of Service where encounter took place[facility] [[id number]]    HPI:    Deepak Arndt is a 83 year old  (1935), who is being seen today for an episodic care visit.  HPI information obtained from: facility chart records, facility staff, patient report and [facility] chart review.  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 He was recently diagnosed with esophageal cancer in 9/24/18. PET/CT on 10/9 shows extensive lymphadenopathy.  He is followed by Dr. Mcleod, who recommends starting weekly paclitaxel and carboplatim with possibility of radiation in future. He is receiving chemo at [facility] oncology due to status in TCU.   He had been on pradaxa, which was on hold due to placement of G tube and port on 10/5/18.   He is eating some pureed food by mouth and continues on G tube feed.     Acute  pulmonary embolism without acute cor pulmonale (H)  Found to have small PULMONARY EMBOLISM in right upper lung. He was transferred to TCU on lovenox.   No shortness of breath O2 sats >90%.   Symptomatic bradycardia  Cardiology was consulted due to HRs 40-60s. Patient has been asymptomatic so will monitor for now. Although propranolol, which he was taking for essential tremor, was stopped.     Chronic atrial fibrillation (H)  CAHDS-Vasc 4. He was to be set up with holter monitor at discharge.   10/11/18 echo EF 60-65%  Loss of weight  PEG tube was placed on 10/5 for supplemental feeding. He had lost about 14# in past month.   Wt Readings from Last 5 Encounters:   11/06/18 133 lb 6.4 oz (60.5 kg)   10/30/18 136 lb 0.4 oz (61.7 kg)   10/30/18 136 lb (61.7 kg)   10/25/18 136 lb (61.7 kg)   10/22/18 131 lb 3.2 oz (59.5 kg)     essential  hypertension  He continues on PTA amlodipine. Hydrochlorothiazide was stopped.   BP's: 125/70, 11/72, 121/76    Abnormal thyroid function test: TSH mildly low at 0.27.    Other atopic dermatitis  Pruritic rash on back. He was given benedryl in hospital.     Physical deconditioning  Lives with spouse in house. He has been mostly indpendent prior to September. He is quite weak now. He is able to walk but is quite forgetful.        ALLERGIES: Versed [midazolam]  Past Medical, Surgical, Family and Social History reviewed and updated in [facility].    Current Outpatient Prescriptions   Medication Sig Dispense Refill     acetaminophen (TYLENOL) 325 MG tablet Take 2 tablets (650 mg) by mouth every 4 hours as needed for mild pain or fever 100 tablet      amLODIPine (NORVASC) 5 MG tablet Take 1 tablet (5 mg) by mouth daily 90 tablet 0     Calcium carb-Vitamin D 500 mg [place]-200 units (OSCAL WITH D;OYSTER SHELL CALCIUM) 500-200 MG-UNIT per tablet Take 1 tablet by mouth daily       dabigatran ANTICOAGULANT (PRADAXA) 150 MG capsule Take 1 capsule (150 mg) by mouth twice daily. Store in original 's bottle or blister pack; use within 120 days of opening. 60 capsule 5     fish oil-omega-3 fatty acids (OMEGA-3 FISH OIL) 1000 MG capsule Take 1 capsule by mouth daily.       hydrocortisone 2.5 % cream Apply topically 2 times daily       Multiple Vitamins-Minerals (DAILY MULTI PO) Take by mouth daily       pantoprazole (PROTONIX) 2 mg/mL SUSP suspension 20 mLs (40 mg) by Per G Tube route every morning  0     simvastatin (ZOCOR) 10 MG tablet Take 5 mg by mouth At Bedtime (Takes half of a 10mg tablet for a total of 5mg daily)       Medications reviewed:  Medications reconciled to facility chart and changes were made to reflect current medications as identified as above med list. Below are the changes that were made:   Medications stopped since last EPIC medication reconciliation:   There are no discontinued  medications.    Medications started since last [facility] medication reconciliation:  No orders of the defined types were placed in this encounter.      REVIEW OF SYSTEMS:  4 point ROS including Respiratory, CV, GI and , other than that noted in the HPI,  is negative    Physical Exam:  /70  Pulse 65  Temp 98.4  F (36.9  C)  Resp 18  SpO2 95%  GENERAL APPEARANCE:  Alert, in no distress  ENT:  Mouth and posterior oropharynx normal, moist mucous membranes, hearing acuity adequate   EYES:  EOM, conjunctivae, lids, pupils and irises normal  RESP:  respiratory effort and palpation of chest normal, no respiratory distress,   CV: , Edema none    M/S:   Gait and station unsteady. , Digits and nails normal   SKIN:  Inspection/Palpation of skin and subcutaneous tissue papular rash on back, skin is dry  NEURO: 2-12 in normal limits and at patient's baseline  PSYCH:  insight and judgement, memory intact , affect and mood normal    Recent Labs:   CBC RESULTS:   Recent Labs   Lab Test  10/30/18   1035  10/23/18   0748   WBC  7.5  11.6*   RBC  4.04*  4.27*   HGB  13.5  14.2   HCT  39.6*  42.6   MCV  98  100   MCH  33.4*  33.3*   MCHC  34.1  33.3   RDW  13.5  13.5   PLT  285  268       Last Basic Metabolic Panel:  Recent Labs   Lab Test  11/06/18   1239  10/30/18   1035   10/16/18   0623  10/15/18   0915   NA   --    --    --   137  135   POTASSIUM   --    --    --   4.0  3.5   CHLORIDE   --    --    --   108  103   TRIPP   --    --    --   8.6  8.8   CO2   --    --    --   22  27   BUN   --    --    --   16  9   CR  0.65*  0.65*   < >  0.61*  0.63*   GLC   --    --    --   109*  114*    < > = values in this interval not displayed.       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 Advanced stage with lymph involvement. Limited ability to take food by mouth. Now with PEG tube. He did start chemotherapy at [facility] oncology. Wife reports he has been quite tired since starting chemotherapy.   Plan: continue outpatient chemotherapy.   Continue Gtube feedings with assist from dietician.       (I26.92) Acute  pulmonary embolism without acute cor pulmonale (H)  Comment: had been on pradaxa, which was on hold due placement of G tube and port. Response for oncology- ok to restart pradaxa  Plan: discontinue lovenox . pradaxa   (R00.1) Symptomatic bradycardia  Comment: evaluated by cardiology. No change in plan  Plan: monitor    (I48.2) Chronic atrial fibrillation (H)  Comment: on lovenox.   Plan: no change    (R63.4) Loss of weight  Comment: due to esoph cancer. SPEECH THERAPY is working with patient. Wt is slightly increasing  Plan: continue supplemental feedings and pureed diet.     (I10) Essential hypertension  Comment: adequate control for now  Plan: monitor     (L20.89) Other atopic dermatitis  Comment: on back. Somewhat improved per patient.   Plan: HC 2.5% cream to back BID    (R79.89) Low TSH level    Plan: repeat TSH in 4 weeks.     (R53.81) Physical deconditioning  Comment: due to esoph cancer. He did walk to therapy gym today. He seems stronger. He is forgetful and will start walking while still connected to feeding tube  Plan: physical therapy and OCCUPATIONAL THERAPY         Electronically signed by  SHILPA Marino CNP

## 2018-11-13 NOTE — PROGRESS NOTES
Infusion Nursing Note:  Deepak Arndt presents today for C1D29 taxol/carbo.    Patient seen by provider today: Yes: Dr. Bolden   present during visit today: Not Applicable.    Note: N/A.    Intravenous Access:  Labs drawn without difficulty.  Implanted Port.    Treatment Conditions:  Lab Results   Component Value Date    HGB 12.7 11/13/2018     Lab Results   Component Value Date    WBC 6.2 11/13/2018      Lab Results   Component Value Date    ANEU 4.3 11/13/2018     Lab Results   Component Value Date     11/13/2018      Lab Results   Component Value Date     10/16/2018                   Lab Results   Component Value Date    POTASSIUM 4.0 10/16/2018           Lab Results   Component Value Date    MAG 2.2 10/15/2018            Lab Results   Component Value Date    CR 0.56 11/13/2018                   Lab Results   Component Value Date    TRIPP 8.6 10/16/2018                Lab Results   Component Value Date    BILITOTAL 1.2 10/11/2018           Lab Results   Component Value Date    ALBUMIN 3.3 10/11/2018                    Lab Results   Component Value Date    ALT 21 10/11/2018           Lab Results   Component Value Date    AST 20 10/11/2018       Results reviewed, labs MET treatment parameters, ok to proceed with treatment.      Post Infusion Assessment:  Patient tolerated infusion without incident.  Site patent and intact, free from redness, edema or discomfort.  No evidence of extravasations.  Access discontinued per protocol.    Discharge Plan:   Discharge instructions reviewed with: Patient and Family.  Patient and/or family verbalized understanding of discharge instructions and all questions answered.  AVS to patient via [employer] Group[employer]ART.  Patient will return prn for next appointment.   Patient discharged in stable condition accompanied by: wife.  Departure Mode: Wheelchair.    Zara Zhou RN

## 2018-11-13 NOTE — PROGRESS NOTES
"Oncology Rooming Note    November 13, 2018 9:59 AM   Deepak Arndt is a 83 year old male who presents for:    Chief Complaint   Patient presents with     Oncology Clinic Visit     Cancer of distal third of esophagus      Initial Vitals: /58  Pulse 63  Temp 97.6  F (36.4  C) (Oral)  Resp 16  Wt 59.3 kg (130 lb 12.8 oz)  SpO2 99%  BMI 20.48 kg/m2 Estimated body mass index is 20.48 kg/(m^2) as calculated from the following:    Height as of 11/6/18: 1.702 m (5' 7.01\").    Weight as of this encounter: 59.3 kg (130 lb 12.8 oz). Body surface area is 1.67 meters squared.  No Pain (0) Comment: Data Unavailable   No LMP for male patient.  Allergies reviewed: Yes  Medications reviewed: Yes    Medications: Medication refills not needed today.  Pharmacy name entered into Invarium:    [facility] [address] [place] [facility] [place] - [address] [facility]ACY #[id number] - [place], [place] - [address] [facility]    Clinical concerns: None                4 minutes for nursing intake (face to face time)     Wilda Alvarado MA            "

## 2018-11-13 NOTE — MR AVS SNAPSHOT
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 (Food and drink instructions) **You will have contrast for this exam.** Do not eat or drink for 2 hours before your exam. If you need to take medicine, you may take it with small sips of water. (We may ask you to take liquid medicine as well.)  The day before your exam, drink extra fluids at least six 8-ounce glasses (unless your doctor tells you to restrict your fluids).  How do I prepare for my exam? (Other instructions) Patients over 70 or patients with diabetes or kidney problems: If you haven t had a blood test (creatinine test) within the last 30 days, the Cardiologist/Radiologist may require you to get this test prior to your exam.  What should I wear: Please wear loose clothing, such as a sweat suit or jogging clothes.  Avoid snaps, zippers and other metal. We may ask you to undress and put on a hospital gown.  How long does the exam take: Most scans take less than 20 minutes.  What should I bring: Please bring any scans or X-rays taken at other hospitals, if similar tests were done. Also bring a list of your medicines, including vitamins, minerals and over-the-counter drugs. It is safest to leave personal items at home.  Do I need a :  No  is needed.  What do I need to tell my doctor? Be sure to tell your doctor: * If you have any  allergies. * If there s any chance you are pregnant. * If you are breastfeeding. * If you have diabetes as your medication may need to be adjusted for this exam.  What should I do after the exam: No restrictions, You may resume normal activities.  What is this test: A CT (computed tomography) scan is a series of pictures that allows us to look inside your body. The scanner creates images of the body in cross sections, much like slices of bread. This helps us see any problems more clearly. You may receive contrast (X-ray dye) before or during your scan. Contrast is given through an IV (small needle in your arm).  Who should I call with questions: If you have any questions, please call the Imaging Department where you will have your exam. Directions, parking instructions, and other information is available on our website, [email or web address].              Who to contact     If you have questions or need follow up information about today's clinic visit or your schedule please contact [facility] directly at [phone].  Normal or non-critical lab and imaging results will be communicated to you by Subimagehart, letter or phone within 4 business days after the clinic has received the results. If you do not hear from us within 7 days, please contact the clinic through VYout or phone. If you have a critical or abnormal lab result, we will notify you by phone as soon as possible.  Submit refill requests through Aviacode or call your pharmacy and they will forward the refill request to us. Please allow 3 business days for your refill to be completed.          Additional Information About Your Visit        Aviacode Information     Aviacode gives you secure access to your electronic health record. If you see a primary care provider, you can also send messages to your care team and make appointments. If you have questions, please call your primary care clinic.  If you do not have a primary care  "provider, please call [phone] and they will assist you.        Care EveryWhere ID     This is your Care EveryWhere ID. This could be used by other organizations to access your [place] medical records  [id number]        Your Vitals Were     Pulse Temperature Respirations Height Pulse Oximetry BMI (Body Mass Index)    63 97.6  F (36.4  C) (Oral) 16 1.702 m (5' 7.01\") 99% 20.48 kg/m2       Blood Pressure from Last 3 Encounters:   11/13/18 105/58   11/13/18 105/58   11/08/18 125/70    Weight from Last 3 Encounters:   11/13/18 59.3 kg (130 lb 12.8 oz)   11/13/18 59.3 kg (130 lb 12.8 oz)   11/06/18 60.5 kg (133 lb 6.4 oz)              We Performed the Following     CBC with platelets differential     Creatinine        Primary Care Provider Office Phone # Fax #    [place] [phone] [address]  [facility] [address]        Equal Access to Services     [facility]: Hadii peggy ku hadasho Sodavid, waaxda luqadaha, qaybta kaalmatha yen, gallito gill . So [facility] [phone].    ATENCIÓN: Si habla español, tiene a esqueda disposición servicios gratuitos de asistencia lingüística. [facility] [phone].    We comply with applicable federal civil rights laws and [place] laws. We do not discriminate on the basis of race, color, national origin, age, disability, sex, sexual orientation, or gender identity.            Thank you!     Thank you for choosing [facility]  for your care. Our goal is always to provide you with excellent care. Hearing back from our patients is one way we can continue to improve our services. Please take a few minutes to complete the written survey that you may receive in the mail after your visit with us. Thank you!             Your Updated Medication List - Protect others around you: Learn how to safely use, store and throw away your medicines at www.disposemymeds.org.          This list is " accurate as of 11/13/18  3:17 PM.  Always use your most recent med list.                   Brand Name Dispense Instructions for use Diagnosis    acetaminophen 325 MG tablet    TYLENOL    100 tablet    Take 2 tablets (650 mg) by mouth every 4 hours as needed for mild pain or fever    Generalized muscle weakness       amLODIPine 5 MG tablet    NORVASC    90 tablet    Take 1 tablet (5 mg) by mouth daily    Essential hypertension, benign       calcium carbonate 500 mg-vitamin D 200 units 500-200 MG-UNIT per tablet    OSCAL with D;OYSTER SHELL CALCIUM     Take 1 tablet by mouth daily        dabigatran ANTICOAGULANT 150 MG capsule    PRADAXA    60 capsule    Take 1 capsule (150 mg) by mouth twice daily. Store in original 's bottle or blister pack; use within 120 days of opening.    Acute saddle pulmonary embolism without acute cor pulmonale (H)       DAILY MULTI PO      Take by mouth daily        fish oil-omega-3 fatty acids 1000 MG capsule      Take 1 capsule by mouth daily.        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

## 2018-11-13 NOTE — PATIENT INSTRUCTIONS
Continue chemotherapy.  Schedule appointment with Dr. Mcleod ([place] [facility]) next week.    The patient is in [facility]    I sent a staff message to confirm the above discharge instructions have been completed-AG

## 2018-11-13 NOTE — LETTER
"    11/13/2018         RE: Deepak Arndt  [address] [place] [address]        Dear Colleague,    Thank you for referring your patient, Deepak Arndt, to the [facility]. Please see a copy of my visit note below.    Oncology Rooming Note    November 13, 2018 9:59 AM   Deepak Arndt is a 83 year old male who presents for:    Chief Complaint   Patient presents with     Oncology Clinic Visit     Cancer of distal third of esophagus      Initial Vitals: /58  Pulse 63  Temp 97.6  F (36.4  C) (Oral)  Resp 16  Wt 59.3 kg (130 lb 12.8 oz)  SpO2 99%  BMI 20.48 kg/m2 Estimated body mass index is 20.48 kg/(m^2) as calculated from the following:    Height as of 11/6/18: 1.702 m (5' 7.01\").    Weight as of this encounter: 59.3 kg (130 lb 12.8 oz). Body surface area is 1.67 meters squared.  No Pain (0) Comment: Data Unavailable   No LMP for male patient.  Allergies reviewed: Yes  Medications reviewed: Yes    Medications: Medication refills not needed today.  Pharmacy name entered into [employer]:    [facility] [address] [place], [place] - [address] [facility]/PHARMACY #[id number] [place], [place] - [address] [facility]    Clinical concerns: None                4 minutes for nursing intake (face to face time)     Wilda Alvarado MA              Visit Date:   11/13/2018      SUBJECTIVE:  Mr. 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  The patient has poor performance status.  He is getting weekly carboplatin and Taxol.  Radiation was recommended.  It could not be done at [facility].  Due to insurance reasons, he would have to go to the [facility] for radiation.  We had discussed this with him.  He did not want to go there for radiation.  He has been on weekly Taxol, and he is here for week #6 of Taxol.  He has been tolerating it well.      The patient feels weak.  That is one of the major problems.  He is not in pain.  No headache.  No " dizziness.  No chest pain.  No shortness of breath.  No nausea.  No vomiting.  Appetite has been fairly good.  No fever or chills.      He was accompanied by his wife.  As per the wife, the main problem is fatigue.  Because of that, he is not able to walk.  He is wheelchair bound.  Wife mentioned that patient does not feel like eating.  He does have a feeding tube.      PHYSICAL EXAMINATION:  Unchanged.      LABORATORY DATA:  Reviewed.      ASSESSMENT:   1.  An 83-year-old gentleman with distal esophageal adenocarcinoma.  The patient on palliative chemotherapy with weekly carboplatin and Taxol, which he is tolerating well.   2.  Weakness.   3.  Right lung pulmonary embolism.   4.  Multiple other medical problems, including coronary artery disease and atrial fibrillation.   5.  Normocytic anemia, secondary to chemotherapy.      PLAN:   1.  The patient is clinically stable.  Labs were all reviewed.  Overall, they are good for treatment.  He will get weak #6 of carboplatin and Taxol.   2.  The patient has a CT scheduled for later this week.  After the CT scan, he will see Dr. Mcleod who will decide regarding further treatment.   3.  The patient has fatigue.  Has not been improving.  His appetite is also not good.  These are clinically worrisome.  He may have progression of disease.  We will wait for the CT scan.   4.  Wife had a few questions, which were all answered.  After the CT scan, he will see Dr. Mcleod.  Dr. Mcleod will decide regarding further treatment.  If patient needs further chemotherapy, I will see him back here.         DIMAS BAEZ MD             D: 2018   T: 11/15/2018   MT: CONY      Name:     LA LOCKE   MRN:      [id number]        Account:      [id number]   :      1935           Visit Date:   2018      Document: [id number]       Again, thank you for allowing me to participate in the care of your patient.        Sincerely,        Dimas Baez MD

## 2018-11-13 NOTE — MR AVS SNAPSHOT
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  Schedule appointment with Dr. Mcleod ([place] [facility]) next week.    The patient is in [facility] IT-[facility]    I sent a staff message to confirm the above discharge instructions have been completed-AG          Follow-ups after your visit        Who to contact     If you have questions or need follow up information about today's clinic visit or your schedule please contact [facility] directly at [phone].  Normal or non-critical lab and imaging results will be communicated to you by Scientific Mediahart, letter or phone within 4 business days after the clinic has received the results. If you do not hear from us within 7 days, please contact the clinic through Scientific Mediahart or phone. If you have a critical or abnormal lab result, we will notify you by phone as soon as possible.  Submit refill requests through 2houses or call your pharmacy and they will forward the refill request to us. Please allow 3 business days for your refill to be completed.          Additional Information About Your Visit        MyCharVarsity News Network Information     2houses gives you secure access to your electronic health record. If you see a primary care provider, you can also send messages to your care team and make appointments. If you have questions, please call your primary care clinic.  If you do not have a primary care provider, please call [phone] and they will assist you.        Care EveryWhere ID     This is your Care EveryWhere ID. This could be used by other organizations to access your [place] medical records  [id number]        Your Vitals Were     Pulse  Temperature Respirations Pulse Oximetry BMI (Body Mass Index)       63 97.6  F (36.4  C) (Oral) 16 99% 20.48 kg/m2        Blood Pressure from Last 3 Encounters:   11/15/18 125/76   11/13/18 105/58   11/13/18 105/58    Weight from Last 3 Encounters:   11/15/18 58.8 kg (129 lb 9.6 oz)   11/13/18 59.3 kg (130 lb 12.8 oz)   11/13/18 59.3 kg (130 lb 12.8 oz)              Today, you had the following     No orders found for display       Primary Care Provider Office Phone # Fax #    [place] [phone] [address] [place] [facility] [address]        Equal Access to Services     [facility] : Hadii aad ku hadasho Sodavid, waaxda luqadaha, qaybta kaalmada adeegyada, gallito gill . So [facility] [phone].    ATENCIÓN: Si habla español, tiene a esqueda disposición servicios gratuitos de asistencia lingüística. Llmatthew al [phone].    We comply with applicable federal civil rights laws and [place] laws. We do not discriminate on the basis of race, color, national origin, age, disability, sex, sexual orientation, or gender identity.            Thank you!     Thank you for choosing [facility]  for your care. Our goal is always to provide you with excellent care. Hearing back from our patients is one way we can continue to improve our services. Please take a few minutes to complete the written survey that you may receive in the mail after your visit with us. Thank you!             Your Updated Medication List - Protect others around you: Learn how to safely use, store and throw away your medicines at www.disposemymeds.org.          This list is accurate as of 11/13/18 11:59 PM.  Always use your most recent med list.                   Brand Name Dispense Instructions for use Diagnosis    acetaminophen 325 MG tablet    TYLENOL    100 tablet    Take 2 tablets (650 mg) by mouth every 4 hours as needed for mild pain or fever    Generalized muscle weakness        amLODIPine 5 MG tablet    NORVASC    90 tablet    Take 1 tablet (5 mg) by mouth daily    Essential hypertension, benign       calcium carbonate 500 mg-vitamin D 200 units 500-200 MG-UNIT per tablet    OSCAL with D;OYSTER SHELL CALCIUM     Take 1 tablet by mouth daily        dabigatran ANTICOAGULANT 150 MG capsule    PRADAXA    60 capsule    Take 1 capsule (150 mg) by mouth twice daily. Store in original 's bottle or blister pack; use within 120 days of opening.    Acute saddle pulmonary embolism without acute cor pulmonale (H)       DAILY MULTI PO      Take by mouth daily        fish oil-omega-3 fatty acids 1000 MG capsule      Take 1 capsule by mouth daily.        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

## 2018-11-15 NOTE — PROGRESS NOTES
Visit Date:   11/13/2018     ONCOLOGY HISTORY: Mr. Arndt is a gentleman with distal esophagealpoorly differentiated carcinoma with adeno and squamous differentiation.  HER-2/blayne is negative.  T3 N2 M0.  1.  EGD on 09/24/2018 revealed distal esophageal mass.  Biopsy revealed poorly differentiated carcinoma with adeno and squamous differentiation.  HER-2/blayne is negative.   2.  CT chest, abdomen and pelvis on 09/28/2018 revealed thickening of distal esophagus extending to GE junction.  There was paraesophageal adenopathy and upper abdominal adenopathy.   3.  Upper EUS on 10/02/2018 revealed completely obstructing malignant neoplasm in the lower third of the esophagus.  Based on the EUS, it was T3 N2 disease.   - FNA of the lymph nodes is positive for malignancy.   - G-tube was placed on 10/08/2014.   4.  PET scan on 10/09/2018 revealed hypermetabolic lymphadenopathy in the mediastinum, retroperitoneum and along the gastrohepatic ligament.  There was a 7 mm lung nodule, too small to characterize.     5.  Paclitaxel and carboplatin weekly was started on 10/16/2016.   6.  Dr. Peter evaluated the patient and recommended radiation. Because of insurance reason, radiation not given.      SUBJECTIVE:  Mr. 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  Patient has poor performance status.  He is getting weekly carboplatin and Taxol.  Radiation was recommended.  It could not be done at [facility].  Due to insurance reasons, he would have to go to the [facility] for radiation.  We had discussed this with him.  He did not want to go there for radiation.  He is here for week #6 of carbo and Taxol.  He has been tolerating it well.      Patient feels weak.  That is one of the major problems.  He is not in pain.  No headache.  No dizziness.  No chest pain.  No shortness of breath.  No nausea.  No vomiting.  Appetite has been fairly good.  No fever or chills.       He was accompanied by his wife.  As per the wife, main problem is fatigue.  Because of that, he is not able to walk.  He is wheelchair bound. He does not feel like eating.  He does have a feeding tube.      PHYSICAL EXAMINATION:   GENERAL:  He is alert and oriented x 3.   VITAL SIGNS:  Reviewed.  ECOG PS of 3.   EYES:  No icterus.   THROAT:  No ulcer or thrush.   NECK:  Supple. No lymphadenopathy or thyromegaly.   AXILLAE:  No lymphadenopathy.   LUNGS:  Good air entry bilaterally.  No wheezing.   HEART:  Irregular.   ABDOMEN:  Soft. Nontender.  No mass felt.  G-tube in place.   EXTREMITIES:  No edema.  No calf swelling or tenderness.   SKIN:  No petechiae.      LABORATORY DATA:  Reviewed.      ASSESSMENT:   1.  An 83-year-old gentleman with distal esophageal adenocarcinoma. Patient is on palliative chemotherapy with weekly carboplatin and Taxol, which he is tolerating well.   2.  Weakness.   3.  Right lung pulmonary embolism.   4.  Multiple other medical problems including coronary artery disease and atrial fibrillation.   5.  Normocytic anemia secondary to chemotherapy.      PLAN:   1.  Patient is clinically stable.  Labs were all reviewed.  Overall, they are good for treatment.  He will get week 6 of carboplatin and Taxol.   2.  Patient has a CT scheduled for later this week.  After the CT scan, he will see Dr. Mcleod. Dr. Mcleod will decide regarding further treatment.   3.  Patient has fatigue. His appetite is also not good.  These are clinically worrisome.  He may have progression of disease.  We will wait for the CT scan.   4.  Wife had a few questions, which were all answered.  After the CT scan, he will see Dr. Mcleod.  Dr. Mcleod will decide regarding further treatment.  If patient needs further chemotherapy, I will see him back here.         DIMAS BAEZ MD             D: 11/14/2018   T: 11/15/2018   MT: CONY      Name:     LA LOCKE   MRN:      [id number]        Account:      [id number]    :      1935           Visit Date:   2018      Document: [id number]

## 2018-11-15 NOTE — LETTER
11/15/2018        RE: Deepak Arndt  [address] [place] [address]        [place] [facility]    Chief Complaint   Patient presents with     RECHECK       [place] Medical Record Number:  [id number]  Place of Service where encounter took place:  [facility] [[id number]]    HPI:    Deepak Arndt is a 83 year old  (1935), who is being seen today for an episodic care visit.  HPI information obtained from: facility chart records, facility staff, patient report and [facility] chart review.  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 He was recently diagnosed with esophageal cancer in 9/24/18. PET/CT on 10/9 shows extensive lymphadenopathy.  He is followed by Dr. Mcleod, who recommends starting weekly paclitaxel and carboplatim with possibility of radiation in future. He is receiving chemo at [facility] oncology due to status in TCU.   He had been on pradaxa, which was on hold due to placement of G tube and port on 10/5/18.   He is eating some pureed food by mouth and continues on G tube feed.     Acute  pulmonary embolism without acute cor pulmonale (H)  Found to have small PULMONARY EMBOLISM in right upper lung. He was transferred to TCU on lovenox.   No shortness of breath O2 sats >90%.   Symptomatic bradycardia  Cardiology was consulted due to HRs 40-60s. Patient has been asymptomatic so will monitor for now. Although propranolol, which he was taking for essential tremor, was stopped.     Chronic atrial fibrillation (H)  CAHDS-Vasc 4. He was to be set up with holter monitor at discharge.   10/11/18 echo EF 60-65%  Loss of weight  PEG tube was placed on 10/5 for supplemental feeding. He had lost about 14# in past month.   Wt Readings from Last 5 Encounters:   11/15/18 129 lb 9.6 oz (58.8 kg)   11/13/18 130 lb 12.8 oz (59.3 kg)   11/13/18 130 lb  12.8 oz (59.3 kg)   11/06/18 133 lb 6.4 oz (60.5 kg)   10/30/18 136 lb 0.4 oz (61.7 kg)     essential hypertension  He continues on PTA amlodipine. Hydrochlorothiazide was stopped.   BP's: 125/76, 132/86, 122/79    Abnormal thyroid function test: TSH mildly low at 0.27.    Other atopic dermatitis  Pruritic rash on back. He was given benedryl in hospital.     Physical deconditioning  Lives with spouse in house. He has been mostly indpendent prior to September. He is quite weak now. He is able to walk but is quite forgetful.        ALLERGIES: Versed [midazolam]  Past Medical, Surgical, Family and Social History reviewed and updated in [facility].    Current Outpatient Prescriptions   Medication Sig Dispense Refill     acetaminophen (TYLENOL) 325 MG tablet Take 2 tablets (650 mg) by mouth every 4 hours as needed for mild pain or fever 100 tablet      amLODIPine (NORVASC) 5 MG tablet Take 1 tablet (5 mg) by mouth daily 90 tablet 0     Calcium carb-Vitamin D 500 mg [place]-200 units (OSCAL WITH D;OYSTER SHELL CALCIUM) 500-200 MG-UNIT per tablet Take 1 tablet by mouth daily       dabigatran ANTICOAGULANT (PRADAXA) 150 MG capsule Take 1 capsule (150 mg) by mouth twice daily. Store in original 's bottle or blister pack; use within 120 days of opening. 60 capsule 5     fish oil-omega-3 fatty acids (OMEGA-3 FISH OIL) 1000 MG capsule Take 1 capsule by mouth daily.       Multiple Vitamins-Minerals (DAILY MULTI PO) Take by mouth daily       pantoprazole (PROTONIX) 2 mg/mL SUSP suspension 20 mLs (40 mg) by Per G Tube route every morning  0     simvastatin (ZOCOR) 10 MG tablet Take 5 mg by mouth At Bedtime (Takes half of a 10mg tablet for a total of 5mg daily)       Medications reviewed:  Medications reconciled to facility chart and changes were made to reflect current medications as identified as above med list. Below are the changes that were made:   Medications stopped since last EPIC medication reconciliation:   Medications  Discontinued During This Encounter   Medication Reason     hydrocortisone 2.5 % cream        Medications started since last [facility] medication reconciliation:  No orders of the defined types were placed in this encounter.      REVIEW OF SYSTEMS:  4 point ROS including Respiratory, CV, GI and , other than that noted in the HPI,  is negative    Physical Exam:  /76  Pulse 76  Temp 97.4  F (36.3  C)  Resp 20  Wt 129 lb 9.6 oz (58.8 kg)  SpO2 96%  BMI 20.29 kg/m2  GENERAL APPEARANCE:  Alert, in no distress  ENT:  Mouth and posterior oropharynx normal, moist mucous membranes, hearing acuity adequate   EYES:  EOM, conjunctivae, lids, pupils and irises normal  RESP:  respiratory effort and palpation of chest normal, no respiratory distress,   CV: , Edema none    M/S:   Gait and station unsteady. , Digits and nails normal   SKIN:  Inspection/Palpation of skin and subcutaneous tissue papular rash on back, skin is dry  NEURO: 2-12 in normal limits and at patient's baseline  PSYCH:  insight and judgement, memory intact , affect and mood normal    Recent Labs:   CBC RESULTS:   Recent Labs   Lab Test  11/13/18   0938  10/30/18   1035   WBC  6.2  7.5   RBC  3.82*  4.04*   HGB  12.7*  13.5   HCT  38.0*  39.6*   MCV  100  98   MCH  33.2*  33.4*   MCHC  33.4  34.1   RDW  14.8  13.5   PLT  160  285       Last Basic Metabolic Panel:  Recent Labs   Lab Test  11/13/18   0938  11/06/18   1239   10/16/18   0623  10/15/18   0915   NA   --    --    --   137  135   POTASSIUM   --    --    --   4.0  3.5   CHLORIDE   --    --    --   108  103   TRIPP   --    --    --   8.6  8.8   CO2   --    --    --   22  27   BUN   --    --    --   16  9   CR  0.56*  0.65*   < >  0.61*  0.63*   GLC   --    --    --   109*  114*    < > = values in this interval not displayed.       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 Advanced stage with lymph involvement. Limited ability to take food by mouth. Now with PEG tube. He did start chemotherapy at [facility] oncology. Wife reports he has been quite tired since starting chemotherapy.   Plan: continue outpatient chemotherapy.   Continue Gtube feedings with assist from dietician.       (I26.92) Acute  pulmonary embolism without acute cor pulmonale (H)  Comment: had been on pradaxa, which was on hold due placement of G tube and port. Response for oncology- ok to restart pradaxa  Plan: continue pradaxa     (R00.1) Symptomatic bradycardia  Comment: evaluated by cardiology. No change in plan  Plan: monitor    (I48.2) Chronic atrial fibrillation (H)  Comment: on pradaxa  Plan: no change    (R63.4) Loss of weight  Comment: due to esoph cancer. SPEECH THERAPY is working with patient. Wt is slightly decreasing.  Wt Readings from Last 5 Encounters:   11/15/18 129 lb 9.6 oz (58.8 kg)   11/13/18 130 lb 12.8 oz (59.3 kg)   11/13/18 130 lb 12.8 oz (59.3 kg)   11/06/18 133 lb 6.4 oz (60.5 kg)   10/30/18 136 lb 0.4 oz (61.7 kg)     Plan: continue supplemental feedings and pureed diet.     (I10) Essential hypertension  Comment: adequate control for now  Plan: monitor     (L20.89) Other atopic dermatitis  Comment: on back. Somewhat improved per patient.   Plan: HC 2.5% cream to back BID    (R79.89) Low TSH level    Plan: repeat TSH on 11/20    (R53.81) Physical deconditioning  Comment: due to esoph cancer. He did walk to therapy gym today. He seems stronger. He is forgetful and will start walking while still connected to feeding tube. Arrangement in place  to move to memory care at [facility].   Plan: physical therapy and OCCUPATIONAL THERAPY         Electronically signed by  SHILPA Marino CNP                      Sincerely,        SHILPA Marino CNP

## 2018-11-15 NOTE — PROGRESS NOTES
[place] [facility]    Chief Complaint   Patient presents with     BRYANECK       [place] Medical Record Number:  [id number]  Place of Service where encounter took place:  [facility] [[id number]]    HPI:    Deepak Arndt is a 83 year old  (1935), who is being seen today for an episodic care visit.  HPI information obtained from: facility chart records, facility staff, patient report and [facility] chart review.  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 He was recently diagnosed with esophageal cancer in 9/24/18. PET/CT on 10/9 shows extensive lymphadenopathy.  He is followed by Dr. Mcleod, who recommends starting weekly paclitaxel and carboplatim with possibility of radiation in future. He is receiving chemo at [facility] oncology due to status in TCU.   He had been on pradaxa, which was on hold due to placement of G tube and port on 10/5/18.   He is eating some pureed food by mouth and continues on G tube feed.     Acute  pulmonary embolism without acute cor pulmonale (H)  Found to have small PULMONARY EMBOLISM in right upper lung. He was transferred to TCU on lovenox.   No shortness of breath O2 sats >90%.   Symptomatic bradycardia  Cardiology was consulted due to HRs 40-60s. Patient has been asymptomatic so will monitor for now. Although propranolol, which he was taking for essential tremor, was stopped.     Chronic atrial fibrillation (H)  CAHDS-Vasc 4. He was to be set up with holter monitor at discharge.   10/11/18 echo EF 60-65%  Loss of weight  PEG tube was placed on 10/5 for supplemental feeding. He had lost about 14# in past month.   Wt Readings from Last 5 Encounters:   11/15/18 129 lb 9.6 oz (58.8 kg)   11/13/18 130 lb 12.8 oz (59.3 kg)   11/13/18 130 lb 12.8 oz (59.3 kg)   11/06/18 133 lb 6.4 oz (60.5 kg)   10/30/18 136 lb 0.4 oz (61.7 kg)      essential hypertension  He continues on PTA amlodipine. Hydrochlorothiazide was stopped.   BP's: 125/76, 132/86, 122/79    Abnormal thyroid function test: TSH mildly low at 0.27.    Other atopic dermatitis  Pruritic rash on back. He was given benedryl in hospital.     Physical deconditioning  Lives with spouse in house. He has been mostly indpendent prior to September. He is quite weak now. He is able to walk but is quite forgetful.        ALLERGIES: Versed [midazolam]  Past Medical, Surgical, Family and Social History reviewed and updated in [facility].    Current Outpatient Prescriptions   Medication Sig Dispense Refill     acetaminophen (TYLENOL) 325 MG tablet Take 2 tablets (650 mg) by mouth every 4 hours as needed for mild pain or fever 100 tablet      amLODIPine (NORVASC) 5 MG tablet Take 1 tablet (5 mg) by mouth daily 90 tablet 0     Calcium carb-Vitamin D 500 mg [place]-200 units (OSCAL WITH D;OYSTER SHELL CALCIUM) 500-200 MG-UNIT per tablet Take 1 tablet by mouth daily       dabigatran ANTICOAGULANT (PRADAXA) 150 MG capsule Take 1 capsule (150 mg) by mouth twice daily. Store in original 's bottle or blister pack; use within 120 days of opening. 60 capsule 5     fish oil-omega-3 fatty acids (OMEGA-3 FISH OIL) 1000 MG capsule Take 1 capsule by mouth daily.       Multiple Vitamins-Minerals (DAILY MULTI PO) Take by mouth daily       pantoprazole (PROTONIX) 2 mg/mL SUSP suspension 20 mLs (40 mg) by Per G Tube route every morning  0     simvastatin (ZOCOR) 10 MG tablet Take 5 mg by mouth At Bedtime (Takes half of a 10mg tablet for a total of 5mg daily)       Medications reviewed:  Medications reconciled to facility chart and changes were made to reflect current medications as identified as above med list. Below are the changes that were made:   Medications stopped since last EPIC medication reconciliation:   Medications Discontinued During This Encounter   Medication Reason     hydrocortisone 2.5 % cream         Medications started since last [facility] medication reconciliation:  No orders of the defined types were placed in this encounter.      REVIEW OF SYSTEMS:  4 point ROS including Respiratory, CV, GI and , other than that noted in the HPI,  is negative    Physical Exam:  /76  Pulse 76  Temp 97.4  F (36.3  C)  Resp 20  Wt 129 lb 9.6 oz (58.8 kg)  SpO2 96%  BMI 20.29 kg/m2  GENERAL APPEARANCE:  Alert, in no distress  ENT:  Mouth and posterior oropharynx normal, moist mucous membranes, hearing acuity adequate   EYES:  EOM, conjunctivae, lids, pupils and irises normal  RESP:  respiratory effort and palpation of chest normal, no respiratory distress,   CV: , Edema none    M/S:   Gait and station unsteady. , Digits and nails normal   SKIN:  Inspection/Palpation of skin and subcutaneous tissue papular rash on back, skin is dry  NEURO: 2-12 in normal limits and at patient's baseline  PSYCH:  insight and judgement, memory intact , affect and mood normal    Recent Labs:   CBC RESULTS:   Recent Labs   Lab Test  11/13/18   0938  10/30/18   1035   WBC  6.2  7.5   RBC  3.82*  4.04*   HGB  12.7*  13.5   HCT  38.0*  39.6*   MCV  100  98   MCH  33.2*  33.4*   MCHC  33.4  34.1   RDW  14.8  13.5   PLT  160  285       Last Basic Metabolic Panel:  Recent Labs   Lab Test  11/13/18   0938  11/06/18   1239   10/16/18   0623  10/15/18   0915   NA   --    --    --   137  135   POTASSIUM   --    --    --   4.0  3.5   CHLORIDE   --    --    --   108  103   TRIPP   --    --    --   8.6  8.8   CO2   --    --    --   22  27   BUN   --    --    --   16  9   CR  0.56*  0.65*   < >  0.61*  0.63*   GLC   --    --    --   109*  114*    < > = values in this interval not displayed.       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 Advanced stage with lymph involvement. Limited ability to take food by mouth. Now with PEG tube. He did start chemotherapy at [facility] oncology. Wife reports he has been quite tired since starting chemotherapy.   Plan: continue outpatient chemotherapy.   Continue Gtube feedings with assist from dietician.       (I26.92) Acute  pulmonary embolism without acute cor pulmonale (H)  Comment: had been on pradaxa, which was on hold due placement of G tube and port. Response for oncology- ok to restart pradaxa  Plan: continue pradaxa     (R00.1) Symptomatic bradycardia  Comment: evaluated by cardiology. No change in plan  Plan: monitor    (I48.2) Chronic atrial fibrillation (H)  Comment: on pradaxa  Plan: no change    (R63.4) Loss of weight  Comment: due to esoph cancer. SPEECH THERAPY is working with patient. Wt is slightly decreasing.  Wt Readings from Last 5 Encounters:   11/15/18 129 lb 9.6 oz (58.8 kg)   11/13/18 130 lb 12.8 oz (59.3 kg)   11/13/18 130 lb 12.8 oz (59.3 kg)   11/06/18 133 lb 6.4 oz (60.5 kg)   10/30/18 136 lb 0.4 oz (61.7 kg)     Plan: continue supplemental feedings and pureed diet.     (I10) Essential hypertension  Comment: adequate control for now  Plan: monitor     (L20.89) Other atopic dermatitis  Comment: on back. Somewhat improved per patient.   Plan: HC 2.5% cream to back BID    (R79.89) Low TSH level    Plan: repeat TSH on 11/20    (R53.81) Physical deconditioning  Comment: due to esoph cancer. He did walk to therapy gym today. He seems stronger. He is forgetful and will start walking while still connected to feeding tube. Arrangement in place to move to memory care at [facility].   Plan: physical therapy and OCCUPATIONAL  THERAPY         Electronically signed by  SHILPA Marino CNP

## 2018-11-16 NOTE — TELEPHONE ENCOUNTER
Message left for patient in regards to Dr Bolden discharge summary from 11.13.18 need to schedule appointment with Dr Mcleod at [facility]

## 2018-11-19 NOTE — PROGRESS NOTES
[place] [facility] DISCHARGE SUMMARY    PATIENT'S NAME: Deepak Arndt  YOB: 1935  MEDICAL RECORD NUMBER:  [id number]  Place of Service where encounter took place:  [facility] [[id number]]    PRIMARY CARE PROVIDER AND CLINIC RESPONSIBLE AFTER TRANSFER: [place] [address] [facility] [address]     TRANSFERRING PROVIDERS: SHILPA Marino CNP, Heriberto Ojeda MD  DATE OF SNF ADMISSION:  October / 20 / 2018  DATE OF SNF (anticipated) DISCHARGE: November / 20 / 2018  DISCHARGE DISPOSITION: [facility] Provider   RECENT HOSPITALIZATION/ED:  [facility] stay 10/11/2018 to 10/20/2018.     CODE STATUS/ADVANCE DIRECTIVES DISCUSSION:  Hospice care     Allergies   Allergen Reactions     Versed [Midazolam] Other (See Comments)     Pt has adverse/opposite reaction to versed. Given in small doses for a port and g-tube placement. Patient was aggressive.     Condition on Discharge:  Declining.  Function:  Needs assist with all ADLs  Cognitive Scores: SLUMS 15/30 and CPT 3.9/5.6    Equipment: walker    DISCHARGE DIAGNOSIS:   1. 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 Acute  pulmonary embolism without acute cor pulmonale (H)    3. Symptomatic bradycardia    4. Chronic atrial fibrillation (H)    5. Loss of weight    6. Essential hypertension    7. Low TSH level    8. Other atopic dermatitis    9. Physical deconditioning    10. Essential hypertension, benign        HPI Nursing Facility Course:  HPI information obtained from: facility chart records, facility staff, patient report and [facility] chart review.      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 He was recently diagnosed with esophageal cancer in 9/24/18. PET/CT on 10/9 shows extensive lymphadenopathy.  He is followed by Dr. Mcleod, who recommends weekly paclitaxel and  carboplatin with possibility of radiation in future. He received 6 weeks of carboplatin and taxol while in TCU. He has tolerated this ok. He did have a a follow up CT scan, which showed progression of disease, and decision made to stop treatment and start hospice care.     Acute  pulmonary embolism without acute cor pulmonale (H)  Found to have small PULMONARY EMBOLISM in right upper lung. He was transferred to TCU on lovenox.   No shortness of breath O2 sats >90%.   Symptomatic bradycardia  Cardiology was consulted due to HRs 40-60s. Patient has been asymptomatic so will monitor for now. Although propranolol, which he was taking for essential tremor, was stopped.     Chronic atrial fibrillation (H)  CAHDS-Vasc 4. He was to be set up with holter monitor at discharge.   10/11/18 echo EF 60-65%  Loss of weight  PEG tube was placed on 10/5 for supplemental feeding. He continues to eat very little.   Wt Readings from Last 5 Encounters:   11/19/18 129 lb 9.6 oz (58.8 kg)   11/15/18 129 lb 9.6 oz (58.8 kg)   11/13/18 130 lb 12.8 oz (59.3 kg)   11/13/18 130 lb 12.8 oz (59.3 kg)   11/06/18 133 lb 6.4 oz (60.5 kg)       essential hypertension  He continues on PTA amlodipine. Hydrochlorothiazide was stopped.   BP's: 132/58, 137/77, 138/76    Abnormal thyroid function test: TSH mildly low at 0.27.    Other atopic dermatitis  Pruritic rash on back. He was given benedryl in hospital.     Physical deconditioning  Lives with spouse in house. He has been mostly indpendent prior to September. He is quite weak now. He is able to walk but is quite forgetful.   HE will move to [facility]. [facility] will start once he moves there.        PAST MEDICAL HISTORY:  has a past medical history of Actinic keratosis; Atrial fibrillation (H); Cancer (H); Cataract; Coronary artery disease; Detached retina (2007); ED (erectile dysfunction); Gastroesophageal reflux disease; Hyperlipidemia; Hypertension; Lung nodules; Mild  "aortic stenosis; Multiple thyroid nodules; Murmur; Myocardial infarction (H) (6/2012); and Osteoporosis.    DISCHARGE MEDICATIONS:  Current Outpatient Prescriptions   Medication Sig Dispense Refill     acetaminophen (TYLENOL) 325 MG tablet Take 2 tablets (650 mg) by mouth every 4 hours as needed for mild pain or fever 100 tablet      amLODIPine (NORVASC) 5 MG tablet Take 1 tablet (5 mg) by mouth daily 30 tablet 0     dabigatran ANTICOAGULANT (PRADAXA) 150 MG capsule Take 1 capsule (150 mg) by mouth twice daily. Store in original 's bottle or blister pack; use within 120 days of opening. 60 capsule 0     fish oil-omega-3 fatty acids (OMEGA-3 FISH OIL) 1000 MG capsule Take 1 capsule by mouth daily.       pantoprazole (PROTONIX) 2 mg/mL SUSP suspension 20 mLs (40 mg) by Per G Tube route daily 400 mL 0     simvastatin (ZOCOR) 10 MG tablet Take 0.5 tablets (5 mg) by mouth At Bedtime (Takes half of a 10mg tablet for a total of 5mg daily) 30 tablet 0     [DISCONTINUED] amLODIPine (NORVASC) 5 MG tablet Take 1 tablet (5 mg) by mouth daily 90 tablet 0     [DISCONTINUED] simvastatin (ZOCOR) 10 MG tablet Take 5 mg by mouth At Bedtime (Takes half of a 10mg tablet for a total of 5mg daily)         MEDICATION CHANGES/RATIONALE:   10/30/18 DISCONTINUE lovenox; start pradaxa  Controlled medications sent with patient:   not applicable/none     ROS:    4 point ROS including Respiratory, CV, GI and , other than that noted in the HPI,  is negative    Physical Exam:   Vitals: /58  Pulse 70  Temp 98.5  F (36.9  C)  Resp 27  Ht 5' 7\" (1.702 m)  Wt 129 lb 9.6 oz (58.8 kg)  SpO2 95%  BMI 20.3 kg/m2  BMI= Body mass index is 20.3 kg/(m^2).    GENERAL APPEARANCE:  Alert, in no distress  ENT:  Mouth and posterior oropharynx normal, moist mucous membranes, hearing acuity adequate   EYES:  EOM, conjunctivae, lids, pupils and irises normal  RESP:  respiratory effort and palpation of chest normal, no respiratory distress, "   CV: , Edema none  M/S:   Gait and station unsteady. , Digits and nails normal   SKIN:  Inspection/Palpation of skin and subcutaneous tissue papular rash on back, skin is dry  NEURO: 2-12 in normal limits and at patient's baseline  PSYCH:  insight and judgement, memory intact , affect and mood normal    DISCHARGE PLAN:  Hospice  Patient instructed to follow-up with:  PCP in 7 days      Current [place] scheduled appointments:  No future appointments.    MTM referral needed and placed by this provider: No    Pending labs: none  SNF labs   CBC RESULTS:   Recent Labs   Lab Test  11/13/18   0938  10/30/18   1035   WBC  6.2  7.5   RBC  3.82*  4.04*   HGB  12.7*  13.5   HCT  38.0*  39.6*   MCV  100  98   MCH  33.2*  33.4*   MCHC  33.4  34.1   RDW  14.8  13.5   PLT  160  285       Last Basic Metabolic Panel:  Recent Labs   Lab Test  11/13/18   0938  11/06/18   1239   10/16/18   0623  10/15/18   0915   NA   --    --    --   137  135   POTASSIUM   --    --    --   4.0  3.5   CHLORIDE   --    --    --   108  103   TRIPP   --    --    --   8.6  8.8   CO2   --    --    --   22  27   BUN   --    --    --   16  9   CR  0.56*  0.65*   < >  0.61*  0.63*   GLC   --    --    --   109*  114*    < > = values in this interval not displayed.       Liver Function Studies -   Recent Labs   Lab Test  10/11/18   0255  09/19/18   1412   PROTTOTAL  7.2  8.0   ALBUMIN  3.3*  3.5   BILITOTAL  1.2  0.8   ALKPHOS  68  79   AST  20  19   ALT  21  27       TSH   Date Value Ref Range Status   10/11/2018 0.27 (L) 0.40 - 4.00 mU/L Final   10/23/2017 0.50 0.40 - 4.00 mU/L Final     Lab Results   Component Value Date    A1C 5.7 10/11/2018    A1C 5.4 05/25/2018     Discharge Treatments:GT tube cares    TOTAL DISCHARGE TIME:   Greater than 30 minutes  Electronically signed by:  SHILPA Marino CNP

## 2018-11-19 NOTE — LETTER
11/19/2018        RE: Deepak Arndt  [address] [place] [address]          [place] [facility] DISCHARGE SUMMARY    PATIENT'S NAME: Deepak Arndt  YOB: 1935  MEDICAL RECORD NUMBER:  [id number]  Place of Service where encounter took place:  [facility] [place] [facility] [[id number]]    PRIMARY CARE PROVIDER AND CLINIC RESPONSIBLE AFTER TRANSFER: [place] [address] / [place] [address]     TRANSFERRING PROVIDERS: SHILPA Marino CNP, Heriberto Ojeda MD  DATE OF SNF ADMISSION:  October / 20 / 2018  DATE OF SNF (anticipated) DISCHARGE: November / 20 / 2018  DISCHARGE DISPOSITION: [facility] Provider   RECENT HOSPITALIZATION/ED:  [facility] stay 10/11/2018 to 10/20/2018.     CODE STATUS/ADVANCE DIRECTIVES DISCUSSION:  Hospice care     Allergies   Allergen Reactions     Versed [Midazolam] Other (See Comments)     Pt has adverse/opposite reaction to versed. Given in small doses for a port and g-tube placement. Patient was aggressive.     Condition on Discharge:  Declining.  Function:  Needs assist with all ADLs  Cognitive Scores: SLUMS 15/30 and CPT 3.9/5.6    Equipment: walker    DISCHARGE DIAGNOSIS:   1. 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 Acute  pulmonary embolism without acute cor pulmonale (H)    3. Symptomatic bradycardia    4. Chronic atrial fibrillation (H)    5. Loss of weight    6. Essential hypertension    7. Low TSH level    8. Other atopic dermatitis    9. Physical deconditioning    10. Essential hypertension, benign        HPI Nursing Facility Course:  HPI information obtained from: facility chart records, facility staff, patient report and [facility] chart review.      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 He was recently diagnosed with esophageal cancer in 9/24/18. PET/CT on 10/9 shows  extensive lymphadenopathy.  He is followed by Dr. Mcleod, who recommends weekly paclitaxel and carboplatin with possibility of radiation in future. He received 6 weeks of carboplatin and taxol while in TCU. He has tolerated this ok. He did have a a follow up CT scan, which showed progression of disease, and decision made to stop treatment and start hospice care.     Acute  pulmonary embolism without acute cor pulmonale (H)  Found to have small PULMONARY EMBOLISM in right upper lung. He was transferred to TCU on lovenox.   No shortness of breath O2 sats >90%.   Symptomatic bradycardia  Cardiology was consulted due to HRs 40-60s. Patient has been asymptomatic so will monitor for now. Although propranolol, which he was taking for essential tremor, was stopped.     Chronic atrial fibrillation (H)  CAHDS-Vasc 4. He was to be set up with holter monitor at discharge.   10/11/18 echo EF 60-65%  Loss of weight  PEG tube was placed on 10/5 for supplemental feeding. He continues to eat very little.   Wt Readings from Last 5 Encounters:   11/19/18 129 lb 9.6 oz (58.8 kg)   11/15/18 129 lb 9.6 oz (58.8 kg)   11/13/18 130 lb 12.8 oz (59.3 kg)   11/13/18 130 lb 12.8 oz (59.3 kg)   11/06/18 133 lb 6.4 oz (60.5 kg)       essential hypertension  He continues on PTA amlodipine. Hydrochlorothiazide was stopped.   BP's: 132/58, 137/77, 138/76    Abnormal thyroid function test: TSH mildly low at 0.27.    Other atopic dermatitis  Pruritic rash on back. He was given benedryl in hospital.     Physical deconditioning  Lives with spouse in house. He has been mostly indpendent prior to September. He is quite weak now. He is able to walk but is quite forgetful.   HE will move to [facility]. [facility] will start once he moves there.        PAST MEDICAL HISTORY:  has a past medical history of Actinic keratosis; Atrial fibrillation (H); Cancer (H); Cataract; Coronary artery disease; Detached retina (2007); ED (erectile  "dysfunction); Gastroesophageal reflux disease; Hyperlipidemia; Hypertension; Lung nodules; Mild aortic stenosis; Multiple thyroid nodules; Murmur; Myocardial infarction (H) (6/2012); and Osteoporosis.    DISCHARGE MEDICATIONS:  Current Outpatient Prescriptions   Medication Sig Dispense Refill     acetaminophen (TYLENOL) 325 MG tablet Take 2 tablets (650 mg) by mouth every 4 hours as needed for mild pain or fever 100 tablet      amLODIPine (NORVASC) 5 MG tablet Take 1 tablet (5 mg) by mouth daily 30 tablet 0     dabigatran ANTICOAGULANT (PRADAXA) 150 MG capsule Take 1 capsule (150 mg) by mouth twice daily. Store in original 's bottle or blister pack; use within 120 days of opening. 60 capsule 0     fish oil-omega-3 fatty acids (OMEGA-3 FISH OIL) 1000 MG capsule Take 1 capsule by mouth daily.       pantoprazole (PROTONIX) 2 mg/mL SUSP suspension 20 mLs (40 mg) by Per G Tube route daily 400 mL 0     simvastatin (ZOCOR) 10 MG tablet Take 0.5 tablets (5 mg) by mouth At Bedtime (Takes half of a 10mg tablet for a total of 5mg daily) 30 tablet 0     [DISCONTINUED] amLODIPine (NORVASC) 5 MG tablet Take 1 tablet (5 mg) by mouth daily 90 tablet 0     [DISCONTINUED] simvastatin (ZOCOR) 10 MG tablet Take 5 mg by mouth At Bedtime (Takes half of a 10mg tablet for a total of 5mg daily)         MEDICATION CHANGES/RATIONALE:   10/30/18 DISCONTINUE lovenox; start pradaxa  Controlled medications sent with patient:   not applicable/none     ROS:    4 point ROS including Respiratory, CV, GI and , other than that noted in the HPI,  is negative    Physical Exam:   Vitals: /58  Pulse 70  Temp 98.5  F (36.9  C)  Resp 27  Ht 5' 7\" (1.702 m)  Wt 129 lb 9.6 oz (58.8 kg)  SpO2 95%  BMI 20.3 kg/m2  BMI= Body mass index is 20.3 kg/(m^2).    GENERAL APPEARANCE:  Alert, in no distress  ENT:  Mouth and posterior oropharynx normal, moist mucous membranes, hearing acuity adequate   EYES:  EOM, conjunctivae, lids, pupils and " irises normal  RESP:  respiratory effort and palpation of chest normal, no respiratory distress,   CV: , Edema none  M/S:   Gait and station unsteady. , Digits and nails normal   SKIN:  Inspection/Palpation of skin and subcutaneous tissue papular rash on back, skin is dry  NEURO: 2-12 in normal limits and at patient's baseline  PSYCH:  insight and judgement, memory intact , affect and mood normal    DISCHARGE PLAN:  Hospice  Patient instructed to follow-up with:  PCP in 7 days      [facility] scheduled appointments:  No future appointments.    MTM referral needed and placed by this provider: No    Pending labs: none  SNF labs   CBC RESULTS:   Recent Labs   Lab Test  11/13/18   0938  10/30/18   1035   WBC  6.2  7.5   RBC  3.82*  4.04*   HGB  12.7*  13.5   HCT  38.0*  39.6*   MCV  100  98   MCH  33.2*  33.4*   MCHC  33.4  34.1   RDW  14.8  13.5   PLT  160  285       Last Basic Metabolic Panel:  Recent Labs   Lab Test  11/13/18   0938  11/06/18   1239   10/16/18   0623  10/15/18   0915   NA   --    --    --   137  135   POTASSIUM   --    --    --   4.0  3.5   CHLORIDE   --    --    --   108  103   TRIPP   --    --    --   8.6  8.8   CO2   --    --    --   22  27   BUN   --    --    --   16  9   CR  0.56*  0.65*   < >  0.61*  0.63*   GLC   --    --    --   109*  114*    < > = values in this interval not displayed.       Liver Function Studies -   Recent Labs   Lab Test  10/11/18   0255  09/19/18   1412   PROTTOTAL  7.2  8.0   ALBUMIN  3.3*  3.5   BILITOTAL  1.2  0.8   ALKPHOS  68  79   AST  20  19   ALT  21  27       TSH   Date Value Ref Range Status   10/11/2018 0.27 (L) 0.40 - 4.00 mU/L Final   10/23/2017 0.50 0.40 - 4.00 mU/L Final     Lab Results   Component Value Date    A1C 5.7 10/11/2018    A1C 5.4 05/25/2018     Discharge Treatments:GT tube cares    TOTAL DISCHARGE TIME:   Greater than 30 minutes  Electronically signed by:  SHILPA Marino CNP      Sincerely,        SHILPA Marino CNP

## 2018-11-20 NOTE — TELEPHONE ENCOUNTER
Message left for patient to schedule Return visit with Dr Boldne after his appointment 11-30-18 with Dr Mcleod at [facility]

## 2018-11-25 NOTE — ED PROVIDER NOTES
"  History     Chief Complaint:  G tube problem    HPI   Deepak Arndt is a 83 year old male who presents via EMS with concern for a Gtube problem. The nurse reports that the patient was brought to the emergency department with concern that the patient had possible pulled out his G tube, though they were unsure if this is true, and wanted to be sure. The patient denies any pain.       Allergies:  Versed    Medications:    Zocor  Pradaxa  Amlodipine     Past Medical History:    Osteoporosis  MI  Murmur  HTN  Hyperlipidemia  Esophageal cancer  [place] PE  Aortic stenosis  Lung nodules  Hyperlipidemia  GERD  ED  Detached retina  right ear  Cataract  Cancer  Afib  Actinic keratosis    Past Surgical History:    Cataract    Family History:    No past pertinent family history.    Social History:  Marital Status:   [2]  Former smoker: 1 ppd, 20 years, quit 1974  Positive for alcohol use.     Review of Systems   Unable to perform ROS: Dementia   Musculoskeletal: Negative for myalgias.       Physical Exam     Patient Vitals for the past 24 hrs:   BP Temp Temp src Heart Rate Resp SpO2 Height Weight   11/25/18 1022 152/72 99.1  F (37.3  C) Temporal 91 20 96 % 1.727 m (5' 8\") 63.5 kg (140 lb)         Physical Exam    General: Alert  Head:  Scalp is atraumatic  Eyes:  The pupils are equal, round, and reactive to light    EOM's intact    No scleral icterus  ENT:      Nose:  The external nose is normal  Ears:  External ears are normal  Mouth/Throat: The oropharynx is normal    Mucus membranes are dry      Neck:  Normal range of motion.      There is no rigidity.    Trachea is in the midline         CV:  Regular rate and rhythm    No murmur   Resp:  Breath sounds are clear bilaterally    Non-labored, no retractions or accessory muscle use      GI:  Abdomen is soft, no distension, no tenderness. G tube in left upper quadrant. No signs of surrounding erythema.       MS:  Normal strength in all 4 extremities      Emergency " Department Course       Imaging:  Radiographic findings were communicated with the patient who voiced understanding of the findings.    XR Abdomen 1 views,:   Gastrostomy tube is identified. Contrast injection of the  tube shows opacification of the duodenum and distal stomach. As such,  this appears appropriate in position. Nonobstructive bowel gas  pattern. as per radiology.       Emergency Department Course:    Nursing notes and vitals reviewed. (1025) I performed an exam of the patient as documented above.     The patient was sent for a abdomen XR while in the emergency department, findings above.     (1100) I rechecked the patient and discussed the results of his workup thus far.     Findings and plan explained to the Patient. Patient discharged home with instructions regarding supportive care, medications, and reasons to return. The importance of close follow-up was reviewed.     I personally reviewed the laboratory results with the Patient and answered all related questions prior to discharge.     Impression & Plan      Medical Decision Making:  Mr. Arndt was seen and evaluated. History is quite limited given patient's baseline mental status. There was concern for his G tube being in an inappropriate position, therefore OMNIpaque was inserted. Plain radiograph was performed of Omnipaque in stomach. This appears to be an intact and in place gastric tube. Patient was subsequently transported back to his hospice facility where he is on hospice cares. No signs of infection or more concerning illness.     Diagnosis:    ICD-10-CM    1. Gastrointestinal tube present (H) Z93.1    2. Hospice care patient Z51.5        Disposition:  discharged to home    Scribe Disclosure:  Juan Carlos GALVAN, am serving as a scribe on 11/25/2018 at 10:25 AM to personally document services performed by Javi Martin MD, based on my observations and the provider's statements to me.       Juan Carlos Oconnor  11/25/2018   [facility]  EMERGENCY DEPARTMENT       Trigger, Javi Miles MD  11/25/18 [id number]

## 2018-11-25 NOTE — DISCHARGE INSTRUCTIONS
"Comfort Measures  End-of-Life Care for Patients and Their Families  What does \"comfort measures only\" mean?  \"Comfort measures only\" is a medical treatment at the end of life. It means we let the natural dying process happen while keeping you as comfortable as possible.  After talking with you and your family about care goals, your doctor may write an order for Comfort measures only. This helps make sure that any further treatments follow your wishes.  \"Comfort measures only\" may be right for you if:    It is not possible to prolong life or hope for a cure.    Comfort is your main concern.    You are not okay with your expected quality of life.    Your health care agent tells us that comfort is the main goal, based on your previously expressed wishes.  What to expect  Comfort measures may include:    Offering pain and symptom management. This could be medicines or other therapies.    Offering a quiet, private area that supports the end-of-life process    Giving spiritual care    Limiting how often we check vital signs    Stopping medicines that do not aid comfort    Stopping needle sticks and blood draws  Changes that may happen: (please note, every patient is different)    Sleeping more    Eating or drinking less    Feeling confused    Decreased vision and hearing    Hallucinations, talking to those who are not there    Incontinence (loss of control of bladder or bowels)    Cool hands, fingers or toes  Family involvement and support  To help ease your loved one's symptoms and offer comfort, try the tips below.    Let your loved one sleep.    Include children from your family in a way that's appropriate for their age.    Keep your loved one's lips moist. You can use ice chips, ointments or mouth sponges.    Ask for medicines to treat symptoms.    Write down memories of the journey and stories your loved one has told.    Play your loved one's favorite music.    Keep a calm environment.    Continue to touch and stay " close to your loved one.    Tell the health care team about any and all concerns.  Tell your health care team right away if your loved one:    Feels restless or anxious    Has changes in breathing    Feels pain or discomfort    Needs spiritual support or Adventist and cultural traditions  Where comfort care is given  Discharge from the hospital for end-of-life care in hospice or home care will usually happen within 24 to 48 hours of choosing comfort measures. We may move your loved one from a critical care setting to a medical or surgical unit while we make a discharge plan.  Critical care units offer one-on-one nursing care during the patient's stay. Our medical units also give patient-focused care, but nurses care for more patients at one time.  No matter what, our focus will be to give the best care to you and your loved one.  Our nursing staff will visit patients every 1 to 2 hours to make sure we are meeting the needs of our patients and families. Please tell us how we can support you at this time.  More resources:    Palliative Care Services    Spiritual Health Services ()    Bereavement support, offered by [facility]: [phone]  [email or web address]    For informational purposes only. Not to replace the advice of your health care provider.   Copyright   2014 [facility]. All rights reserved. [employer] [id number] - REV 09/16.

## 2018-11-25 NOTE — ED NOTES
Bed: ED04  Expected date:   Expected time:   Means of arrival:   Comments:  Shira 1 G-tube not working 83 male

## 2018-11-25 NOTE — ED AVS SNAPSHOT
"  Emergency Department    [address] [facility] [address]    Phone:  [phone]    Fax:  [phone]                                       Deepak Arndt   MRN: [id number]    Department:   Emergency Department   Date of Visit:  11/25/2018           Patient Information     Date Of Birth          1935        Your diagnoses for this visit were:     Gastrointestinal tube present (H)     Hospice care patient        You were seen by Javi Martin MD.      Follow-up Information     Follow up with Clinton Mills MD.    Specialty:  Family Practice    Why:  As needed, If symptoms worsen    Contact information:    [address] [facility] [address] [phone]          Follow up with  Emergency Department.    Specialty:  EMERGENCY MEDICINE    Why:  As needed, If symptoms worsen    Contact information:    [address] [facility] [id number] [phone]        Discharge Instructions       Comfort Measures  End-of-Life Care for Patients and Their Families  What does \"comfort measures only\" mean?  \"Comfort measures only\" is a medical treatment at the end of life. It means we let the natural dying process happen while keeping you as comfortable as possible.  After talking with you and your family about care goals, your doctor may write an order for Comfort measures only. This helps make sure that any further treatments follow your wishes.  \"Comfort measures only\" may be right for you if:    It is not possible to prolong life or hope for a cure.    Comfort is your main concern.    You are not okay with your expected quality of life.    Your health care agent tells us that comfort is the main goal, based on your previously expressed wishes.  What to expect  Comfort measures may include:    Offering pain and symptom management. This could be medicines or other therapies.    Offering a quiet, private area that supports the end-of-life process    Giving " spiritual care    Limiting how often we check vital signs    Stopping medicines that do not aid comfort    Stopping needle sticks and blood draws  Changes that may happen: (please note, every patient is different)    Sleeping more    Eating or drinking less    Feeling confused    Decreased vision and hearing    Hallucinations, talking to those who are not there    Incontinence (loss of control of bladder or bowels)    Cool hands, fingers or toes  Family involvement and support  To help ease your loved one's symptoms and offer comfort, try the tips below.    Let your loved one sleep.    Include children from your family in a way that's appropriate for their age.    Keep your loved one's lips moist. You can use ice chips, ointments or mouth sponges.    Ask for medicines to treat symptoms.    Write down memories of the journey and stories your loved one has told.    Play your loved one's favorite music.    Keep a calm environment.    Continue to touch and stay close to your loved one.    Tell the health care team about any and all concerns.  Tell your health care team right away if your loved one:    Feels restless or anxious    Has changes in breathing    Feels pain or discomfort    Needs spiritual support or Adventist and cultural traditions  Where comfort care is given  Discharge from the hospital for end-of-life care in hospice or home care will usually happen within 24 to 48 hours of choosing comfort measures. We may move your loved one from a critical care setting to a medical or surgical unit while we make a discharge plan.  Critical care units offer one-on-one nursing care during the patient's stay. Our medical units also give patient-focused care, but nurses care for more patients at one time.  No matter what, our focus will be to give the best care to you and your loved one.  Our nursing staff will visit patients every 1 to 2 hours to make sure we are meeting the needs of our patients and families. Please  tell us how we can support you at this time.  More resources:    Palliative Care Services    Spiritual Health Services ()    Bereavement support, offered by [facility]: [phone]  [email or web address]    For informational purposes only. Not to replace the advice of your health care provider.   Copyright   2014 [facility]. All rights reserved. [employer] 294039 - REV 09/16.      24 Hour Appointment Hotline       To make an appointment at any [facility] clinic, call [phone] ([phone]). If you don't have a family doctor or clinic, we will help you find one. [facility] clinics are conveniently located to serve the needs of you and your family.             Review of your medicines      Our records show that you are taking the medicines listed below. If these are incorrect, please call your family doctor or clinic.        Dose / Directions Last dose taken    acetaminophen 325 MG tablet   Commonly known as:  TYLENOL   Dose:  650 mg   Quantity:  100 tablet        Take 2 tablets (650 mg) by mouth every 4 hours as needed for mild pain or fever   Refills:  0        amLODIPine 5 MG tablet   Commonly known as:  NORVASC   Dose:  5 mg   Indication:  High Blood Pressure Disorder   Quantity:  30 tablet        Take 1 tablet (5 mg) by mouth daily   Refills:  0        dabigatran ANTICOAGULANT 150 MG capsule   Commonly known as:  PRADAXA   Quantity:  60 capsule        Take 1 capsule (150 mg) by mouth twice daily. Store in original 's bottle or blister pack; use within 120 days of opening.   Refills:  0        fish oil-omega-3 fatty acids 1000 MG capsule   Dose:  1 capsule        Take 1 capsule by mouth daily.   Refills:  0        pantoprazole 2 mg/mL Susp suspension   Commonly known as:  PROTONIX   Dose:  40 mg   Quantity:  400 mL        20 mLs (40 mg) by Per G Tube route daily   Refills:  0        simvastatin 10 MG tablet   Commonly known as:  ZOCOR   Dose:  5 mg   Indication:   cuts 10 mg in half   Quantity:  30 tablet        Take 0.5 tablets (5 mg) by mouth At Bedtime (Takes half of a 10mg tablet for a total of 5mg daily)   Refills:  0                Procedures and tests performed during your visit     Abdomen XR 1 vw      Orders Needing Specimen Collection     None      Pending Results     Date and Time Order Name Status Description    11/25/2018 1028 Abdomen XR 1 vw Preliminary             Pending Culture Results     No orders found from 11/23/2018 to 11/26/2018.            Pending Results Instructions     If you had any lab results that were not finalized at the time of your Discharge, you can call the ED Lab Result RN at [phone]. You will be contacted by this team for any positive Lab results or changes in treatment. The nurses are available 7 days a week from 10A to 6:30P.  You can leave a message 24 hours per day and they will return your call.        Test Results From Your Hospital Stay        11/25/2018 10:45 AM      Narrative     ABDOMEN ONE VIEW    11/25/2018 10:42 AM     HISTORY: Evaluate G tube placement.     COMPARISON: None.        Impression     IMPRESSION: Gastrostomy tube is identified. Contrast injection of the  tube shows opacification of the duodenum and distal stomach. As such,  this appears appropriate in position. Nonobstructive bowel gas  pattern.                Clinical Quality Measure: Blood Pressure Screening     Your blood pressure was checked while you were in the emergency department today. The last reading we obtained was  BP: 152/72 . Please read the guidelines below about what these numbers mean and what you should do about them.  If your systolic blood pressure (the top number) is less than 120 and your diastolic blood pressure (the bottom number) is less than 80, then your blood pressure is normal. There is nothing more that you need to do about it.  If your systolic blood pressure (the top number) is 120-139 or your diastolic blood pressure (the  bottom number) is 80-89, your blood pressure may be higher than it should be. You should have your blood pressure rechecked within a year by a primary care provider.  If your systolic blood pressure (the top number) is 140 or greater or your diastolic blood pressure (the bottom number) is 90 or greater, you may have high blood pressure. High blood pressure is treatable, but if left untreated over time it can put you at risk for heart attack, stroke, or kidney failure. You should have your blood pressure rechecked by a primary care provider within the next 4 weeks.  If your provider in the emergency department today gave you specific instructions to follow-up with your doctor or provider even sooner than that, you should follow that instruction and not wait for up to 4 weeks for your follow-up visit.        Thank you for choosing [facility]       Thank you for choosing [facility] for your care. Our goal is always to provide you with excellent care. Hearing back from our patients is one way we can continue to improve our services. Please take a few minutes to complete the written survey that you may receive in the mail after you visit with us. Thank you!        [facility] Information     [facility] gives you secure access to your electronic health record. If you see a primary care provider, you can also send messages to your care team and make appointments. If you have questions, please call your primary care clinic.  If you do not have a primary care provider, please call [phone] and they will assist you.        Care EveryWhere ID     This is your Care EveryWhere ID. This could be used by other organizations to access your [facility] medical records  [id number]        Equal Access to Services     NICK LOONEY : Hadii peggy Giron, waaxda meka, qaybta kaalgallito delgado . So [facility] [place] [phone].    ATENCIÓN: Si habla español, tiene a esqueda disposición servicios gratuitos  de asistencia lingüística. Antonio brown [phone].    We comply with applicable federal civil rights laws and [place] laws. We do not discriminate on the basis of race, color, national origin, age, disability, sex, sexual orientation, or gender identity.            After Visit Summary       This is your record. Keep this with you and show to your community pharmacist(s) and doctor(s) at your next visit.

## 2018-11-25 NOTE — ED AVS SNAPSHOT
Emergency Department    [address] [place] [address]    Phone:  [phone]    Fax:  [phone]                                       Deepak Arndt   MRN: [id number]    Department:   Emergency Department   Date of Visit:  11/25/2018           After Visit Summary Signature Page     I have received my discharge instructions, and my questions have been answered. I have discussed any challenges I see with this plan with the nurse or doctor.    ..........................................................................................................................................  Patient/Patient Representative Signature      ..........................................................................................................................................  Patient Representative Print Name and Relationship to Patient    ..................................................               ................................................  Date                                   Time    ..........................................................................................................................................  Reviewed by Signature/Title    ...................................................              ..............................................  Date                                               Time          22EPIC Rev 08/18

## 2018-12-07 ENCOUNTER — PATIENT OUTREACH (OUTPATIENT)
Dept: CARE COORDINATION | Facility: CLINIC | Age: 83
End: 2018-12-07

## 2018-12-07 NOTE — PROGRESS NOTES
Clinic Care Coordination Contact  Care Team Conversations    Completed chart review, patient .    Will close care coordination referral.    Mari Otoole, VIRGIE, [facility]  Clinic Care Coordinator  [email or web address]  [phone]

## 2019-02-22 NOTE — PROGRESS NOTES
[facility]  Hospitalist Progress Note   10/17/2018          Assessment and Plan:       Deepak Arndt is a 83 year old male  admitted on 10/11/2018. He has a history of recently diagnosed pancreatic CA s/p gastrostomy tube, CAD, Htn and is admitted for weakness, failure to thrive and small pulmonary embolism.      Adult failure to thrive  Physical deconditioning from acute illness/malignancy/senile fraility  Mechanical fall at home due to physical deconditioning  Possible mild cognitive impairment/dementia at baseline.  Severe malnutrition in the context of chronic illness  Patient has been having ongoing generalized weakness, fell out of bed on the night of admission and unable to get back up. CT head no acute intracranial pathology. G-tube tube placed 10/15.    -Appreciate nutrition team assistance with tube feeding.  Free water through feeding tube.  -Dietary supplements with Ensure  -Speech therapy has concerns for aspiration and recommend n.p.o. with pleasure foods and thin liquids.   -Oncology recommend - Okay for soft foods and liquids as long as not having N/V, pain or cough  -Pt getting most of his nutrition through the G-tube, takes some pills orally and eats for pleausure  -Physical therapy, occupational therapy -recommend TCU.  Not safe to go home.  Discussed with care coordinator on floor for assistance with transition of care.  Care team has been having challenge with TCU given patient on chemotherapy.  PT/OT reassessment requested.      Pulmonary embolism in the setting of malignancy/anticoagulation on hold for procedure.  Pt was off Pradaxa 150 mg BID from 10/5-10/10 for G-Tube/Port placement.    CT chest done in the emergency room positive for right-sided pulmonary embolus and noted Emphysema. Distal esophageal mass consistent with known cancer is again seen. Mediastinal and upper abdominal adenopathy have slightly increased.  PTA dabigatran switch to low molecular weight heparin  (10/16) for hypercoagulation of malignancy/swallowing difficulty.  Aggressive incentive spirometry.      Reactive leukocytosis likely from malignancy. Improved  Lactic acidosis likely from dehydration and poor oral intake.  WBC count stable/improved, lactate of 2.1 >1.3 UA -no signs of infection.   Chest x-ray right chest wall port with catheter tip in SVC.  No acute pathology.   Blood cultures no growth..  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    Had a G-tube and port placed on 10/5/2018.  Weekly paclitaxel and carboplatinum -started 10/16.  Dr. Peter of radiation oncology following to consider radiation therapy in the future  [facility] following-appreciate comanagement      Atrial fibrillation with slow ventricular response.  He is on chronic therapy with propranolol which is on hold since admission.  Cardiology / EP recommend to continue to hold beta-blocker. Holter monitor at discharge.  CHADS-Vasc of 4.   PTA dabigatran switch to low molecular weight heparin (10/16) for hypercoagulation of malignancy/swallowing difficulty.      Abnormal thyroid function test.  TSH 0.27, T4 1.52. Patient having bradycardia hence will hold off treatment for hyperthyroidism.  -Monitor thyroid function tests in 4-6 weeks.      Coronary artery disease  History of MI.  Hyperlipidemia  Hypertension  -Continue PTA amlodipine 5 mg oral daily, simvastatin 5 mg.  -holding PTA hydrochlorothiazide 25 mg - consider restarting as needed      Essential tremor   -Hold PTA propranolol given bradycardia      Gastroesophageal reflux disease.  Continue PTA Protonix 40 mg oral daily.      Osteoporosis.  -Follow-up as outpatient.     Contact dermatitis: rash noted on back, itchy.   -hydroxyzine prn    Active Diet Order      Advance Diet as Tolerated      Dysphagia Diet Level 1 Pureed Thin Liquids  (water, ice chips, juice, milk gelatin, ice cream, etc)    Adult Formula Bolus Feeding: QID Isosource 1.5; Route: Gastrostomy; 4; Can(s); Medication - Tube Feeding Flush Frequency: At least 15-30 mL water before and after medication administration and with tube clogging;  DVT Prophylaxis:  Sequential compression device. Pradaxa  Code Status: Full Code  Disposition: Expected discharge pending placement/oncology clearance for discharge    Patient, family, interdisciplinary team involved in care and agrees with plan.  Total time - Greater than 25 min. More than 50% of time spent in direct patient care, care coordination, patient/caregiver counseling, and formalizing plan of care.     Kwan Benoit MD        Interval History:      Patient sitting up in the chair.  Denies any chest pain or shortness of breath.  Able to answer simple commands, states rash over the back is improved.  Minimal oral intake.       Physical Exam:        Physical Exam   Temp:  [96.3  F (35.7  C)-97.2  F (36.2  C)] 97.2  F (36.2  C)  Heart Rate:  [55-58] 55  Resp:  [16-18] 16  BP: (120-145)/(59-61) 120/60  SpO2:  [95 %-97 %] 95 %    Intake/Output Summary (Last 24 hours) at 10/17/18 1534  Last data filed at 10/17/18 1300   Gross per 24 hour   Intake             1210 ml   Output             1075 ml   Net              135 ml     Admission Weight: 65.8 kg (145 lb)  Current Weight: 62.8 kg (138 lb 6.4 oz)    PHYSICAL EXAM  GENERAL: Patient is in no distress. Alert and able to answer simple commands.  Appears chronically ill.    HEART: Regular rate and rhythm. S1S2. No murmurs  LUNGS: Bilateral slightly decreased breath sounds, no wheezing no crackles.  ABDOMEN: Soft, no abdominal tenderness, bowel sounds heard   NEURO: Moving all extremities.  EXTREMITIES: No pedal edema. 2+ peripheral pulses.  SKIN: Warm, dry.  Rash over the back improved  PSYCHIATRY Cooperative       Medications:          amLODIPine  5 mg Oral Daily     calcium carbonate 500  mg-vitamin D 200 units  1 tablet Oral Daily     diphenhydrAMINE  50 mg Oral Once     enoxaparin  1 mg/kg Subcutaneous Q12H     heparin  5 mL Intracatheter Q28 Days     heparin lock flush  5-10 mL Intracatheter Q24H     pantoprazole  40 mg Per G Tube QAM     simvastatin  5 mg Oral At Bedtime     acetaminophen, acetaminophen, albuterol, albuterol, IV fluid REPLACEMENT ONLY, diphenhydrAMINE, EPINEPHrine, heparin lock flush, hydrOXYzine, lidocaine 4%, LORazepam, - MEDICATION INSTRUCTIONS -, melatonin, meperidine, methylPREDNISolone, mineral oil-white petrolatum, naloxone, ondansetron **OR** ondansetron, - MEDICATION INSTRUCTIONS -, potassium phosphate (KPHOS) in D5W IV, potassium phosphate (KPHOS) in D5W IV, potassium phosphate (KPHOS) in D5W IV, potassium phosphate (KPHOS) in D5W IV, senna-docusate **OR** senna-docusate, sodium chloride (PF), sodium chloride (PF), sodium chloride         Data:      All new lab and imaging data was reviewed.                Cell Phone/PDA (specify)/Clothing

## 2019-05-07 NOTE — IP AVS SNAPSHOT
MRN:[id number]                      After Visit Summary   10/11/2018    Deepak Arndt    MRN: [id number]           Thank you!     Thank you for choosing [facility] for your care. Our goal is always to provide you with excellent care. Hearing back from our patients is one way we can continue to improve our services. Please take a few minutes to complete the written survey that you may receive in the mail after you visit with us. Thank you!        Patient Information     Date Of Birth          1935        Designated Caregiver       Most Recent Value    Caregiver    Will someone help with your care after discharge? yes    Name of designated caregiver joe    Phone number of caregiver [phone]    Caregiver address [address] [place] [id number]      About your hospital stay     You were admitted on:  October 11, 2018 You last received care in the:  [address]    You were discharged on:  October 20, 2018        Reason for your hospital stay       Were admitted to the hospital with falls/physical deconditioning.                  Who to Call     For medical emergencies, please call 911.  For non-urgent questions about your medical care, please call your primary care provider or clinic, [phone]          Attending Provider     Provider Specialty    Sherwin Walker MD Emergency Medicine    [place], Nicolás Cox DO Internal Medicine       Primary Care Provider Office Phone # Fax #    [place] [phone]      After Care Instructions     Activity - Up with nursing assistance           Additional Discharge Instructions       Speech therapy recommend n.p.o. due to esophageal obstruction.  Patient and her family can consider pleasure p.o. intake with full liquid texture, upright positioning for 1-2 hours after any intake [as per oncology]  Dietary supplements with Ensure.  Tube feeding as per nutrition recommendation.             Refill request - DRUG DRUG INTERACTION  Okay to fill? Drug-Drug: verapamil and simvastatin   Plasma concentrations and pharmacologic effects of statins (atorvastatin, lovastatin and simvastatin) may be increased by verapamil. Toxicity, characterized by muscle injury, may occur. Specific maximum dose recommendations exist for simvastatin and lovastatin when coadministered with verapamil in official package labeling. Additionally, limited data indicates that atorvastatin and lovastatin may cause an increased exposure to verapamil. Verapamil 240mg tab.  #30 with 5 refills  LOV 5/3/19  /80 on 5/3/19  CMP 5/3/19 Advance Diet as Tolerated       Adult Formula Bolus Feeding: QID Isosource 1.5; Route: Gastrostomy; 4; Can(s); Medication - Tube Feeding Flush Frequency: At least 15-30 mL water before and after medication administration and with tube clogging;            Encourage PO fluids       Dysphagia Diet Level 1 Pureed Thin Liquids (water, ice chips, juice, milk gelatin, ice cream, etc)            Fall precautions           General info for SNF       Length of Stay Estimate: Short Term Care: Estimated # of Days <30  Condition at Discharge: Stable  Level of care:skilled   Rehabilitation Potential: Fair  Admission H&P remains valid and up-to-date: Yes  Recent Chemotherapy: plan to start soon                Use Nursing Home Standing Orders: Yes            Mantoux instructions       Give two-step Mantoux (PPD) Per Facility Policy Yes                  Follow-up Appointments     Follow Up and recommended labs and tests       Schedule an outpatient consult with [facility] for ongoing weekly chemotherapy with Taxol/Carboplatin while at [facility]. Chemotherapy is due 10/23/2018.            Follow Up and recommended labs and tests       Follow up with [facility] physician.  The following labs/tests are recommended: BMP in 1 week .    Follow-up with oncology as per schedule.  Monitor thyroid function tests in 4-6 weeks.  Event monitor at discharge, follow-up with cardiology as outpatient.  Monitor daily blood pressure/heart rate and review on provider visit.  Review volume status and consider optimizing dose of diuretic.                  Your next 10 appointments already scheduled     Oct 23, 2018  7:30 AM CDT   Level 4 with  INFUSION CHAIR 2   [facility] ([facility])    [facility] [place]  [address]  [facility] [address]   [phone]            Oct 23, 2018  8:20 AM CDT   Return Visit with El Bolden MD   [facility] ([facility])    [facility]  [address]  [place] [address] [phone]              Additional Services     Follow-Up with Cardiac Advanced Practice Provider           Nutrition Services Adult IP Consult       Reason: Tube feeding            Occupational Therapy Adult Consult       Evaluate and treat as clinically indicated.    Reason: Physical deconditioning/cognitive decline            Physical Therapy Adult Consult       Evaluate and treat as clinically indicated.    Reason:  deconditioning            Speech Language Path Adult Consult       Evaluate and treat as clinically indicated.    Reason: Risk for aspiration.                  Pending Results     Date and Time Order Name Status Description    10/11/2018 1040 T4 free In process             Statement of Approval     Ordered          10/20/18 1012  I have reviewed and agree with all the recommendations and orders detailed in this document.  EFFECTIVE NOW     Approved and electronically signed by:  Donnell Larsen MD           10/13/18 7160  I have reviewed and agree with all the recommendations and orders detailed in this document.  EFFECTIVE NOW     Approved and electronically signed by:  Kwan Benoit MD             Admission Information     Date & Time Provider Department Dept. Phone    10/11/2018 Nicolás Mercado DO [place] [address] Oncology [phone]      Your Vitals Were     Blood Pressure Pulse Temperature Respirations Height Weight    153/58 (BP Location: Right arm) 54 96.5  F (35.8  C) (Oral) 16 1.727 m (5' 7.99\") 58.7 kg (129 lb 4.8 oz)    Pulse Oximetry BMI (Body Mass Index)                91% 19.66 kg/m2          MyChart Information     [employer] gives you secure access to your electronic health record. If you see a primary care provider, you can also send messages to your care team and make appointments. If you have questions, please call your primary care clinic.  If you do " not have a primary care provider, please call [phone] and they will assist you.        Care EveryWhere ID     This is your Care EveryWhere ID. This could be used by other organizations to access your [facility] medical records  [id number]        Equal Access to Services     NICK LOONEY : Hadii aad ku hadrustylia Divinaali, watereda luqadaha, qamichaelta kaalmada yovana, waxjose mayelin chelorichy ingram johnjag barth. So [facility] [phone].    ATENCIÓN: Si habla español, tiene a esqueda disposición servicios gratuitos de asistencia lingüística. Llame al [phone].    We comply with applicable federal civil rights laws and [place] laws. We do not discriminate on the basis of race, color, national origin, age, disability, sex, sexual orientation, or gender identity.               Review of your medicines      START taking        Dose / Directions    enoxaparin 60 MG/0.6ML injection   Commonly known as:  LOVENOX   Used for:  Other acute pulmonary embolism without acute cor pulmonale (H)        Dose:  60 mg   Inject 0.6 mLs (60 mg) Subcutaneous every 12 hours   Refills:  0       pantoprazole 2 mg/mL Susp suspension   Commonly known as:  PROTONIX   Replaces:  pantoprazole 40 MG EC tablet        Dose:  40 mg   20 mLs (40 mg) by Per G Tube route every morning   Refills:  0         CONTINUE these medicines which have NOT CHANGED        Dose / Directions    acetaminophen 325 MG tablet   Commonly known as:  TYLENOL   Used for:  Generalized muscle weakness        Dose:  650 mg   Take 2 tablets (650 mg) by mouth every 4 hours as needed for mild pain or fever   Quantity:  100 tablet   Refills:  0       amLODIPine 5 MG tablet   Commonly known as:  NORVASC   Indication:  High Blood Pressure Disorder   Used for:  Essential hypertension, benign        Dose:  5 mg   Take 1 tablet (5 mg) by mouth daily   Quantity:  90 tablet   Refills:  0       calcium carbonate 500 mg-vitamin D 200 units 500-200 MG-UNIT per tablet   Commonly known as:  OSCAL  with D;OYSTER SHELL CALCIUM        Dose:  1 tablet   Take 1 tablet by mouth daily   Refills:  0       DAILY MULTI PO        Take by mouth daily   Refills:  0       fish oil-omega-3 fatty acids 1000 MG capsule        Dose:  1 capsule   Take 1 capsule by mouth daily.   Refills:  0       simvastatin 10 MG tablet   Commonly known as:  ZOCOR   Indication:  cuts 10 mg in half        Dose:  5 mg   Take 5 mg by mouth At Bedtime (Takes half of a 10mg tablet for a total of 5mg daily)   Refills:  0         STOP taking     hydrochlorothiazide 25 MG tablet   Commonly known as:  HYDRODIURIL           pantoprazole 40 MG EC tablet   Commonly known as:  PROTONIX   Replaced by:  pantoprazole 2 mg/mL Susp suspension           PRADAXA PO           propranolol 40 MG tablet   Commonly known as:  INDERAL                Where to get your medicines      Some of these will need a paper prescription and others can be bought over the counter. Ask your nurse if you have questions.     You don't need a prescription for these medications     enoxaparin 60 MG/0.6ML injection    pantoprazole 2 mg/mL Susp suspension                Protect others around you: Learn how to safely use, store and throw away your medicines at www.disposemymeds.org.             Medication List: This is a list of all your medications and when to take them. Check marks below indicate your daily home schedule. Keep this list as a reference.      Medications           Morning Afternoon Evening Bedtime As Needed    acetaminophen 325 MG tablet   Commonly known as:  TYLENOL   Take 2 tablets (650 mg) by mouth every 4 hours as needed for mild pain or fever   Last time this was given:  650 mg on 10/19/2018  5:04 AM                                amLODIPine 5 MG tablet   Commonly known as:  NORVASC   Take 1 tablet (5 mg) by mouth daily   Last time this was given:  5 mg on 10/20/2018  9:29 AM                                calcium carbonate 500 mg-vitamin D 200 units 500-200 MG-UNIT  per tablet   Commonly known as:  OSCAL with D;OYSTER SHELL CALCIUM   Take 1 tablet by mouth daily   Last time this was given:  1 tablet on 10/20/2018  9:29 AM                                DAILY MULTI PO   Take by mouth daily                                enoxaparin 60 MG/0.6ML injection   Commonly known as:  LOVENOX   Inject 0.6 mLs (60 mg) Subcutaneous every 12 hours   Last time this was given:  60 mg on 10/20/2018  9:29 AM                                fish oil-omega-3 fatty acids 1000 MG capsule   Take 1 capsule by mouth daily.                                pantoprazole 2 mg/mL Susp suspension   Commonly known as:  PROTONIX   20 mLs (40 mg) by Per G Tube route every morning   Last time this was given:  40 mg on 10/20/2018  9:45 AM                                simvastatin 10 MG tablet   Commonly known as:  ZOCOR   Take 5 mg by mouth At Bedtime (Takes half of a 10mg tablet for a total of 5mg daily)   Last time this was given:  5 mg on 10/19/2018  9:05 PM

## 2019-10-09 NOTE — PROGRESS NOTES
Infusion Nursing Note:  Deepak Arndt presents today for Carbo/Taxol C2D1.    Patient seen by provider today: Yes: Dr. Bolden   present during visit today: Not Applicable.    Note: N/A.    Intravenous Access:  Implanted Port.    Treatment Conditions:  Lab Results   Component Value Date    HGB 14.2 10/23/2018     Lab Results   Component Value Date    WBC 11.6 10/23/2018      Lab Results   Component Value Date    ANEU 8.9 10/23/2018     Lab Results   Component Value Date     10/23/2018   Results reviewed, labs MET treatment parameters, ok to proceed with treatment.    Post Infusion Assessment:  Patient tolerated infusion without incident.  Blood return noted pre and post infusion.  Site patent and intact, free from redness, edema or discomfort.  No evidence of extravasations.  Access discontinued per protocol.    Discharge Plan:   Discharge instructions reviewed with: Patient.  Patient and/or family verbalized understanding of discharge instructions and all questions answered.  Copy of AVS reviewed with patient and/or family and sent to [facility].  Patient will return 10/30/2018 for next appointment.  Patient discharged in stable condition accompanied by: self and wife.  Departure Mode: Wheelchair.    Zenia Wilkinson RN  
09-Oct-2019 18:10

## 2025-05-27 NOTE — PLAN OF CARE
"Problem: Patient Care Overview  Goal: Plan of Care/Patient Progress Review  PT: Orders received, evaluation/treatment completed. 82 year old male admitted s/p fall at home. Of note, patient has been going through an extensive OP workup for possible parkinsons. Patient lives in a home with his spouse. Pt reports 3 stairs to enter, 12 stairs within the home. Typically ambulates with a cane outside the home and \"furniture walks\" within the home. Pt alert/oriented but generally confused this session. Patient reports >5 falls in the last 6 months.      Discharge Planner PT   Patient plan for discharge: Open to TCU  Current status: Sit to supine with SBA, cues to sequence. Sit to/from stand with CGA/min A. Ambulates 5' with straight cane and mod A. Very unsteady, large LOB posteriorly to the bed. Ambulates 200' with FWW  And CGA. Min A needed at times to steady patient and to turn walker. Multiple safety cues needed to avoid stepping outside the walker. Trialed self care tasks - donning/dofifng bilateral socks and shoes. Patient with heavy lean to the L, requiring mod A to correct. Patient with difficulty following motor commands at times.   Barriers to return to prior living situation: Current level of assist required, high falls risk  Recommendations for discharge: TCU.   Rationale for recommendations: Patient would benefit from continued PT to address strength and balance deficits. If patient/family refuse, patient would require 24 hour supervision/assist, FWW with all mobility, and HHPT/OT.        Entered by: Nidia Dimas 10/23/2017 10:16 AM             " Cleared by cards

## (undated) DEVICE — SOL WATER IRRIG 1000ML BOTTLE 2F7114

## (undated) RX ORDER — FENTANYL CITRATE 50 UG/ML
INJECTION, SOLUTION INTRAMUSCULAR; INTRAVENOUS
Status: DISPENSED
Start: 2018-01-01

## (undated) RX ORDER — CEFAZOLIN SODIUM 2 G/100ML
INJECTION, SOLUTION INTRAVENOUS
Status: DISPENSED
Start: 2018-01-01